# Patient Record
Sex: MALE | Race: WHITE | NOT HISPANIC OR LATINO | Employment: OTHER | URBAN - METROPOLITAN AREA
[De-identification: names, ages, dates, MRNs, and addresses within clinical notes are randomized per-mention and may not be internally consistent; named-entity substitution may affect disease eponyms.]

---

## 2017-05-27 DIAGNOSIS — F20.9 SCHIZOPHRENIA, UNSPECIFIED TYPE: ICD-10-CM

## 2017-05-29 RX ORDER — ARIPIPRAZOLE 20 MG/1
TABLET ORAL
Qty: 90 TABLET | OUTPATIENT
Start: 2017-05-29

## 2017-08-14 DIAGNOSIS — F20.9 SCHIZOPHRENIA, UNSPECIFIED TYPE: ICD-10-CM

## 2017-08-15 RX ORDER — ARIPIPRAZOLE 20 MG/1
20 TABLET ORAL DAILY
Qty: 90 TABLET | Refills: 0 | Status: SHIPPED | OUTPATIENT
Start: 2017-08-15 | End: 2017-10-20 | Stop reason: SDUPTHER

## 2017-10-20 ENCOUNTER — OFFICE VISIT (OUTPATIENT)
Dept: FAMILY MEDICINE | Facility: CLINIC | Age: 72
End: 2017-10-20
Payer: MEDICARE

## 2017-10-20 VITALS
HEIGHT: 70 IN | OXYGEN SATURATION: 98 % | WEIGHT: 159.81 LBS | SYSTOLIC BLOOD PRESSURE: 130 MMHG | TEMPERATURE: 98 F | HEART RATE: 78 BPM | BODY MASS INDEX: 22.88 KG/M2 | DIASTOLIC BLOOD PRESSURE: 66 MMHG

## 2017-10-20 DIAGNOSIS — F20.9 SCHIZOPHRENIA, UNSPECIFIED TYPE: ICD-10-CM

## 2017-10-20 DIAGNOSIS — D48.9 NEOPLASM OF UNCERTAIN BEHAVIOR: ICD-10-CM

## 2017-10-20 DIAGNOSIS — Z00.00 ANNUAL PHYSICAL EXAM: Primary | ICD-10-CM

## 2017-10-20 PROCEDURE — 99397 PER PM REEVAL EST PAT 65+ YR: CPT | Mod: S$GLB,,, | Performed by: FAMILY MEDICINE

## 2017-10-20 PROCEDURE — 99499 UNLISTED E&M SERVICE: CPT | Mod: S$GLB,,, | Performed by: FAMILY MEDICINE

## 2017-10-20 PROCEDURE — 99999 PR PBB SHADOW E&M-EST. PATIENT-LVL III: CPT | Mod: PBBFAC,,, | Performed by: FAMILY MEDICINE

## 2017-10-20 NOTE — PROGRESS NOTES
"Chief Complaint   Patient presents with    Annual Exam     SUBJECTIVE:   Antonio Torres Jr. is a 72 y.o. male presenting for his annual checkup.  Current Outpatient Prescriptions   Medication Sig Dispense Refill    aripiprazole (ABILIFY) 20 MG Tab Take 1 tablet (20 mg total) by mouth once daily. Due for follow up 90 tablet 0    fish oil-omega-3 fatty acids 300-1,000 mg capsule Take 2 g by mouth once daily.      loratadine (CLARITIN) 10 mg tablet Take 10 mg by mouth once daily.       No current facility-administered medications for this visit.      Allergies: Patient has no known allergies.     ROS:  Feeling well. No dyspnea or chest pain on exertion. No abdominal pain, change in bowel habits, black or bloody stools. No urinary tract or prostatic symptoms. No neurological complaints.    OBJECTIVE:   The patient appears well, alert, oriented x 3, in no distress.   /66   Pulse 78   Temp 97.8 °F (36.6 °C) (Oral)   Ht 5' 10" (1.778 m)   Wt 72.5 kg (159 lb 13.3 oz)   SpO2 98%   BMI 22.93 kg/m²   ENT normal.  Neck supple. No adenopathy or thyromegaly. DALE. Lungs are clear, good air entry, no wheezes, rhonchi or rales. S1 and S2 normal, no murmurs, regular rate and rhythm. Abdomen is soft without tenderness, guarding, mass or organomegaly.  exam: deferred.  Extremities show no edema, normal peripheral pulses. Neurological is normal without focal findings.  Lesion on the scalp with kristin now treating with Derm following his scalp lesions    ASSESSMENT:   1. Schizophrenia, unspecified type      Annual exam      PLAN:     Antonio was seen today for annual exam.    Diagnoses and all orders for this visit:    Annual physical exam    Schizophrenia, unspecified type  -     aripiprazole (ABILIFY) 20 MG Tab; Take 1 tablet (20 mg total) by mouth once daily. Due for follow up    Neoplasm of uncertain behavior        The current medical regimen is effective;  continue present plan and medications.  F/u in 6 months " for med check  Answers for HPI/ROS submitted by the patient on 10/19/2017   activity change: No  unexpected weight change: No  neck pain: No  hearing loss: No  rhinorrhea: No  trouble swallowing: No  eye discharge: No  visual disturbance: No  chest tightness: No  wheezing: No  chest pain: No  palpitations: No  blood in stool: No  constipation: No  vomiting: No  diarrhea: No  polydipsia: No  polyuria: No  difficulty urinating: No  urgency: No  hematuria: No  joint swelling: No  arthralgias: No  headaches: No  weakness: No  confusion: No  dysphoric mood: No

## 2017-10-22 RX ORDER — ARIPIPRAZOLE 20 MG/1
20 TABLET ORAL DAILY
Qty: 90 TABLET | Refills: 3 | Status: SHIPPED | OUTPATIENT
Start: 2017-10-22 | End: 2018-11-12 | Stop reason: SDUPTHER

## 2017-10-24 ENCOUNTER — LAB VISIT (OUTPATIENT)
Dept: LAB | Facility: HOSPITAL | Age: 72
End: 2017-10-24
Attending: FAMILY MEDICINE
Payer: MEDICARE

## 2017-10-24 ENCOUNTER — TELEPHONE (OUTPATIENT)
Dept: FAMILY MEDICINE | Facility: CLINIC | Age: 72
End: 2017-10-24

## 2017-10-24 DIAGNOSIS — Z00.00 ENCOUNTER FOR ANNUAL PHYSICAL EXAM: Primary | ICD-10-CM

## 2017-10-24 DIAGNOSIS — Z00.00 ENCOUNTER FOR ANNUAL PHYSICAL EXAM: ICD-10-CM

## 2017-10-24 LAB
ALBUMIN SERPL BCP-MCNC: 3.6 G/DL
ALP SERPL-CCNC: 72 U/L
ALT SERPL W/O P-5'-P-CCNC: 22 U/L
ANION GAP SERPL CALC-SCNC: 7 MMOL/L
AST SERPL-CCNC: 28 U/L
BASOPHILS # BLD AUTO: 0.03 K/UL
BASOPHILS NFR BLD: 0.4 %
BILIRUB SERPL-MCNC: 0.9 MG/DL
BUN SERPL-MCNC: 13 MG/DL
CALCIUM SERPL-MCNC: 9.6 MG/DL
CHLORIDE SERPL-SCNC: 106 MMOL/L
CO2 SERPL-SCNC: 28 MMOL/L
COMPLEXED PSA SERPL-MCNC: 2.2 NG/ML
CREAT SERPL-MCNC: 1.5 MG/DL
DIFFERENTIAL METHOD: ABNORMAL
EOSINOPHIL # BLD AUTO: 0.1 K/UL
EOSINOPHIL NFR BLD: 1.2 %
ERYTHROCYTE [DISTWIDTH] IN BLOOD BY AUTOMATED COUNT: 13.1 %
EST. GFR  (AFRICAN AMERICAN): 52 ML/MIN/1.73 M^2
EST. GFR  (NON AFRICAN AMERICAN): 45 ML/MIN/1.73 M^2
ESTIMATED AVG GLUCOSE: 100 MG/DL
GLUCOSE SERPL-MCNC: 86 MG/DL
HBA1C MFR BLD HPLC: 5.1 %
HCT VFR BLD AUTO: 46.4 %
HGB BLD-MCNC: 15.9 G/DL
LYMPHOCYTES # BLD AUTO: 1 K/UL
LYMPHOCYTES NFR BLD: 14.9 %
MCH RBC QN AUTO: 31.8 PG
MCHC RBC AUTO-ENTMCNC: 34.3 G/DL
MCV RBC AUTO: 93 FL
MONOCYTES # BLD AUTO: 0.6 K/UL
MONOCYTES NFR BLD: 8.8 %
NEUTROPHILS # BLD AUTO: 5.1 K/UL
NEUTROPHILS NFR BLD: 74.6 %
PLATELET # BLD AUTO: 218 K/UL
PMV BLD AUTO: 9.9 FL
POTASSIUM SERPL-SCNC: 4.8 MMOL/L
PROT SERPL-MCNC: 7.2 G/DL
RBC # BLD AUTO: 5 M/UL
SODIUM SERPL-SCNC: 141 MMOL/L
TSH SERPL DL<=0.005 MIU/L-ACNC: 3.41 UIU/ML
URATE SERPL-MCNC: 9.3 MG/DL
WBC # BLD AUTO: 6.78 K/UL

## 2017-10-24 PROCEDURE — 84443 ASSAY THYROID STIM HORMONE: CPT

## 2017-10-24 PROCEDURE — 84153 ASSAY OF PSA TOTAL: CPT

## 2017-10-24 PROCEDURE — 84550 ASSAY OF BLOOD/URIC ACID: CPT

## 2017-10-24 PROCEDURE — 36415 COLL VENOUS BLD VENIPUNCTURE: CPT

## 2017-10-24 PROCEDURE — 83036 HEMOGLOBIN GLYCOSYLATED A1C: CPT

## 2017-10-24 PROCEDURE — 80053 COMPREHEN METABOLIC PANEL: CPT

## 2017-10-24 PROCEDURE — 85025 COMPLETE CBC W/AUTO DIFF WBC: CPT

## 2017-10-30 ENCOUNTER — TELEPHONE (OUTPATIENT)
Dept: FAMILY MEDICINE | Facility: CLINIC | Age: 72
End: 2017-10-30

## 2017-10-30 NOTE — TELEPHONE ENCOUNTER
----- Message from Fabio Lennon MD sent at 10/22/2017  1:20 PM CDT -----  Schedule 6 month recall, thank you!

## 2018-10-22 ENCOUNTER — OFFICE VISIT (OUTPATIENT)
Dept: FAMILY MEDICINE | Facility: CLINIC | Age: 73
End: 2018-10-22
Payer: MEDICARE

## 2018-10-22 VITALS
HEIGHT: 70 IN | SYSTOLIC BLOOD PRESSURE: 130 MMHG | TEMPERATURE: 98 F | WEIGHT: 163.13 LBS | HEART RATE: 66 BPM | DIASTOLIC BLOOD PRESSURE: 70 MMHG | OXYGEN SATURATION: 97 % | BODY MASS INDEX: 23.35 KG/M2

## 2018-10-22 DIAGNOSIS — F20.9 SCHIZOPHRENIA, UNSPECIFIED TYPE: ICD-10-CM

## 2018-10-22 DIAGNOSIS — E79.0 HYPERURICEMIA: ICD-10-CM

## 2018-10-22 DIAGNOSIS — Z00.00 ANNUAL PHYSICAL EXAM: Primary | ICD-10-CM

## 2018-10-22 DIAGNOSIS — E78.00 HYPERCHOLESTEROLEMIA: ICD-10-CM

## 2018-10-22 DIAGNOSIS — D48.9 NEOPLASM OF UNCERTAIN BEHAVIOR: ICD-10-CM

## 2018-10-22 PROCEDURE — 99214 OFFICE O/P EST MOD 30 MIN: CPT | Mod: S$PBB,,, | Performed by: FAMILY MEDICINE

## 2018-10-22 PROCEDURE — 99499 UNLISTED E&M SERVICE: CPT | Mod: S$GLB,,, | Performed by: FAMILY MEDICINE

## 2018-10-22 PROCEDURE — 99213 OFFICE O/P EST LOW 20 MIN: CPT | Mod: PBBFAC,PO,25 | Performed by: FAMILY MEDICINE

## 2018-10-22 PROCEDURE — 99999 PR PBB SHADOW E&M-EST. PATIENT-LVL III: CPT | Mod: PBBFAC,,, | Performed by: FAMILY MEDICINE

## 2018-10-22 PROCEDURE — 90662 IIV NO PRSV INCREASED AG IM: CPT | Mod: PBBFAC,PO

## 2018-10-22 NOTE — PROGRESS NOTES
"Chief Complaint   Patient presents with    Annual Exam     SUBJECTIVE:   Antonio Torres Jr. is a 73 y.o. male presenting for his annual checkup.  Current Outpatient Medications   Medication Sig Dispense Refill    aripiprazole (ABILIFY) 20 MG Tab Take 1 tablet (20 mg total) by mouth once daily. Due for follow up 90 tablet 3    fish oil-omega-3 fatty acids 300-1,000 mg capsule Take 2 g by mouth once daily.      loratadine (CLARITIN) 10 mg tablet Take 10 mg by mouth once daily.       No current facility-administered medications for this visit.      Allergies: Patient has no known allergies.     ROS:  Feeling well. No dyspnea or chest pain on exertion. No abdominal pain, change in bowel habits, black or bloody stools. No urinary tract or prostatic symptoms. No neurological complaints.    OBJECTIVE:   The patient appears well, alert, oriented x 3, in no distress.   /70   Pulse 66   Temp 98.4 °F (36.9 °C) (Oral)   Ht 5' 10" (1.778 m)   Wt 74 kg (163 lb 2.3 oz)   SpO2 97%   BMI 23.41 kg/m²   ENT normal.  Neck supple. No adenopathy or thyromegaly. DALE. Lungs are clear, good air entry, no wheezes, rhonchi or rales. S1 and S2 normal, no murmurs, regular rate and rhythm. Abdomen is soft without tenderness, guarding, mass or organomegaly.  exam: deferred.  Extremities show no edema, normal peripheral pulses. Neurological is normal without focal findings.    ASSESSMENT:   1. Annual physical exam    2. Schizophrenia, unspecified type    3. Neoplasm of uncertain behavior    4. Hypercholesterolemia    5. Hyperuricemia        PLAN:     Counseled on age appropriate medical preventative services, including age appropriate cancer screenings, over all nutritional health, need for a consistent exercise regimen and an over all push towards maintaining a vigorous and active lifestyle.  Counseled on age appropriate vaccines and discussed upcoming health care needs based on age/gender.  Spent time with patient " counseling on need for a good patient/doctor relationship moving forward.  Discussed use of common OTC medications and supplements.  Discussed common dietary aids and use of caffeine and the need for good sleep hygiene and stress management.      We will watch him  No blood work.

## 2018-11-12 DIAGNOSIS — F20.9 SCHIZOPHRENIA, UNSPECIFIED TYPE: ICD-10-CM

## 2018-11-12 RX ORDER — ARIPIPRAZOLE 20 MG/1
TABLET ORAL
Qty: 90 TABLET | Refills: 3 | Status: SHIPPED | OUTPATIENT
Start: 2018-11-12 | End: 2019-08-19 | Stop reason: SDUPTHER

## 2019-01-14 ENCOUNTER — PATIENT MESSAGE (OUTPATIENT)
Dept: FAMILY MEDICINE | Facility: CLINIC | Age: 74
End: 2019-01-14

## 2019-08-19 DIAGNOSIS — F20.9 SCHIZOPHRENIA, UNSPECIFIED TYPE: ICD-10-CM

## 2019-08-19 RX ORDER — ARIPIPRAZOLE 20 MG/1
TABLET ORAL
Qty: 90 TABLET | Refills: 3 | Status: SHIPPED | OUTPATIENT
Start: 2019-08-19 | End: 2020-08-24 | Stop reason: SDUPTHER

## 2019-10-08 ENCOUNTER — PATIENT OUTREACH (OUTPATIENT)
Dept: ADMINISTRATIVE | Facility: HOSPITAL | Age: 74
End: 2019-10-08

## 2019-10-22 ENCOUNTER — OFFICE VISIT (OUTPATIENT)
Dept: FAMILY MEDICINE | Facility: CLINIC | Age: 74
End: 2019-10-22
Payer: MEDICARE

## 2019-10-22 VITALS
HEART RATE: 72 BPM | DIASTOLIC BLOOD PRESSURE: 70 MMHG | OXYGEN SATURATION: 98 % | SYSTOLIC BLOOD PRESSURE: 112 MMHG | BODY MASS INDEX: 23.83 KG/M2 | WEIGHT: 166.44 LBS | TEMPERATURE: 98 F | HEIGHT: 70 IN

## 2019-10-22 DIAGNOSIS — Z23 NEEDS FLU SHOT: ICD-10-CM

## 2019-10-22 DIAGNOSIS — Z00.00 ANNUAL PHYSICAL EXAM: Primary | ICD-10-CM

## 2019-10-22 DIAGNOSIS — N18.30 CKD (CHRONIC KIDNEY DISEASE) STAGE 3, GFR 30-59 ML/MIN: ICD-10-CM

## 2019-10-22 DIAGNOSIS — F20.9 SCHIZOPHRENIA, UNSPECIFIED TYPE: ICD-10-CM

## 2019-10-22 DIAGNOSIS — R79.9 ABNORMAL FINDING OF BLOOD CHEMISTRY, UNSPECIFIED: ICD-10-CM

## 2019-10-22 DIAGNOSIS — R73.03 PREDIABETES: ICD-10-CM

## 2019-10-22 PROCEDURE — G0008 ADMIN INFLUENZA VIRUS VAC: HCPCS | Mod: S$GLB,,, | Performed by: FAMILY MEDICINE

## 2019-10-22 PROCEDURE — 99999 PR PBB SHADOW E&M-EST. PATIENT-LVL III: ICD-10-PCS | Mod: PBBFAC,,, | Performed by: FAMILY MEDICINE

## 2019-10-22 PROCEDURE — 90662 IIV NO PRSV INCREASED AG IM: CPT | Mod: S$GLB,,, | Performed by: FAMILY MEDICINE

## 2019-10-22 PROCEDURE — G0008 FLU VACCINE - HIGH DOSE (65+) PRESERVATIVE FREE IM: ICD-10-PCS | Mod: S$GLB,,, | Performed by: FAMILY MEDICINE

## 2019-10-22 PROCEDURE — 99214 OFFICE O/P EST MOD 30 MIN: CPT | Mod: 25,S$GLB,, | Performed by: FAMILY MEDICINE

## 2019-10-22 PROCEDURE — 90662 FLU VACCINE - HIGH DOSE (65+) PRESERVATIVE FREE IM: ICD-10-PCS | Mod: S$GLB,,, | Performed by: FAMILY MEDICINE

## 2019-10-22 PROCEDURE — 99499 UNLISTED E&M SERVICE: CPT | Mod: S$GLB,,, | Performed by: FAMILY MEDICINE

## 2019-10-22 PROCEDURE — 99214 PR OFFICE/OUTPT VISIT, EST, LEVL IV, 30-39 MIN: ICD-10-PCS | Mod: 25,S$GLB,, | Performed by: FAMILY MEDICINE

## 2019-10-22 PROCEDURE — 99499 RISK ADDL DX/OHS AUDIT: ICD-10-PCS | Mod: S$GLB,,, | Performed by: FAMILY MEDICINE

## 2019-10-22 PROCEDURE — 99999 PR PBB SHADOW E&M-EST. PATIENT-LVL III: CPT | Mod: PBBFAC,,, | Performed by: FAMILY MEDICINE

## 2019-10-22 NOTE — PROGRESS NOTES
After obtaining consent, and per orders of Dr. Lennon, injection of high dose flu given into the right deltoid by Shelby Anand. Patient instructed to remain in clinic for 20 minutes afterwards, and to report any adverse reaction to me immediately.

## 2019-10-22 NOTE — PROGRESS NOTES
Chief Complaint   Patient presents with    Annual Exam       SUBJECTIVE:  Antonio Torres Jr. is a 74 y.o. male here for new problem of annual and doing well, he has no concerns or issues, wants to get some blood work.  He is independent with ADL's, no depression, no falls.  He has thought about Adv directives but doesn't seem to have any on file will send to his family to see about getting that done..  Currently has co-morbidities including per problem list.      Past Medical History:   Diagnosis Date    Chronic kidney disease     Depression     Hematuria     had neg work up with Dr. Stafford    Hypercholesterolemia 3/13    Schizophrenia      Past Surgical History:   Procedure Laterality Date    HERNIA REPAIR       Social History     Socioeconomic History    Marital status: Single     Spouse name: Not on file    Number of children: Not on file    Years of education: Not on file    Highest education level: Not on file   Occupational History    Not on file   Social Needs    Financial resource strain: Not on file    Food insecurity:     Worry: Not on file     Inability: Not on file    Transportation needs:     Medical: Not on file     Non-medical: Not on file   Tobacco Use    Smoking status: Never Smoker    Smokeless tobacco: Never Used   Substance and Sexual Activity    Alcohol use: No    Drug use: No    Sexual activity: Never   Lifestyle    Physical activity:     Days per week: Not on file     Minutes per session: Not on file    Stress: Not on file   Relationships    Social connections:     Talks on phone: Not on file     Gets together: Not on file     Attends Buddhism service: Not on file     Active member of club or organization: Not on file     Attends meetings of clubs or organizations: Not on file     Relationship status: Not on file   Other Topics Concern    Not on file   Social History Narrative    Not on file     Family History   Problem Relation Age of Onset    Alzheimer's  "disease Mother     Heart disease Father     Pulmonary embolism Father     Hypertension Sister      Current Outpatient Medications on File Prior to Visit   Medication Sig Dispense Refill    ARIPiprazole (ABILIFY) 20 MG Tab TAKE ONE TABLET BY MOUTH EVERY DAY 90 tablet 3    fish oil-omega-3 fatty acids 300-1,000 mg capsule Take 2 g by mouth once daily.      loratadine (CLARITIN) 10 mg tablet Take 10 mg by mouth once daily.       No current facility-administered medications on file prior to visit.      Review of patient's allergies indicates:  No Known Allergies      Review of Systems   Constitutional: Negative.    HENT: Negative.    Eyes: Negative.    Respiratory: Negative.    Cardiovascular: Negative.    Gastrointestinal: Negative.    Genitourinary: Negative.    Musculoskeletal: Negative.    Skin: Negative.    Neurological: Negative.    Endo/Heme/Allergies: Negative.    Psychiatric/Behavioral: Negative.        OBJECTIVE:  /70   Pulse 72   Temp 97.9 °F (36.6 °C) (Oral)   Ht 5' 10" (1.778 m)   Wt 75.5 kg (166 lb 7.2 oz)   SpO2 98%   BMI 23.88 kg/m²     Wt Readings from Last 3 Encounters:   10/22/19 75.5 kg (166 lb 7.2 oz)   10/22/18 74 kg (163 lb 2.3 oz)   10/20/17 72.5 kg (159 lb 13.3 oz)     BP Readings from Last 3 Encounters:   10/22/19 112/70   10/22/18 130/70   10/20/17 130/66       He appears well, in no apparent distress.  Alert and oriented times three, pleasant and cooperative. Vital signs are as documented in vital signs section.  S1 and S2 normal, no murmurs, clicks, gallops or rubs. Regular rate and rhythm. Chest is clear; no wheezes or rales. No edema or JVD.  Chronic skin changes.  Mood is farely flat, but he responds to everything and no issues with SI/HI  He has some sarcopenia overall but can transfer well.  No obvious neurological deficits.  He has bloated abdomen but otherwise no issues noted or masses.    Review of old Records:  Reviewed per epic and [facility]    Review of old " labs:  Reviewed from 2017    Review of old imaging:  n/a    ASSESSMENT:  Problem List Items Addressed This Visit     Schizophrenia    Relevant Orders    CBC auto differential    Comprehensive metabolic panel    Hemoglobin A1c    Lipid panel    Microalbumin/creatinine urine ratio    Urinalysis    Uric acid    TSH    CKD (chronic kidney disease) stage 3, GFR 30-59 ml/min    Relevant Orders    CBC auto differential    Comprehensive metabolic panel    Hemoglobin A1c    Lipid panel    Microalbumin/creatinine urine ratio    Urinalysis    Uric acid    TSH      Other Visit Diagnoses     Annual physical exam    -  Primary    Needs flu shot        Relevant Orders    Influenza - High Dose (65+) (PF) (IM) (Completed)    Abnormal finding of blood chemistry, unspecified         Relevant Orders    Hemoglobin A1c    Lipid panel    Prediabetes         Relevant Orders    TSH          ICD-10-CM ICD-9-CM   1. Annual physical exam Z00.00 V70.0   2. Schizophrenia, unspecified type F20.9 295.90   3. CKD (chronic kidney disease) stage 3, GFR 30-59 ml/min N18.3 585.3   4. Needs flu shot Z23 V04.81   5. Abnormal finding of blood chemistry, unspecified  R79.9 790.6   6. Prediabetes  R73.03 790.29         PLAN:  1. Schizophrenia, unspecified type  The current medical regimen is effective;  continue present plan and medications.    - CBC auto differential; Future  - Comprehensive metabolic panel; Future  - Hemoglobin A1c; Future  - Lipid panel; Future  - Microalbumin/creatinine urine ratio; Future  - Urinalysis; Future  - Uric acid; Future  - TSH; Future    2. CKD (chronic kidney disease) stage 3, GFR 30-59 ml/min  The current medical regimen is effective;  continue present plan and medications.  recheck  - CBC auto differential; Future  - Comprehensive metabolic panel; Future  - Hemoglobin A1c; Future  - Lipid panel; Future  - Microalbumin/creatinine urine ratio; Future  - Urinalysis; Future  - Uric acid; Future  - TSH; Future    3. Needs flu  shot    - Influenza - High Dose (65+) (PF) (IM)  - varicella-zoster [id number], PF, (SHINGRIX, PF,) 50 mcg/0.5 mL injection; Inject 0.5 mLs into the muscle once. for 1 dose  Dispense: 0.5 mL; Refill: 0    4. Abnormal finding of blood chemistry, unspecified     - Hemoglobin A1c; Future  - Lipid panel; Future    5. Prediabetes     - TSH; Future    6. Annual physical exam  Counseled on age appropriate medical preventative services, including age appropriate cancer screenings, over all nutritional health, need for a consistent exercise regimen and an over all push towards maintaining a vigorous and active lifestyle.  Counseled on age appropriate vaccines and discussed upcoming health care needs based on age/gender.  Spent time with patient counseling on need for a good patient/doctor relationship moving forward.  Discussed use of common OTC medications and supplements.  Discussed common dietary aids and use of caffeine and the need for good sleep hygiene and stress management.        Medication List with Changes/Refills   Current Medications    ARIPIPRAZOLE (ABILIFY) 20 MG TAB    TAKE ONE TABLET BY MOUTH EVERY DAY    FISH OIL-OMEGA-3 FATTY ACIDS 300-1,000 MG CAPSULE    Take 2 g by mouth once daily.    LORATADINE (CLARITIN) 10 MG TABLET    Take 10 mg by mouth once daily.       Follow up in about 1 year (around 10/22/2020) for wellness exam.

## 2019-10-24 ENCOUNTER — LAB VISIT (OUTPATIENT)
Dept: LAB | Facility: HOSPITAL | Age: 74
End: 2019-10-24
Attending: FAMILY MEDICINE
Payer: MEDICARE

## 2019-10-24 DIAGNOSIS — R73.03 PREDIABETES: ICD-10-CM

## 2019-10-24 DIAGNOSIS — N18.30 CKD (CHRONIC KIDNEY DISEASE) STAGE 3, GFR 30-59 ML/MIN: ICD-10-CM

## 2019-10-24 DIAGNOSIS — R79.9 ABNORMAL FINDING OF BLOOD CHEMISTRY, UNSPECIFIED: ICD-10-CM

## 2019-10-24 DIAGNOSIS — F20.9 SCHIZOPHRENIA, UNSPECIFIED TYPE: ICD-10-CM

## 2019-10-24 LAB
ALBUMIN SERPL BCP-MCNC: 4 G/DL (ref 3.5–5.2)
ALP SERPL-CCNC: 69 U/L (ref 55–135)
ALT SERPL W/O P-5'-P-CCNC: 23 U/L (ref 10–44)
ANION GAP SERPL CALC-SCNC: 8 MMOL/L (ref 8–16)
AST SERPL-CCNC: 22 U/L (ref 10–40)
BASOPHILS # BLD AUTO: 0.04 K/UL (ref 0–0.2)
BASOPHILS NFR BLD: 0.6 % (ref 0–1.9)
BILIRUB SERPL-MCNC: 1 MG/DL (ref 0.1–1)
BUN SERPL-MCNC: 13 MG/DL (ref 8–23)
CALCIUM SERPL-MCNC: 10.3 MG/DL (ref 8.7–10.5)
CHLORIDE SERPL-SCNC: 105 MMOL/L (ref 95–110)
CHOLEST SERPL-MCNC: 233 MG/DL (ref 120–199)
CHOLEST/HDLC SERPL: 4.2 {RATIO} (ref 2–5)
CO2 SERPL-SCNC: 29 MMOL/L (ref 23–29)
CREAT SERPL-MCNC: 1.7 MG/DL (ref 0.5–1.4)
DIFFERENTIAL METHOD: ABNORMAL
EOSINOPHIL # BLD AUTO: 0.2 K/UL (ref 0–0.5)
EOSINOPHIL NFR BLD: 2.3 % (ref 0–8)
ERYTHROCYTE [DISTWIDTH] IN BLOOD BY AUTOMATED COUNT: 12.9 % (ref 11.5–14.5)
EST. GFR  (AFRICAN AMERICAN): 44 ML/MIN/1.73 M^2
EST. GFR  (NON AFRICAN AMERICAN): 38 ML/MIN/1.73 M^2
ESTIMATED AVG GLUCOSE: 108 MG/DL (ref 68–131)
GLUCOSE SERPL-MCNC: 92 MG/DL (ref 70–110)
HBA1C MFR BLD HPLC: 5.4 % (ref 4–5.6)
HCT VFR BLD AUTO: 50.4 % (ref 40–54)
HDLC SERPL-MCNC: 55 MG/DL (ref 40–75)
HDLC SERPL: 23.6 % (ref 20–50)
HGB BLD-MCNC: 16.5 G/DL (ref 14–18)
IMM GRANULOCYTES # BLD AUTO: 0.02 K/UL (ref 0–0.04)
IMM GRANULOCYTES NFR BLD AUTO: 0.3 % (ref 0–0.5)
LDLC SERPL CALC-MCNC: 154.8 MG/DL (ref 63–159)
LYMPHOCYTES # BLD AUTO: 1 K/UL (ref 1–4.8)
LYMPHOCYTES NFR BLD: 15 % (ref 18–48)
MCH RBC QN AUTO: 31.3 PG (ref 27–31)
MCHC RBC AUTO-ENTMCNC: 32.7 G/DL (ref 32–36)
MCV RBC AUTO: 96 FL (ref 82–98)
MONOCYTES # BLD AUTO: 0.6 K/UL (ref 0.3–1)
MONOCYTES NFR BLD: 8.1 % (ref 4–15)
NEUTROPHILS # BLD AUTO: 5.1 K/UL (ref 1.8–7.7)
NEUTROPHILS NFR BLD: 73.7 % (ref 38–73)
NONHDLC SERPL-MCNC: 178 MG/DL
NRBC BLD-RTO: 0 /100 WBC
PLATELET # BLD AUTO: 212 K/UL (ref 150–350)
PMV BLD AUTO: 10.3 FL (ref 9.2–12.9)
POTASSIUM SERPL-SCNC: 4.7 MMOL/L (ref 3.5–5.1)
PROT SERPL-MCNC: 7 G/DL (ref 6–8.4)
RBC # BLD AUTO: 5.27 M/UL (ref 4.6–6.2)
SODIUM SERPL-SCNC: 142 MMOL/L (ref 136–145)
TRIGL SERPL-MCNC: 116 MG/DL (ref 30–150)
TSH SERPL DL<=0.005 MIU/L-ACNC: 2.63 UIU/ML (ref 0.4–4)
URATE SERPL-MCNC: 9.5 MG/DL (ref 3.4–7)
WBC # BLD AUTO: 6.93 K/UL (ref 3.9–12.7)

## 2019-10-24 PROCEDURE — 80053 COMPREHEN METABOLIC PANEL: CPT

## 2019-10-24 PROCEDURE — 83036 HEMOGLOBIN GLYCOSYLATED A1C: CPT

## 2019-10-24 PROCEDURE — 80061 LIPID PANEL: CPT

## 2019-10-24 PROCEDURE — 84550 ASSAY OF BLOOD/URIC ACID: CPT

## 2019-10-24 PROCEDURE — 84443 ASSAY THYROID STIM HORMONE: CPT

## 2019-10-24 PROCEDURE — 36415 COLL VENOUS BLD VENIPUNCTURE: CPT | Mod: PO

## 2019-10-24 PROCEDURE — 85025 COMPLETE CBC W/AUTO DIFF WBC: CPT

## 2020-08-14 DIAGNOSIS — Z11.59 NEED FOR HEPATITIS C SCREENING TEST: ICD-10-CM

## 2020-08-23 DIAGNOSIS — F20.9 SCHIZOPHRENIA, UNSPECIFIED TYPE: ICD-10-CM

## 2020-08-23 RX ORDER — ARIPIPRAZOLE 20 MG/1
20 TABLET ORAL DAILY
Qty: 90 TABLET | Refills: 3 | Status: CANCELLED | OUTPATIENT
Start: 2020-08-23

## 2020-08-24 ENCOUNTER — PATIENT MESSAGE (OUTPATIENT)
Dept: FAMILY MEDICINE | Facility: CLINIC | Age: 75
End: 2020-08-24

## 2020-08-24 DIAGNOSIS — N13.8 BPH WITH URINARY OBSTRUCTION: ICD-10-CM

## 2020-08-24 DIAGNOSIS — N40.1 BPH WITH URINARY OBSTRUCTION: ICD-10-CM

## 2020-08-24 DIAGNOSIS — F20.9 SCHIZOPHRENIA, UNSPECIFIED TYPE: Primary | ICD-10-CM

## 2020-08-24 DIAGNOSIS — F41.3 OTHER MIXED ANXIETY DISORDERS: ICD-10-CM

## 2020-08-24 DIAGNOSIS — N18.9 CHRONIC KIDNEY DISEASE, UNSPECIFIED: ICD-10-CM

## 2020-08-24 DIAGNOSIS — N18.30 CKD (CHRONIC KIDNEY DISEASE) STAGE 3, GFR 30-59 ML/MIN: ICD-10-CM

## 2020-08-25 ENCOUNTER — TELEPHONE (OUTPATIENT)
Dept: FAMILY MEDICINE | Facility: CLINIC | Age: 75
End: 2020-08-25

## 2020-10-05 ENCOUNTER — LAB VISIT (OUTPATIENT)
Dept: LAB | Facility: HOSPITAL | Age: 75
End: 2020-10-05
Attending: FAMILY MEDICINE
Payer: MEDICARE

## 2020-10-05 DIAGNOSIS — N40.1 BPH WITH URINARY OBSTRUCTION: ICD-10-CM

## 2020-10-05 DIAGNOSIS — F20.9 SCHIZOPHRENIA, UNSPECIFIED TYPE: ICD-10-CM

## 2020-10-05 DIAGNOSIS — N18.30 CKD (CHRONIC KIDNEY DISEASE) STAGE 3, GFR 30-59 ML/MIN: ICD-10-CM

## 2020-10-05 DIAGNOSIS — N13.8 BPH WITH URINARY OBSTRUCTION: ICD-10-CM

## 2020-10-05 LAB
BILIRUB UR QL STRIP: NEGATIVE
CLARITY UR: CLEAR
COLOR UR: YELLOW
GLUCOSE UR QL STRIP: NEGATIVE
HGB UR QL STRIP: NEGATIVE
KETONES UR QL STRIP: NEGATIVE
LEUKOCYTE ESTERASE UR QL STRIP: NEGATIVE
NITRITE UR QL STRIP: NEGATIVE
PH UR STRIP: 5 [PH] (ref 5–8)
PROT UR QL STRIP: NEGATIVE
SP GR UR STRIP: 1.01 (ref 1–1.03)
URN SPEC COLLECT METH UR: NORMAL
UROBILINOGEN UR STRIP-ACNC: NEGATIVE EU/DL

## 2020-10-05 PROCEDURE — 81003 URINALYSIS AUTO W/O SCOPE: CPT

## 2020-10-08 ENCOUNTER — OFFICE VISIT (OUTPATIENT)
Dept: FAMILY MEDICINE | Facility: CLINIC | Age: 75
End: 2020-10-08
Payer: MEDICARE

## 2020-10-08 VITALS
OXYGEN SATURATION: 95 % | TEMPERATURE: 99 F | WEIGHT: 165.38 LBS | HEIGHT: 67 IN | SYSTOLIC BLOOD PRESSURE: 120 MMHG | DIASTOLIC BLOOD PRESSURE: 70 MMHG | BODY MASS INDEX: 25.96 KG/M2 | HEART RATE: 78 BPM

## 2020-10-08 DIAGNOSIS — N40.1 BPH WITH URINARY OBSTRUCTION: ICD-10-CM

## 2020-10-08 DIAGNOSIS — E78.00 HYPERCHOLESTEROLEMIA: ICD-10-CM

## 2020-10-08 DIAGNOSIS — Z00.00 ANNUAL PHYSICAL EXAM: Primary | ICD-10-CM

## 2020-10-08 DIAGNOSIS — E79.0 HYPERURICEMIA: ICD-10-CM

## 2020-10-08 DIAGNOSIS — F20.9 SCHIZOPHRENIA, UNSPECIFIED TYPE: ICD-10-CM

## 2020-10-08 DIAGNOSIS — N13.8 BPH WITH URINARY OBSTRUCTION: ICD-10-CM

## 2020-10-08 DIAGNOSIS — N18.32 STAGE 3B CHRONIC KIDNEY DISEASE: ICD-10-CM

## 2020-10-08 PROCEDURE — 99397 PR PREVENTIVE VISIT,EST,65 & OVER: ICD-10-PCS | Mod: S$GLB,,, | Performed by: FAMILY MEDICINE

## 2020-10-08 PROCEDURE — 99397 PER PM REEVAL EST PAT 65+ YR: CPT | Mod: S$GLB,,, | Performed by: FAMILY MEDICINE

## 2020-10-08 PROCEDURE — 99499 UNLISTED E&M SERVICE: CPT | Mod: S$GLB,,, | Performed by: FAMILY MEDICINE

## 2020-10-08 PROCEDURE — 99499 RISK ADDL DX/OHS AUDIT: ICD-10-PCS | Mod: S$GLB,,, | Performed by: FAMILY MEDICINE

## 2020-10-08 PROCEDURE — 99999 PR PBB SHADOW E&M-EST. PATIENT-LVL III: CPT | Mod: PBBFAC,,, | Performed by: FAMILY MEDICINE

## 2020-10-08 PROCEDURE — 99999 PR PBB SHADOW E&M-EST. PATIENT-LVL III: ICD-10-PCS | Mod: PBBFAC,,, | Performed by: FAMILY MEDICINE

## 2020-10-08 NOTE — PROGRESS NOTES
"Chief Complaint   Patient presents with    Annual Exam     SUBJECTIVE:   Antonio Torres Jr. is a 75 y.o. male presenting for his annual checkup.  Current Outpatient Medications   Medication Sig Dispense Refill    ARIPiprazole (ABILIFY) 20 MG Tab Take 1 tablet (20 mg total) by mouth once daily. 90 tablet 3    fish oil-omega-3 fatty acids 300-1,000 mg capsule Take 2 g by mouth once daily.      loratadine (CLARITIN) 10 mg tablet Take 10 mg by mouth once daily.       No current facility-administered medications for this visit.      Allergies: Patient has no known allergies.     ROS:  Feeling well. No dyspnea or chest pain on exertion. No abdominal pain, change in bowel habits, black or bloody stools. No urinary tract or prostatic symptoms. No neurological complaints.    OBJECTIVE:   The patient appears well, alert, oriented x 3, in no distress.   /70   Pulse 78   Temp 99.1 °F (37.3 °C) (Oral)   Ht 5' 7" (1.702 m)   Wt 75 kg (165 lb 5.5 oz)   SpO2 95%   BMI 25.90 kg/m²   ENT normal.  Neck supple. No adenopathy or thyromegaly. DALE. Lungs are clear, good air entry, no wheezes, rhonchi or rales. S1 and S2 normal, no murmurs, regular rate and rhythm. Abdomen is soft without tenderness, guarding, mass or organomegaly.  exam: defered.  Extremities show no edema, normal peripheral pulses. Neurological is normal without focal findings. He is a little stff. Chronic skin changes, arthritic changes noted.  Has some tremor noted    ASSESSMENT:   1. Annual physical exam    2. Schizophrenia, unspecified type    3. Stage 3b chronic kidney disease    4. Hypercholesterolemia    5. Hyperuricemia    6. BPH with urinary obstruction          PLAN:   Counseled on age appropriate medical preventative services, including age appropriate cancer screenings, over all nutritional health, need for a consistent exercise regimen and an over all push towards maintaining a vigorous and active lifestyle.  Counseled on age " appropriate vaccines and discussed upcoming health care needs based on age/gender.  Spent time with patient counseling on need for a good patient/doctor relationship moving forward.  Discussed use of common OTC medications and supplements.  Discussed common dietary aids and use of caffeine and the need for good sleep hygiene and stress management.    The current medical regimen is effective;  continue present plan and medications.    CKD monitor    F/u in 1 year for wellness

## 2021-01-20 ENCOUNTER — OFFICE VISIT (OUTPATIENT)
Dept: FAMILY MEDICINE | Facility: CLINIC | Age: 76
End: 2021-01-20
Payer: MEDICARE

## 2021-01-20 ENCOUNTER — LAB VISIT (OUTPATIENT)
Dept: LAB | Facility: HOSPITAL | Age: 76
End: 2021-01-20
Attending: FAMILY MEDICINE
Payer: MEDICARE

## 2021-01-20 VITALS
OXYGEN SATURATION: 99 % | BODY MASS INDEX: 24.58 KG/M2 | WEIGHT: 156.94 LBS | HEART RATE: 80 BPM | DIASTOLIC BLOOD PRESSURE: 80 MMHG | TEMPERATURE: 98 F | SYSTOLIC BLOOD PRESSURE: 124 MMHG

## 2021-01-20 DIAGNOSIS — F20.9 SCHIZOPHRENIA, UNSPECIFIED TYPE: ICD-10-CM

## 2021-01-20 DIAGNOSIS — R55 SYNCOPE, UNSPECIFIED SYNCOPE TYPE: Primary | ICD-10-CM

## 2021-01-20 DIAGNOSIS — R55 SYNCOPE, UNSPECIFIED SYNCOPE TYPE: ICD-10-CM

## 2021-01-20 LAB
ALBUMIN SERPL BCP-MCNC: 4 G/DL (ref 3.5–5.2)
ALP SERPL-CCNC: 78 U/L (ref 55–135)
ALT SERPL W/O P-5'-P-CCNC: 32 U/L (ref 10–44)
ANION GAP SERPL CALC-SCNC: 11 MMOL/L (ref 8–16)
AST SERPL-CCNC: 22 U/L (ref 10–40)
BASOPHILS # BLD AUTO: 0.04 K/UL (ref 0–0.2)
BASOPHILS NFR BLD: 0.4 % (ref 0–1.9)
BILIRUB SERPL-MCNC: 0.8 MG/DL (ref 0.1–1)
BUN SERPL-MCNC: 18 MG/DL (ref 8–23)
CALCIUM SERPL-MCNC: 10 MG/DL (ref 8.7–10.5)
CHLORIDE SERPL-SCNC: 101 MMOL/L (ref 95–110)
CO2 SERPL-SCNC: 27 MMOL/L (ref 23–29)
CREAT SERPL-MCNC: 1.6 MG/DL (ref 0.5–1.4)
DIFFERENTIAL METHOD: ABNORMAL
EOSINOPHIL # BLD AUTO: 0.2 K/UL (ref 0–0.5)
EOSINOPHIL NFR BLD: 1.7 % (ref 0–8)
ERYTHROCYTE [DISTWIDTH] IN BLOOD BY AUTOMATED COUNT: 13.2 % (ref 11.5–14.5)
EST. GFR  (AFRICAN AMERICAN): 47 ML/MIN/1.73 M^2
EST. GFR  (NON AFRICAN AMERICAN): 41 ML/MIN/1.73 M^2
GLUCOSE SERPL-MCNC: 88 MG/DL (ref 70–110)
HCT VFR BLD AUTO: 50.7 % (ref 40–54)
HGB BLD-MCNC: 16.4 G/DL (ref 14–18)
IMM GRANULOCYTES # BLD AUTO: 0.04 K/UL (ref 0–0.04)
IMM GRANULOCYTES NFR BLD AUTO: 0.4 % (ref 0–0.5)
LYMPHOCYTES # BLD AUTO: 1.5 K/UL (ref 1–4.8)
LYMPHOCYTES NFR BLD: 16.2 % (ref 18–48)
MCH RBC QN AUTO: 30.9 PG (ref 27–31)
MCHC RBC AUTO-ENTMCNC: 32.3 G/DL (ref 32–36)
MCV RBC AUTO: 96 FL (ref 82–98)
MONOCYTES # BLD AUTO: 0.6 K/UL (ref 0.3–1)
MONOCYTES NFR BLD: 7.1 % (ref 4–15)
NEUTROPHILS # BLD AUTO: 6.7 K/UL (ref 1.8–7.7)
NEUTROPHILS NFR BLD: 74.2 % (ref 38–73)
NRBC BLD-RTO: 0 /100 WBC
PLATELET # BLD AUTO: 280 K/UL (ref 150–350)
PMV BLD AUTO: 10.2 FL (ref 9.2–12.9)
POTASSIUM SERPL-SCNC: 4.1 MMOL/L (ref 3.5–5.1)
PROT SERPL-MCNC: 7.3 G/DL (ref 6–8.4)
RBC # BLD AUTO: 5.3 M/UL (ref 4.6–6.2)
SODIUM SERPL-SCNC: 139 MMOL/L (ref 136–145)
WBC # BLD AUTO: 9.05 K/UL (ref 3.9–12.7)

## 2021-01-20 PROCEDURE — 93005 ELECTROCARDIOGRAM TRACING: CPT | Mod: S$GLB,,, | Performed by: FAMILY MEDICINE

## 2021-01-20 PROCEDURE — 1159F MED LIST DOCD IN RCRD: CPT | Mod: S$GLB,,, | Performed by: FAMILY MEDICINE

## 2021-01-20 PROCEDURE — 93010 ELECTROCARDIOGRAM REPORT: CPT | Mod: S$GLB,,, | Performed by: INTERNAL MEDICINE

## 2021-01-20 PROCEDURE — 1159F PR MEDICATION LIST DOCUMENTED IN MEDICAL RECORD: ICD-10-PCS | Mod: S$GLB,,, | Performed by: FAMILY MEDICINE

## 2021-01-20 PROCEDURE — 93005 EKG 12-LEAD: ICD-10-PCS | Mod: S$GLB,,, | Performed by: FAMILY MEDICINE

## 2021-01-20 PROCEDURE — 99214 OFFICE O/P EST MOD 30 MIN: CPT | Mod: S$GLB,,, | Performed by: FAMILY MEDICINE

## 2021-01-20 PROCEDURE — 99214 PR OFFICE/OUTPT VISIT, EST, LEVL IV, 30-39 MIN: ICD-10-PCS | Mod: S$GLB,,, | Performed by: FAMILY MEDICINE

## 2021-01-20 PROCEDURE — 93010 EKG 12-LEAD: ICD-10-PCS | Mod: S$GLB,,, | Performed by: INTERNAL MEDICINE

## 2021-01-20 PROCEDURE — 1125F PR PAIN SEVERITY QUANTIFIED, PAIN PRESENT: ICD-10-PCS | Mod: S$GLB,,, | Performed by: FAMILY MEDICINE

## 2021-01-20 PROCEDURE — 36415 COLL VENOUS BLD VENIPUNCTURE: CPT | Mod: PO

## 2021-01-20 PROCEDURE — 1100F PR PT FALLS ASSESS DOC 2+ FALLS/FALL W/INJURY/YR: ICD-10-PCS | Mod: CPTII,S$GLB,, | Performed by: FAMILY MEDICINE

## 2021-01-20 PROCEDURE — 80053 COMPREHEN METABOLIC PANEL: CPT

## 2021-01-20 PROCEDURE — 99999 PR PBB SHADOW E&M-EST. PATIENT-LVL III: CPT | Mod: PBBFAC,,, | Performed by: FAMILY MEDICINE

## 2021-01-20 PROCEDURE — 3288F FALL RISK ASSESSMENT DOCD: CPT | Mod: CPTII,S$GLB,, | Performed by: FAMILY MEDICINE

## 2021-01-20 PROCEDURE — 99999 PR PBB SHADOW E&M-EST. PATIENT-LVL III: ICD-10-PCS | Mod: PBBFAC,,, | Performed by: FAMILY MEDICINE

## 2021-01-20 PROCEDURE — 85025 COMPLETE CBC W/AUTO DIFF WBC: CPT

## 2021-01-20 PROCEDURE — 1125F AMNT PAIN NOTED PAIN PRSNT: CPT | Mod: S$GLB,,, | Performed by: FAMILY MEDICINE

## 2021-01-20 PROCEDURE — 1100F PTFALLS ASSESS-DOCD GE2>/YR: CPT | Mod: CPTII,S$GLB,, | Performed by: FAMILY MEDICINE

## 2021-01-20 PROCEDURE — 3288F PR FALLS RISK ASSESSMENT DOCUMENTED: ICD-10-PCS | Mod: CPTII,S$GLB,, | Performed by: FAMILY MEDICINE

## 2021-01-22 ENCOUNTER — HOSPITAL ENCOUNTER (OUTPATIENT)
Dept: RADIOLOGY | Facility: HOSPITAL | Age: 76
Discharge: HOME | End: 2021-01-22
Attending: FAMILY MEDICINE
Payer: MEDICARE

## 2021-01-22 ENCOUNTER — PATIENT MESSAGE (OUTPATIENT)
Dept: FAMILY MEDICINE | Facility: CLINIC | Age: 76
End: 2021-01-22

## 2021-01-22 DIAGNOSIS — R55 SYNCOPE, UNSPECIFIED SYNCOPE TYPE: ICD-10-CM

## 2021-01-22 PROCEDURE — 93880 US CAROTID BILATERAL: ICD-10-PCS | Mod: 26,,, | Performed by: RADIOLOGY

## 2021-01-22 PROCEDURE — 93880 EXTRACRANIAL BILAT STUDY: CPT | Mod: 26,,, | Performed by: RADIOLOGY

## 2021-01-22 PROCEDURE — 93880 EXTRACRANIAL BILAT STUDY: CPT | Mod: TC

## 2021-01-29 ENCOUNTER — HOSPITAL ENCOUNTER (OUTPATIENT)
Dept: CARDIOLOGY | Facility: HOSPITAL | Age: 76
Discharge: HOME | End: 2021-01-29
Attending: FAMILY MEDICINE
Payer: MEDICARE

## 2021-01-29 LAB
ASCENDING AORTA: 4.11 CM
AV INDEX (PROSTH): 0.87
AV MEAN GRADIENT: 2 MMHG
AV PEAK GRADIENT: 3 MMHG
AV VALVE AREA: 4.73 CM2
AV VELOCITY RATIO: 0.77
CV ECHO LV RWT: 0.35 CM
DOP CALC AO PEAK VEL: 0.87 M/S
DOP CALC AO VTI: 17.87 CM
DOP CALC LVOT AREA: 5.4 CM2
DOP CALC LVOT DIAMETER: 2.63 CM
DOP CALC LVOT PEAK VEL: 0.67 M/S
DOP CALC LVOT STROKE VOLUME: 84.6 CM3
DOP CALCLVOT PEAK VEL VTI: 15.58 CM
E WAVE DECELERATION TIME: 267.84 MSEC
E/A RATIO: 1.05
ECHO LV POSTERIOR WALL: 0.77 CM (ref 0.6–1.1)
FRACTIONAL SHORTENING: 28 % (ref 28–44)
INTERVENTRICULAR SEPTUM: 0.77 CM (ref 0.6–1.1)
IVRT: 138.41 MSEC
LA MAJOR: 4.96 CM
LA MINOR: 4.84 CM
LA WIDTH: 3.77 CM
LEFT ATRIUM SIZE: 3.55 CM
LEFT ATRIUM VOLUME: 55.73 CM3
LEFT INTERNAL DIMENSION IN SYSTOLE: 3.12 CM (ref 2.1–4)
LEFT VENTRICLE DIASTOLIC VOLUME: 85.56 ML
LEFT VENTRICLE SYSTOLIC VOLUME: 38.46 ML
LEFT VENTRICULAR INTERNAL DIMENSION IN DIASTOLE: 4.35 CM (ref 3.5–6)
LEFT VENTRICULAR MASS: 102.12 G
MV PEAK A VEL: 0.42 M/S
MV PEAK E VEL: 0.44 M/S
PISA TR MAX VEL: 2.31 M/S
PULM VEIN S/D RATIO: 1.92
PV PEAK D VEL: 0.24 M/S
PV PEAK S VEL: 0.46 M/S
PV PEAK VELOCITY: 0.97 CM/S
RA MAJOR: 4.64 CM
RA WIDTH: 4.78 CM
RIGHT VENTRICULAR END-DIASTOLIC DIMENSION: 4.09 CM
SINUS: 3.84 CM
STJ: 3.79 CM
TR MAX PG: 21 MMHG
TRICUSPID ANNULAR PLANE SYSTOLIC EXCURSION: 2.97 CM

## 2021-01-29 PROCEDURE — 93306 ECHO (CUPID ONLY): ICD-10-PCS | Mod: 26,,, | Performed by: INTERNAL MEDICINE

## 2021-01-29 PROCEDURE — 93306 TTE W/DOPPLER COMPLETE: CPT | Mod: 26,,, | Performed by: INTERNAL MEDICINE

## 2021-01-29 PROCEDURE — 93306 TTE W/DOPPLER COMPLETE: CPT

## 2021-02-05 ENCOUNTER — PATIENT MESSAGE (OUTPATIENT)
Dept: FAMILY MEDICINE | Facility: CLINIC | Age: 76
End: 2021-02-05

## 2021-02-25 ENCOUNTER — PATIENT MESSAGE (OUTPATIENT)
Dept: FAMILY MEDICINE | Facility: CLINIC | Age: 76
End: 2021-02-25

## 2021-10-27 ENCOUNTER — PATIENT MESSAGE (OUTPATIENT)
Dept: FAMILY MEDICINE | Facility: CLINIC | Age: 76
End: 2021-10-27
Payer: MEDICARE

## 2021-10-29 ENCOUNTER — OFFICE VISIT (OUTPATIENT)
Dept: FAMILY MEDICINE | Facility: CLINIC | Age: 76
End: 2021-10-29
Payer: MEDICARE

## 2021-10-29 DIAGNOSIS — N18.32 STAGE 3B CHRONIC KIDNEY DISEASE: ICD-10-CM

## 2021-10-29 DIAGNOSIS — R79.9 ABNORMAL FINDING OF BLOOD CHEMISTRY: ICD-10-CM

## 2021-10-29 DIAGNOSIS — N13.8 BPH WITH URINARY OBSTRUCTION: ICD-10-CM

## 2021-10-29 DIAGNOSIS — F20.9 SCHIZOPHRENIA, UNSPECIFIED TYPE: Primary | ICD-10-CM

## 2021-10-29 DIAGNOSIS — E78.00 HYPERCHOLESTEROLEMIA: ICD-10-CM

## 2021-10-29 DIAGNOSIS — N40.1 BPH WITH URINARY OBSTRUCTION: ICD-10-CM

## 2021-10-29 PROCEDURE — 99442 PR PHYSICIAN TELEPHONE EVALUATION 11-20 MIN: ICD-10-PCS | Mod: 95,,, | Performed by: FAMILY MEDICINE

## 2021-10-29 PROCEDURE — 99442 PR PHYSICIAN TELEPHONE EVALUATION 11-20 MIN: CPT | Mod: 95,,, | Performed by: FAMILY MEDICINE

## 2021-11-02 ENCOUNTER — LAB VISIT (OUTPATIENT)
Dept: LAB | Facility: HOSPITAL | Age: 76
End: 2021-11-02
Attending: FAMILY MEDICINE
Payer: MEDICARE

## 2021-11-02 ENCOUNTER — CLINICAL SUPPORT (OUTPATIENT)
Dept: FAMILY MEDICINE | Facility: CLINIC | Age: 76
End: 2021-11-02
Payer: MEDICARE

## 2021-11-02 DIAGNOSIS — Z23 FLU VACCINE NEED: Primary | ICD-10-CM

## 2021-11-02 DIAGNOSIS — N40.1 BPH WITH URINARY OBSTRUCTION: ICD-10-CM

## 2021-11-02 DIAGNOSIS — R79.9 ABNORMAL FINDING OF BLOOD CHEMISTRY: ICD-10-CM

## 2021-11-02 DIAGNOSIS — E78.00 HYPERCHOLESTEROLEMIA: ICD-10-CM

## 2021-11-02 DIAGNOSIS — N18.32 STAGE 3B CHRONIC KIDNEY DISEASE: ICD-10-CM

## 2021-11-02 DIAGNOSIS — N13.8 BPH WITH URINARY OBSTRUCTION: ICD-10-CM

## 2021-11-02 DIAGNOSIS — F20.9 SCHIZOPHRENIA, UNSPECIFIED TYPE: ICD-10-CM

## 2021-11-02 LAB
ALBUMIN SERPL BCP-MCNC: 4 G/DL (ref 3.5–5.2)
ALP SERPL-CCNC: 75 U/L (ref 55–135)
ALT SERPL W/O P-5'-P-CCNC: 26 U/L (ref 10–44)
ANION GAP SERPL CALC-SCNC: 9 MMOL/L (ref 8–16)
AST SERPL-CCNC: 24 U/L (ref 10–40)
BASOPHILS # BLD AUTO: 0.05 K/UL (ref 0–0.2)
BASOPHILS NFR BLD: 0.6 % (ref 0–1.9)
BILIRUB SERPL-MCNC: 1.1 MG/DL (ref 0.1–1)
BUN SERPL-MCNC: 17 MG/DL (ref 8–23)
CALCIUM SERPL-MCNC: 10.3 MG/DL (ref 8.7–10.5)
CHLORIDE SERPL-SCNC: 104 MMOL/L (ref 95–110)
CHOLEST SERPL-MCNC: 222 MG/DL (ref 120–199)
CHOLEST/HDLC SERPL: 4.2 {RATIO} (ref 2–5)
CO2 SERPL-SCNC: 26 MMOL/L (ref 23–29)
CREAT SERPL-MCNC: 1.6 MG/DL (ref 0.5–1.4)
DIFFERENTIAL METHOD: ABNORMAL
EOSINOPHIL # BLD AUTO: 0.1 K/UL (ref 0–0.5)
EOSINOPHIL NFR BLD: 1.6 % (ref 0–8)
ERYTHROCYTE [DISTWIDTH] IN BLOOD BY AUTOMATED COUNT: 12.7 % (ref 11.5–14.5)
EST. GFR  (AFRICAN AMERICAN): 47 ML/MIN/1.73 M^2
EST. GFR  (NON AFRICAN AMERICAN): 41 ML/MIN/1.73 M^2
GLUCOSE SERPL-MCNC: 95 MG/DL (ref 70–110)
HCT VFR BLD AUTO: 48.3 % (ref 40–54)
HDLC SERPL-MCNC: 53 MG/DL (ref 40–75)
HDLC SERPL: 23.9 % (ref 20–50)
HGB BLD-MCNC: 16.1 G/DL (ref 14–18)
IMM GRANULOCYTES # BLD AUTO: 0.03 K/UL (ref 0–0.04)
IMM GRANULOCYTES NFR BLD AUTO: 0.3 % (ref 0–0.5)
LDLC SERPL CALC-MCNC: 142.2 MG/DL (ref 63–159)
LYMPHOCYTES # BLD AUTO: 1.3 K/UL (ref 1–4.8)
LYMPHOCYTES NFR BLD: 14.6 % (ref 18–48)
MCH RBC QN AUTO: 31.9 PG (ref 27–31)
MCHC RBC AUTO-ENTMCNC: 33.3 G/DL (ref 32–36)
MCV RBC AUTO: 96 FL (ref 82–98)
MONOCYTES # BLD AUTO: 0.7 K/UL (ref 0.3–1)
MONOCYTES NFR BLD: 7.8 % (ref 4–15)
NEUTROPHILS # BLD AUTO: 6.5 K/UL (ref 1.8–7.7)
NEUTROPHILS NFR BLD: 75.1 % (ref 38–73)
NONHDLC SERPL-MCNC: 169 MG/DL
NRBC BLD-RTO: 0 /100 WBC
PLATELET # BLD AUTO: 218 K/UL (ref 150–450)
PMV BLD AUTO: 10.3 FL (ref 9.2–12.9)
POTASSIUM SERPL-SCNC: 4.3 MMOL/L (ref 3.5–5.1)
PROT SERPL-MCNC: 7.2 G/DL (ref 6–8.4)
RBC # BLD AUTO: 5.05 M/UL (ref 4.6–6.2)
SODIUM SERPL-SCNC: 139 MMOL/L (ref 136–145)
TRIGL SERPL-MCNC: 134 MG/DL (ref 30–150)
WBC # BLD AUTO: 8.7 K/UL (ref 3.9–12.7)

## 2021-11-02 PROCEDURE — G0008 FLU VACCINE - QUADRIVALENT - ADJUVANTED: ICD-10-PCS | Mod: S$GLB,,, | Performed by: FAMILY MEDICINE

## 2021-11-02 PROCEDURE — 90694 FLU VACCINE - QUADRIVALENT - ADJUVANTED: ICD-10-PCS | Mod: S$GLB,,, | Performed by: FAMILY MEDICINE

## 2021-11-02 PROCEDURE — 80061 LIPID PANEL: CPT | Performed by: FAMILY MEDICINE

## 2021-11-02 PROCEDURE — 36415 COLL VENOUS BLD VENIPUNCTURE: CPT | Mod: PO | Performed by: FAMILY MEDICINE

## 2021-11-02 PROCEDURE — 90694 VACC AIIV4 NO PRSRV 0.5ML IM: CPT | Mod: S$GLB,,, | Performed by: FAMILY MEDICINE

## 2021-11-02 PROCEDURE — 80053 COMPREHEN METABOLIC PANEL: CPT | Performed by: FAMILY MEDICINE

## 2021-11-02 PROCEDURE — G0008 ADMIN INFLUENZA VIRUS VAC: HCPCS | Mod: S$GLB,,, | Performed by: FAMILY MEDICINE

## 2021-11-02 PROCEDURE — 85025 COMPLETE CBC W/AUTO DIFF WBC: CPT | Performed by: FAMILY MEDICINE

## 2021-12-10 ENCOUNTER — HOSPITAL ENCOUNTER (OUTPATIENT)
Dept: RADIOLOGY | Facility: HOSPITAL | Age: 76
Discharge: HOME | End: 2021-12-10
Attending: NURSE PRACTITIONER
Payer: MEDICARE

## 2021-12-10 ENCOUNTER — OFFICE VISIT (OUTPATIENT)
Dept: FAMILY MEDICINE | Facility: CLINIC | Age: 76
End: 2021-12-10
Payer: MEDICARE

## 2021-12-10 VITALS
HEART RATE: 86 BPM | TEMPERATURE: 98 F | OXYGEN SATURATION: 96 % | BODY MASS INDEX: 21.73 KG/M2 | DIASTOLIC BLOOD PRESSURE: 70 MMHG | RESPIRATION RATE: 16 BRPM | HEIGHT: 71 IN | SYSTOLIC BLOOD PRESSURE: 130 MMHG | WEIGHT: 155.19 LBS

## 2021-12-10 DIAGNOSIS — M79.672 PAIN OF LEFT FOOT: Primary | ICD-10-CM

## 2021-12-10 DIAGNOSIS — M79.672 PAIN OF LEFT FOOT: ICD-10-CM

## 2021-12-10 PROCEDURE — 73630 XR FOOT COMPLETE 3 VIEW LEFT: ICD-10-PCS | Mod: 26,LT,, | Performed by: RADIOLOGY

## 2021-12-10 PROCEDURE — 99213 PR OFFICE/OUTPT VISIT, EST, LEVL III, 20-29 MIN: ICD-10-PCS | Mod: S$GLB,,, | Performed by: NURSE PRACTITIONER

## 2021-12-10 PROCEDURE — 99999 PR PBB SHADOW E&M-EST. PATIENT-LVL IV: CPT | Mod: PBBFAC,,, | Performed by: NURSE PRACTITIONER

## 2021-12-10 PROCEDURE — 99213 OFFICE O/P EST LOW 20 MIN: CPT | Mod: S$GLB,,, | Performed by: NURSE PRACTITIONER

## 2021-12-10 PROCEDURE — 73630 X-RAY EXAM OF FOOT: CPT | Mod: TC,FY,LT

## 2021-12-10 PROCEDURE — 99999 PR PBB SHADOW E&M-EST. PATIENT-LVL IV: ICD-10-PCS | Mod: PBBFAC,,, | Performed by: NURSE PRACTITIONER

## 2021-12-10 PROCEDURE — 73630 X-RAY EXAM OF FOOT: CPT | Mod: 26,LT,, | Performed by: RADIOLOGY

## 2021-12-10 RX ORDER — DICLOFENAC SODIUM 50 MG/1
50 TABLET, DELAYED RELEASE ORAL 4 TIMES DAILY PRN
Qty: 60 TABLET | Refills: 1 | Status: SHIPPED | OUTPATIENT
Start: 2021-12-10 | End: 2022-04-29

## 2021-12-10 RX ORDER — METHYLPREDNISOLONE 4 MG/1
TABLET ORAL
Qty: 21 EACH | Refills: 0 | Status: SHIPPED | OUTPATIENT
Start: 2021-12-10 | End: 2021-12-31

## 2022-03-23 ENCOUNTER — PATIENT MESSAGE (OUTPATIENT)
Dept: FAMILY MEDICINE | Facility: CLINIC | Age: 77
End: 2022-03-23
Payer: MEDICARE

## 2022-03-23 DIAGNOSIS — F20.9 SCHIZOPHRENIA, UNSPECIFIED TYPE: ICD-10-CM

## 2022-03-23 RX ORDER — ARIPIPRAZOLE 20 MG/1
20 TABLET ORAL DAILY
Qty: 90 TABLET | Refills: 2 | Status: SHIPPED | OUTPATIENT
Start: 2022-03-23 | End: 2022-12-19

## 2022-03-23 NOTE — TELEPHONE ENCOUNTER
No new care gaps identified.  Powered by [employer]. Reference number: [id number].   3/23/2022 2:38:35 PM CDT

## 2022-04-29 ENCOUNTER — OFFICE VISIT (OUTPATIENT)
Dept: FAMILY MEDICINE | Facility: CLINIC | Age: 77
End: 2022-04-29
Payer: MEDICARE

## 2022-04-29 VITALS
SYSTOLIC BLOOD PRESSURE: 110 MMHG | HEART RATE: 74 BPM | TEMPERATURE: 98 F | OXYGEN SATURATION: 97 % | HEIGHT: 70 IN | BODY MASS INDEX: 22 KG/M2 | WEIGHT: 153.69 LBS | DIASTOLIC BLOOD PRESSURE: 70 MMHG

## 2022-04-29 DIAGNOSIS — N18.32 STAGE 3B CHRONIC KIDNEY DISEASE: ICD-10-CM

## 2022-04-29 DIAGNOSIS — F20.9 SCHIZOPHRENIA, UNSPECIFIED TYPE: ICD-10-CM

## 2022-04-29 DIAGNOSIS — E86.0 DEHYDRATION: Primary | ICD-10-CM

## 2022-04-29 PROCEDURE — 1160F RVW MEDS BY RX/DR IN RCRD: CPT | Mod: CPTII,S$GLB,, | Performed by: FAMILY MEDICINE

## 2022-04-29 PROCEDURE — 1160F PR REVIEW ALL MEDS BY PRESCRIBER/CLIN PHARMACIST DOCUMENTED: ICD-10-PCS | Mod: CPTII,S$GLB,, | Performed by: FAMILY MEDICINE

## 2022-04-29 PROCEDURE — 1101F PT FALLS ASSESS-DOCD LE1/YR: CPT | Mod: CPTII,S$GLB,, | Performed by: FAMILY MEDICINE

## 2022-04-29 PROCEDURE — 3288F FALL RISK ASSESSMENT DOCD: CPT | Mod: CPTII,S$GLB,, | Performed by: FAMILY MEDICINE

## 2022-04-29 PROCEDURE — 99999 PR PBB SHADOW E&M-EST. PATIENT-LVL III: CPT | Mod: PBBFAC,,, | Performed by: FAMILY MEDICINE

## 2022-04-29 PROCEDURE — 3288F PR FALLS RISK ASSESSMENT DOCUMENTED: ICD-10-PCS | Mod: CPTII,S$GLB,, | Performed by: FAMILY MEDICINE

## 2022-04-29 PROCEDURE — 3078F PR MOST RECENT DIASTOLIC BLOOD PRESSURE < 80 MM HG: ICD-10-PCS | Mod: CPTII,S$GLB,, | Performed by: FAMILY MEDICINE

## 2022-04-29 PROCEDURE — 99213 PR OFFICE/OUTPT VISIT, EST, LEVL III, 20-29 MIN: ICD-10-PCS | Mod: S$GLB,,, | Performed by: FAMILY MEDICINE

## 2022-04-29 PROCEDURE — 3074F SYST BP LT 130 MM HG: CPT | Mod: CPTII,S$GLB,, | Performed by: FAMILY MEDICINE

## 2022-04-29 PROCEDURE — 1159F MED LIST DOCD IN RCRD: CPT | Mod: CPTII,S$GLB,, | Performed by: FAMILY MEDICINE

## 2022-04-29 PROCEDURE — 1159F PR MEDICATION LIST DOCUMENTED IN MEDICAL RECORD: ICD-10-PCS | Mod: CPTII,S$GLB,, | Performed by: FAMILY MEDICINE

## 2022-04-29 PROCEDURE — 3074F PR MOST RECENT SYSTOLIC BLOOD PRESSURE < 130 MM HG: ICD-10-PCS | Mod: CPTII,S$GLB,, | Performed by: FAMILY MEDICINE

## 2022-04-29 PROCEDURE — 1101F PR PT FALLS ASSESS DOC 0-1 FALLS W/OUT INJ PAST YR: ICD-10-PCS | Mod: CPTII,S$GLB,, | Performed by: FAMILY MEDICINE

## 2022-04-29 PROCEDURE — 99999 PR PBB SHADOW E&M-EST. PATIENT-LVL III: ICD-10-PCS | Mod: PBBFAC,,, | Performed by: FAMILY MEDICINE

## 2022-04-29 PROCEDURE — 99213 OFFICE O/P EST LOW 20 MIN: CPT | Mod: S$GLB,,, | Performed by: FAMILY MEDICINE

## 2022-04-29 PROCEDURE — 3078F DIAST BP <80 MM HG: CPT | Mod: CPTII,S$GLB,, | Performed by: FAMILY MEDICINE

## 2022-04-29 NOTE — PROGRESS NOTES
Answers for HPI/ROS submitted by the patient on 4/26/2022  activity change: No  unexpected weight change: No  neck pain: No  hearing loss: No  rhinorrhea: No  trouble swallowing: No  eye discharge: No  visual disturbance: No  chest tightness: No  wheezing: No  chest pain: No  palpitations: No  blood in stool: No  constipation: No  vomiting: No  diarrhea: No  polydipsia: No  polyuria: No  difficulty urinating: No  urgency: No  hematuria: No  joint swelling: No  arthralgias: No  headaches: No  weakness: Yes  confusion: No  dysphoric mood: No

## 2022-04-29 NOTE — PROGRESS NOTES
Subjective:       Patient ID: Antonio Torres Jr. is a 77 y.o. male.    Chief Complaint: Discuss recurrent light-headedness when walking    77 yelindsey antonyd here fiwht his sister for issues with falling. He states he is walking and had an episode where he got weak an d fell out. His sister is concerned about his fluid intake as he sttes he doesn't want to take in too much water. He is also eating at 7 a.m. and at 9 a.m. and then again at 5 p.m. they are trying to get him to adjust.     Past Medical History:   Diagnosis Date    Chronic kidney disease     Depression     Hematuria     had neg work up with Dr. Stafford    Hypercholesterolemia 3/13    Schizophrenia       Past Surgical History:   Procedure Laterality Date    HERNIA REPAIR       Family History   Problem Relation Age of Onset    Alzheimer's disease Mother     Heart disease Father     Pulmonary embolism Father     Hypertension Sister      Social History     Socioeconomic History    Marital status: Single   Tobacco Use    Smoking status: Never Smoker    Smokeless tobacco: Never Used   Substance and Sexual Activity    Alcohol use: No    Drug use: No    Sexual activity: Never       Review of Systems   Constitutional: Negative for activity change and unexpected weight change.   HENT: Negative for hearing loss, rhinorrhea and trouble swallowing.    Eyes: Negative for discharge and visual disturbance.   Respiratory: Negative for chest tightness and wheezing.    Cardiovascular: Negative for chest pain and palpitations.   Gastrointestinal: Negative for blood in stool, constipation, diarrhea and vomiting.   Endocrine: Negative for polydipsia and polyuria.   Genitourinary: Negative for difficulty urinating, hematuria and urgency.   Musculoskeletal: Negative for arthralgias, joint swelling and neck pain.   Neurological: Positive for weakness. Negative for headaches.   Psychiatric/Behavioral: Negative for confusion and dysphoric mood.         Objective:        Past Medical History:   Diagnosis Date    Chronic kidney disease     Depression     Hematuria     had neg work up with Dr. Stafford    Hypercholesterolemia 3/13    Schizophrenia       Past Surgical History:   Procedure Laterality Date    HERNIA REPAIR       Family History   Problem Relation Age of Onset    Alzheimer's disease Mother     Heart disease Father     Pulmonary embolism Father     Hypertension Sister      Social History     Socioeconomic History    Marital status: Single   Tobacco Use    Smoking status: Never Smoker    Smokeless tobacco: Never Used   Substance and Sexual Activity    Alcohol use: No    Drug use: No    Sexual activity: Never       Physical Exam  Constitutional:       General: He is not in acute distress.     Appearance: Normal appearance. He is well-developed. He is not ill-appearing, toxic-appearing or diaphoretic.   HENT:      Head: Normocephalic and atraumatic.   Eyes:      Conjunctiva/sclera: Conjunctivae normal.   Cardiovascular:      Rate and Rhythm: Normal rate and regular rhythm.      Heart sounds: Normal heart sounds. No murmur heard.    No friction rub. No gallop.   Pulmonary:      Effort: Pulmonary effort is normal. No respiratory distress.      Breath sounds: Normal breath sounds. No stridor. No wheezing, rhonchi or rales.   Musculoskeletal:      Cervical back: Normal range of motion and neck supple.   Neurological:      General: No focal deficit present.      Mental Status: He is alert and oriented to person, place, and time.   Psychiatric:         Mood and Affect: Mood normal.         Behavior: Behavior normal.         Assessment:       Problem List Items Addressed This Visit    None     Visit Diagnoses     Dehydration    -  Primary          Plan:       Antonio was seen today for discuss recurrent light-headedness when walking.    Diagnoses and all orders for this visit:    Dehydration    encouraged hydration and increased food intake.

## 2022-07-02 ENCOUNTER — PES CALL (OUTPATIENT)
Dept: ADMINISTRATIVE | Facility: CLINIC | Age: 77
End: 2022-07-02
Payer: MEDICARE

## 2022-08-11 ENCOUNTER — PES CALL (OUTPATIENT)
Dept: ADMINISTRATIVE | Facility: CLINIC | Age: 77
End: 2022-08-11
Payer: MEDICARE

## 2022-08-24 ENCOUNTER — PES CALL (OUTPATIENT)
Dept: ADMINISTRATIVE | Facility: CLINIC | Age: 77
End: 2022-08-24
Payer: MEDICARE

## 2022-09-07 ENCOUNTER — PATIENT MESSAGE (OUTPATIENT)
Dept: FAMILY MEDICINE | Facility: CLINIC | Age: 77
End: 2022-09-07
Payer: MEDICARE

## 2022-09-07 DIAGNOSIS — R79.9 ABNORMAL FINDING OF BLOOD CHEMISTRY: ICD-10-CM

## 2022-09-07 DIAGNOSIS — E78.00 HYPERCHOLESTEROLEMIA: Primary | ICD-10-CM

## 2022-09-21 ENCOUNTER — LAB VISIT (OUTPATIENT)
Dept: LAB | Facility: HOSPITAL | Age: 77
End: 2022-09-21
Attending: FAMILY MEDICINE
Payer: MEDICARE

## 2022-09-21 DIAGNOSIS — R79.9 ABNORMAL FINDING OF BLOOD CHEMISTRY: ICD-10-CM

## 2022-09-21 DIAGNOSIS — E78.00 HYPERCHOLESTEROLEMIA: ICD-10-CM

## 2022-09-21 LAB
ALBUMIN SERPL BCP-MCNC: 3.8 G/DL (ref 3.5–5.2)
ALP SERPL-CCNC: 69 U/L (ref 55–135)
ALT SERPL W/O P-5'-P-CCNC: 31 U/L (ref 10–44)
ANION GAP SERPL CALC-SCNC: 9 MMOL/L (ref 8–16)
AST SERPL-CCNC: 24 U/L (ref 10–40)
BASOPHILS # BLD AUTO: 0.03 K/UL (ref 0–0.2)
BASOPHILS NFR BLD: 0.4 % (ref 0–1.9)
BILIRUB SERPL-MCNC: 1 MG/DL (ref 0.1–1)
BUN SERPL-MCNC: 21 MG/DL (ref 8–23)
CALCIUM SERPL-MCNC: 9.6 MG/DL (ref 8.7–10.5)
CHLORIDE SERPL-SCNC: 106 MMOL/L (ref 95–110)
CHOLEST SERPL-MCNC: 222 MG/DL (ref 120–199)
CHOLEST/HDLC SERPL: 4.1 {RATIO} (ref 2–5)
CO2 SERPL-SCNC: 25 MMOL/L (ref 23–29)
CREAT SERPL-MCNC: 1.6 MG/DL (ref 0.5–1.4)
DIFFERENTIAL METHOD: ABNORMAL
EOSINOPHIL # BLD AUTO: 0.2 K/UL (ref 0–0.5)
EOSINOPHIL NFR BLD: 2.5 % (ref 0–8)
ERYTHROCYTE [DISTWIDTH] IN BLOOD BY AUTOMATED COUNT: 12.8 % (ref 11.5–14.5)
EST. GFR  (NO RACE VARIABLE): 44 ML/MIN/1.73 M^2
ESTIMATED AVG GLUCOSE: 103 MG/DL (ref 68–131)
GLUCOSE SERPL-MCNC: 95 MG/DL (ref 70–110)
HBA1C MFR BLD: 5.2 % (ref 4–5.6)
HCT VFR BLD AUTO: 46.2 % (ref 40–54)
HDLC SERPL-MCNC: 54 MG/DL (ref 40–75)
HDLC SERPL: 24.3 % (ref 20–50)
HGB BLD-MCNC: 15.4 G/DL (ref 14–18)
IMM GRANULOCYTES # BLD AUTO: 0.02 K/UL (ref 0–0.04)
IMM GRANULOCYTES NFR BLD AUTO: 0.2 % (ref 0–0.5)
LDLC SERPL CALC-MCNC: 143.2 MG/DL (ref 63–159)
LYMPHOCYTES # BLD AUTO: 1.2 K/UL (ref 1–4.8)
LYMPHOCYTES NFR BLD: 14 % (ref 18–48)
MCH RBC QN AUTO: 31.8 PG (ref 27–31)
MCHC RBC AUTO-ENTMCNC: 33.3 G/DL (ref 32–36)
MCV RBC AUTO: 96 FL (ref 82–98)
MONOCYTES # BLD AUTO: 0.7 K/UL (ref 0.3–1)
MONOCYTES NFR BLD: 7.8 % (ref 4–15)
NEUTROPHILS # BLD AUTO: 6.4 K/UL (ref 1.8–7.7)
NEUTROPHILS NFR BLD: 75.1 % (ref 38–73)
NONHDLC SERPL-MCNC: 168 MG/DL
NRBC BLD-RTO: 0 /100 WBC
PLATELET # BLD AUTO: 229 K/UL (ref 150–450)
PMV BLD AUTO: 10.5 FL (ref 9.2–12.9)
POTASSIUM SERPL-SCNC: 4.2 MMOL/L (ref 3.5–5.1)
PROT SERPL-MCNC: 6.8 G/DL (ref 6–8.4)
RBC # BLD AUTO: 4.84 M/UL (ref 4.6–6.2)
SODIUM SERPL-SCNC: 140 MMOL/L (ref 136–145)
TRIGL SERPL-MCNC: 124 MG/DL (ref 30–150)
WBC # BLD AUTO: 8.48 K/UL (ref 3.9–12.7)

## 2022-09-21 PROCEDURE — 85025 COMPLETE CBC W/AUTO DIFF WBC: CPT | Performed by: FAMILY MEDICINE

## 2022-09-21 PROCEDURE — 36415 COLL VENOUS BLD VENIPUNCTURE: CPT | Mod: PO | Performed by: FAMILY MEDICINE

## 2022-09-21 PROCEDURE — 83036 HEMOGLOBIN GLYCOSYLATED A1C: CPT | Performed by: FAMILY MEDICINE

## 2022-09-21 PROCEDURE — 80061 LIPID PANEL: CPT | Performed by: FAMILY MEDICINE

## 2022-09-21 PROCEDURE — 80053 COMPREHEN METABOLIC PANEL: CPT | Performed by: FAMILY MEDICINE

## 2022-09-23 ENCOUNTER — OFFICE VISIT (OUTPATIENT)
Dept: FAMILY MEDICINE | Facility: CLINIC | Age: 77
End: 2022-09-23
Payer: MEDICARE

## 2022-09-23 VITALS
HEIGHT: 70 IN | TEMPERATURE: 98 F | DIASTOLIC BLOOD PRESSURE: 70 MMHG | WEIGHT: 149.94 LBS | SYSTOLIC BLOOD PRESSURE: 122 MMHG | OXYGEN SATURATION: 98 % | HEART RATE: 72 BPM | BODY MASS INDEX: 21.47 KG/M2

## 2022-09-23 DIAGNOSIS — F20.9 SUBCHRONIC SCHIZOPHRENIA: ICD-10-CM

## 2022-09-23 DIAGNOSIS — N18.32 STAGE 3B CHRONIC KIDNEY DISEASE: ICD-10-CM

## 2022-09-23 DIAGNOSIS — R53.1 WEAKNESS: Primary | ICD-10-CM

## 2022-09-23 PROCEDURE — 3288F PR FALLS RISK ASSESSMENT DOCUMENTED: ICD-10-PCS | Mod: CPTII,S$GLB,, | Performed by: FAMILY MEDICINE

## 2022-09-23 PROCEDURE — 1101F PT FALLS ASSESS-DOCD LE1/YR: CPT | Mod: CPTII,S$GLB,, | Performed by: FAMILY MEDICINE

## 2022-09-23 PROCEDURE — 3074F PR MOST RECENT SYSTOLIC BLOOD PRESSURE < 130 MM HG: ICD-10-PCS | Mod: CPTII,S$GLB,, | Performed by: FAMILY MEDICINE

## 2022-09-23 PROCEDURE — 99214 OFFICE O/P EST MOD 30 MIN: CPT | Mod: S$GLB,,, | Performed by: FAMILY MEDICINE

## 2022-09-23 PROCEDURE — 1159F PR MEDICATION LIST DOCUMENTED IN MEDICAL RECORD: ICD-10-PCS | Mod: CPTII,S$GLB,, | Performed by: FAMILY MEDICINE

## 2022-09-23 PROCEDURE — G0008 FLU VACCINE - QUADRIVALENT - ADJUVANTED: ICD-10-PCS | Mod: S$GLB,,, | Performed by: FAMILY MEDICINE

## 2022-09-23 PROCEDURE — 1101F PR PT FALLS ASSESS DOC 0-1 FALLS W/OUT INJ PAST YR: ICD-10-PCS | Mod: CPTII,S$GLB,, | Performed by: FAMILY MEDICINE

## 2022-09-23 PROCEDURE — 90694 FLU VACCINE - QUADRIVALENT - ADJUVANTED: ICD-10-PCS | Mod: S$GLB,,, | Performed by: FAMILY MEDICINE

## 2022-09-23 PROCEDURE — 90694 VACC AIIV4 NO PRSRV 0.5ML IM: CPT | Mod: S$GLB,,, | Performed by: FAMILY MEDICINE

## 2022-09-23 PROCEDURE — 99214 PR OFFICE/OUTPT VISIT, EST, LEVL IV, 30-39 MIN: ICD-10-PCS | Mod: S$GLB,,, | Performed by: FAMILY MEDICINE

## 2022-09-23 PROCEDURE — 99999 PR PBB SHADOW E&M-EST. PATIENT-LVL III: ICD-10-PCS | Mod: PBBFAC,,, | Performed by: FAMILY MEDICINE

## 2022-09-23 PROCEDURE — 99499 UNLISTED E&M SERVICE: CPT | Mod: S$GLB,,, | Performed by: FAMILY MEDICINE

## 2022-09-23 PROCEDURE — 3074F SYST BP LT 130 MM HG: CPT | Mod: CPTII,S$GLB,, | Performed by: FAMILY MEDICINE

## 2022-09-23 PROCEDURE — 1160F RVW MEDS BY RX/DR IN RCRD: CPT | Mod: CPTII,S$GLB,, | Performed by: FAMILY MEDICINE

## 2022-09-23 PROCEDURE — 1159F MED LIST DOCD IN RCRD: CPT | Mod: CPTII,S$GLB,, | Performed by: FAMILY MEDICINE

## 2022-09-23 PROCEDURE — 99499 RISK ADDL DX/OHS AUDIT: ICD-10-PCS | Mod: S$GLB,,, | Performed by: FAMILY MEDICINE

## 2022-09-23 PROCEDURE — 3078F PR MOST RECENT DIASTOLIC BLOOD PRESSURE < 80 MM HG: ICD-10-PCS | Mod: CPTII,S$GLB,, | Performed by: FAMILY MEDICINE

## 2022-09-23 PROCEDURE — 1160F PR REVIEW ALL MEDS BY PRESCRIBER/CLIN PHARMACIST DOCUMENTED: ICD-10-PCS | Mod: CPTII,S$GLB,, | Performed by: FAMILY MEDICINE

## 2022-09-23 PROCEDURE — 3288F FALL RISK ASSESSMENT DOCD: CPT | Mod: CPTII,S$GLB,, | Performed by: FAMILY MEDICINE

## 2022-09-23 PROCEDURE — G0008 ADMIN INFLUENZA VIRUS VAC: HCPCS | Mod: S$GLB,,, | Performed by: FAMILY MEDICINE

## 2022-09-23 PROCEDURE — 3078F DIAST BP <80 MM HG: CPT | Mod: CPTII,S$GLB,, | Performed by: FAMILY MEDICINE

## 2022-09-23 PROCEDURE — 99999 PR PBB SHADOW E&M-EST. PATIENT-LVL III: CPT | Mod: PBBFAC,,, | Performed by: FAMILY MEDICINE

## 2022-09-23 NOTE — PROGRESS NOTES
Subjective:       Patient ID: Antonio Torres Jr. is a 77 y.o. male.    Chief Complaint: Annual Exam    77 yea old male presents with concerns about his foot. He  has stopped walking as a result of his foot pain. They are interested at doing physical therapy. He states he feels his foot is getting better. He uses tylenol for the foot pain, which helped. They would like home health for him.      Past Medical History:   Diagnosis Date    Chronic kidney disease     Depression     Hematuria     had neg work up with Dr. Stafford    Hypercholesterolemia 3/13    Schizophrenia       Past Surgical History:   Procedure Laterality Date    HERNIA REPAIR       Family History   Problem Relation Age of Onset    Alzheimer's disease Mother     Heart disease Father     Pulmonary embolism Father     Hypertension Sister      Social History     Socioeconomic History    Marital status: Single   Tobacco Use    Smoking status: Never    Smokeless tobacco: Never   Substance and Sexual Activity    Alcohol use: No    Drug use: No    Sexual activity: Never       Review of Systems   Constitutional:  Negative for activity change and unexpected weight change.   HENT:  Negative for hearing loss, rhinorrhea and trouble swallowing.    Eyes:  Negative for discharge and visual disturbance.   Respiratory:  Negative for chest tightness, shortness of breath and wheezing.    Cardiovascular:  Negative for chest pain, palpitations and leg swelling.   Gastrointestinal:  Negative for abdominal pain, blood in stool, constipation, diarrhea and vomiting.   Endocrine: Negative for polydipsia and polyuria.   Genitourinary:  Negative for difficulty urinating, hematuria and urgency.   Musculoskeletal:  Positive for arthralgias. Negative for joint swelling and neck pain.   Neurological:  Positive for weakness. Negative for dizziness, light-headedness and headaches.   Psychiatric/Behavioral:  Negative for confusion and dysphoric mood.        Objective:      Vitals:     "09/23/22 0921   BP: 122/70   Pulse: 72   Temp: 98.1 °F (36.7 °C)   TempSrc: Oral   SpO2: 98%   Weight: 68 kg (149 lb 14.6 oz)   Height: 5' 10" (1.778 m)       Physical Exam  Constitutional:       General: He is not in acute distress.     Appearance: Normal appearance. He is well-developed. He is obese. He is not ill-appearing, toxic-appearing or diaphoretic.   HENT:      Head: Normocephalic and atraumatic.   Eyes:      Conjunctiva/sclera: Conjunctivae normal.   Cardiovascular:      Rate and Rhythm: Normal rate and regular rhythm.      Heart sounds: Normal heart sounds. No murmur heard.    No friction rub. No gallop.   Pulmonary:      Effort: Pulmonary effort is normal. No respiratory distress.      Breath sounds: Normal breath sounds. No stridor. No wheezing, rhonchi or rales.   Abdominal:      General: Abdomen is flat. Bowel sounds are normal. There is no distension.      Palpations: Abdomen is soft. There is no mass.      Tenderness: There is no abdominal tenderness. There is no guarding or rebound.      Hernia: No hernia is present.   Musculoskeletal:      Cervical back: Normal range of motion and neck supple.      Right lower leg: No edema.      Left lower leg: No edema.   Neurological:      General: No focal deficit present.      Mental Status: He is alert and oriented to person, place, and time.       Assessment:       Problem List Items Addressed This Visit       CKD (chronic kidney disease) stage 3, GFR 30-59 ml/min     Other Visit Diagnoses       Weakness    -  Primary    Relevant Orders    Ambulatory referral/consult to Home Health    Subchronic schizophrenia                Plan:       Antonio was seen today for annual exam.    Diagnoses and all orders for this visit:    Weakness  -     Ambulatory referral/consult to Home Health; Future  Referral to home health  Subchronic schizophrenia  Controlled    Stage 3b chronic kidney disease    Other orders  -     Influenza (FLUAD) - Quadrivalent (Adjuvanted) " *Preferred* (65+) (PF)

## 2022-09-23 NOTE — PROGRESS NOTES
Answers submitted by the patient for this visit:  Review of Systems Questionnaire (Submitted on 9/22/2022)  activity change: No  unexpected weight change: No  neck pain: No  hearing loss: No  rhinorrhea: No  trouble swallowing: No  eye discharge: No  visual disturbance: No  chest tightness: No  wheezing: No  chest pain: No  palpitations: No  blood in stool: No  constipation: No  vomiting: No  diarrhea: No  polydipsia: No  polyuria: No  difficulty urinating: No  urgency: No  hematuria: No  joint swelling: No  arthralgias: Yes  headaches: No  weakness: Yes  confusion: No  dysphoric mood: No

## 2022-09-26 ENCOUNTER — PES CALL (OUTPATIENT)
Dept: ADMINISTRATIVE | Facility: CLINIC | Age: 77
End: 2022-09-26
Payer: MEDICARE

## 2022-09-27 ENCOUNTER — PES CALL (OUTPATIENT)
Dept: ADMINISTRATIVE | Facility: CLINIC | Age: 77
End: 2022-09-27
Payer: MEDICARE

## 2022-10-26 ENCOUNTER — PATIENT MESSAGE (OUTPATIENT)
Dept: FAMILY MEDICINE | Facility: CLINIC | Age: 77
End: 2022-10-26
Payer: MEDICARE

## 2022-11-18 ENCOUNTER — OFFICE VISIT (OUTPATIENT)
Dept: HOME HEALTH SERVICES | Facility: CLINIC | Age: 77
End: 2022-11-18
Payer: MEDICARE

## 2022-11-18 VITALS
HEIGHT: 70 IN | DIASTOLIC BLOOD PRESSURE: 61 MMHG | BODY MASS INDEX: 22.9 KG/M2 | SYSTOLIC BLOOD PRESSURE: 106 MMHG | WEIGHT: 160 LBS | HEART RATE: 62 BPM

## 2022-11-18 DIAGNOSIS — R53.1 WEAKNESS: ICD-10-CM

## 2022-11-18 DIAGNOSIS — N18.32 STAGE 3B CHRONIC KIDNEY DISEASE: ICD-10-CM

## 2022-11-18 DIAGNOSIS — F20.9 SCHIZOPHRENIA, UNSPECIFIED TYPE: ICD-10-CM

## 2022-11-18 DIAGNOSIS — Z00.00 ENCOUNTER FOR PREVENTIVE HEALTH EXAMINATION: Primary | ICD-10-CM

## 2022-11-18 DIAGNOSIS — E78.00 HYPERCHOLESTEROLEMIA: ICD-10-CM

## 2022-11-18 DIAGNOSIS — R26.9 ABNORMALITY OF GAIT: ICD-10-CM

## 2022-11-18 PROCEDURE — 1160F PR REVIEW ALL MEDS BY PRESCRIBER/CLIN PHARMACIST DOCUMENTED: ICD-10-PCS | Mod: CPTII,S$GLB,, | Performed by: NURSE PRACTITIONER

## 2022-11-18 PROCEDURE — G0439 PR MEDICARE ANNUAL WELLNESS SUBSEQUENT VISIT: ICD-10-PCS | Mod: S$GLB,,, | Performed by: NURSE PRACTITIONER

## 2022-11-18 PROCEDURE — 1159F MED LIST DOCD IN RCRD: CPT | Mod: CPTII,S$GLB,, | Performed by: NURSE PRACTITIONER

## 2022-11-18 PROCEDURE — 3078F DIAST BP <80 MM HG: CPT | Mod: CPTII,S$GLB,, | Performed by: NURSE PRACTITIONER

## 2022-11-18 PROCEDURE — 1160F RVW MEDS BY RX/DR IN RCRD: CPT | Mod: CPTII,S$GLB,, | Performed by: NURSE PRACTITIONER

## 2022-11-18 PROCEDURE — 1101F PT FALLS ASSESS-DOCD LE1/YR: CPT | Mod: CPTII,S$GLB,, | Performed by: NURSE PRACTITIONER

## 2022-11-18 PROCEDURE — 1126F PR PAIN SEVERITY QUANTIFIED, NO PAIN PRESENT: ICD-10-PCS | Mod: CPTII,S$GLB,, | Performed by: NURSE PRACTITIONER

## 2022-11-18 PROCEDURE — 3074F PR MOST RECENT SYSTOLIC BLOOD PRESSURE < 130 MM HG: ICD-10-PCS | Mod: CPTII,S$GLB,, | Performed by: NURSE PRACTITIONER

## 2022-11-18 PROCEDURE — 3288F PR FALLS RISK ASSESSMENT DOCUMENTED: ICD-10-PCS | Mod: CPTII,S$GLB,, | Performed by: NURSE PRACTITIONER

## 2022-11-18 PROCEDURE — 1126F AMNT PAIN NOTED NONE PRSNT: CPT | Mod: CPTII,S$GLB,, | Performed by: NURSE PRACTITIONER

## 2022-11-18 PROCEDURE — 3078F PR MOST RECENT DIASTOLIC BLOOD PRESSURE < 80 MM HG: ICD-10-PCS | Mod: CPTII,S$GLB,, | Performed by: NURSE PRACTITIONER

## 2022-11-18 PROCEDURE — 3288F FALL RISK ASSESSMENT DOCD: CPT | Mod: CPTII,S$GLB,, | Performed by: NURSE PRACTITIONER

## 2022-11-18 PROCEDURE — 99499 UNLISTED E&M SERVICE: CPT | Mod: S$GLB,,, | Performed by: NURSE PRACTITIONER

## 2022-11-18 PROCEDURE — G0439 PPPS, SUBSEQ VISIT: HCPCS | Mod: S$GLB,,, | Performed by: NURSE PRACTITIONER

## 2022-11-18 PROCEDURE — 99499 RISK ADDL DX/OHS AUDIT: ICD-10-PCS | Mod: S$GLB,,, | Performed by: NURSE PRACTITIONER

## 2022-11-18 PROCEDURE — 3074F SYST BP LT 130 MM HG: CPT | Mod: CPTII,S$GLB,, | Performed by: NURSE PRACTITIONER

## 2022-11-18 PROCEDURE — 1101F PR PT FALLS ASSESS DOC 0-1 FALLS W/OUT INJ PAST YR: ICD-10-PCS | Mod: CPTII,S$GLB,, | Performed by: NURSE PRACTITIONER

## 2022-11-18 PROCEDURE — 1159F PR MEDICATION LIST DOCUMENTED IN MEDICAL RECORD: ICD-10-PCS | Mod: CPTII,S$GLB,, | Performed by: NURSE PRACTITIONER

## 2022-11-18 NOTE — PROGRESS NOTES
"  Antonio Torres presented for a  Medicare AWV and comprehensive Health Risk Assessment today. The following components were reviewed and updated:    Medical history  Family History  Social history  Allergies and Current Medications  Health Risk Assessment  Health Maintenance  Care Team         ** See Completed Assessments for Annual Wellness Visit within the encounter summary.**         The following assessments were completed:  Living Situation  CAGE  Depression Screening  Timed Get Up and Go  Whisper Test  Cognitive Function Screening      Nutrition Screening  ADL Screening  PAQ Screening        Vitals:    11/18/22 0907   BP: 106/61   Pulse: 62   Weight: 72.6 kg (160 lb)   Height: 5' 10" (1.778 m)     Body mass index is 22.96 kg/m².  Physical Exam  Constitutional:       Appearance: Normal appearance.   HENT:      Head: Normocephalic.      Nose: Nose normal.      Mouth/Throat:      Mouth: Mucous membranes are moist.   Eyes:      Extraocular Movements: Extraocular movements intact.   Cardiovascular:      Rate and Rhythm: Normal rate and regular rhythm.      Heart sounds: Normal heart sounds.   Pulmonary:      Effort: Pulmonary effort is normal. No respiratory distress.      Breath sounds: Normal breath sounds.   Abdominal:      General: Bowel sounds are normal. There is no distension.      Palpations: Abdomen is soft.   Musculoskeletal:         General: No swelling. Normal range of motion.      Cervical back: Normal range of motion.   Skin:     General: Skin is warm and dry.      Findings: No bruising.   Neurological:      General: No focal deficit present.      Mental Status: He is alert and oriented to person, place, and time.      Motor: Weakness present.      Gait: Gait abnormal.   Psychiatric:         Mood and Affect: Mood normal.         Behavior: Behavior normal.             Diagnoses and health risks identified today and associated recommendations/orders:    1. Encounter for preventive health " examination  Assessments completed. Preventive measures and health maintenance reviewed with patient and patients sister.  Review for Opioid screening: Patient does not have any prescriptions for Opioids.  Review for substance use disorder: Patient does not use any substances.    2. Schizophrenia, unspecified type  Stable, patient on Abilify. Followed by     3. Stage 3b chronic kidney disease  Stable, followed by PCP. Patient educated on avoiding NSAID's.    4. Hypercholesterolemia  Stable, patient taking fish oil. Followed by PCP. Low fat healthy diet encouraged.    5. Weakness  Stable, followed by PCP. Fluids and hydration encouraged.    6. Abnormality of gait  Stable, followed by PCP. Patient does not currently use any ambulatory assistance devices. Denies any recent falls or injuries.      Provided Antonio with a 5-10 year written screening schedule and personal prevention plan. Recommendations were developed using the USPSTF age appropriate recommendations. Education, counseling, and referrals were provided as needed. After Visit Summary printed and given to patient which includes a list of additional screenings\tests needed.    Follow up in about 1 year (around 11/18/2023) for your next annual wellness visit.    Megha Valdez, CHRISTINE  I offered to discuss advanced care planning, including how to pick a person who would make decisions for you if you were unable to make them for yourself, called a health care power of , and what kind of decisions you might make such as use of life sustaining treatments such as ventilators and tube feeding when faced with a life limiting illness recorded on a living will that they will need to know. (How you want to be cared for as you near the end of your natural life)     X Patient is interested in learning more about how to make advanced directives.  I provided them paperwork and offered to discuss this with them.

## 2022-11-18 NOTE — PATIENT INSTRUCTIONS
Counseling and Referral of Other Preventative  (Italic type indicates deductible and co-insurance are waived)    Patient Name: Antonio Torres  Today's Date: 11/18/2022    Health Maintenance       Date Due Completion Date    TETANUS VACCINE Never done ---    COVID-19 Vaccine (3 - Booster for Moderna series) 05/24/2021 3/29/2021    Lipid Panel 09/21/2027 9/21/2022        No orders of the defined types were placed in this encounter.      The following information is provided to all patients.  This information is to help you find resources for any of the problems found today that may be affecting your health:                Living healthy guide: www.[facility].[place].[facility]      Understanding Diabetes: www.diabetes.org      Eating healthy: www.cdc.gov/healthyweight      [facility] home safety checklist: www.cdc.gov/steadi/patient.html      Agency on Aging: www.goea.[place].[facility]      Alcoholics anonymous (AA): www.aa.org      Physical Activity: www.belinda.nih.gov/ef9gxet      Tobacco use: www.quitwithusla.org

## 2022-11-20 PROBLEM — R53.1 WEAKNESS: Status: ACTIVE | Noted: 2022-11-20

## 2022-11-22 ENCOUNTER — PATIENT MESSAGE (OUTPATIENT)
Dept: INTERNAL MEDICINE | Facility: CLINIC | Age: 77
End: 2022-11-22
Payer: MEDICARE

## 2022-11-23 ENCOUNTER — TELEPHONE (OUTPATIENT)
Dept: FAMILY MEDICINE | Facility: CLINIC | Age: 77
End: 2022-11-23
Payer: MEDICARE

## 2022-11-23 NOTE — TELEPHONE ENCOUNTER
----- Message from Fabio Lennon MD sent at 11/23/2022  3:16 PM CST -----  Regarding: RE: Home health order  Fawn can we print out the prior home health order and send to [facility]    ----- Message -----  From: Fawn Mccain LPN  Sent: 11/23/2022   2:32 PM CST  To: Fabio Lennon MD  Subject: FW: Home health order                              ----- Message -----  From: Megha Valdez NP  Sent: 11/23/2022  12:44 PM CST  To: S[facility] Mccarthy Staff  Subject: Home health order                                Hi, I had the pleasure of visiting Mr. Torres for an in home eAWV. There's an order in his chart from 9/23 for home health PT. Patient sister/caregiver reports that no one has reached out so far. Can someone follow-up with home health and the patient regarding this matter? Thanks!

## 2023-04-06 ENCOUNTER — HOSPITAL ENCOUNTER (OUTPATIENT)
Facility: HOSPITAL | Age: 78
Discharge: HOME | End: 2023-04-10
Attending: INTERNAL MEDICINE | Admitting: STUDENT IN AN ORGANIZED HEALTH CARE EDUCATION/TRAINING PROGRAM
Payer: MEDICARE

## 2023-04-06 DIAGNOSIS — M25.562 BILATERAL KNEE PAIN: ICD-10-CM

## 2023-04-06 DIAGNOSIS — R79.89 ELEVATED TROPONIN I LEVEL: ICD-10-CM

## 2023-04-06 DIAGNOSIS — R55 SYNCOPAL EPISODES: ICD-10-CM

## 2023-04-06 DIAGNOSIS — M25.571 RIGHT ANKLE PAIN: ICD-10-CM

## 2023-04-06 DIAGNOSIS — R07.9 CHEST PAIN: ICD-10-CM

## 2023-04-06 DIAGNOSIS — S82.831A CLOSED FRACTURE OF DISTAL END OF RIGHT FIBULA, UNSPECIFIED FRACTURE MORPHOLOGY, INITIAL ENCOUNTER: Primary | ICD-10-CM

## 2023-04-06 DIAGNOSIS — M25.561 BILATERAL KNEE PAIN: ICD-10-CM

## 2023-04-06 DIAGNOSIS — R55 SYNCOPE: ICD-10-CM

## 2023-04-06 PROBLEM — S82.401A CLOSED RIGHT FIBULAR FRACTURE: Status: ACTIVE | Noted: 2023-04-06

## 2023-04-06 LAB
ALBUMIN SERPL BCP-MCNC: 4 G/DL (ref 3.5–5.2)
ALP SERPL-CCNC: 73 U/L (ref 55–135)
ALT SERPL W/O P-5'-P-CCNC: 34 U/L (ref 10–44)
ANION GAP SERPL CALC-SCNC: 17 MMOL/L (ref 8–16)
AST SERPL-CCNC: 46 U/L (ref 10–40)
BASOPHILS # BLD AUTO: 0.04 K/UL (ref 0–0.2)
BASOPHILS NFR BLD: 0.2 % (ref 0–1.9)
BILIRUB SERPL-MCNC: 1.2 MG/DL (ref 0.1–1)
BNP SERPL-MCNC: 32 PG/ML (ref 0–99)
BUN SERPL-MCNC: 25 MG/DL (ref 8–23)
CALCIUM SERPL-MCNC: 10.2 MG/DL (ref 8.7–10.5)
CHLORIDE SERPL-SCNC: 107 MMOL/L (ref 95–110)
CO2 SERPL-SCNC: 21 MMOL/L (ref 23–29)
CREAT SERPL-MCNC: 2.1 MG/DL (ref 0.5–1.4)
DIFFERENTIAL METHOD: ABNORMAL
EOSINOPHIL # BLD AUTO: 0 K/UL (ref 0–0.5)
EOSINOPHIL NFR BLD: 0 % (ref 0–8)
ERYTHROCYTE [DISTWIDTH] IN BLOOD BY AUTOMATED COUNT: 12.9 % (ref 11.5–14.5)
EST. GFR  (NO RACE VARIABLE): 31 ML/MIN/1.73 M^2
GLUCOSE SERPL-MCNC: 113 MG/DL (ref 70–110)
HCT VFR BLD AUTO: 45.9 % (ref 40–54)
HGB BLD-MCNC: 15.8 G/DL (ref 14–18)
IMM GRANULOCYTES # BLD AUTO: 0.08 K/UL (ref 0–0.04)
IMM GRANULOCYTES NFR BLD AUTO: 0.4 % (ref 0–0.5)
LACTATE SERPL-SCNC: 3.7 MMOL/L (ref 0.5–2.2)
LYMPHOCYTES # BLD AUTO: 0.3 K/UL (ref 1–4.8)
LYMPHOCYTES NFR BLD: 1.6 % (ref 18–48)
MCH RBC QN AUTO: 31.7 PG (ref 27–31)
MCHC RBC AUTO-ENTMCNC: 34.4 G/DL (ref 32–36)
MCV RBC AUTO: 92 FL (ref 82–98)
MONOCYTES # BLD AUTO: 1.9 K/UL (ref 0.3–1)
MONOCYTES NFR BLD: 9.3 % (ref 4–15)
NEUTROPHILS # BLD AUTO: 17.8 K/UL (ref 1.8–7.7)
NEUTROPHILS NFR BLD: 88.5 % (ref 38–73)
NRBC BLD-RTO: 0 /100 WBC
PLATELET # BLD AUTO: 228 K/UL (ref 150–450)
PMV BLD AUTO: 9.6 FL (ref 9.2–12.9)
POTASSIUM SERPL-SCNC: 4.5 MMOL/L (ref 3.5–5.1)
PROCALCITONIN SERPL IA-MCNC: 0.42 NG/ML
PROT SERPL-MCNC: 6.7 G/DL (ref 6–8.4)
RBC # BLD AUTO: 4.98 M/UL (ref 4.6–6.2)
SODIUM SERPL-SCNC: 145 MMOL/L (ref 136–145)
TROPONIN I SERPL DL<=0.01 NG/ML-MCNC: 0.49 NG/ML (ref 0–0.03)
WBC # BLD AUTO: 20.07 K/UL (ref 3.9–12.7)

## 2023-04-06 PROCEDURE — 25000003 PHARM REV CODE 250: Performed by: INTERNAL MEDICINE

## 2023-04-06 PROCEDURE — 83605 ASSAY OF LACTIC ACID: CPT

## 2023-04-06 PROCEDURE — 83880 ASSAY OF NATRIURETIC PEPTIDE: CPT | Performed by: EMERGENCY MEDICINE

## 2023-04-06 PROCEDURE — 96361 HYDRATE IV INFUSION ADD-ON: CPT

## 2023-04-06 PROCEDURE — G0378 HOSPITAL OBSERVATION PER HR: HCPCS

## 2023-04-06 PROCEDURE — 80053 COMPREHEN METABOLIC PANEL: CPT | Performed by: EMERGENCY MEDICINE

## 2023-04-06 PROCEDURE — 99285 EMERGENCY DEPT VISIT HI MDM: CPT | Mod: 25

## 2023-04-06 PROCEDURE — 93010 ELECTROCARDIOGRAM REPORT: CPT | Mod: ,,, | Performed by: INTERNAL MEDICINE

## 2023-04-06 PROCEDURE — 83605 ASSAY OF LACTIC ACID: CPT | Mod: 91 | Performed by: INTERNAL MEDICINE

## 2023-04-06 PROCEDURE — 84484 ASSAY OF TROPONIN QUANT: CPT | Performed by: EMERGENCY MEDICINE

## 2023-04-06 PROCEDURE — 85025 COMPLETE CBC W/AUTO DIFF WBC: CPT | Performed by: EMERGENCY MEDICINE

## 2023-04-06 PROCEDURE — 93005 ELECTROCARDIOGRAM TRACING: CPT

## 2023-04-06 PROCEDURE — 84145 PROCALCITONIN (PCT): CPT | Performed by: INTERNAL MEDICINE

## 2023-04-06 PROCEDURE — 96360 HYDRATION IV INFUSION INIT: CPT

## 2023-04-06 PROCEDURE — 84443 ASSAY THYROID STIM HORMONE: CPT

## 2023-04-06 PROCEDURE — 93010 EKG 12-LEAD: ICD-10-PCS | Mod: ,,, | Performed by: INTERNAL MEDICINE

## 2023-04-06 RX ORDER — ACETAMINOPHEN 325 MG/1
650 TABLET ORAL EVERY 4 HOURS PRN
Status: DISCONTINUED | OUTPATIENT
Start: 2023-04-07 | End: 2023-04-10 | Stop reason: HOSPADM

## 2023-04-06 RX ORDER — PROCHLORPERAZINE EDISYLATE 5 MG/ML
5 INJECTION INTRAMUSCULAR; INTRAVENOUS EVERY 6 HOURS PRN
Status: DISCONTINUED | OUTPATIENT
Start: 2023-04-07 | End: 2023-04-10 | Stop reason: HOSPADM

## 2023-04-06 RX ORDER — TALC
6 POWDER (GRAM) TOPICAL NIGHTLY PRN
Status: DISCONTINUED | OUTPATIENT
Start: 2023-04-07 | End: 2023-04-10 | Stop reason: HOSPADM

## 2023-04-06 RX ORDER — ONDANSETRON 8 MG/1
8 TABLET, ORALLY DISINTEGRATING ORAL EVERY 8 HOURS PRN
Status: DISCONTINUED | OUTPATIENT
Start: 2023-04-07 | End: 2023-04-10 | Stop reason: HOSPADM

## 2023-04-06 RX ORDER — ENOXAPARIN SODIUM 100 MG/ML
30 INJECTION SUBCUTANEOUS EVERY 24 HOURS
Status: DISCONTINUED | OUTPATIENT
Start: 2023-04-07 | End: 2023-04-10 | Stop reason: HOSPADM

## 2023-04-06 RX ORDER — OXYCODONE HYDROCHLORIDE 5 MG/1
5 TABLET ORAL EVERY 6 HOURS PRN
Status: DISCONTINUED | OUTPATIENT
Start: 2023-04-07 | End: 2023-04-10 | Stop reason: HOSPADM

## 2023-04-06 RX ORDER — POLYETHYLENE GLYCOL 3350 17 G/17G
17 POWDER, FOR SOLUTION ORAL DAILY
Status: DISCONTINUED | OUTPATIENT
Start: 2023-04-07 | End: 2023-04-10 | Stop reason: HOSPADM

## 2023-04-06 RX ORDER — MAG HYDROX/ALUMINUM HYD/SIMETH 200-200-20
30 SUSPENSION, ORAL (FINAL DOSE FORM) ORAL 4 TIMES DAILY PRN
Status: DISCONTINUED | OUTPATIENT
Start: 2023-04-07 | End: 2023-04-10 | Stop reason: HOSPADM

## 2023-04-06 RX ORDER — NALOXONE HCL 0.4 MG/ML
0.02 VIAL (ML) INJECTION
Status: DISCONTINUED | OUTPATIENT
Start: 2023-04-07 | End: 2023-04-10 | Stop reason: HOSPADM

## 2023-04-06 RX ADMIN — SODIUM CHLORIDE 1000 ML: 9 INJECTION, SOLUTION INTRAVENOUS at 09:04

## 2023-04-06 NOTE — Clinical Note
Diagnosis: Syncope [868024]   Future Attending Provider: ZACARIAS ROJAS [[id number]]   Admitting Provider:: ZACARIAS ROJAS [[id number]]   Special Needs:: No Special Needs [1]

## 2023-04-07 ENCOUNTER — PATIENT MESSAGE (OUTPATIENT)
Dept: FAMILY MEDICINE | Facility: CLINIC | Age: 78
End: 2023-04-07
Payer: MEDICARE

## 2023-04-07 PROBLEM — R79.89 ELEVATED TROPONIN LEVEL NOT DUE TO ACUTE CORONARY SYNDROME: Status: ACTIVE | Noted: 2023-04-07

## 2023-04-07 LAB
ALLENS TEST: ABNORMAL
AMPHET+METHAMPHET UR QL: NEGATIVE
ANION GAP SERPL CALC-SCNC: 12 MMOL/L (ref 8–16)
AORTIC ROOT ANNULUS: 3.27 CM
AORTIC VALVE CUSP SEPERATION: 2.11 CM
ASCENDING AORTA: 3.65 CM
AV INDEX (PROSTH): 0.84
AV MEAN GRADIENT: 2 MMHG
AV PEAK GRADIENT: 3 MMHG
AV VALVE AREA: 4.11 CM2
AV VELOCITY RATIO: 0.85
BACTERIA #/AREA URNS HPF: ABNORMAL /HPF
BARBITURATES UR QL SCN>200 NG/ML: NEGATIVE
BASOPHILS # BLD AUTO: 0.01 K/UL (ref 0–0.2)
BASOPHILS NFR BLD: 0.1 % (ref 0–1.9)
BENZODIAZ UR QL SCN>200 NG/ML: NEGATIVE
BILIRUB UR QL STRIP: NEGATIVE
BSA FOR ECHO PROCEDURE: 1.89 M2
BUN SERPL-MCNC: 24 MG/DL (ref 8–23)
BZE UR QL SCN: NEGATIVE
CALCIUM SERPL-MCNC: 9.2 MG/DL (ref 8.7–10.5)
CANNABINOIDS UR QL SCN: NEGATIVE
CHLORIDE SERPL-SCNC: 112 MMOL/L (ref 95–110)
CHOLEST SERPL-MCNC: 178 MG/DL (ref 120–199)
CHOLEST/HDLC SERPL: 3.7 {RATIO} (ref 2–5)
CLARITY UR: CLEAR
CO2 SERPL-SCNC: 19 MMOL/L (ref 23–29)
COLOR UR: YELLOW
CREAT SERPL-MCNC: 1.7 MG/DL (ref 0.5–1.4)
CREAT UR-MCNC: 174.4 MG/DL (ref 23–375)
CV ECHO LV RWT: 0.53 CM
DELSYS: ABNORMAL
DIFFERENTIAL METHOD: ABNORMAL
DOP CALC AO PEAK VEL: 0.86 M/S
DOP CALC AO VTI: 18.6 CM
DOP CALC LVOT AREA: 4.9 CM2
DOP CALC LVOT DIAMETER: 2.49 CM
DOP CALC LVOT PEAK VEL: 0.73 M/S
DOP CALC LVOT STROKE VOLUME: 76.41 CM3
DOP CALCLVOT PEAK VEL VTI: 15.7 CM
E WAVE DECELERATION TIME: 149.55 MSEC
E/A RATIO: 1.02
ECHO LV POSTERIOR WALL: 1.23 CM (ref 0.6–1.1)
EJECTION FRACTION: 55 %
EOSINOPHIL # BLD AUTO: 0 K/UL (ref 0–0.5)
EOSINOPHIL NFR BLD: 0 % (ref 0–8)
ERYTHROCYTE [DISTWIDTH] IN BLOOD BY AUTOMATED COUNT: 13.1 % (ref 11.5–14.5)
EST. GFR  (NO RACE VARIABLE): 40 ML/MIN/1.73 M^2
FIO2: 21
FRACTIONAL SHORTENING: 29 % (ref 28–44)
GLUCOSE SERPL-MCNC: 107 MG/DL (ref 70–110)
GLUCOSE UR QL STRIP: NEGATIVE
HCO3 UR-SCNC: 22.6 MMOL/L (ref 24–28)
HCT VFR BLD AUTO: 44.1 % (ref 40–54)
HDLC SERPL-MCNC: 48 MG/DL (ref 40–75)
HDLC SERPL: 27 % (ref 20–50)
HGB BLD-MCNC: 14.2 G/DL (ref 14–18)
HGB UR QL STRIP: ABNORMAL
HYALINE CASTS #/AREA URNS LPF: 106 /LPF
IMM GRANULOCYTES # BLD AUTO: 0.05 K/UL (ref 0–0.04)
IMM GRANULOCYTES NFR BLD AUTO: 0.4 % (ref 0–0.5)
INTERVENTRICULAR SEPTUM: 1.24 CM (ref 0.6–1.1)
IVC DIAMETER: 0.79 CM
KETONES UR QL STRIP: ABNORMAL
LA MAJOR: 3.9 CM
LA MINOR: 3.93 CM
LA WIDTH: 3.9 CM
LACTATE SERPL-SCNC: 2.8 MMOL/L (ref 0.5–2.2)
LDLC SERPL CALC-MCNC: 120.6 MG/DL (ref 63–159)
LEFT ATRIUM SIZE: 2.92 CM
LEFT ATRIUM VOLUME INDEX: 20.1 ML/M2
LEFT ATRIUM VOLUME: 37.9 CM3
LEFT INTERNAL DIMENSION IN SYSTOLE: 3.29 CM (ref 2.1–4)
LEFT VENTRICLE DIASTOLIC VOLUME INDEX: 52.7 ML/M2
LEFT VENTRICLE DIASTOLIC VOLUME: 99.61 ML
LEFT VENTRICLE MASS INDEX: 115 G/M2
LEFT VENTRICLE SYSTOLIC VOLUME INDEX: 23.1 ML/M2
LEFT VENTRICLE SYSTOLIC VOLUME: 43.69 ML
LEFT VENTRICULAR INTERNAL DIMENSION IN DIASTOLE: 4.65 CM (ref 3.5–6)
LEFT VENTRICULAR MASS: 217.25 G
LEUKOCYTE ESTERASE UR QL STRIP: NEGATIVE
LVOT MG: 1.31 MMHG
LVOT MV: 0.55 CM/S
LYMPHOCYTES # BLD AUTO: 0.7 K/UL (ref 1–4.8)
LYMPHOCYTES NFR BLD: 5.3 % (ref 18–48)
MCH RBC QN AUTO: 30.7 PG (ref 27–31)
MCHC RBC AUTO-ENTMCNC: 32.2 G/DL (ref 32–36)
MCV RBC AUTO: 96 FL (ref 82–98)
METHADONE UR QL SCN>300 NG/ML: NEGATIVE
MICROSCOPIC COMMENT: ABNORMAL
MODE: ABNORMAL
MONOCYTES # BLD AUTO: 1.5 K/UL (ref 0.3–1)
MONOCYTES NFR BLD: 11.4 % (ref 4–15)
MV PEAK A VEL: 0.62 M/S
MV PEAK E VEL: 0.63 M/S
MV STENOSIS PRESSURE HALF TIME: 48.38 MS
MV VALVE AREA P 1/2 METHOD: 4.55 CM2
NEUTROPHILS # BLD AUTO: 11.1 K/UL (ref 1.8–7.7)
NEUTROPHILS NFR BLD: 82.8 % (ref 38–73)
NITRITE UR QL STRIP: NEGATIVE
NONHDLC SERPL-MCNC: 130 MG/DL
NRBC BLD-RTO: 0 /100 WBC
OPIATES UR QL SCN: NEGATIVE
PCO2 BLDA: 39.8 MMHG (ref 35–45)
PCP UR QL SCN>25 NG/ML: NEGATIVE
PH SMN: 7.36 [PH] (ref 7.35–7.45)
PH UR STRIP: 5 [PH] (ref 5–8)
PISA TR MAX VEL: 2.79 M/S
PLATELET # BLD AUTO: 197 K/UL (ref 150–450)
PMV BLD AUTO: 10 FL (ref 9.2–12.9)
PO2 BLDA: 25 MMHG (ref 40–60)
POC BE: -3 MMOL/L
POC SATURATED O2: 43 % (ref 95–100)
POC TCO2: 24 MMOL/L (ref 24–29)
POTASSIUM SERPL-SCNC: 4.1 MMOL/L (ref 3.5–5.1)
PROT UR QL STRIP: ABNORMAL
PV PEAK VELOCITY: 0.59 CM/S
RA MAJOR: 4.3 CM
RA PRESSURE: 3 MMHG
RA WIDTH: 3.31 CM
RBC # BLD AUTO: 4.62 M/UL (ref 4.6–6.2)
RBC #/AREA URNS HPF: 1 /HPF (ref 0–4)
SAMPLE: ABNORMAL
SINUS: 3.98 CM
SITE: ABNORMAL
SODIUM SERPL-SCNC: 143 MMOL/L (ref 136–145)
SP GR UR STRIP: 1.02 (ref 1–1.03)
STJ: 3.45 CM
TOXICOLOGY INFORMATION: NORMAL
TR MAX PG: 31 MMHG
TRIGL SERPL-MCNC: 47 MG/DL (ref 30–150)
TROPONIN I SERPL DL<=0.01 NG/ML-MCNC: 0.8 NG/ML (ref 0–0.03)
TROPONIN I SERPL DL<=0.01 NG/ML-MCNC: 0.81 NG/ML (ref 0–0.03)
TROPONIN I SERPL DL<=0.01 NG/ML-MCNC: 0.9 NG/ML (ref 0–0.03)
TSH SERPL DL<=0.005 MIU/L-ACNC: 1.4 UIU/ML (ref 0.4–4)
TV REST PULMONARY ARTERY PRESSURE: 34 MMHG
URN SPEC COLLECT METH UR: ABNORMAL
UROBILINOGEN UR STRIP-ACNC: NEGATIVE EU/DL
WBC # BLD AUTO: 13.4 K/UL (ref 3.9–12.7)
WBC #/AREA URNS HPF: 0 /HPF (ref 0–5)

## 2023-04-07 PROCEDURE — 99203 OFFICE O/P NEW LOW 30 MIN: CPT | Mod: 25,,, | Performed by: INTERNAL MEDICINE

## 2023-04-07 PROCEDURE — 80307 DRUG TEST PRSMV CHEM ANLYZR: CPT

## 2023-04-07 PROCEDURE — 96372 THER/PROPH/DIAG INJ SC/IM: CPT

## 2023-04-07 PROCEDURE — 96361 HYDRATE IV INFUSION ADD-ON: CPT

## 2023-04-07 PROCEDURE — 80048 BASIC METABOLIC PNL TOTAL CA: CPT

## 2023-04-07 PROCEDURE — 25000003 PHARM REV CODE 250

## 2023-04-07 PROCEDURE — 82803 BLOOD GASES ANY COMBINATION: CPT

## 2023-04-07 PROCEDURE — G0378 HOSPITAL OBSERVATION PER HR: HCPCS

## 2023-04-07 PROCEDURE — 85025 COMPLETE CBC W/AUTO DIFF WBC: CPT

## 2023-04-07 PROCEDURE — 80061 LIPID PANEL: CPT

## 2023-04-07 PROCEDURE — 84484 ASSAY OF TROPONIN QUANT: CPT

## 2023-04-07 PROCEDURE — 63600175 PHARM REV CODE 636 W HCPCS

## 2023-04-07 PROCEDURE — 81000 URINALYSIS NONAUTO W/SCOPE: CPT | Mod: 59

## 2023-04-07 PROCEDURE — 99900035 HC TECH TIME PER 15 MIN (STAT)

## 2023-04-07 PROCEDURE — 99203 PR OFFICE/OUTPT VISIT, NEW, LEVL III, 30-44 MIN: ICD-10-PCS | Mod: 25,,, | Performed by: INTERNAL MEDICINE

## 2023-04-07 RX ORDER — ARIPIPRAZOLE 5 MG/1
20 TABLET ORAL DAILY
Status: DISCONTINUED | OUTPATIENT
Start: 2023-04-07 | End: 2023-04-10 | Stop reason: HOSPADM

## 2023-04-07 RX ORDER — SODIUM CHLORIDE 9 MG/ML
INJECTION, SOLUTION INTRAVENOUS CONTINUOUS
Status: DISCONTINUED | OUTPATIENT
Start: 2023-04-07 | End: 2023-04-09

## 2023-04-07 RX ADMIN — ENOXAPARIN SODIUM 30 MG: 30 INJECTION SUBCUTANEOUS at 04:04

## 2023-04-07 RX ADMIN — SODIUM CHLORIDE: 9 INJECTION, SOLUTION INTRAVENOUS at 07:04

## 2023-04-07 RX ADMIN — SODIUM CHLORIDE: 9 INJECTION, SOLUTION INTRAVENOUS at 05:04

## 2023-04-07 RX ADMIN — ARIPIPRAZOLE 20 MG: 10 TABLET ORAL at 09:04

## 2023-04-07 RX ADMIN — OXYCODONE HYDROCHLORIDE 5 MG: 5 TABLET ORAL at 04:04

## 2023-04-07 NOTE — ASSESSMENT & PLAN NOTE
Patient denies being on any current medications for schizophrenia, not currently exhibiting any signs of psychosis.  Continue to monitor

## 2023-04-07 NOTE — H&P
"[facility] Medicine  History & Physical    Patient Name: Antonio Torres Jr.  MRN: [id number]  Patient Class: OP- Observation  Admission Date: 4/6/2023  Attending Physician: Yaakov Echevarria MD   Primary Care Provider: Fabio Lennon MD         Patient information was obtained from patient, past medical records and ER records.     Subjective:     Principal Problem:Syncope    Chief Complaint:   Chief Complaint   Patient presents with    Weakness     Pt reports he was having a BM and then felt very "faint" and attempted to lower himself to the floor; pt was on the floor for 2hrs when sister found him; pt c/o dizziness, weakness in his legs, and was unable to stand without assist; pt has abrasions noted to back of head; denies LOC        HPI: Antonio Torres Jr. 78 y.o. male with With CKD 3, HDL, BPH, neoplasm, schizophrenia presents to the hospital due to a chief complaint of syncopal episode.  Acute onset 04/06/2023 patient reports that he had a fainting episode after large bowel movement today and standing up.  Patient endorses that he was on the floor for awhile with his sister found him, per family report it was 2 hours.  He stated that he felt lightheaded after standing up his legs grew weak and he gently lowered himself to the ground.  Patient endorses decreased thirst stating that he only had 2 glasses of water today, patient states that he had similar episodes 2 years ago.  Patient denied attempted any self-treatment.   denies head trauma, syncope, headache, chest pain, SOB, or other associated symptoms.  Patient also endorses pain to the right ankle and bilateral knees.  On exam right ankle is swollen +2 greater than left bilateral knees are erythematous and TTP. No other alleviating or exacerbating factors noted    In the ED patient was afebrile with leukocytosis WBCs 20.07, CO2 21, anion gap 17, BUN 25, creatinine 2.1, EGFR 31, glucose 113, bilirubin total 1.2, AST 46, BNP negative, " 1st troponin 0.493, 2nd troponin 0.803, UA showed +1 protein, +2 ketones +3 occult blood, CT head and C-spine pending, CXR negative, x-ray bilateral knees negative, x-ray right ankle showed Acute distal fibular shaft fracture.  Patient was placed in observation for further workup and management of fibular shaft fracture and ACS rule out      Past Medical History:   Diagnosis Date    Chronic kidney disease     Depression     Hematuria     had neg work up with Dr. Stafford    Hypercholesterolemia 3/13    Schizophrenia        Past Surgical History:   Procedure Laterality Date    HERNIA REPAIR         Review of patient's allergies indicates:  No Known Allergies    No current facility-administered medications on file prior to encounter.     Current Outpatient Medications on File Prior to Encounter   Medication Sig    ARIPiprazole (ABILIFY) 20 MG Tab TAKE ONE TABLET BY MOUTH EVERY DAY    fish oil-omega-3 fatty acids 300-1,000 mg capsule Take 2 g by mouth once daily.    loratadine (CLARITIN) 10 mg tablet Take 10 mg by mouth once daily.    multivitamin capsule Take 1 capsule by mouth once daily.     Family History       Problem Relation (Age of Onset)    Alzheimer's disease Mother    Heart disease Father    Hypertension Sister    Pulmonary embolism Father          Tobacco Use    Smoking status: Never    Smokeless tobacco: Never   Substance and Sexual Activity    Alcohol use: No    Drug use: No    Sexual activity: Never     Review of Systems   Constitutional:  Negative for chills and fever.   HENT:  Negative for congestion and rhinorrhea.    Eyes:  Negative for photophobia and visual disturbance.   Respiratory:  Negative for cough and shortness of breath.    Cardiovascular:  Negative for chest pain, palpitations and leg swelling.   Gastrointestinal:  Negative for abdominal pain, diarrhea, nausea and vomiting.   Genitourinary:  Negative for frequency, hematuria and urgency.   Skin:  Negative for pallor, rash and wound.    Neurological:  Positive for syncope, weakness and light-headedness (resolved). Negative for headaches.   Psychiatric/Behavioral:  Negative for confusion and decreased concentration.    Objective:     Vital Signs (Most Recent):  Temp: 97.9 °F (36.6 °C) (04/06/23 2105)  Pulse: 85 (04/07/23 0001)  Resp: 11 (04/07/23 0001)  BP: 119/67 (04/07/23 0001)  SpO2: 97 % (04/07/23 0001) Vital Signs (24h Range):  Temp:  [97.9 °F (36.6 °C)] 97.9 °F (36.6 °C)  Pulse:  [] 85  Resp:  [11-20] 11  SpO2:  [97 %-100 %] 97 %  BP: ()/(59-76) 119/67     Weight: 72.1 kg (159 lb)  Body mass index is 22.81 kg/m².    Physical Exam  Vitals and nursing note reviewed.   Constitutional:       General: He is not in acute distress.     Appearance: He is well-developed.   HENT:      Head: Normocephalic and atraumatic.      Right Ear: External ear normal.      Left Ear: External ear normal.      Nose: Nose normal.      Mouth/Throat:      Mouth: Mucous membranes are dry.   Eyes:      Conjunctiva/sclera: Conjunctivae normal.      Pupils: Pupils are equal, round, and reactive to light.   Cardiovascular:      Rate and Rhythm: Normal rate and regular rhythm.      Pulses: Normal pulses.      Heart sounds: Normal heart sounds.   Pulmonary:      Effort: Pulmonary effort is normal. No respiratory distress.      Breath sounds: Normal breath sounds. No wheezing or rales.   Abdominal:      General: Bowel sounds are normal. There is no distension.      Palpations: Abdomen is soft.      Tenderness: There is no abdominal tenderness.      Comments: No palpable hepatomegaly or splenomegaly   Musculoskeletal:         General: Swelling (RLE ankle) and tenderness (RLE ankle) present. Normal range of motion.      Right elbow: Tenderness present.      Left elbow: Tenderness present.      Cervical back: Normal range of motion and neck supple.      Right knee: Swelling and erythema present. Tenderness present.      Left knee: Swelling and erythema present.  Tenderness present.      Right ankle: Swelling present. Tenderness present.      Left ankle: No swelling. No tenderness.      Comments: Erythema and slight swelling to bilateral forearms/elbows slight TTP  Mild swelling and erythema to bilateral knees, slight TTP   Skin:     General: Skin is warm and dry.   Neurological:      Mental Status: He is alert and oriented to person, place, and time.   Psychiatric:         Thought Content: Thought content normal.         CRANIAL NERVES     CN III, IV, VI   Pupils are equal, round, and reactive to light.     Significant Labs: All pertinent labs within the past 24 hours have been reviewed.  Recent Lab Results  (Last 5 results in the past 24 hours)        04/07/23  0002   04/07/23  0001   04/06/23  2359   04/06/23  2244   04/06/23  2134        Procalcitonin       0.42  Comment: A concentration < 0.25 ng/mL represents a low risk of bacterial   infection.  Procalcitonin may not be accurate among patients with localized   infection, recent trauma or major surgery, immunosuppressed state,   invasive fungal infection, renal dysfunction. Decisions regarding   initiation or continuation of antibiotic therapy should not be based   solely on procalcitonin levels.           Albumin         4.0       Alkaline Phosphatase         73       Allens Test   N/A             ALT         34       Anion Gap         17       Appearance, UA Clear               AST         46       Bacteria, UA None               Baso #         0.04       Basophil %         0.2       Bilirubin (UA) Negative               BILIRUBIN TOTAL         1.2  Comment: For infants and newborns, interpretation of results should be based  on gestational age, weight and in agreement with clinical  observations.    Premature Infant recommended reference ranges:  Up to 24 hours.............<8.0 mg/dL  Up to 48 hours............<12.0 mg/dL  3-5 days..................<15.0 mg/dL  6-29 days.................<15.0 mg/dL         BNP          32  Comment: Values of less than 100 pg/ml are consistent with non-CHF populations.       Site   Other             BUN         25       Calcium         10.2       Chloride         107       CO2         21       Color, UA Yellow               Creatinine         2.1       DelSys   Room Air             Differential Method         Automated       eGFR         31       Eos #         0.0       Eosinophil %         0.0       FiO2   21             Glucose         113       Glucose, UA Negative               Gran # (ANC)         17.8       Gran %         88.5       Hematocrit         45.9       Hemoglobin         15.8       Hyaline Casts,                Immature Grans (Abs)         0.08  Comment: Mild elevation in immature granulocytes is non specific and   can be seen in a variety of conditions including stress response,   acute inflammation, trauma and pregnancy. Correlation with other   laboratory and clinical findings is essential.         Immature Granulocytes         0.4       Ketones, UA 2+               Lactate, Samm     2.8  Comment: Falsely low lactic acid results can be found in samples   containing >=13.0 mg/dL total bilirubin and/or >=3.5 mg/dL   direct bilirubin.     3.7  Comment: Falsely low lactic acid results can be found in samples   containing >=13.0 mg/dL total bilirubin and/or >=3.5 mg/dL   direct bilirubin.  Lactic Acid critical result(s) called and verbal readback obtained   from Maribel GÓMEZ   by LN2 04/06/2023 23:10           Leukocytes, UA Negative               Lymph #         0.3       Lymph %         1.6       MCH         31.7       MCHC         34.4       MCV         92       Microscopic Comment SEE COMMENT  Comment: Other formed elements not mentioned in the report are not   present in the microscopic examination.                  Mode   SPONT             Mono #         1.9       Mono %         9.3       MPV         9.6       NITRITE UA Negative               nRBC         0        Occult Blood UA 3+               pH, UA 5.0               Platelets         228       POC BE   -3             POC HCO3   22.6             POC PCO2   39.8             POC PH   7.361             POC PO2   25             POC SATURATED O2   43             POC TCO2   24             Potassium         4.5       PROTEIN TOTAL         6.7       Protein, UA 1+  Comment: Recommend a 24 hour urine protein or a urine   protein/creatinine ratio if globulin induced proteinuria is  clinically suspected.                 RBC         4.98       RBC, UA 1               RDW         12.9       Sample   VENOUS             Sodium         145       Specific [place], UA 1.020               Specimen UA Urine, Clean Catch               Troponin I     0.803  Comment: The reference interval for Troponin I represents the 99th percentile   cutoff   for our facility and is consistent with 3rd generation assay   performance.       0.493  Comment: The reference interval for Troponin I represents the 99th percentile   cutoff   for our facility and is consistent with 3rd generation assay   performance.         TSH     1.398           UROBILINOGEN UA Negative               WBC, UA 0               WBC         20.07                              Significant Imaging: I have reviewed all pertinent imaging results/findings within the past 24 hours.  Imaging Results              CT Head Without Contrast (Final result)  Result time 04/07/23 01:11:49      Final result by Олег Vo MD (04/07/23 01:11:49)                   Impression:      No acute intracranial abnormalities identified.      Electronically signed by: Олег Vo MD  Date:    04/07/2023  Time:    01:11               Narrative:    EXAMINATION:  CT HEAD WITHOUT CONTRAST    CLINICAL HISTORY:  Head trauma, minor (Age >= 65y);    TECHNIQUE:  Low dose axial images were obtained through the head.  Coronal and sagittal reformations were also performed. Contrast was not  administered.    COMPARISON:  None.    FINDINGS:  There is mild generalized cerebral volume loss and chronic microvascular ischemic change.  No evidence of acute/recent major vascular distribution cerebral infarction, intraparenchymal hemorrhage, or intra-axial space occupying lesion. The ventricular system is normal in size and configuration with no evidence of hydrocephalus. No effacement of the skull-base cisterns. No abnormal extra-axial fluid collections or blood products. Visualized paranasal sinuses and mastoid air cells are clear. The calvarium shows no significant abnormality.                                       CT Cervical Spine Without Contrast (Final result)  Result time 04/07/23 01:14:02      Final result by Олег Vo MD (04/07/23 01:14:02)                   Impression:      No evidence of acute cervical spine fracture or dislocation.      Electronically signed by: Олег Vo MD  Date:    04/07/2023  Time:    01:14               Narrative:    EXAMINATION:  CT CERVICAL SPINE WITHOUT CONTRAST    CLINICAL HISTORY:  Neck trauma (Age >= 65y);    TECHNIQUE:  Low dose axial images, sagittal and coronal reformations were performed though the cervical spine.  Contrast was not administered.    COMPARISON:  None    FINDINGS:  No evidence of acute cervical spine fracture or dislocation.  Odontoid process is intact.  Craniocervical junction is unremarkable.  Cervical spine alignment is within normal limits.  Mild multilevel degenerative changes and intervertebral disc space narrowing are seen.  Prominent facet arthropathy is visualized at the right C4-5 and C5-6 levels.    Surrounding soft tissues show no significant abnormalities.  Airway is patent.  Partially visualized lung apices are clear.                                       X-Ray Knee 1 or 2 View Bilateral (Final result)  Result time 04/07/23 00:04:58   Procedure changed from X-Ray Knee 3 View Bilateral     Final result by Олег Vo MD  (04/07/23 00:04:58)                   Impression:      No acute osseous abnormality identified.      Electronically signed by: Олег Vo MD  Date:    04/07/2023  Time:    00:04               Narrative:    EXAMINATION:  XR KNEE 1 OR 2 VIEW BILATERAL    CLINICAL HISTORY:  Bilateral knee pain;  Pain in right knee    TECHNIQUE:  Bilateral knees AP and lateral views were obtained.    COMPARISON:  None    FINDINGS:  No evidence of acute displaced fracture, dislocation, or osseous destructive process.  Medial and lateral compartment joint spaces of the knees appear fairly well preserved.  No significant suprapatellar joint effusion on the right or left.                                       X-Ray Ankle Complete Right (Final result)  Result time 04/07/23 00:01:03      Final result by Олег Vo MD (04/07/23 00:01:03)                   Impression:      Acute distal fibular shaft fracture.      Electronically signed by: Олег Vo MD  Date:    04/07/2023  Time:    00:01               Narrative:    EXAMINATION:  XR ANKLE COMPLETE 3 VIEW RIGHT    CLINICAL HISTORY:  Pain in right ankle and joints of right foot    TECHNIQUE:  AP, lateral, and oblique images of the right ankle were performed.    COMPARISON:  None    FINDINGS:  Acute displaced, obliquely oriented fracture is seen through the distal aspect of the fibular shaft.  Prominent overlying soft tissue swelling is seen.  No additional acute displaced fracture or dislocation seen.  Ankle mortise is maintained.                                       X-Ray Chest AP Portable (Final result)  Result time 04/06/23 22:08:35      Final result by Олег Vo MD (04/06/23 22:08:35)                   Impression:      No acute cardiopulmonary process identified.      Electronically signed by: Олег Vo MD  Date:    04/06/2023  Time:    22:08               Narrative:    EXAMINATION:  XR CHEST AP PORTABLE    CLINICAL HISTORY:  fatigue;    TECHNIQUE:  Single  frontal view of the chest was performed.    COMPARISON:  None    FINDINGS:  Cardiac silhouette is normal in size.  Lungs are expanded with mild elevation of the right hemidiaphragm seen.  No evidence of focal consolidative process, pneumothorax, or significant pleural effusion.  No acute osseous abnormality identified.                                        Assessment/Plan:     * Syncope  Continuous Cardiac monitoring  EKG showed Normal sinus rhythm Possible Lateral infarct  Trend troponins 1st troponin 0.493, 2nd troponin 0.803 consult to cardiology for current significant upward trend  Orthostatic blood pressure negative  Neuro checks Q4hrs  2D Echo with bubble  U/A negative   CT of head pending   Check Tox screen       Closed right fibular fracture  Patient was still adamant that he did not fall however physical exam showed obvious bracing injuries to forearms and knees, x-ray right ankle showed Acute distal fibular shaft fracture.  Fall precautions  Prn analgesics  Nonweightbearing RLE  Ice packs  Short leg splint RLE  Consult ortho for displaced distal fibular shaft fracture  NPO    Neoplasm of uncertain behavior  History noted, patient with abrasion to the back of scalp on exam states that is part of his treatment that he receives patient referred to it as a skin peel    CKD (chronic kidney disease) stage 3, GFR 30-59 ml/min  Creatinine 2.1 on admit Baseline about 1.6 Voiding well  Avoid nephrotoxic agents  Strict I/O's  Renally dose medications BMP, Magnesium in AM  No ace/arb     Hypercholesterolemia  Chronic, stable, continue statin      Schizophrenia  Patient denies being on any current medications for schizophrenia, not currently exhibiting any signs of psychosis.  Continue to monitor      VTE Risk Mitigation (From admission, onward)           Ordered     enoxaparin injection 30 mg  Daily         04/06/23 2312     IP VTE HIGH RISK PATIENT  Once         04/06/23 2315     Place sequential compression  device  Until discontinued         04/06/23 2315                         On 04/07/2023, patient should be placed in hospital observation services under my care in collaboration with Yaakov Echevarria MD.      James Mcfarland NP  Department of Hospital Medicine  [facility] - Emergency Dept

## 2023-04-07 NOTE — HPI
Antonio Torres Jr. Is an 78 y.o. male who presents after a syncopal episode.  Patient states episode occurred after having a bowel movement.  He did note some straining.  When he stood up he felt dizzy, weak.  He went to the floor and was there for roughly a few hours.  He notes 1 other prior syncopal episode roughly 2 years ago.  He denies any history of coronary artery disease.  Initial troponin was 0.4.  Subsequent troponins have remained flat at 0.8.  His echocardiogram shows normal left ventricular systolic function with estimated ejection fraction of 55 percent.  EKG shows sinus rhythm.  No acute ischemic changes.  History of chronic kidney disease.  Creatinine was 2.1 on presentation.  Latest 1.7 which is his baseline.  Leukocytosis.  X-ray of his knees showed no acute abnormalities.  Right ankle showed acute distal fibular shaft fracture.    Echocardiogram 04/07/2023:    Technically difficult study.  The left ventricle is normal in size with concentric remodeling and normal systolic function.  The estimated ejection fraction is 55%.  Normal left ventricular diastolic function.  Normal right ventricular size with normal right ventricular systolic function.  Mild tricuspid regurgitation.  Normal central venous pressure (3 mmHg).  The estimated PA systolic pressure is 34 mmHg.  There is mild pulmonary hypertension.  The sinuses of Valsalva is mildly dilated. 4.0cm.

## 2023-04-07 NOTE — ASSESSMENT & PLAN NOTE
Patient was still adamant that he did not fall however physical exam showed obvious bracing injuries to forearms and knees, x-ray right ankle showed Acute distal fibular shaft fracture.  Fall precautions  Prn analgesics  Nonweightbearing RLE  Ice packs  Consult ortho for displaced distal fibular shaft fracture  NPO

## 2023-04-07 NOTE — SUBJECTIVE & OBJECTIVE
Past Medical History:   Diagnosis Date    Chronic kidney disease     Depression     Hematuria     had neg work up with Dr. Stafford    Hypercholesterolemia 3/13    Schizophrenia        Past Surgical History:   Procedure Laterality Date    HERNIA REPAIR         Review of patient's allergies indicates:  No Known Allergies    No current facility-administered medications on file prior to encounter.     Current Outpatient Medications on File Prior to Encounter   Medication Sig    ARIPiprazole (ABILIFY) 20 MG Tab TAKE ONE TABLET BY MOUTH EVERY DAY    fish oil-omega-3 fatty acids 300-1,000 mg capsule Take 2 g by mouth once daily.    loratadine (CLARITIN) 10 mg tablet Take 10 mg by mouth once daily.    multivitamin capsule Take 1 capsule by mouth once daily.     Family History       Problem Relation (Age of Onset)    Alzheimer's disease Mother    Heart disease Father    Hypertension Sister    Pulmonary embolism Father          Tobacco Use    Smoking status: Never    Smokeless tobacco: Never   Substance and Sexual Activity    Alcohol use: No    Drug use: No    Sexual activity: Never     Review of Systems   Constitutional:  Negative for chills and fever.   HENT:  Negative for congestion and rhinorrhea.    Eyes:  Negative for photophobia and visual disturbance.   Respiratory:  Negative for cough and shortness of breath.    Cardiovascular:  Negative for chest pain, palpitations and leg swelling.   Gastrointestinal:  Negative for abdominal pain, diarrhea, nausea and vomiting.   Genitourinary:  Negative for frequency, hematuria and urgency.   Skin:  Negative for pallor, rash and wound.   Neurological:  Positive for syncope, weakness and light-headedness (resolved). Negative for headaches.   Psychiatric/Behavioral:  Negative for confusion and decreased concentration.    Objective:     Vital Signs (Most Recent):  Temp: 97.9 °F (36.6 °C) (04/06/23 2105)  Pulse: 85 (04/07/23 0001)  Resp: 11 (04/07/23 0001)  BP: 119/67 (04/07/23  0001)  SpO2: 97 % (04/07/23 0001) Vital Signs (24h Range):  Temp:  [97.9 °F (36.6 °C)] 97.9 °F (36.6 °C)  Pulse:  [] 85  Resp:  [11-20] 11  SpO2:  [97 %-100 %] 97 %  BP: ()/(59-76) 119/67     Weight: 72.1 kg (159 lb)  Body mass index is 22.81 kg/m².    Physical Exam  Vitals and nursing note reviewed.   Constitutional:       General: He is not in acute distress.     Appearance: He is well-developed.   HENT:      Head: Normocephalic and atraumatic.      Right Ear: External ear normal.      Left Ear: External ear normal.      Nose: Nose normal.      Mouth/Throat:      Mouth: Mucous membranes are dry.   Eyes:      Conjunctiva/sclera: Conjunctivae normal.      Pupils: Pupils are equal, round, and reactive to light.   Cardiovascular:      Rate and Rhythm: Normal rate and regular rhythm.      Pulses: Normal pulses.      Heart sounds: Normal heart sounds.   Pulmonary:      Effort: Pulmonary effort is normal. No respiratory distress.      Breath sounds: Normal breath sounds. No wheezing or rales.   Abdominal:      General: Bowel sounds are normal. There is no distension.      Palpations: Abdomen is soft.      Tenderness: There is no abdominal tenderness.      Comments: No palpable hepatomegaly or splenomegaly   Musculoskeletal:         General: Swelling (RLE ankle) and tenderness (RLE ankle) present. Normal range of motion.      Right elbow: Tenderness present.      Left elbow: Tenderness present.      Cervical back: Normal range of motion and neck supple.      Right knee: Swelling and erythema present. Tenderness present.      Left knee: Swelling and erythema present. Tenderness present.      Right ankle: Swelling present. Tenderness present.      Left ankle: No swelling. No tenderness.      Comments: Erythema and slight swelling to bilateral forearms/elbows slight TTP  Mild swelling and erythema to bilateral knees, slight TTP   Skin:     General: Skin is warm and dry.   Neurological:      Mental Status: He is  alert and oriented to person, place, and time.   Psychiatric:         Thought Content: Thought content normal.         CRANIAL NERVES     CN III, IV, VI   Pupils are equal, round, and reactive to light.     Significant Labs: All pertinent labs within the past 24 hours have been reviewed.  Recent Lab Results  (Last 5 results in the past 24 hours)        04/07/23  0002   04/07/23  0001   04/06/23  2359   04/06/23  2244   04/06/23  2134        Procalcitonin       0.42  Comment: A concentration < 0.25 ng/mL represents a low risk of bacterial   infection.  Procalcitonin may not be accurate among patients with localized   infection, recent trauma or major surgery, immunosuppressed state,   invasive fungal infection, renal dysfunction. Decisions regarding   initiation or continuation of antibiotic therapy should not be based   solely on procalcitonin levels.           Albumin         4.0       Alkaline Phosphatase         73       Allens Test   N/A             ALT         34       Anion Gap         17       Appearance, UA Clear               AST         46       Bacteria, UA None               Baso #         0.04       Basophil %         0.2       Bilirubin (UA) Negative               BILIRUBIN TOTAL         1.2  Comment: For infants and newborns, interpretation of results should be based  on gestational age, weight and in agreement with clinical  observations.    Premature Infant recommended reference ranges:  Up to 24 hours.............<8.0 mg/dL  Up to 48 hours............<12.0 mg/dL  3-5 days..................<15.0 mg/dL  6-29 days.................<15.0 mg/dL         BNP         32  Comment: Values of less than 100 pg/ml are consistent with non-CHF populations.       Site   Other             BUN         25       Calcium         10.2       Chloride         107       CO2         21       Color, UA Yellow               Creatinine         2.1       DelLyatisss   Room Air             Differential Method         Automated        eGFR         31       Eos #         0.0       Eosinophil %         0.0       FiO2   21             Glucose         113       Glucose, UA Negative               Gran # (ANC)         17.8       Gran %         88.5       Hematocrit         45.9       Hemoglobin         15.8       Hyaline Casts,                Immature Grans (Abs)         0.08  Comment: Mild elevation in immature granulocytes is non specific and   can be seen in a variety of conditions including stress response,   acute inflammation, trauma and pregnancy. Correlation with other   laboratory and clinical findings is essential.         Immature Granulocytes         0.4       Ketones, UA 2+               Lactate, Samm     2.8  Comment: Falsely low lactic acid results can be found in samples   containing >=13.0 mg/dL total bilirubin and/or >=3.5 mg/dL   direct bilirubin.     3.7  Comment: Falsely low lactic acid results can be found in samples   containing >=13.0 mg/dL total bilirubin and/or >=3.5 mg/dL   direct bilirubin.  Lactic Acid critical result(s) called and verbal readback obtained   from Maribel GÓMEZ   by LN2 04/06/2023 23:10           Leukocytes, UA Negative               Lymph #         0.3       Lymph %         1.6       MCH         31.7       MCHC         34.4       MCV         92       Microscopic Comment SEE COMMENT  Comment: Other formed elements not mentioned in the report are not   present in the microscopic examination.                  Mode   SPONT             Mono #         1.9       Mono %         9.3       MPV         9.6       NITRITE UA Negative               nRBC         0       Occult Blood UA 3+               pH, UA 5.0               Platelets         228       POC BE   -3             POC HCO3   22.6             POC PCO2   39.8             POC PH   7.361             POC PO2   25             POC SATURATED O2   43             POC TCO2   24             Potassium         4.5       PROTEIN TOTAL         6.7       Protein, UA  1+  Comment: Recommend a 24 hour urine protein or a urine   protein/creatinine ratio if globulin induced proteinuria is  clinically suspected.                 RBC         4.98       RBC, UA 1               RDW         12.9       Sample   VENOUS             Sodium         145       Specific [place], UA 1.020               Specimen UA Urine, Clean Catch               Troponin I     0.803  Comment: The reference interval for Troponin I represents the 99th percentile   cutoff   for our facility and is consistent with 3rd generation assay   performance.       0.493  Comment: The reference interval for Troponin I represents the 99th percentile   cutoff   for our facility and is consistent with 3rd generation assay   performance.         TSH     1.398           UROBILINOGEN UA Negative               WBC, UA 0               WBC         20.07                              Significant Imaging: I have reviewed all pertinent imaging results/findings within the past 24 hours.  Imaging Results              CT Head Without Contrast (Final result)  Result time 04/07/23 01:11:49      Final result by Олег Vo MD (04/07/23 01:11:49)                   Impression:      No acute intracranial abnormalities identified.      Electronically signed by: Олег Vo MD  Date:    04/07/2023  Time:    01:11               Narrative:    EXAMINATION:  CT HEAD WITHOUT CONTRAST    CLINICAL HISTORY:  Head trauma, minor (Age >= 65y);    TECHNIQUE:  Low dose axial images were obtained through the head.  Coronal and sagittal reformations were also performed. Contrast was not administered.    COMPARISON:  None.    FINDINGS:  There is mild generalized cerebral volume loss and chronic microvascular ischemic change.  No evidence of acute/recent major vascular distribution cerebral infarction, intraparenchymal hemorrhage, or intra-axial space occupying lesion. The ventricular system is normal in size and configuration with no evidence of  hydrocephalus. No effacement of the skull-base cisterns. No abnormal extra-axial fluid collections or blood products. Visualized paranasal sinuses and mastoid air cells are clear. The calvarium shows no significant abnormality.                                       CT Cervical Spine Without Contrast (Final result)  Result time 04/07/23 01:14:02      Final result by Олег Vo MD (04/07/23 01:14:02)                   Impression:      No evidence of acute cervical spine fracture or dislocation.      Electronically signed by: Олег Vo MD  Date:    04/07/2023  Time:    01:14               Narrative:    EXAMINATION:  CT CERVICAL SPINE WITHOUT CONTRAST    CLINICAL HISTORY:  Neck trauma (Age >= 65y);    TECHNIQUE:  Low dose axial images, sagittal and coronal reformations were performed though the cervical spine.  Contrast was not administered.    COMPARISON:  None    FINDINGS:  No evidence of acute cervical spine fracture or dislocation.  Odontoid process is intact.  Craniocervical junction is unremarkable.  Cervical spine alignment is within normal limits.  Mild multilevel degenerative changes and intervertebral disc space narrowing are seen.  Prominent facet arthropathy is visualized at the right C4-5 and C5-6 levels.    Surrounding soft tissues show no significant abnormalities.  Airway is patent.  Partially visualized lung apices are clear.                                       X-Ray Knee 1 or 2 View Bilateral (Final result)  Result time 04/07/23 00:04:58   Procedure changed from X-Ray Knee 3 View Bilateral     Final result by Олег Vo MD (04/07/23 00:04:58)                   Impression:      No acute osseous abnormality identified.      Electronically signed by: Олег Vo MD  Date:    04/07/2023  Time:    00:04               Narrative:    EXAMINATION:  XR KNEE 1 OR 2 VIEW BILATERAL    CLINICAL HISTORY:  Bilateral knee pain;  Pain in right knee    TECHNIQUE:  Bilateral knees AP and lateral  views were obtained.    COMPARISON:  None    FINDINGS:  No evidence of acute displaced fracture, dislocation, or osseous destructive process.  Medial and lateral compartment joint spaces of the knees appear fairly well preserved.  No significant suprapatellar joint effusion on the right or left.                                       X-Ray Ankle Complete Right (Final result)  Result time 04/07/23 00:01:03      Final result by Олег Vo MD (04/07/23 00:01:03)                   Impression:      Acute distal fibular shaft fracture.      Electronically signed by: Олег Vo MD  Date:    04/07/2023  Time:    00:01               Narrative:    EXAMINATION:  XR ANKLE COMPLETE 3 VIEW RIGHT    CLINICAL HISTORY:  Pain in right ankle and joints of right foot    TECHNIQUE:  AP, lateral, and oblique images of the right ankle were performed.    COMPARISON:  None    FINDINGS:  Acute displaced, obliquely oriented fracture is seen through the distal aspect of the fibular shaft.  Prominent overlying soft tissue swelling is seen.  No additional acute displaced fracture or dislocation seen.  Ankle mortise is maintained.                                       X-Ray Chest AP Portable (Final result)  Result time 04/06/23 22:08:35      Final result by Олег Vo MD (04/06/23 22:08:35)                   Impression:      No acute cardiopulmonary process identified.      Electronically signed by: Олег Vo MD  Date:    04/06/2023  Time:    22:08               Narrative:    EXAMINATION:  XR CHEST AP PORTABLE    CLINICAL HISTORY:  fatigue;    TECHNIQUE:  Single frontal view of the chest was performed.    COMPARISON:  None    FINDINGS:  Cardiac silhouette is normal in size.  Lungs are expanded with mild elevation of the right hemidiaphragm seen.  No evidence of focal consolidative process, pneumothorax, or significant pleural effusion.  No acute osseous abnormality identified.

## 2023-04-07 NOTE — NURSING TRANSFER
Nursing Transfer Note      4/7/2023       Reason patient is being transferred: Change in level of care      Transfer From: ED      Transfer via stretcher      Transfer with cardiac monitoring      Transported by Transporter      Medicines sent: No      Any special needs or follow-up needed: None      Chart send with patient: Yes      Notified: Sister      Patient reassessed at: 04/07/2023, 1455      Upon arrival to floor: cardiac monitor applied, patient oriented to room, call bell in reach, and bed in lowest position

## 2023-04-07 NOTE — ASSESSMENT & PLAN NOTE
Creatinine 2.1 on admit Baseline about 1.6 Voiding well  Avoid nephrotoxic agents  Strict I/O's  Renally dose medications BMP, Magnesium in AM  No ace/arb

## 2023-04-07 NOTE — CARE UPDATE
H&P, labs and vitals were reviewed. Patient seen and examined during provider rounds.    Antonio Torres Jr. Was placed under observation for management of right fibula fracture and elevated troponin. Orthopedic surgery was consulted, who recommended keeping the splint in place, and elevation and ice application help with reduction of swelling, with no surgical intervention planned during admission. Patient is to follow up as outpatient upon discharge. Cardiology was consulted for  ACS rule out of elevated troponin and although troponins were elevated, latest have remained flat and echocardiogram noted to have shown normal function. Further ischemic evaluation planned with nuclear stress test, as permitted. PT/OT have been consulted since patient will now be non-weightbearing on his RLE, and on exam is noted to have swollen and bruised right knee and right ankle > left ankle.     I will continue to monitor and treat Antonio Torres Jr. Throughout his observation stay.      Simeon Lomeli PA-C  Department of Hospital Medicine  [facility]  04/07/2023   left lower quadrant

## 2023-04-07 NOTE — ED PROVIDER NOTES
"Encounter Date: 4/6/2023    SCRIBE #1 NOTE: I, Arnoldo Rios, am scribing for, and in the presence of,  Curtis Louis MD. I have scribed the following portions of the note - the EKG reading. Other sections scribed: HPI, ROS, PE.     History     Chief Complaint   Patient presents with    Weakness     Pt reports he was having a BM and then felt very "faint" and attempted to lower himself to the floor; pt was on the floor for 2hrs when sister found him; pt c/o dizziness, weakness in his legs, and was unable to stand without assist; pt has abrasions noted to back of head; denies LOC     Antonio Torres Jr. Is an 78 y.o male with a PMHx of CKD, and hypercholesteremia, who presents to the ED for evaluation of lightheadedness beginning today. Patient reports he had a "fainting episode" after having a large bowel movement today, endorsing he felt lightheaded upon standing up and his legs were weak afterwards. Patient reports he only had 2 glasses of water today. Patient reports similar previous episodes 2 years ago. No medications taken PTA. No alleviating or exacerbating factors noted. Denies head trauma. Denies syncope, headache, CP, SOB, or other associated symptoms. NKDA.    The history is provided by the patient. No  was used.   Review of patient's allergies indicates:  No Known Allergies  Past Medical History:   Diagnosis Date    Chronic kidney disease     Depression     Hematuria     had neg work up with Dr. Stafford    Hypercholesterolemia 03/01/2013    Schizophrenia     Skin cancer of scalp     Syncope 04/06/2023     Past Surgical History:   Procedure Laterality Date    HERNIA REPAIR      SKIN CANCER EXCISION       Family History   Problem Relation Age of Onset    Alzheimer's disease Mother     Heart disease Father     Pulmonary embolism Father     Hypertension Sister      Social History     Tobacco Use    Smoking status: Never    Smokeless tobacco: Never   Substance Use Topics    Alcohol use: " No    Drug use: No     Review of Systems   Constitutional:  Negative for fever.   HENT:  Negative for sore throat.    Respiratory:  Negative for shortness of breath.    Cardiovascular:  Negative for chest pain.   Gastrointestinal:  Negative for diarrhea, nausea and vomiting.   Genitourinary:  Negative for dysuria.   Musculoskeletal:  Negative for back pain.   Skin:  Negative for rash.   Neurological:  Positive for weakness (BLE) and light-headedness. Negative for syncope and headaches.   Psychiatric/Behavioral:  Negative for behavioral problems.    All other systems reviewed and are negative.    Physical Exam     Initial Vitals [04/06/23 2105]   BP Pulse Resp Temp SpO2   117/70 94 18 97.9 °F (36.6 °C) 97 %      MAP       --         Physical Exam    Nursing note and vitals reviewed.  Constitutional: He appears well-developed and well-nourished.   HENT:   Head: Normocephalic and atraumatic.   Eyes: Conjunctivae are normal.   Neck: Neck supple.   Normal range of motion.  Cardiovascular:  Normal rate, regular rhythm and normal heart sounds.     Exam reveals no gallop and no friction rub.       No murmur heard.  Pulmonary/Chest: Breath sounds normal. No respiratory distress. He has no wheezes. He has no rhonchi. He has no rales.   Abdominal: Abdomen is soft. There is no abdominal tenderness.   Musculoskeletal:         General: No edema. Normal range of motion.      Cervical back: Normal range of motion and neck supple.     Neurological: He is alert and oriented to person, place, and time. GCS score is 15. GCS eye subscore is 4. GCS verbal subscore is 5. GCS motor subscore is 6.   Skin: Skin is warm and dry.   Multiple healed scabbed lesions.    Psychiatric: He has a normal mood and affect.       ED Course   Critical Care    Date/Time: 4/6/2023 11:08 PM  Performed by: Curtis Louis MD  Authorized by: Curtis Louis MD   Direct patient critical care time: 10 minutes  Additional history critical care time: 10  minutes  Ordering / reviewing critical care time: 10 minutes  Documentation critical care time: 10 minutes  Consulting other physicians critical care time: 5 minutes  Consult with family critical care time: 10 minutes  Total critical care time (exclusive of procedural time) : 55 minutes  Critical care was necessary to treat or prevent imminent or life-threatening deterioration of the following conditions: circulatory failure and cardiac failure.  Critical care was time spent personally by me on the following activities: development of treatment plan with patient or surrogate, discussions with consultants, evaluation of patient's response to treatment, examination of patient, obtaining history from patient or surrogate, ordering and performing treatments and interventions, ordering and review of laboratory studies, ordering and review of radiographic studies, re-evaluation of patient's condition and review of old charts.      Labs Reviewed   CBC W/ AUTO DIFFERENTIAL - Abnormal; Notable for the following components:       Result Value    WBC 20.07 (*)     MCH 31.7 (*)     Gran # (ANC) 17.8 (*)     Immature Grans (Abs) 0.08 (*)     Lymph # 0.3 (*)     Mono # 1.9 (*)     Gran % 88.5 (*)     Lymph % 1.6 (*)     All other components within normal limits   COMPREHENSIVE METABOLIC PANEL - Abnormal; Notable for the following components:    CO2 21 (*)     Glucose 113 (*)     BUN 25 (*)     Creatinine 2.1 (*)     Total Bilirubin 1.2 (*)     AST 46 (*)     Anion Gap 17 (*)     eGFR 31 (*)     All other components within normal limits   TROPONIN I - Abnormal; Notable for the following components:    Troponin I 0.493 (*)     All other components within normal limits   TROPONIN I - Abnormal; Notable for the following components:    Troponin I 0.803 (*)     All other components within normal limits   PROCALCITONIN - Abnormal; Notable for the following components:    Procalcitonin 0.42 (*)     All other components within normal  limits   LACTIC ACID, PLASMA - Abnormal; Notable for the following components:    Lactate (Lactic Acid) 3.7 (*)     All other components within normal limits    Narrative:     Lactic Acid critical result(s) called and verbal readback obtained   from Maribel GÓMEZ   by LN2 04/06/2023 23:10   URINALYSIS, REFLEX TO URINE CULTURE - Abnormal; Notable for the following components:    Protein, UA 1+ (*)     Ketones, UA 2+ (*)     Occult Blood UA 3+ (*)     All other components within normal limits    Narrative:     Specimen Source->Urine   LACTIC ACID, PLASMA - Abnormal; Notable for the following components:    Lactate (Lactic Acid) 2.8 (*)     All other components within normal limits   URINALYSIS MICROSCOPIC - Abnormal; Notable for the following components:    Hyaline Casts,  (*)     All other components within normal limits    Narrative:     Specimen Source->Urine   BASIC METABOLIC PANEL - Abnormal; Notable for the following components:    Chloride 112 (*)     CO2 19 (*)     BUN 24 (*)     Creatinine 1.7 (*)     eGFR 40 (*)     All other components within normal limits    Narrative:     Fasting   TROPONIN I - Abnormal; Notable for the following components:    Troponin I 0.897 (*)     All other components within normal limits    Narrative:     STAT, if not done in ED, then at 2nd and 6th hour from  initial draw.   TROPONIN I - Abnormal; Notable for the following components:    Troponin I 0.809 (*)     All other components within normal limits    Narrative:     STAT, if not done in ED, then at 2nd and 6th hour from  initial draw.  Collection has been rescheduled by CMO at 04/07/2023 07:43 Reason: Pt   not in 23   CBC W/ AUTO DIFFERENTIAL - Abnormal; Notable for the following components:    WBC 13.40 (*)     Gran # (ANC) 11.1 (*)     Immature Grans (Abs) 0.05 (*)     Lymph # 0.7 (*)     Mono # 1.5 (*)     Gran % 82.8 (*)     Lymph % 5.3 (*)     All other components within normal limits    Narrative:     Fasting    ISTAT PROCEDURE - Abnormal; Notable for the following components:    POC PO2 25 (*)     POC HCO3 22.6 (*)     POC SATURATED O2 43 (*)     All other components within normal limits   B-TYPE NATRIURETIC PEPTIDE   TSH   DRUG SCREEN PANEL, URINE EMERGENCY   LIPID PANEL    Narrative:     Fasting   DRUG SCREEN PANEL, URINE EMERGENCY    Narrative:     Specimen Source->Urine     EKG Readings: (Independently Interpreted)   EKGs independently interpreted by Curtis Louis MD reads: Normal Sinus Rhythm. Rate of 92. Normal ST segments. Normal T-waves. No STEMI. Q-waves present in V4, V5, and V6. External documents reviewed for previous EKG comparison: Grossly unchanged from EKG on January 20, 2021.       Imaging Results              CT Head Without Contrast (Final result)  Result time 04/07/23 01:11:49      Final result by Олег Vo MD (04/07/23 01:11:49)                   Impression:      No acute intracranial abnormalities identified.      Electronically signed by: Олег Vo MD  Date:    04/07/2023  Time:    01:11               Narrative:    EXAMINATION:  CT HEAD WITHOUT CONTRAST    CLINICAL HISTORY:  Head trauma, minor (Age >= 65y);    TECHNIQUE:  Low dose axial images were obtained through the head.  Coronal and sagittal reformations were also performed. Contrast was not administered.    COMPARISON:  None.    FINDINGS:  There is mild generalized cerebral volume loss and chronic microvascular ischemic change.  No evidence of acute/recent major vascular distribution cerebral infarction, intraparenchymal hemorrhage, or intra-axial space occupying lesion. The ventricular system is normal in size and configuration with no evidence of hydrocephalus. No effacement of the skull-base cisterns. No abnormal extra-axial fluid collections or blood products. Visualized paranasal sinuses and mastoid air cells are clear. The calvarium shows no significant abnormality.                                       CT Cervical Spine  Without Contrast (Final result)  Result time 04/07/23 01:14:02      Final result by Олег Vo MD (04/07/23 01:14:02)                   Impression:      No evidence of acute cervical spine fracture or dislocation.      Electronically signed by: Олег Vo MD  Date:    04/07/2023  Time:    01:14               Narrative:    EXAMINATION:  CT CERVICAL SPINE WITHOUT CONTRAST    CLINICAL HISTORY:  Neck trauma (Age >= 65y);    TECHNIQUE:  Low dose axial images, sagittal and coronal reformations were performed though the cervical spine.  Contrast was not administered.    COMPARISON:  None    FINDINGS:  No evidence of acute cervical spine fracture or dislocation.  Odontoid process is intact.  Craniocervical junction is unremarkable.  Cervical spine alignment is within normal limits.  Mild multilevel degenerative changes and intervertebral disc space narrowing are seen.  Prominent facet arthropathy is visualized at the right C4-5 and C5-6 levels.    Surrounding soft tissues show no significant abnormalities.  Airway is patent.  Partially visualized lung apices are clear.                                       X-Ray Knee 1 or 2 View Bilateral (Final result)  Result time 04/07/23 00:04:58   Procedure changed from X-Ray Knee 3 View Bilateral     Final result by Олег Vo MD (04/07/23 00:04:58)                   Impression:      No acute osseous abnormality identified.      Electronically signed by: Олег Vo MD  Date:    04/07/2023  Time:    00:04               Narrative:    EXAMINATION:  XR KNEE 1 OR 2 VIEW BILATERAL    CLINICAL HISTORY:  Bilateral knee pain;  Pain in right knee    TECHNIQUE:  Bilateral knees AP and lateral views were obtained.    COMPARISON:  None    FINDINGS:  No evidence of acute displaced fracture, dislocation, or osseous destructive process.  Medial and lateral compartment joint spaces of the knees appear fairly well preserved.  No significant suprapatellar joint effusion on the  right or left.                                       X-Ray Ankle Complete Right (Final result)  Result time 04/07/23 00:01:03      Final result by Олег Vo MD (04/07/23 00:01:03)                   Impression:      Acute distal fibular shaft fracture.      Electronically signed by: Олег Vo MD  Date:    04/07/2023  Time:    00:01               Narrative:    EXAMINATION:  XR ANKLE COMPLETE 3 VIEW RIGHT    CLINICAL HISTORY:  Pain in right ankle and joints of right foot    TECHNIQUE:  AP, lateral, and oblique images of the right ankle were performed.    COMPARISON:  None    FINDINGS:  Acute displaced, obliquely oriented fracture is seen through the distal aspect of the fibular shaft.  Prominent overlying soft tissue swelling is seen.  No additional acute displaced fracture or dislocation seen.  Ankle mortise is maintained.                                       X-Ray Chest AP Portable (Final result)  Result time 04/06/23 22:08:35      Final result by Олег Vo MD (04/06/23 22:08:35)                   Impression:      No acute cardiopulmonary process identified.      Electronically signed by: Олег Vo MD  Date:    04/06/2023  Time:    22:08               Narrative:    EXAMINATION:  XR CHEST AP PORTABLE    CLINICAL HISTORY:  fatigue;    TECHNIQUE:  Single frontal view of the chest was performed.    COMPARISON:  None    FINDINGS:  Cardiac silhouette is normal in size.  Lungs are expanded with mild elevation of the right hemidiaphragm seen.  No evidence of focal consolidative process, pneumothorax, or significant pleural effusion.  No acute osseous abnormality identified.                                       Medications   melatonin tablet 6 mg (has no administration in time range)   ondansetron disintegrating tablet 8 mg (has no administration in time range)   prochlorperazine injection Soln 5 mg (has no administration in time range)   polyethylene glycol packet 17 g (17 g Oral Not Given 4/7/23  "0900)   aluminum-magnesium hydroxide-simethicone 200-200-20 mg/5 mL suspension 30 mL (has no administration in time range)   acetaminophen tablet 650 mg (650 mg Oral Given 4/8/23 0001)   naloxone 0.4 mg/mL injection 0.02 mg (has no administration in time range)   enoxaparin injection 30 mg (30 mg Subcutaneous Given 4/7/23 1647)   oxyCODONE immediate release tablet 5 mg (5 mg Oral Given 4/7/23 1646)   0.9%  NaCl infusion ( Intravenous New Bag 4/8/23 0301)   ARIPiprazole tablet 20 mg (20 mg Oral Given 4/7/23 0930)   sodium chloride 0.9% bolus 1,000 mL 1,000 mL (0 mLs Intravenous Stopped 4/7/23 0005)     Medical Decision Making:   History:   Old Medical Records: I decided to obtain old medical records.  Initial Assessment:   Antonio Torres Jr. Is an 78 y.o male with a PMHx of CKD, and hypercholesteremia, who presents to the ED for evaluation of lightheadedness beginning today. Patient reports he had a "fainting episode" after having a large bowel movement today, endorsing he felt lightheaded upon standing up and his legs were weak afterwards. Patient reports he only had 2 glasses of water today. Patient reports similar previous episodes 2 years ago. No medications taken PTA. No alleviating or exacerbating factors noted. Denies head trauma. Denies syncope, headache, CP, SOB, or other associated symptoms. NKDA.  Independently Interpreted Test(s):   I have ordered and independently interpreted EKG Reading(s) - see prior notes  Clinical Tests:   Lab Tests: Ordered and Reviewed  Radiological Study: Ordered and Reviewed  Medical Tests: Ordered and Reviewed  ED Management:  Antonio Torres Jr. Is an 78 y.o male with a PMHx of CKD, and hypercholesteremia, who presents to the ED for evaluation of lightheadedness beginning today. Patient reports he had a "fainting episode" after having a large bowel movement today, endorsing he felt lightheaded upon standing up and his legs were weak afterwards. Patient reports he only had " 2 glasses of water today. Patient reports similar previous episodes 2 years ago. No medications taken PTA. No alleviating or exacerbating factors noted. Denies head trauma. Denies syncope, headache, CP, SOB, or other associated symptoms. NKDA.  Course of ED stay:  1. Cardiovascular-patient has denied chest pain throughout ED stay and prior to presentation to the ED. EKG showed no changes except for inferior lateral Q-waves since his most recent EKG in 2021.  Troponin was elevated (0.493).  BNP was normal.  Heart score is 6  2. Pulmonary-patient has had no signs or symptoms of respiratory distress throughout ED stay.  Chest x-ray shows no acute abnormalities.  O2 sats have been greater than 95% on room air throughout ED stay.  3. Hematology/infectious disease-patient has been afebrile and CBC was reassuring except for elevated white blood cell count (20,000).  4. GI/nutrition/renal/endocrine-patient has history of chronic kidney disease.  CMP shows increase in creatinine (2.1) from previous in September 2022 (1.6).  5. Musculoskeletal-x-ray ankle shows distal fibular shaft fracture and x-ray of bilateral knees shows no acute disease.  Spoke with Cardiology on-call (Dr. Bragg) who recommends admission for syncope, rule out ACS.    Additional MDM:   Heart Score:    History:          Slightly suspicious.  ECG:             Normal  Age:               >65 years  Risk factors: >= 3 risk factors or history of atherosclerotic disease  Troponin:       >2x normal limit  Final Score: 6       Scribe Attestation:   Scribe #1: I performed the above scribed service and the documentation accurately describes the services I performed. I attest to the accuracy of the note.                   Clinical Impression:   Final diagnoses:  [R55] Syncope  [M25.561, M25.562] Bilateral knee pain  [M25.571] Right ankle pain        ED Disposition Condition    Observation Stable               This document was produced by a scribe under my  direction and in my presence. I agree with the content of the note and have made any necessary edits.     Dr. Louis    04/08/2023 10:12 PM       Curtis Louis MD  04/08/23 0445

## 2023-04-07 NOTE — ASSESSMENT & PLAN NOTE
04/07/2023-Abnormal troponin in light of chronic kidney disease.  Latest have remained flat.  Echocardiogram shows normal function.  Can pursue ischemic evaluation with nuclear stress.

## 2023-04-07 NOTE — CONSULTS
"[facility] - Emergency Dept  Orthopedics  Consult Note    Patient Name: Antonio Torres Jr.  MRN: [id number]  Admission Date: 4/6/2023  Hospital Length of Stay: 0 days  Attending Provider: Yaakov Echevarria MD  Primary Care Provider: Fabio Lennon MD    Patient information was obtained from patient and ER records.     Inpatient consult to Orthopedic Surgery  Consult performed by: Abby Lemus MD  Consult ordered by: James Mcfarland NP      Subjective:     Principal Problem:Syncope    Chief Complaint:   Chief Complaint   Patient presents with    Weakness     Pt reports he was having a BM and then felt very "faint" and attempted to lower himself to the floor; pt was on the floor for 2hrs when sister found him; pt c/o dizziness, weakness in his legs, and was unable to stand without assist; pt has abrasions noted to back of head; denies LOC        HPI: Patient is s/p syncopal episode and fall while in the bathroom. Found down by family approximately 2 hours later. Presents with abrasions to bilateral knees and right ankle pain and swelling. Knee xrays negative but R ankle xray shows a distal fibula fracture. Ortho consulted for evaluation. Patient being admitted for syncopal workup.     Past Medical History:   Diagnosis Date    Chronic kidney disease     Depression     Hematuria     had neg work up with Dr. Stafford    Hypercholesterolemia 3/13    Schizophrenia        Past Surgical History:   Procedure Laterality Date    HERNIA REPAIR         Review of patient's allergies indicates:  No Known Allergies    Current Facility-Administered Medications   Medication    0.9%  NaCl infusion    acetaminophen tablet 650 mg    aluminum-magnesium hydroxide-simethicone 200-200-20 mg/5 mL suspension 30 mL    ARIPiprazole tablet 20 mg    enoxaparin injection 30 mg    melatonin tablet 6 mg    naloxone 0.4 mg/mL injection 0.02 mg    ondansetron disintegrating tablet 8 mg    oxyCODONE immediate release tablet 5 mg    polyethylene glycol " "packet 17 g    prochlorperazine injection Soln 5 mg     Current Outpatient Medications   Medication Sig    ARIPiprazole (ABILIFY) 20 MG Tab TAKE ONE TABLET BY MOUTH EVERY DAY    fish oil-omega-3 fatty acids 300-1,000 mg capsule Take 2 g by mouth once daily.    loratadine (CLARITIN) 10 mg tablet Take 10 mg by mouth once daily.    multivitamin capsule Take 1 capsule by mouth once daily.     Family History       Problem Relation (Age of Onset)    Alzheimer's disease Mother    Heart disease Father    Hypertension Sister    Pulmonary embolism Father          Tobacco Use    Smoking status: Never    Smokeless tobacco: Never   Substance and Sexual Activity    Alcohol use: No    Drug use: No    Sexual activity: Never     Review of System:   Pertinent as above.       Objective:     Vital Signs (Most Recent):  Temp: 97.9 °F (36.6 °C) (04/06/23 2105)  Pulse: 76 (04/07/23 0633)  Resp: 16 (04/07/23 0633)  BP: 117/60 (04/07/23 0633)  SpO2: 96 % (04/07/23 0633) Vital Signs (24h Range):  Temp:  [97.9 °F (36.6 °C)] 97.9 °F (36.6 °C)  Pulse:  [] 76  Resp:  [10-20] 16  SpO2:  [95 %-100 %] 96 %  BP: ()/(57-76) 117/60     Weight: 72.1 kg (159 lb)  Height: 5' 10" (177.8 cm)  Body mass index is 22.81 kg/m².      Intake/Output Summary (Last 24 hours) at 4/7/2023 0841  Last data filed at 4/7/2023 0005  Gross per 24 hour   Intake 1000 ml   Output --   Net 1000 ml       Physical Exam:   NAD, AAOx3, nonlabored respirations.   R ankle splinted. Sensation and motor function intact to exposed toes. Calf soft and compressible.     Significant Labs: All pertinent labs within the past 24 hours have been reviewed.    Significant Imaging:  XR R ankle show  a mildly displaced Tai C distal fibula fracture. Ankle mortise is well aligned. Knee XR negative, no proximal fracture.     Assessment/Plan:   S/p fall with R distal fibula fracture.   - Keep splint in place.   - NWB RLE. Elevate to decrease swelling. May apply ice behind the knee.   - " No surgical intervention planned during this admission.   - Follow up as outpatient after discharge.     Abby Lemus MD  Orthopedics  [facility]

## 2023-04-07 NOTE — HPI
Antonio Torres Jr. 78 y.o. male with With CKD 3, HDL, BPH, neoplasm, schizophrenia presents to the hospital due to a chief complaint of syncopal episode.  Acute onset 04/06/2023 patient reports that he had a fainting episode after large bowel movement today and standing up.  Patient endorses that he was on the floor for awhile with his sister found him, per family report it was 2 hours.  He stated that he felt lightheaded after standing up his legs grew weak and he gently lowered himself to the ground.  Patient endorses decreased thirst stating that he only had 2 glasses of water today, patient states that he had similar episodes 2 years ago.  Patient denied attempted any self-treatment.   denies head trauma, syncope, headache, chest pain, SOB, or other associated symptoms.  Patient also endorses pain to the right ankle and bilateral knees.  On exam right ankle is swollen +2 greater than left bilateral knees are erythematous and TTP. No other alleviating or exacerbating factors noted    In the ED patient was afebrile with leukocytosis WBCs 20.07, CO2 21, anion gap 17, BUN 25, creatinine 2.1, EGFR 31, glucose 113, bilirubin total 1.2, AST 46, BNP negative, 1st troponin 0.493, 2nd troponin 0.803, UA showed +1 protein, +2 ketones +3 occult blood, CT head and C-spine pending, CXR negative, x-ray bilateral knees negative, x-ray right ankle showed Acute distal fibular shaft fracture.  Patient was placed in observation for further workup and management of fibular shaft fracture and ACS rule out

## 2023-04-07 NOTE — NURSING
[facility] [place]  Nurses Note -- 4 Eyes      4/7/2023       Skin assessed on: Admit      [] No Pressure Injuries Present    []Prevention Measures Documented    [] Yes LDA  for Pressure Injury Previously documented     [] Yes New Pressure Injury Discovered   [x] LDA for New Pressure Injury Added      Attending RN:  Carley Krueger, RN     Second RN:  Karen Carl

## 2023-04-07 NOTE — PHARMACY MED REC
"Admission Medication History     The home medication history was taken by Brittny Corrales CPhT.      You may go to "Admission" then "Reconcile Home Medications" tabs to review and/or act upon these items.     The home medication list has been updated by the Pharmacy department.   Please read ALL comments highlighted in yellow.   Please address this information as you see fit.    Feel free to contact us if you have any questions or require assistance.          Medications listed below were obtained from: Patient/family and Analytic software- Rep  (Not in a hospital admission)          Brittny Corrales CPhT.  [phone]                .        "

## 2023-04-07 NOTE — ASSESSMENT & PLAN NOTE
Continuous Cardiac monitoring  EKG showed Normal sinus rhythm Possible Lateral infarct  Trend troponins 1st troponin 0.493, 2nd troponin 0.803 consult to cardiology for current significant upward trend  Orthostatic blood pressure negative  Neuro checks Q4hrs  2D Echo with bubble  U/A negative   CT of head pending   Check Tox screen

## 2023-04-07 NOTE — SUBJECTIVE & OBJECTIVE
Past Medical History:   Diagnosis Date    Chronic kidney disease     Depression     Hematuria     had neg work up with Dr. Stafford    Hypercholesterolemia 03/01/2013    Schizophrenia     Skin cancer of scalp     Syncope 04/06/2023       Past Surgical History:   Procedure Laterality Date    HERNIA REPAIR      SKIN CANCER EXCISION         Review of patient's allergies indicates:  No Known Allergies    No current facility-administered medications on file prior to encounter.     Current Outpatient Medications on File Prior to Encounter   Medication Sig    ARIPiprazole (ABILIFY) 20 MG Tab TAKE ONE TABLET BY MOUTH EVERY DAY    fish oil-omega-3 fatty acids 300-1,000 mg capsule Take 2 g by mouth once daily.    loratadine (CLARITIN) 10 mg tablet Take 10 mg by mouth once daily.    multivitamin capsule Take 1 capsule by mouth once daily.     Family History       Problem Relation (Age of Onset)    Alzheimer's disease Mother    Heart disease Father    Hypertension Sister    Pulmonary embolism Father          Tobacco Use    Smoking status: Never    Smokeless tobacco: Never   Substance and Sexual Activity    Alcohol use: No    Drug use: No    Sexual activity: Not on file     Review of Systems   Constitutional: Negative for chills, decreased appetite, diaphoresis, malaise/fatigue, weight gain and weight loss.   HENT:  Negative for congestion, hearing loss, hoarse voice, nosebleeds, odynophagia, stridor and tinnitus.    Eyes:  Negative for blurred vision, double vision, photophobia, visual disturbance and visual halos.   Cardiovascular:  Positive for syncope. Negative for chest pain, dyspnea on exertion, irregular heartbeat, leg swelling, near-syncope, orthopnea, palpitations and paroxysmal nocturnal dyspnea.   Respiratory:  Negative for shortness of breath, sputum production and wheezing.    Musculoskeletal:  Negative for falls, joint pain, muscle cramps, muscle weakness, myalgias, neck pain and stiffness.   Gastrointestinal:   Negative for abdominal pain, anorexia, change in bowel habit, bowel incontinence, constipation, diarrhea, heartburn, hematemesis and melena.   Neurological:  Negative for disturbances in coordination, dizziness, focal weakness, headaches, light-headedness, loss of balance, numbness, paresthesias, seizures, sensory change, tremors, vertigo and weakness.   Psychiatric/Behavioral:  Negative for altered mental status, depression, hallucinations and memory loss. The patient does not have insomnia and is not nervous/anxious.    Objective:     Vital Signs (Most Recent):  Temp: 97.9 °F (36.6 °C) (04/06/23 2105)  Pulse: 85 (04/07/23 0932)  Resp: 14 (04/07/23 0932)  BP: 120/71 (04/07/23 0932)  SpO2: 98 % (04/07/23 0932) Vital Signs (24h Range):  Temp:  [97.9 °F (36.6 °C)] 97.9 °F (36.6 °C)  Pulse:  [] 85  Resp:  [10-20] 14  SpO2:  [95 %-100 %] 98 %  BP: ()/(57-76) 120/71     Weight: 72.1 kg (159 lb)  Body mass index is 22.81 kg/m².    SpO2: 98 %         Intake/Output Summary (Last 24 hours) at 4/7/2023 1330  Last data filed at 4/7/2023 0005  Gross per 24 hour   Intake 1000 ml   Output --   Net 1000 ml       Lines/Drains/Airways       Peripheral Intravenous Line  Duration                  Peripheral IV - Single Lumen 04/06/23 2135 20 G Left Forearm <1 day                    Physical Exam  Vitals reviewed.   Constitutional:       General: He is not in acute distress.     Appearance: He is not diaphoretic.   Neck:      Vascular: No carotid bruit or JVD.   Cardiovascular:      Rate and Rhythm: Normal rate and regular rhythm.      Pulses: Normal pulses.            Right dorsalis pedis pulse not accessible.         Right posterior tibial pulse not accessible.   Pulmonary:      Effort: Pulmonary effort is normal.      Breath sounds: Normal breath sounds.   Abdominal:      General: Bowel sounds are normal.      Palpations: Abdomen is soft.      Tenderness: There is no abdominal tenderness.   Musculoskeletal:      Right  knee: Erythema present.      Right lower leg: No edema.      Left lower leg: No edema.   Neurological:      Mental Status: He is alert.   Psychiatric:         Mood and Affect: Affect is flat.         Speech: Speech normal.         Behavior: Behavior normal.       Significant Labs: CMP   Recent Labs   Lab 04/06/23 2134 04/07/23  0404    143   K 4.5 4.1    112*   CO2 21* 19*   * 107   BUN 25* 24*   CREATININE 2.1* 1.7*   CALCIUM 10.2 9.2   PROT 6.7  --    ALBUMIN 4.0  --    BILITOT 1.2*  --    ALKPHOS 73  --    AST 46*  --    ALT 34  --    ANIONGAP 17* 12   , CBC   Recent Labs   Lab 04/06/23 2134 04/07/23  0404   WBC 20.07* 13.40*   HGB 15.8 14.2   HCT 45.9 44.1    197   , and Troponin   Recent Labs   Lab 04/06/23 2359 04/07/23  0404 04/07/23  0748   TROPONINI 0.803* 0.897* 0.809*       Significant Imaging: Echocardiogram: Transthoracic echo (TTE) complete (Cupid Only):   Results for orders placed or performed during the hospital encounter of 04/06/23   Echo Saline Bubble? Yes   Result Value Ref Range    BSA 1.89 m2    LA WIDTH 3.90 cm    IVC diameter 0.79 cm    Left Ventricular Outflow Tract Mean Velocity 0.55 cm/s    Left Ventricular Outflow Tract Mean Gradient 1.31 mmHg    AORTIC VALVE CUSP SEPERATION 2.11 cm    PV PEAK VELOCITY 0.59 cm/s    LVIDd 4.65 3.5 - 6.0 cm    IVS 1.24 (A) 0.6 - 1.1 cm    Posterior Wall 1.23 (A) 0.6 - 1.1 cm    Ao root annulus 3.27 cm    LVIDs 3.29 2.1 - 4.0 cm    FS 29 28 - 44 %    LA volume 37.90 cm3    Sinus 3.98 cm    STJ 3.45 cm    Ascending aorta 3.65 cm    LV mass 217.25 g    LA size 2.92 cm    Left Ventricle Relative Wall Thickness 0.53 cm    AV mean gradient 2 mmHg    AV valve area 4.11 cm2    AV Velocity Ratio 0.85     AV index (prosthetic) 0.84     MV valve area p 1/2 method 4.55 cm2    E/A ratio 1.02     E wave deceleration time 149.55 msec    LVOT diameter 2.49 cm    LVOT area 4.9 cm2    LVOT peak sukhjinder 0.73 m/s    LVOT peak VTI 15.70 cm    Ao peak  rm 0.86 m/s    Ao VTI 18.6 cm    LVOT stroke volume 76.41 cm3    AV peak gradient 3 mmHg    MV Peak E Rm 0.63 m/s    TR Max Rm 2.79 m/s    MV stenosis pressure 1/2 time 48.38 ms    MV Peak A Rm 0.62 m/s    LV Systolic Volume 43.69 mL    LV Systolic Volume Index 23.1 mL/m2    LV Diastolic Volume 99.61 mL    LV Diastolic Volume Index 52.70 mL/m2    LA Volume Index 20.1 mL/m2    LV Mass Index 115 g/m2    RA Major Axis 4.30 cm    Left Atrium Minor Axis 3.93 cm    Left Atrium Major Axis 3.90 cm    Triscuspid Valve Regurgitation Peak Gradient 31 mmHg    RA Width 3.31 cm    Right Atrial Pressure (from IVC) 3 mmHg    EF 55 %    TV rest pulmonary artery pressure 34 mmHg    Narrative    · Technically difficult study.  · The left ventricle is normal in size with concentric remodeling and   normal systolic function.  · The estimated ejection fraction is 55%.  · Normal left ventricular diastolic function.  · Normal right ventricular size with normal right ventricular systolic   function.  · Mild tricuspid regurgitation.  · Normal central venous pressure (3 mmHg).  · The estimated PA systolic pressure is 34 mmHg.  · There is mild pulmonary hypertension.

## 2023-04-08 ENCOUNTER — PATIENT MESSAGE (OUTPATIENT)
Dept: FAMILY MEDICINE | Facility: CLINIC | Age: 78
End: 2023-04-08
Payer: MEDICARE

## 2023-04-08 LAB
ANION GAP SERPL CALC-SCNC: 9 MMOL/L (ref 8–16)
BASOPHILS # BLD AUTO: 0.04 K/UL (ref 0–0.2)
BASOPHILS NFR BLD: 0.4 % (ref 0–1.9)
BUN SERPL-MCNC: 20 MG/DL (ref 8–23)
CALCIUM SERPL-MCNC: 8.7 MG/DL (ref 8.7–10.5)
CHLORIDE SERPL-SCNC: 114 MMOL/L (ref 95–110)
CO2 SERPL-SCNC: 21 MMOL/L (ref 23–29)
CREAT SERPL-MCNC: 1.3 MG/DL (ref 0.5–1.4)
DIFFERENTIAL METHOD: ABNORMAL
EOSINOPHIL # BLD AUTO: 0.1 K/UL (ref 0–0.5)
EOSINOPHIL NFR BLD: 0.9 % (ref 0–8)
ERYTHROCYTE [DISTWIDTH] IN BLOOD BY AUTOMATED COUNT: 13.2 % (ref 11.5–14.5)
EST. GFR  (NO RACE VARIABLE): 56 ML/MIN/1.73 M^2
GLUCOSE SERPL-MCNC: 97 MG/DL (ref 70–110)
HCT VFR BLD AUTO: 39.9 % (ref 40–54)
HGB BLD-MCNC: 12.7 G/DL (ref 14–18)
IMM GRANULOCYTES # BLD AUTO: 0.02 K/UL (ref 0–0.04)
IMM GRANULOCYTES NFR BLD AUTO: 0.2 % (ref 0–0.5)
LYMPHOCYTES # BLD AUTO: 1 K/UL (ref 1–4.8)
LYMPHOCYTES NFR BLD: 9.7 % (ref 18–48)
MCH RBC QN AUTO: 30.5 PG (ref 27–31)
MCHC RBC AUTO-ENTMCNC: 31.8 G/DL (ref 32–36)
MCV RBC AUTO: 96 FL (ref 82–98)
MONOCYTES # BLD AUTO: 0.9 K/UL (ref 0.3–1)
MONOCYTES NFR BLD: 8.4 % (ref 4–15)
NEUTROPHILS # BLD AUTO: 8.2 K/UL (ref 1.8–7.7)
NEUTROPHILS NFR BLD: 80.4 % (ref 38–73)
NRBC BLD-RTO: 0 /100 WBC
PLATELET # BLD AUTO: 173 K/UL (ref 150–450)
PMV BLD AUTO: 10.2 FL (ref 9.2–12.9)
POTASSIUM SERPL-SCNC: 4 MMOL/L (ref 3.5–5.1)
RBC # BLD AUTO: 4.16 M/UL (ref 4.6–6.2)
SODIUM SERPL-SCNC: 144 MMOL/L (ref 136–145)
WBC # BLD AUTO: 10.17 K/UL (ref 3.9–12.7)

## 2023-04-08 PROCEDURE — 97530 THERAPEUTIC ACTIVITIES: CPT

## 2023-04-08 PROCEDURE — 99232 PR SUBSEQUENT HOSPITAL CARE,LEVL II: ICD-10-PCS | Mod: ,,, | Performed by: INTERNAL MEDICINE

## 2023-04-08 PROCEDURE — 96372 THER/PROPH/DIAG INJ SC/IM: CPT

## 2023-04-08 PROCEDURE — 97165 OT EVAL LOW COMPLEX 30 MIN: CPT

## 2023-04-08 PROCEDURE — 96361 HYDRATE IV INFUSION ADD-ON: CPT

## 2023-04-08 PROCEDURE — 36415 COLL VENOUS BLD VENIPUNCTURE: CPT

## 2023-04-08 PROCEDURE — 25000003 PHARM REV CODE 250

## 2023-04-08 PROCEDURE — G0378 HOSPITAL OBSERVATION PER HR: HCPCS

## 2023-04-08 PROCEDURE — 97161 PT EVAL LOW COMPLEX 20 MIN: CPT

## 2023-04-08 PROCEDURE — 99232 SBSQ HOSP IP/OBS MODERATE 35: CPT | Mod: ,,, | Performed by: INTERNAL MEDICINE

## 2023-04-08 PROCEDURE — 80048 BASIC METABOLIC PNL TOTAL CA: CPT

## 2023-04-08 PROCEDURE — 85025 COMPLETE CBC W/AUTO DIFF WBC: CPT

## 2023-04-08 PROCEDURE — 63600175 PHARM REV CODE 636 W HCPCS

## 2023-04-08 RX ADMIN — SODIUM CHLORIDE: 9 INJECTION, SOLUTION INTRAVENOUS at 03:04

## 2023-04-08 RX ADMIN — ARIPIPRAZOLE 20 MG: 10 TABLET ORAL at 09:04

## 2023-04-08 RX ADMIN — SODIUM CHLORIDE: 9 INJECTION, SOLUTION INTRAVENOUS at 06:04

## 2023-04-08 RX ADMIN — ENOXAPARIN SODIUM 30 MG: 30 INJECTION SUBCUTANEOUS at 04:04

## 2023-04-08 RX ADMIN — ACETAMINOPHEN 650 MG: 325 TABLET ORAL at 12:04

## 2023-04-08 NOTE — PT/OT/SLP EVAL
"Physical Therapy Evaluation    Patient Name:  Antonio Torres Jr.   MRN:  [id number]    Recommendations:     Discharge Recommendations: home health PT, home with home health   Discharge Equipment Recommendations: walker, rolling, bedside commode     Assessment:     Antonio Torres Jr. is a 78 y.o. male admitted with a medical diagnosis of Syncope.  He presents with the following impairments/functional limitations: impaired endurance, impaired functional mobility, gait instability, impaired balance, decreased lower extremity function, orthopedic precautions.    Rehab Prognosis: Good; patient would benefit from acute skilled PT services to address these deficits and reach maximum level of function.    Recent Surgery: * No surgery found *      Plan:     During this hospitalization, patient to be seen 5 x/week to address the identified rehab impairments via gait training, therapeutic activities, therapeutic exercises and progress toward the following goals:    Plan of Care Expires:  04/11/23    Subjective     Chief Complaint: None  Patient/Family Comments/goals: Pt agreeable to PT evaluation and treatment.  Pain/Comfort:  Pain Rating 1: 0/10 ("uncomfortable")  Location - Side 1: Right  Location 1: leg  Pain Addressed 1: Reposition    Patients cultural, spiritual, Muslim conflicts given the current situation: no    Living Environment:  PTA pt lived alone in a garage apartment at sister and brother-in-law's home.  Prior to admission, patients level of function was independent.  Equipment used at home: none.  DME owned (not currently used): none.  Upon discharge, patient will have assistance from sister.    Objective:     Communicated with nurse, April prior to session.  Patient found supine with peripheral IV, telemetry  upon PT entry to room.    General Precautions: Standard, fall  Orthopedic Precautions:RLE non weight bearing   Braces:  (splint (R) ankle)  Respiratory Status: Room air    Exams:  Sensation:    -    "    Intact  light/touch BLEs /x (R) foot NT  Skin Integrity/Edema:      -       Skin integrity: redness (B) knees; (R)>(L)  RLE ROM: WFL except ankle NT  RLE Strength: NT  LLE ROM: WFL  LLE Strength: WFL    Functional Mobility:  Bed Mobility:     Supine to Sit: stand by assistance  Transfers:     Sit to Stand:  contact guard assistance with rolling walker  Gait: 5' w/RW CGA and VCs to maintain NWB RLE  Balance: Fair static/dynamic standing      AM-PAC 6 CLICK MOBILITY  Total Score:18       Treatment & Education:  Educated on role of PT and POC, able to take some steps to B/S chair, NWB RLE.    Patient left  reclined  with all lines intact, call button in reach, nurse notified, and sister and pharmacist present.    GOALS:   Multidisciplinary Problems       Physical Therapy Goals          Problem: Physical Therapy    Goal Priority Disciplines Outcome Goal Variances Interventions   Physical Therapy Goal     PT, PT/OT Ongoing, Progressing     Description: Goals to be met by: 23     Patient will increase functional independence with mobility by performin. Pt to be mod I with bed mobility.  2. Pt to transfer with supervision.  3. Pt to ambulate 15' w/RW SBA, NWB RLE.  4. Pt to be (I) with written HEP.                         History:     Past Medical History:   Diagnosis Date    Chronic kidney disease     Depression     Hematuria     had neg work up with Dr. Stafford    Hypercholesterolemia 2013    Schizophrenia     Skin cancer of scalp     Syncope 2023       Past Surgical History:   Procedure Laterality Date    HERNIA REPAIR      SKIN CANCER EXCISION         Time Tracking:     PT Received On: 23  PT Start Time: 852     PT Stop Time: 915  PT Total Time (min): 23 min     Billable Minutes: Evaluation 15 and Therapeutic Activity 8      2023

## 2023-04-08 NOTE — PT/OT/SLP EVAL
Occupational Therapy   Evaluation    Name: Antonio Torres Jr.  MRN: [id number]  Admitting Diagnosis: Syncope  Recent Surgery: * No surgery found *      Recommendations:     Discharge Recommendations: home health OT  Discharge Equipment Recommendations:  bedside commode, walker, rolling  Barriers to discharge:  None    Assessment:     Antonio Torres Jr. is a 78 y.o. male with a medical diagnosis of Syncope.  He presents with decreased ability to perform ADL's due to being NWB on R LE. Performance deficits affecting function: weakness, impaired endurance, impaired functional mobility, impaired self care skills, gait instability, impaired balance, decreased lower extremity function, impaired cardiopulmonary response to activity. Patient tolerated OT session fairly well. He performed bed mobility with stand by assistance and standing trial with CGA and RW. He was able to maintain R LE NWB while standing. He would continue to benefit in OT in order to get back to independently performing ADL's.     Rehab Prognosis: Good; patient would benefit from acute skilled OT services to address these deficits and reach maximum level of function.       Plan:     Patient to be seen 4 x/week to address the above listed problems via self-care/home management, therapeutic activities, therapeutic exercises  Plan of Care Expires: 05/08/23  Plan of Care Reviewed with: patient (sister present in AM)    Subjective     Subjective: Agreeable to OT session.   Patient/Family Comments/goals: To get right ankle to heal.     Occupational Profile:  Living Environment: Patient lives in a garage apartment attached to his sister and brother in laws house.   Previous level of function: independent   Roles and Routines: brother  Equipment Used at Home: none  Assistance upon Discharge: sister    Pain/Comfort:  Pain Rating 1: 0/10    Patients cultural, spiritual, Restorationism conflicts given the current situation:  n/a    Objective:     Communicated with:  RN prior to session.  Patient found up in chair during 1st session and supine for 2nd session with peripheral IV, telemetry upon OT entry to room. Sister present in room during 1st session.     General Precautions: Standard, fall  Orthopedic Precautions: RLE non weight bearing  Braces:  (splint to right ankle)  Respiratory Status: Room air    Occupational Performance:    Bed Mobility:    Patient completed Scooting/Bridging with stand by assistance  Patient completed Supine to Sit with stand by assistance  Patient completed Sit to Supine with stand by assistance    Functional Mobility/Transfers:  Patient completed Sit <> Stand Transfer with contact guard assistance and  rolling walker x1 from bed while maintaining R LE NWB  Functional Mobility: Patient hipped 3 steps to the left with contact guard assistance and rolling walker while maintaining R LE NWB.    Activities of Daily Living:  Lower Body Dressing: minimum assistance for left sock while seated on edge of bed  Grooming: already performed this AM with setup from his sister  Toileting: denied need during both attempts     Cognitive/Visual Perceptual:  Cognitive/Psychosocial Skills:     -       Oriented to: Person, Place, Time, and Situation   -       Follows Commands/attention:Follows two-step commands  -       Communication: clear/fluent  -       Memory: No Deficits noted  -       Safety awareness/insight to disability: intact   -       Mood/Affect/Coping skills/emotional control: Appropriate to situation    Physical Exam:  Upper Extremity Range of Motion:     -       Right Upper Extremity: WFL  -       Left Upper Extremity: WFL  Upper Extremity Strength:    -       Right Upper Extremity: WFL  -       Left Upper Extremity: WFL   Strength:    -       Right Upper Extremity: WFL  -       Left Upper Extremity: WFL    AMPAC 6 Click ADL:  AMPAC Total Score: 21    Treatment & Education:  Patient educated in OT role, POC, and NWB to R LE.     Patient left supine  with all lines intact, call button in reach, and RN notified    GOALS:   Multidisciplinary Problems       Occupational Therapy Goals          Problem: Occupational Therapy    Goal Priority Disciplines Outcome Interventions   Occupational Therapy Goal     OT, PT/OT Ongoing, Progressing    Description: Goals to be met by: 4/22/2023     Patient will increase functional independence with ADLs by performing:    LE Dressing with Supervision.  Grooming while standing at sink with Supervision.  Toileting from bedside commode with Supervision for hygiene and clothing management.   Toilet transfer to bedside commode with Supervision.                         History:     Past Medical History:   Diagnosis Date    Chronic kidney disease     Depression     Hematuria     had neg work up with Dr. Stafford    Hypercholesterolemia 03/01/2013    Schizophrenia     Skin cancer of scalp     Syncope 04/06/2023         Past Surgical History:   Procedure Laterality Date    HERNIA REPAIR      SKIN CANCER EXCISION         Time Tracking:     OT Date of Treatment: 04/08/23  OT Start Time: 0943 (1313)  OT Stop Time: 0952 (1328)  OT Total Time (min): 9 min + 15 min = 24 total     Billable Minutes:Evaluation 24    4/8/2023

## 2023-04-08 NOTE — PLAN OF CARE
Problem: Physical Therapy  Goal: Physical Therapy Goal  Description: Goals to be met by: 23     Patient will increase functional independence with mobility by performin. Pt to be mod I with bed mobility.  2. Pt to transfer with supervision.  3. Pt to ambulate 15' w/RW SBA, NWB RLE.  4. Pt to be (I) with written HEP.    2023 1046 by Terinne G Coleman, PT  Outcome: Ongoing, Progressing   Initial eval completed, see in chart for details.

## 2023-04-08 NOTE — NURSING
[facility]  Nurses Note -- 4 Eyes      4/7/2023       Skin assessed on: Q Shift      [] No Pressure Injuries Present    []Prevention Measures Documented    [x] Yes LDA  for Pressure Injury Previously documented     [] Yes New Pressure Injury Discovered   [] LDA for New Pressure Injury Added      Attending RN:  RAYMOND SUAREZ RN     Second RN:  Carley DEWEY RN

## 2023-04-08 NOTE — PLAN OF CARE
OT evaluation completed. Patient would benefit from RW, BSC, and HH OT at discharge.     Problem: Occupational Therapy  Goal: Occupational Therapy Goal  Description: Goals to be met by: 4/22/2023     Patient will increase functional independence with ADLs by performing:    LE Dressing with Supervision.  Grooming while standing at sink with Supervision.  Toileting from bedside commode with Supervision for hygiene and clothing management.   Toilet transfer to bedside commode with Supervision.    Outcome: Ongoing, Progressing

## 2023-04-08 NOTE — ASSESSMENT & PLAN NOTE
4/8:  Initially BISI superimposed on CKD, renal function improving, continue to monitor.    Creatinine 2.1 on admit Baseline about 1.6 Voiding well  Avoid nephrotoxic agents  Strict I/O's  Renally dose medications BMP, Magnesium in AM  No ace/arb

## 2023-04-08 NOTE — NURSING
Reported off to oncoming nurse, patient resting in bed, [id number]. Patient can make needs known to staff, minimal assist required for adls and transfers, no acute distress noted, safety precautions maintained.      Chart check completed.

## 2023-04-08 NOTE — SUBJECTIVE & OBJECTIVE
Interval History:     Review of Systems   Constitutional: Negative for chills, decreased appetite, diaphoresis, malaise/fatigue, weight gain and weight loss.   HENT:  Negative for congestion, hearing loss, hoarse voice, nosebleeds, odynophagia, stridor and tinnitus.    Eyes:  Negative for blurred vision, double vision, photophobia, visual disturbance and visual halos.   Cardiovascular:  Negative for chest pain, dyspnea on exertion, irregular heartbeat, leg swelling, near-syncope, orthopnea, palpitations, paroxysmal nocturnal dyspnea and syncope.   Respiratory:  Negative for shortness of breath, sputum production and wheezing.    Musculoskeletal:  Negative for falls, joint pain, muscle cramps, muscle weakness, myalgias, neck pain and stiffness.   Gastrointestinal:  Negative for abdominal pain, anorexia, change in bowel habit, bowel incontinence, constipation, diarrhea, heartburn, hematemesis and melena.   Neurological:  Negative for disturbances in coordination, dizziness, focal weakness, headaches, light-headedness, loss of balance, numbness, paresthesias, seizures, sensory change, tremors, vertigo and weakness.   Psychiatric/Behavioral:  Negative for altered mental status, depression, hallucinations and memory loss. The patient does not have insomnia and is not nervous/anxious.    Objective:     Vital Signs (Most Recent):  Temp: 97.8 °F (36.6 °C) (04/08/23 1118)  Pulse: 69 (04/08/23 1118)  Resp: 18 (04/08/23 1118)  BP: 110/62 (04/08/23 1118)  SpO2: 96 % (04/08/23 1118) Vital Signs (24h Range):  Temp:  [97.6 °F (36.4 °C)-98.3 °F (36.8 °C)] 97.8 °F (36.6 °C)  Pulse:  [] 69  Resp:  [18-20] 18  SpO2:  [96 %-99 %] 96 %  BP: (102-138)/(57-62) 110/62     Weight: 73 kg (160 lb 15 oz)  Body mass index is 23.09 kg/m².     SpO2: 96 %         Intake/Output Summary (Last 24 hours) at 4/8/2023 1429  Last data filed at 4/8/2023 1257  Gross per 24 hour   Intake 560 ml   Output 875 ml   Net -315 ml       Lines/Drains/Airways        Peripheral Intravenous Line  Duration                  Peripheral IV - Single Lumen 04/06/23 2135 20 G Left Antecubital 1 day                    Physical Exam  Vitals reviewed.   Constitutional:       General: He is not in acute distress.     Appearance: He is not diaphoretic.   Neck:      Vascular: No carotid bruit or JVD.   Cardiovascular:      Rate and Rhythm: Normal rate and regular rhythm.      Pulses: Normal pulses.            Right dorsalis pedis pulse not accessible.         Right posterior tibial pulse not accessible.   Pulmonary:      Effort: Pulmonary effort is normal.      Breath sounds: Normal breath sounds.   Abdominal:      General: Bowel sounds are normal.      Palpations: Abdomen is soft.      Tenderness: There is no abdominal tenderness.   Musculoskeletal:      Right knee: Erythema present.      Right lower leg: No edema.      Left lower leg: No edema.   Neurological:      Mental Status: He is alert.   Psychiatric:         Mood and Affect: Affect is flat.         Speech: Speech normal.         Behavior: Behavior normal.       Significant Labs: CMP   Recent Labs   Lab 04/06/23 2134 04/07/23 0404 04/08/23  0523    143 144   K 4.5 4.1 4.0    112* 114*   CO2 21* 19* 21*   * 107 97   BUN 25* 24* 20   CREATININE 2.1* 1.7* 1.3   CALCIUM 10.2 9.2 8.7   PROT 6.7  --   --    ALBUMIN 4.0  --   --    BILITOT 1.2*  --   --    ALKPHOS 73  --   --    AST 46*  --   --    ALT 34  --   --    ANIONGAP 17* 12 9   , CBC   Recent Labs   Lab 04/06/23 2134 04/07/23  0404 04/08/23  0523   WBC 20.07* 13.40* 10.17   HGB 15.8 14.2 12.7*   HCT 45.9 44.1 39.9*    197 173   , and Troponin   Recent Labs   Lab 04/06/23  2359 04/07/23  0404 04/07/23  0748   TROPONINI 0.803* 0.897* 0.809*       Significant Imaging: Echocardiogram: Transthoracic echo (TTE) complete (Cupid Only):   Results for orders placed or performed during the hospital encounter of 04/06/23   Echo Saline Bubble? Yes   Result Value  Ref Range    BSA 1.89 m2    LA WIDTH 3.90 cm    IVC diameter 0.79 cm    Left Ventricular Outflow Tract Mean Velocity 0.55 cm/s    Left Ventricular Outflow Tract Mean Gradient 1.31 mmHg    AORTIC VALVE CUSP SEPERATION 2.11 cm    PV PEAK VELOCITY 0.59 cm/s    LVIDd 4.65 3.5 - 6.0 cm    IVS 1.24 (A) 0.6 - 1.1 cm    Posterior Wall 1.23 (A) 0.6 - 1.1 cm    Ao root annulus 3.27 cm    LVIDs 3.29 2.1 - 4.0 cm    FS 29 28 - 44 %    LA volume 37.90 cm3    Sinus 3.98 cm    STJ 3.45 cm    Ascending aorta 3.65 cm    LV mass 217.25 g    LA size 2.92 cm    Left Ventricle Relative Wall Thickness 0.53 cm    AV mean gradient 2 mmHg    AV valve area 4.11 cm2    AV Velocity Ratio 0.85     AV index (prosthetic) 0.84     MV valve area p 1/2 method 4.55 cm2    E/A ratio 1.02     E wave deceleration time 149.55 msec    LVOT diameter 2.49 cm    LVOT area 4.9 cm2    LVOT peak rm 0.73 m/s    LVOT peak VTI 15.70 cm    Ao peak rm 0.86 m/s    Ao VTI 18.6 cm    LVOT stroke volume 76.41 cm3    AV peak gradient 3 mmHg    MV Peak E Rm 0.63 m/s    TR Max Rm 2.79 m/s    MV stenosis pressure 1/2 time 48.38 ms    MV Peak A Rm 0.62 m/s    LV Systolic Volume 43.69 mL    LV Systolic Volume Index 23.1 mL/m2    LV Diastolic Volume 99.61 mL    LV Diastolic Volume Index 52.70 mL/m2    LA Volume Index 20.1 mL/m2    LV Mass Index 115 g/m2    RA Major Axis 4.30 cm    Left Atrium Minor Axis 3.93 cm    Left Atrium Major Axis 3.90 cm    Triscuspid Valve Regurgitation Peak Gradient 31 mmHg    RA Width 3.31 cm    Right Atrial Pressure (from IVC) 3 mmHg    EF 55 %    TV rest pulmonary artery pressure 34 mmHg    Narrative    · Technically difficult study.  · The left ventricle is normal in size with concentric remodeling and   normal systolic function.  · The estimated ejection fraction is 55%.  · Normal left ventricular diastolic function.  · Normal right ventricular size with normal right ventricular systolic   function.  · Mild tricuspid regurgitation.  · Normal  central venous pressure (3 mmHg).  · The estimated PA systolic pressure is 34 mmHg.  · There is mild pulmonary hypertension.

## 2023-04-08 NOTE — ASSESSMENT & PLAN NOTE
04/07/2023-Abnormal troponin in light of chronic kidney disease.  Latest have remained flat.  Echocardiogram shows normal function.  Can pursue ischemic evaluation with nuclear stress.    04/08/2023-plan for nuclear stress on 04/10/2023.

## 2023-04-08 NOTE — PROGRESS NOTES
[facility] Medicine  Progress Note    Patient Name: Antonio Torres Jr.  MRN: [id number]  Patient Class: OP- Observation   Admission Date: 4/6/2023  Length of Stay: 0 days  Attending Physician: Yaakov Echevarria MD  Primary Care Provider: Fabio Lennon MD        Subjective:     Principal Problem:Syncope        HPI:  Antonio Torres Jr. 78 y.o. male with With CKD 3, HDL, BPH, neoplasm, schizophrenia presents to the hospital due to a chief complaint of syncopal episode.  Acute onset 04/06/2023 patient reports that he had a fainting episode after large bowel movement today and standing up.  Patient endorses that he was on the floor for awhile with his sister found him, per family report it was 2 hours.  He stated that he felt lightheaded after standing up his legs grew weak and he gently lowered himself to the ground.  Patient endorses decreased thirst stating that he only had 2 glasses of water today, patient states that he had similar episodes 2 years ago.  Patient denied attempted any self-treatment.   denies head trauma, syncope, headache, chest pain, SOB, or other associated symptoms.  Patient also endorses pain to the right ankle and bilateral knees.  On exam right ankle is swollen +2 greater than left bilateral knees are erythematous and TTP. No other alleviating or exacerbating factors noted    In the ED patient was afebrile with leukocytosis WBCs 20.07, CO2 21, anion gap 17, BUN 25, creatinine 2.1, EGFR 31, glucose 113, bilirubin total 1.2, AST 46, BNP negative, 1st troponin 0.493, 2nd troponin 0.803, UA showed +1 protein, +2 ketones +3 occult blood, CT head and C-spine pending, CXR negative, x-ray bilateral knees negative, x-ray right ankle showed Acute distal fibular shaft fracture.  Patient was placed in observation for further workup and management of fibular shaft fracture and ACS rule out      Overview/Hospital Course:  Mr. Torres was placed in observation for further evaluation and  treatment following a syncopal episode at home.  Acute onset after large bowel movement and standing up.  Was on the floor for a while.  Was noted to have significant laboratory abnormalities and x-rays revealed ankle fracture.  Labs with leukocytosis, BISI, and elevated troponin.  EKG without evidence of acute ischemia, troponin with flat trend in setting of BISI.  Leukocytosis and renal function have improved.  Cardiology was consulted and plan for stress test on Monday 04/10/2023.      Interval History:  Feeling well without complaint, no new issues    Review of Systems   Constitutional:  Negative for chills and fever.   HENT:  Negative for congestion and rhinorrhea.    Eyes:  Negative for photophobia and visual disturbance.   Respiratory:  Negative for cough and shortness of breath.    Cardiovascular:  Negative for chest pain, palpitations and leg swelling.   Gastrointestinal:  Negative for abdominal pain, diarrhea, nausea and vomiting.   Genitourinary:  Negative for frequency, hematuria and urgency.   Skin:  Negative for pallor, rash and wound.   Neurological:  Negative for light-headedness and headaches.   Psychiatric/Behavioral:  Negative for confusion and decreased concentration.    Objective:     Vital Signs (Most Recent):  Temp: 97.8 °F (36.6 °C) (04/08/23 1118)  Pulse: 69 (04/08/23 1118)  Resp: 18 (04/08/23 1118)  BP: 110/62 (04/08/23 1118)  SpO2: 96 % (04/08/23 1118) Vital Signs (24h Range):  Temp:  [97.6 °F (36.4 °C)-98.3 °F (36.8 °C)] 97.8 °F (36.6 °C)  Pulse:  [] 69  Resp:  [18-20] 18  SpO2:  [96 %-99 %] 96 %  BP: (102-138)/(57-62) 110/62     Weight: 73 kg (160 lb 15 oz)  Body mass index is 23.09 kg/m².    Intake/Output Summary (Last 24 hours) at 4/8/2023 1323  Last data filed at 4/8/2023 1257  Gross per 24 hour   Intake 560 ml   Output 875 ml   Net -315 ml      Physical Exam  Vitals and nursing note reviewed.   Constitutional:       General: He is not in acute distress.     Appearance: He is  well-developed.   HENT:      Head: Normocephalic and atraumatic.      Right Ear: External ear normal.      Left Ear: External ear normal.      Nose: Nose normal.   Eyes:      Conjunctiva/sclera: Conjunctivae normal.   Cardiovascular:      Rate and Rhythm: Normal rate and regular rhythm.   Pulmonary:      Effort: Pulmonary effort is normal. No respiratory distress.   Abdominal:      General: There is no distension.      Palpations: Abdomen is soft.      Comments: No palpable hepatomegaly or splenomegaly   Musculoskeletal:         General: Normal range of motion.      Cervical back: Normal range of motion and neck supple.      Comments: Cast to right lower extremity   Skin:     General: Skin is warm and dry.   Neurological:      Mental Status: He is alert and oriented to person, place, and time.   Psychiatric:         Thought Content: Thought content normal.       Significant Labs: All pertinent labs within the past 24 hours have been reviewed.    Significant Imaging: I have reviewed all pertinent imaging results/findings within the past 24 hours.      Assessment/Plan:      * Syncope  4/8:  Suspect vagal, but elevated troponins are concerning.  Cardiology plans for stress test on Monday.  Continue to monitor on tele and follow lab work.    Continuous Cardiac monitoring  EKG showed Normal sinus rhythm Possible Lateral infarct  Trend troponins 1st troponin 0.493, 2nd troponin 0.803 consult to cardiology for current significant upward trend  Orthostatic blood pressure negative  Neuro checks Q4hrs  2D Echo with bubble  U/A negative   CT of head pending   Check Tox screen       Elevated troponin level not due to acute coronary syndrome  As above    Closed right fibular fracture  Patient was still adamant that he did not fall however physical exam showed obvious bracing injuries to forearms and knees, x-ray right ankle showed Acute distal fibular shaft fracture.  Fall precautions  Prn analgesics  Nonweightbearing RLE  Ice  packs  Consult ortho for displaced distal fibular shaft fracture  NPO    Neoplasm of uncertain behavior  History noted, patient with abrasion to the back of scalp on exam states that is part of his treatment that he receives patient referred to it as a skin peel    CKD (chronic kidney disease) stage 3, GFR 30-59 ml/min  4/8:  Initially BISI superimposed on CKD, renal function improving, continue to monitor.    Creatinine 2.1 on admit Baseline about 1.6 Voiding well  Avoid nephrotoxic agents  Strict I/O's  Renally dose medications BMP, Magnesium in AM  No ace/arb     Hypercholesterolemia  Chronic, stable, continue statin      Schizophrenia  Patient denies being on any current medications for schizophrenia, not currently exhibiting any signs of psychosis.  Continue to monitor      VTE Risk Mitigation (From admission, onward)         Ordered     enoxaparin injection 30 mg  Daily         04/06/23 2315     IP VTE HIGH RISK PATIENT  Once         04/06/23 2315     Place sequential compression device  Until discontinued         04/06/23 2315                Discharge Planning   JULIANA:      Code Status: Full Code   Is the patient medically ready for discharge?:     Reason for patient still in hospital (select all that apply): Patient trending condition and Consult recommendations         Ignacio Doll Jr., APRN, [facility]-BC  Hospitalist - Department of Hospital Medicine  [facility] [address] [place] [address]  Office #: [phone]; Pager #: [phone]

## 2023-04-08 NOTE — PROGRESS NOTES
[facility]  Cardiology  Progress Note    Patient Name: Antonio Torres Jr.  MRN: [id number]  Admission Date: 4/6/2023  Hospital Length of Stay: 0 days  Code Status: Full Code   Attending Physician: Yaakov Echevarria MD   Primary Care Physician: Fabio Lennon MD  Expected Discharge Date:   Principal Problem:Syncope    Subjective:     Hospital Course:   No notes on file    Interval History:     Review of Systems   Constitutional: Negative for chills, decreased appetite, diaphoresis, malaise/fatigue, weight gain and weight loss.   HENT:  Negative for congestion, hearing loss, hoarse voice, nosebleeds, odynophagia, stridor and tinnitus.    Eyes:  Negative for blurred vision, double vision, photophobia, visual disturbance and visual halos.   Cardiovascular:  Negative for chest pain, dyspnea on exertion, irregular heartbeat, leg swelling, near-syncope, orthopnea, palpitations, paroxysmal nocturnal dyspnea and syncope.   Respiratory:  Negative for shortness of breath, sputum production and wheezing.    Musculoskeletal:  Negative for falls, joint pain, muscle cramps, muscle weakness, myalgias, neck pain and stiffness.   Gastrointestinal:  Negative for abdominal pain, anorexia, change in bowel habit, bowel incontinence, constipation, diarrhea, heartburn, hematemesis and melena.   Neurological:  Negative for disturbances in coordination, dizziness, focal weakness, headaches, light-headedness, loss of balance, numbness, paresthesias, seizures, sensory change, tremors, vertigo and weakness.   Psychiatric/Behavioral:  Negative for altered mental status, depression, hallucinations and memory loss. The patient does not have insomnia and is not nervous/anxious.    Objective:     Vital Signs (Most Recent):  Temp: 97.8 °F (36.6 °C) (04/08/23 1118)  Pulse: 69 (04/08/23 1118)  Resp: 18 (04/08/23 1118)  BP: 110/62 (04/08/23 1118)  SpO2: 96 % (04/08/23 1118) Vital Signs (24h Range):  Temp:  [97.6 °F (36.4 °C)-98.3 °F (36.8 °C)] 97.8  °F (36.6 °C)  Pulse:  [] 69  Resp:  [18-20] 18  SpO2:  [96 %-99 %] 96 %  BP: (102-138)/(57-62) 110/62     Weight: 73 kg (160 lb 15 oz)  Body mass index is 23.09 kg/m².     SpO2: 96 %         Intake/Output Summary (Last 24 hours) at 4/8/2023 1429  Last data filed at 4/8/2023 1257  Gross per 24 hour   Intake 560 ml   Output 875 ml   Net -315 ml       Lines/Drains/Airways       Peripheral Intravenous Line  Duration                  Peripheral IV - Single Lumen 04/06/23 2135 20 G Left Antecubital 1 day                    Physical Exam  Vitals reviewed.   Constitutional:       General: He is not in acute distress.     Appearance: He is not diaphoretic.   Neck:      Vascular: No carotid bruit or JVD.   Cardiovascular:      Rate and Rhythm: Normal rate and regular rhythm.      Pulses: Normal pulses.            Right dorsalis pedis pulse not accessible.         Right posterior tibial pulse not accessible.   Pulmonary:      Effort: Pulmonary effort is normal.      Breath sounds: Normal breath sounds.   Abdominal:      General: Bowel sounds are normal.      Palpations: Abdomen is soft.      Tenderness: There is no abdominal tenderness.   Musculoskeletal:      Right knee: Erythema present.      Right lower leg: No edema.      Left lower leg: No edema.   Neurological:      Mental Status: He is alert.   Psychiatric:         Mood and Affect: Affect is flat.         Speech: Speech normal.         Behavior: Behavior normal.       Significant Labs: CMP   Recent Labs   Lab 04/06/23 2134 04/07/23  0404 04/08/23  0523    143 144   K 4.5 4.1 4.0    112* 114*   CO2 21* 19* 21*   * 107 97   BUN 25* 24* 20   CREATININE 2.1* 1.7* 1.3   CALCIUM 10.2 9.2 8.7   PROT 6.7  --   --    ALBUMIN 4.0  --   --    BILITOT 1.2*  --   --    ALKPHOS 73  --   --    AST 46*  --   --    ALT 34  --   --    ANIONGAP 17* 12 9   , CBC   Recent Labs   Lab 04/06/23 2134 04/07/23  0404 04/08/23  0523   WBC 20.07* 13.40* 10.17   HGB 15.8  14.2 12.7*   HCT 45.9 44.1 39.9*    197 173   , and Troponin   Recent Labs   Lab 04/06/23  2359 04/07/23  0404 04/07/23  0748   TROPONINI 0.803* 0.897* 0.809*       Significant Imaging: Echocardiogram: Transthoracic echo (TTE) complete (Cupid Only):   Results for orders placed or performed during the hospital encounter of 04/06/23   Echo Saline Bubble? Yes   Result Value Ref Range    BSA 1.89 m2    LA WIDTH 3.90 cm    IVC diameter 0.79 cm    Left Ventricular Outflow Tract Mean Velocity 0.55 cm/s    Left Ventricular Outflow Tract Mean Gradient 1.31 mmHg    AORTIC VALVE CUSP SEPERATION 2.11 cm    PV PEAK VELOCITY 0.59 cm/s    LVIDd 4.65 3.5 - 6.0 cm    IVS 1.24 (A) 0.6 - 1.1 cm    Posterior Wall 1.23 (A) 0.6 - 1.1 cm    Ao root annulus 3.27 cm    LVIDs 3.29 2.1 - 4.0 cm    FS 29 28 - 44 %    LA volume 37.90 cm3    Sinus 3.98 cm    STJ 3.45 cm    Ascending aorta 3.65 cm    LV mass 217.25 g    LA size 2.92 cm    Left Ventricle Relative Wall Thickness 0.53 cm    AV mean gradient 2 mmHg    AV valve area 4.11 cm2    AV Velocity Ratio 0.85     AV index (prosthetic) 0.84     MV valve area p 1/2 method 4.55 cm2    E/A ratio 1.02     E wave deceleration time 149.55 msec    LVOT diameter 2.49 cm    LVOT area 4.9 cm2    LVOT peak rm 0.73 m/s    LVOT peak VTI 15.70 cm    Ao peak rm 0.86 m/s    Ao VTI 18.6 cm    LVOT stroke volume 76.41 cm3    AV peak gradient 3 mmHg    MV Peak E Rm 0.63 m/s    TR Max Rm 2.79 m/s    MV stenosis pressure 1/2 time 48.38 ms    MV Peak A Rm 0.62 m/s    LV Systolic Volume 43.69 mL    LV Systolic Volume Index 23.1 mL/m2    LV Diastolic Volume 99.61 mL    LV Diastolic Volume Index 52.70 mL/m2    LA Volume Index 20.1 mL/m2    LV Mass Index 115 g/m2    RA Major Axis 4.30 cm    Left Atrium Minor Axis 3.93 cm    Left Atrium Major Axis 3.90 cm    Triscuspid Valve Regurgitation Peak Gradient 31 mmHg    RA Width 3.31 cm    Right Atrial Pressure (from IVC) 3 mmHg    EF 55 %    TV rest pulmonary  artery pressure 34 mmHg    Narrative    · Technically difficult study.  · The left ventricle is normal in size with concentric remodeling and   normal systolic function.  · The estimated ejection fraction is 55%.  · Normal left ventricular diastolic function.  · Normal right ventricular size with normal right ventricular systolic   function.  · Mild tricuspid regurgitation.  · Normal central venous pressure (3 mmHg).  · The estimated PA systolic pressure is 34 mmHg.  · There is mild pulmonary hypertension.        Assessment and Plan:     Brief HPI:     * Syncope  04/07/2023-episode sounds vagal.      Elevated troponin level not due to acute coronary syndrome  04/07/2023-Abnormal troponin in light of chronic kidney disease.  Latest have remained flat.  Echocardiogram shows normal function.  Can pursue ischemic evaluation with nuclear stress.    04/08/2023-plan for nuclear stress on 04/10/2023.          VTE Risk Mitigation (From admission, onward)         Ordered     enoxaparin injection 30 mg  Daily         04/06/23 2315     IP VTE HIGH RISK PATIENT  Once         04/06/23 2315     Place sequential compression device  Until discontinued         04/06/23 2315                Eduar Vasquez MD  Cardiology  [facility] - Telemetry

## 2023-04-08 NOTE — SUBJECTIVE & OBJECTIVE
Interval History:  Feeling well without complaint, no new issues    Review of Systems   Constitutional:  Negative for chills and fever.   HENT:  Negative for congestion and rhinorrhea.    Eyes:  Negative for photophobia and visual disturbance.   Respiratory:  Negative for cough and shortness of breath.    Cardiovascular:  Negative for chest pain, palpitations and leg swelling.   Gastrointestinal:  Negative for abdominal pain, diarrhea, nausea and vomiting.   Genitourinary:  Negative for frequency, hematuria and urgency.   Skin:  Negative for pallor, rash and wound.   Neurological:  Negative for light-headedness and headaches.   Psychiatric/Behavioral:  Negative for confusion and decreased concentration.    Objective:     Vital Signs (Most Recent):  Temp: 97.8 °F (36.6 °C) (04/08/23 1118)  Pulse: 69 (04/08/23 1118)  Resp: 18 (04/08/23 1118)  BP: 110/62 (04/08/23 1118)  SpO2: 96 % (04/08/23 1118) Vital Signs (24h Range):  Temp:  [97.6 °F (36.4 °C)-98.3 °F (36.8 °C)] 97.8 °F (36.6 °C)  Pulse:  [] 69  Resp:  [18-20] 18  SpO2:  [96 %-99 %] 96 %  BP: (102-138)/(57-62) 110/62     Weight: 73 kg (160 lb 15 oz)  Body mass index is 23.09 kg/m².    Intake/Output Summary (Last 24 hours) at 4/8/2023 1323  Last data filed at 4/8/2023 1257  Gross per 24 hour   Intake 560 ml   Output 875 ml   Net -315 ml      Physical Exam  Vitals and nursing note reviewed.   Constitutional:       General: He is not in acute distress.     Appearance: He is well-developed.   HENT:      Head: Normocephalic and atraumatic.      Right Ear: External ear normal.      Left Ear: External ear normal.      Nose: Nose normal.   Eyes:      Conjunctiva/sclera: Conjunctivae normal.   Cardiovascular:      Rate and Rhythm: Normal rate and regular rhythm.   Pulmonary:      Effort: Pulmonary effort is normal. No respiratory distress.   Abdominal:      General: There is no distension.      Palpations: Abdomen is soft.      Comments: No palpable hepatomegaly or  splenomegaly   Musculoskeletal:         General: Normal range of motion.      Cervical back: Normal range of motion and neck supple.      Comments: Cast to right lower extremity   Skin:     General: Skin is warm and dry.   Neurological:      Mental Status: He is alert and oriented to person, place, and time.   Psychiatric:         Thought Content: Thought content normal.       Significant Labs: All pertinent labs within the past 24 hours have been reviewed.    Significant Imaging: I have reviewed all pertinent imaging results/findings within the past 24 hours.

## 2023-04-08 NOTE — HOSPITAL COURSE
Mr. Torres was placed in observation for further evaluation and treatment following a syncopal episode at home.  Acute onset after large bowel movement and standing up.  Was on the floor for a while.  Was noted to have significant laboratory abnormalities and x-rays revealed ankle fracture.  Orthopedic surgery evaluated the patient and recommended keeping splint in place, ice behind knee as needed, no surgical intervention and patient is to follow-up outpatient upon discharge.  Labs with leukocytosis, BISI, and elevated troponin.  EKG without evidence of acute ischemia, troponin with flat trend in setting of BISI.  Leukocytosis and renal function have improved.  Cardiology was consulted and conducted stress test on 04/10 which showed no evidence of myocardial ischemia or infarction, normal wall motion post stress, and no arrhythmias.  PT/OT evaluated the patient and recommended home health PT/OT as well as a rolling walker and bedside commode upon discharge.  Per case management, patient will have to call orthopedic surgery clinic to set up an appointment.    All findings and plan were explained to the patient and sister at bedside. All questions and concerns were answered. Patient and sister verbalized understanding. Patient is in stable condition to d/c home and has been informed to follow up with his PCP within the next 7-10 days to discuss his observation stay and to follow-up with Orthopedic Surgery and Cardiology. Patient has been educated to return to the ED if he experiences any further syncopal episodes, chest pain, shortness of breath, falls, lightheadness, weakness, or discomfort.

## 2023-04-08 NOTE — NURSING
[facility]  Nurses Note -- 4 Eyes      4/8/2023       Skin assessed on: Q Shift      [] No Pressure Injuries Present    []Prevention Measures Documented    [x] Yes LDA  for Pressure Injury Previously documented     [] Yes New Pressure Injury Discovered   [] LDA for New Pressure Injury Added      Attending RN:  Ciera Greco RN     Second RN:  RICO Rocha

## 2023-04-08 NOTE — ASSESSMENT & PLAN NOTE
4/8:  Suspect vagal, but elevated troponins are concerning.  Cardiology plans for stress test on Monday.  Continue to monitor on tele and follow lab work.    Continuous Cardiac monitoring  EKG showed Normal sinus rhythm Possible Lateral infarct  Trend troponins 1st troponin 0.493, 2nd troponin 0.803 consult to cardiology for current significant upward trend  Orthostatic blood pressure negative  Neuro checks Q4hrs  2D Echo with bubble  U/A negative   CT of head pending   Check Tox screen

## 2023-04-08 NOTE — PLAN OF CARE
[facility] [place] - Telemetry  Initial Discharge Assessment    Mostly independent at home next to sister, who assists; no current medical services or equip; sister desires HH and BSC at discharge.    Primary Care Provider: Fabio Lennon MD Prefers mid-morning appointments    Admission Diagnosis: Syncopal episodes [R55]  Syncope [R55]  Right ankle pain [M25.571]  Bilateral knee pain [M25.561, M25.562]  Chest pain [R07.9]    Admission Date: 4/6/2023  Expected Discharge Date:     Discharge Barriers Identified: None    Payor: [employer] MEDICARE / Plan: [employer] COMPLETE / Product Type: Medicare Advantage /     Extended Emergency Contact Information  Primary Emergency Contact: Caitlyn Manzo  Address: [address] [place] [address] [place] [address] [facility]  Home Phone: [phone]  Mobile Phone: [phone]  Relation: Sister    Discharge Plan A: Home with family (Follow-up; possible HH; BSC)  Discharge Plan B: Other (TBD)      [facility] - [place] - [address]  [address]  [place] [address]  Phone: [phone] Fax: [phone]      Initial Assessment (most recent)       Adult Discharge Assessment - 04/08/23 1440          Discharge Assessment    Assessment Type Discharge Planning Assessment     Confirmed/corrected address, phone number and insurance Yes     Confirmed Demographics Correct on Facesheet     Source of Information patient;health record;family     Does patient/caregiver understand observation status Yes     Communicated JULIANA with patient/caregiver Date not available/Unable to determine     Reason For Admission Syncopal episodes     People in Home sibling(s)     Facility Arrived From: Home: in garage next to sister's home     Do you expect to return to your current living situation? Yes     Do you have help at home or someone to help you manage your care at home? Yes     Who are your caregiver(s) and their phone number(s)? Caitlyn-sister:  [phone]     Prior to hospitilization cognitive status: Alert/Oriented     Current cognitive status: Alert/Oriented     Do you have any problems with: Errands/Grocery;Needs other help     Specify other help Sister and brother-in-law assist as needed; transport     Home Accessibility wheelchair accessible     Home Layout Able to live on 1st floor     Equipment Currently Used at Home none     Readmission within 30 days? No     Patient currently being followed by outpatient case management? No     Do you currently have service(s) that help you manage your care at home? No     Do you take prescription medications? Yes     Do you have prescription coverage? Yes     Coverage PHN; Medicaid     Do you have any problems affording any of your prescribed medications? No     Is the patient taking medications as prescribed? yes     Who is going to help you get home at discharge? Caitlyn sister     How do you get to doctors appointments? family or friend will provide     Are you on dialysis? No     Do you take coumadin? No     Discharge Plan A Home with family   Follow-up; possible HH; BSC    Discharge Plan B Other   TBD    DME Needed Upon Discharge  --   TBD    Discharge Plan discussed with: Patient;Sibling     Name(s) and Number(s) Caitlyn-daughter: [phone]     Discharge Barriers Identified None                 SW Role explained to patient; two patient identifiers recognized; SW contact information placed on Communication board. Discussed patient managing health care at home and discussed discharge plans A and B; determined who would be helping patient at home with recovery: Caitlyn, sister, will help with recovery at home.

## 2023-04-09 LAB
ANION GAP SERPL CALC-SCNC: 7 MMOL/L (ref 8–16)
BASOPHILS # BLD AUTO: 0.03 K/UL (ref 0–0.2)
BASOPHILS NFR BLD: 0.3 % (ref 0–1.9)
BUN SERPL-MCNC: 14 MG/DL (ref 8–23)
CALCIUM SERPL-MCNC: 8.2 MG/DL (ref 8.7–10.5)
CHLORIDE SERPL-SCNC: 115 MMOL/L (ref 95–110)
CO2 SERPL-SCNC: 21 MMOL/L (ref 23–29)
CREAT SERPL-MCNC: 1.2 MG/DL (ref 0.5–1.4)
DIFFERENTIAL METHOD: ABNORMAL
EOSINOPHIL # BLD AUTO: 0.2 K/UL (ref 0–0.5)
EOSINOPHIL NFR BLD: 2.1 % (ref 0–8)
ERYTHROCYTE [DISTWIDTH] IN BLOOD BY AUTOMATED COUNT: 13 % (ref 11.5–14.5)
EST. GFR  (NO RACE VARIABLE): >60 ML/MIN/1.73 M^2
GLUCOSE SERPL-MCNC: 97 MG/DL (ref 70–110)
HCT VFR BLD AUTO: 36.9 % (ref 40–54)
HGB BLD-MCNC: 12.3 G/DL (ref 14–18)
IMM GRANULOCYTES # BLD AUTO: 0.03 K/UL (ref 0–0.04)
IMM GRANULOCYTES NFR BLD AUTO: 0.3 % (ref 0–0.5)
LYMPHOCYTES # BLD AUTO: 1.1 K/UL (ref 1–4.8)
LYMPHOCYTES NFR BLD: 12 % (ref 18–48)
MCH RBC QN AUTO: 31.1 PG (ref 27–31)
MCHC RBC AUTO-ENTMCNC: 33.3 G/DL (ref 32–36)
MCV RBC AUTO: 93 FL (ref 82–98)
MONOCYTES # BLD AUTO: 0.8 K/UL (ref 0.3–1)
MONOCYTES NFR BLD: 8.3 % (ref 4–15)
NEUTROPHILS # BLD AUTO: 6.9 K/UL (ref 1.8–7.7)
NEUTROPHILS NFR BLD: 77 % (ref 38–73)
NRBC BLD-RTO: 0 /100 WBC
PLATELET # BLD AUTO: 170 K/UL (ref 150–450)
PMV BLD AUTO: 10.5 FL (ref 9.2–12.9)
POTASSIUM SERPL-SCNC: 3.4 MMOL/L (ref 3.5–5.1)
RBC # BLD AUTO: 3.96 M/UL (ref 4.6–6.2)
SODIUM SERPL-SCNC: 143 MMOL/L (ref 136–145)
WBC # BLD AUTO: 9.02 K/UL (ref 3.9–12.7)

## 2023-04-09 PROCEDURE — 99232 SBSQ HOSP IP/OBS MODERATE 35: CPT | Mod: ,,, | Performed by: INTERNAL MEDICINE

## 2023-04-09 PROCEDURE — 96361 HYDRATE IV INFUSION ADD-ON: CPT

## 2023-04-09 PROCEDURE — 99232 PR SUBSEQUENT HOSPITAL CARE,LEVL II: ICD-10-PCS | Mod: ,,, | Performed by: INTERNAL MEDICINE

## 2023-04-09 PROCEDURE — 85025 COMPLETE CBC W/AUTO DIFF WBC: CPT

## 2023-04-09 PROCEDURE — 96372 THER/PROPH/DIAG INJ SC/IM: CPT

## 2023-04-09 PROCEDURE — 80048 BASIC METABOLIC PNL TOTAL CA: CPT

## 2023-04-09 PROCEDURE — 36415 COLL VENOUS BLD VENIPUNCTURE: CPT

## 2023-04-09 PROCEDURE — 25000003 PHARM REV CODE 250

## 2023-04-09 PROCEDURE — G0378 HOSPITAL OBSERVATION PER HR: HCPCS

## 2023-04-09 PROCEDURE — 63600175 PHARM REV CODE 636 W HCPCS

## 2023-04-09 RX ORDER — POTASSIUM CHLORIDE 20 MEQ/1
40 TABLET, EXTENDED RELEASE ORAL ONCE
Status: COMPLETED | OUTPATIENT
Start: 2023-04-09 | End: 2023-04-09

## 2023-04-09 RX ADMIN — ENOXAPARIN SODIUM 30 MG: 30 INJECTION SUBCUTANEOUS at 05:04

## 2023-04-09 RX ADMIN — POTASSIUM CHLORIDE 40 MEQ: 1500 TABLET, EXTENDED RELEASE ORAL at 08:04

## 2023-04-09 RX ADMIN — ARIPIPRAZOLE 20 MG: 10 TABLET ORAL at 08:04

## 2023-04-09 RX ADMIN — SODIUM CHLORIDE: 9 INJECTION, SOLUTION INTRAVENOUS at 04:04

## 2023-04-09 NOTE — NURSING
[facility]  Nurses Note -- 4 Eyes      4/9/2023       Skin assessed on: Q Shift      [] No Pressure Injuries Present    []Prevention Measures Documented    [x] Yes LDA  for Pressure Injury Previously documented     [] Yes New Pressure Injury Discovered   [] LDA for New Pressure Injury Added      Attending RN:  Ciera Greco RN     Second RN:  RICO Ayoub

## 2023-04-09 NOTE — NURSING
[facility]  Nurses Note -- 4 Eyes      4/8/2023       Skin assessed on: Q Shift      [] No Pressure Injuries Present    []Prevention Measures Documented    [x] Yes LDA  for Pressure Injury Previously documented     [] Yes New Pressure Injury Discovered   [] LDA for New Pressure Injury Added      Attending RN:  Mega Nolasco RN     Second RN:  RICO Vang

## 2023-04-09 NOTE — ASSESSMENT & PLAN NOTE
Patient was still adamant that he did not fall however physical exam showed obvious bracing injuries to forearms and knees, x-ray right ankle showed Acute distal fibular shaft fracture.  Fall precautions  Prn analgesics  Nonweightbearing RLE  Ice packs    04/09: Ortho consulted and evaluated the patient. No plan for surgical intervention currently.   PT/OT consulted and recommended HH PT/OT with rolling walker and bedside commode upon discharge.

## 2023-04-09 NOTE — PROGRESS NOTES
[facility]  Cardiology  Progress Note    Patient Name: Antonio Torres Jr.  MRN: [id number]  Admission Date: 4/6/2023  Hospital Length of Stay: 0 days  Code Status: Full Code   Attending Physician: Yaakov Echevarria MD   Primary Care Physician: Fabio Lennon MD  Expected Discharge Date:   Principal Problem:Syncope    Subjective:     Hospital Course:   No notes on file    Interval History:     Review of Systems   Constitutional: Negative for chills, decreased appetite, diaphoresis, malaise/fatigue, weight gain and weight loss.   HENT:  Negative for congestion, hearing loss, hoarse voice, nosebleeds, odynophagia, stridor and tinnitus.    Eyes:  Negative for blurred vision, double vision, photophobia, visual disturbance and visual halos.   Cardiovascular:  Negative for chest pain, dyspnea on exertion, irregular heartbeat, leg swelling, near-syncope, orthopnea, palpitations, paroxysmal nocturnal dyspnea and syncope.   Respiratory:  Negative for shortness of breath, sputum production and wheezing.    Musculoskeletal:  Negative for falls, joint pain, muscle cramps, muscle weakness, myalgias, neck pain and stiffness.   Gastrointestinal:  Negative for abdominal pain, anorexia, change in bowel habit, bowel incontinence, constipation, diarrhea, heartburn, hematemesis and melena.   Neurological:  Negative for disturbances in coordination, dizziness, focal weakness, headaches, light-headedness, loss of balance, numbness, paresthesias, seizures, sensory change, tremors, vertigo and weakness.   Psychiatric/Behavioral:  Negative for altered mental status, depression, hallucinations and memory loss. The patient does not have insomnia and is not nervous/anxious.    Objective:     Vital Signs (Most Recent):  Temp: 99.8 °F (37.7 °C) (04/09/23 0804)  Pulse: 90 (04/09/23 0804)  Resp: 18 (04/09/23 0804)  BP: 139/65 (04/09/23 0804)  SpO2: (!) 93 % (04/09/23 0804)   Vital Signs (24h Range):  Temp:  [97.8 °F (36.6 °C)-99.8 °F (37.7 °C)]  99.8 °F (37.7 °C)  Pulse:  [69-90] 90  Resp:  [17-19] 18  SpO2:  [93 %-99 %] 93 %  BP: (110-139)/(60-66) 139/65     Weight: 73 kg (160 lb 15 oz)  Body mass index is 23.09 kg/m².     SpO2: (!) 93 %         Intake/Output Summary (Last 24 hours) at 4/9/2023 0836  Last data filed at 4/9/2023 0414  Gross per 24 hour   Intake --   Output 1100 ml   Net -1100 ml       Lines/Drains/Airways       Peripheral Intravenous Line  Duration                  Peripheral IV - Single Lumen 04/06/23 2135 20 G Left Antecubital 2 days                    Physical Exam  Vitals reviewed.   Constitutional:       General: He is not in acute distress.     Appearance: He is not diaphoretic.   Neck:      Vascular: No carotid bruit or JVD.   Cardiovascular:      Rate and Rhythm: Normal rate and regular rhythm.      Pulses: Normal pulses.            Right dorsalis pedis pulse not accessible.         Right posterior tibial pulse not accessible.   Pulmonary:      Effort: Pulmonary effort is normal.      Breath sounds: Normal breath sounds.   Abdominal:      General: Bowel sounds are normal.      Palpations: Abdomen is soft.      Tenderness: There is no abdominal tenderness.   Musculoskeletal:      Right knee: Erythema present.      Right lower leg: No edema.      Left lower leg: No edema.   Neurological:      Mental Status: He is alert.   Psychiatric:         Mood and Affect: Affect is flat.         Speech: Speech normal.         Behavior: Behavior normal.       Significant Labs: CMP   Recent Labs   Lab 04/08/23  0523 04/09/23  0505    143   K 4.0 3.4*   * 115*   CO2 21* 21*   GLU 97 97   BUN 20 14   CREATININE 1.3 1.2   CALCIUM 8.7 8.2*   ANIONGAP 9 7*   , CBC   Recent Labs   Lab 04/08/23  0523 04/09/23  0505   WBC 10.17 9.02   HGB 12.7* 12.3*   HCT 39.9* 36.9*    170   , and Troponin No results for input(s): TROPONINI in the last 48 hours.    Significant Imaging: Echocardiogram: Transthoracic echo (TTE) complete (Cupid Only):    Results for orders placed or performed during the hospital encounter of 04/06/23   Echo Saline Bubble? Yes   Result Value Ref Range    BSA 1.89 m2    LA WIDTH 3.90 cm    IVC diameter 0.79 cm    Left Ventricular Outflow Tract Mean Velocity 0.55 cm/s    Left Ventricular Outflow Tract Mean Gradient 1.31 mmHg    AORTIC VALVE CUSP SEPERATION 2.11 cm    PV PEAK VELOCITY 0.59 cm/s    LVIDd 4.65 3.5 - 6.0 cm    IVS 1.24 (A) 0.6 - 1.1 cm    Posterior Wall 1.23 (A) 0.6 - 1.1 cm    Ao root annulus 3.27 cm    LVIDs 3.29 2.1 - 4.0 cm    FS 29 28 - 44 %    LA volume 37.90 cm3    Sinus 3.98 cm    STJ 3.45 cm    Ascending aorta 3.65 cm    LV mass 217.25 g    LA size 2.92 cm    Left Ventricle Relative Wall Thickness 0.53 cm    AV mean gradient 2 mmHg    AV valve area 4.11 cm2    AV Velocity Ratio 0.85     AV index (prosthetic) 0.84     MV valve area p 1/2 method 4.55 cm2    E/A ratio 1.02     E wave deceleration time 149.55 msec    LVOT diameter 2.49 cm    LVOT area 4.9 cm2    LVOT peak rm 0.73 m/s    LVOT peak VTI 15.70 cm    Ao peak rm 0.86 m/s    Ao VTI 18.6 cm    LVOT stroke volume 76.41 cm3    AV peak gradient 3 mmHg    MV Peak E Rm 0.63 m/s    TR Max Rm 2.79 m/s    MV stenosis pressure 1/2 time 48.38 ms    MV Peak A Rm 0.62 m/s    LV Systolic Volume 43.69 mL    LV Systolic Volume Index 23.1 mL/m2    LV Diastolic Volume 99.61 mL    LV Diastolic Volume Index 52.70 mL/m2    LA Volume Index 20.1 mL/m2    LV Mass Index 115 g/m2    RA Major Axis 4.30 cm    Left Atrium Minor Axis 3.93 cm    Left Atrium Major Axis 3.90 cm    Triscuspid Valve Regurgitation Peak Gradient 31 mmHg    RA Width 3.31 cm    Right Atrial Pressure (from IVC) 3 mmHg    EF 55 %    TV rest pulmonary artery pressure 34 mmHg    Narrative    · Technically difficult study.  · The left ventricle is normal in size with concentric remodeling and   normal systolic function.  · The estimated ejection fraction is 55%.  · Normal left ventricular diastolic function.  ·  Normal right ventricular size with normal right ventricular systolic   function.  · Mild tricuspid regurgitation.  · Normal central venous pressure (3 mmHg).  · The estimated PA systolic pressure is 34 mmHg.  · There is mild pulmonary hypertension.        Assessment and Plan:     Brief HPI:     * Syncope  04/07/2023-episode sounds vagal.      Elevated troponin level not due to acute coronary syndrome  04/07/2023-Abnormal troponin in light of chronic kidney disease.  Latest have remained flat.  Echocardiogram shows normal function.  Can pursue ischemic evaluation with nuclear stress.    04/08/2023-plan for nuclear stress on 04/10/2023.    04/09/2023-nuclear stress 04/10/2023.          VTE Risk Mitigation (From admission, onward)         Ordered     enoxaparin injection 30 mg  Daily         04/06/23 2315     IP VTE HIGH RISK PATIENT  Once         04/06/23 2315     Place sequential compression device  Until discontinued         04/06/23 2315                Eduar Vasquez MD  Cardiology  [facility] - Telemetry

## 2023-04-09 NOTE — ASSESSMENT & PLAN NOTE
04/07/2023-Abnormal troponin in light of chronic kidney disease.  Latest have remained flat.  Echocardiogram shows normal function.  Can pursue ischemic evaluation with nuclear stress.    04/08/2023-plan for nuclear stress on 04/10/2023.    04/09/2023-nuclear stress 04/10/2023.

## 2023-04-09 NOTE — PROGRESS NOTES
[facility]  Progress Note    Patient Name: Antonio Torres Jr.  MRN: [id number]  Patient Class: OP- Observation   Admission Date: 4/6/2023  Length of Stay: 0 days  Attending Physician: Yaakov Echevarria MD  Primary Care Provider: Fabio Lennon MD        Subjective:     Principal Problem:Syncope        HPI:  Antonio Torers Jr. 78 y.o. male with With CKD 3, HDL, BPH, neoplasm, schizophrenia presents to the hospital due to a chief complaint of syncopal episode.  Acute onset 04/06/2023 patient reports that he had a fainting episode after large bowel movement today and standing up.  Patient endorses that he was on the floor for awhile with his sister found him, per family report it was 2 hours.  He stated that he felt lightheaded after standing up his legs grew weak and he gently lowered himself to the ground.  Patient endorses decreased thirst stating that he only had 2 glasses of water today, patient states that he had similar episodes 2 years ago.  Patient denied attempted any self-treatment.   denies head trauma, syncope, headache, chest pain, SOB, or other associated symptoms.  Patient also endorses pain to the right ankle and bilateral knees.  On exam right ankle is swollen +2 greater than left bilateral knees are erythematous and TTP. No other alleviating or exacerbating factors noted    In the ED patient was afebrile with leukocytosis WBCs 20.07, CO2 21, anion gap 17, BUN 25, creatinine 2.1, EGFR 31, glucose 113, bilirubin total 1.2, AST 46, BNP negative, 1st troponin 0.493, 2nd troponin 0.803, UA showed +1 protein, +2 ketones +3 occult blood, CT head and C-spine pending, CXR negative, x-ray bilateral knees negative, x-ray right ankle showed Acute distal fibular shaft fracture.  Patient was placed in observation for further workup and management of fibular shaft fracture and ACS rule out      Overview/Hospital Course:  Mr. Torres was placed in observation for further evaluation and  treatment following a syncopal episode at home.  Acute onset after large bowel movement and standing up.  Was on the floor for a while.  Was noted to have significant laboratory abnormalities and x-rays revealed ankle fracture.  Labs with leukocytosis, BISI, and elevated troponin.  EKG without evidence of acute ischemia, troponin with flat trend in setting of BISI.  Leukocytosis and renal function have improved.  Cardiology was consulted and plan for stress test on Monday 04/10/2023. Patient continues to endorse that he is chest-pain free, without any other complaints.      Interval History: NAEON; resting comfortably in bed, chest pain free awaiting stress test on 04/10.    Review of Systems   Constitutional:  Negative for activity change, appetite change, chills, fatigue and fever.   HENT:  Negative for congestion, ear pain, rhinorrhea, sinus pressure, sneezing, sore throat and trouble swallowing.    Eyes:  Negative for pain, redness and visual disturbance.   Respiratory:  Negative for cough, chest tightness, shortness of breath and wheezing.    Cardiovascular:  Negative for chest pain (Resolved), palpitations and leg swelling.   Gastrointestinal:  Negative for abdominal distention, abdominal pain, constipation, diarrhea, nausea and vomiting.   Genitourinary:  Negative for difficulty urinating, dysuria, frequency, hematuria and urgency.   Musculoskeletal:  Negative for arthralgias, gait problem and joint swelling.   Neurological:  Negative for dizziness, tremors, light-headedness, numbness and headaches.   Psychiatric/Behavioral:  Negative for agitation and behavioral problems.    Objective:     Vital Signs (Most Recent):  Temp: 99.8 °F (37.7 °C) (04/09/23 0804)  Pulse: 90 (04/09/23 0804)  Resp: 18 (04/09/23 0804)  BP: 139/65 (04/09/23 0804)  SpO2: (!) 93 % (04/09/23 0804)   Vital Signs (24h Range):  Temp:  [97.8 °F (36.6 °C)-99.8 °F (37.7 °C)] 99.8 °F (37.7 °C)  Pulse:  [69-90] 90  Resp:  [17-19] 18  SpO2:  [93  %-99 %] 93 %  BP: (110-139)/(60-66) 139/65     Weight: 73 kg (160 lb 15 oz)  Body mass index is 23.09 kg/m².    Intake/Output Summary (Last 24 hours) at 4/9/2023 1102  Last data filed at 4/9/2023 0804  Gross per 24 hour   Intake 240 ml   Output 1100 ml   Net -860 ml      Physical Exam  Vitals reviewed.   Constitutional:       General: He is not in acute distress.     Appearance: Normal appearance. He is normal weight. He is not ill-appearing.   HENT:      Head: Normocephalic and atraumatic.      Right Ear: External ear normal.      Left Ear: External ear normal.      Nose: Nose normal.      Mouth/Throat:      Mouth: Mucous membranes are moist.      Pharynx: Oropharynx is clear.   Eyes:      General:         Right eye: No discharge.         Left eye: No discharge.      Extraocular Movements: Extraocular movements intact.      Conjunctiva/sclera: Conjunctivae normal.      Pupils: Pupils are equal, round, and reactive to light.   Cardiovascular:      Rate and Rhythm: Normal rate and regular rhythm.      Pulses: Normal pulses.      Heart sounds: Normal heart sounds. No murmur heard.    No friction rub. No gallop.      Comments: Admission EKG reviewed and noted to be NSR at 85 bpm.  Pulmonary:      Effort: Pulmonary effort is normal. No respiratory distress.      Breath sounds: Normal breath sounds. No wheezing.   Abdominal:      General: Bowel sounds are normal. There is no distension.      Palpations: Abdomen is soft.      Tenderness: There is no abdominal tenderness.   Musculoskeletal:         General: Signs of injury present. Normal range of motion.      Cervical back: Normal range of motion.      Right knee: Ecchymosis present.      Left lower leg: Normal.      Comments: Cast noted on RLE.   Skin:     General: Skin is warm.   Neurological:      General: No focal deficit present.      Mental Status: He is alert and oriented to person, place, and time.      Motor: No weakness.   Psychiatric:         Mood and Affect:  Mood normal.         Behavior: Behavior normal.       Significant Labs: All pertinent labs within the past 24 hours have been reviewed.  Bilirubin:   Recent Labs   Lab 04/06/23  2134   BILITOT 1.2*     BMP:   Recent Labs   Lab 04/09/23  0505   GLU 97      K 3.4*   *   CO2 21*   BUN 14   CREATININE 1.2   CALCIUM 8.2*     CBC:   Recent Labs   Lab 04/08/23  0523 04/09/23  0505   WBC 10.17 9.02   HGB 12.7* 12.3*   HCT 39.9* 36.9*    170     CMP:   Recent Labs   Lab 04/08/23  0523 04/09/23  0505    143   K 4.0 3.4*   * 115*   CO2 21* 21*   GLU 97 97   BUN 20 14   CREATININE 1.3 1.2   CALCIUM 8.7 8.2*   ANIONGAP 9 7*     TSH:   Recent Labs   Lab 04/06/23  2359   TSH 1.398       Significant Imaging: I have reviewed all pertinent imaging results/findings within the past 24 hours.      Assessment/Plan:      * Syncope  4/8:  Suspect vagal, but elevated troponins are concerning.  Cardiology plans for stress test on Monday.  Continue to monitor on tele and follow lab work.  04/09: Cardiology consulted and planning for stress test on 04/10/2023.    Continuous Cardiac monitoring  EKG showed Normal sinus rhythm Possible Lateral infarct  Trend troponins 1st troponin 0.493, 2nd troponin 0.803 consult to cardiology for current significant upward trend  Orthostatic blood pressure negative  Neuro checks Q4hrs  2D Echo with bubble  U/A negative   CT of head pending   Check Tox screen     Elevated troponin level not due to acute coronary syndrome  Troponins trended flat.  Cardiology consulted and planning for stress test on 04/10/2023.    Closed right fibular fracture  Patient was still adamant that he did not fall however physical exam showed obvious bracing injuries to forearms and knees, x-ray right ankle showed Acute distal fibular shaft fracture.  Fall precautions  Prn analgesics  Nonweightbearing RLE  Ice packs    04/09: Ortho consulted and evaluated the patient. No plan for surgical intervention  currently.   PT/OT consulted and recommended HH PT/OT with rolling walker and bedside commode upon discharge.    Neoplasm of uncertain behavior  History noted, patient with abrasion to the back of scalp on exam states that is part of his treatment that he receives patient referred to it as a skin peel    CKD (chronic kidney disease) stage 3, GFR 30-59 ml/min  4/8:  Initially BISI superimposed on CKD, renal function improving, continue to monitor.  04/09: Resolved; Fluids stopped.    Creatinine 2.1 on admit Baseline about 1.6 Voiding well  Avoid nephrotoxic agents  Strict I/O's  Renally dose medications BMP, Magnesium in AM  No ace/arb     Hypercholesterolemia  Chronic, stable, continue statin    Schizophrenia  Patient denies being on any current medications for schizophrenia, not currently exhibiting any signs of psychosis.  Continue to monitor      VTE Risk Mitigation (From admission, onward)         Ordered     enoxaparin injection 30 mg  Daily         04/06/23 2315     IP VTE HIGH RISK PATIENT  Once         04/06/23 2315     Place sequential compression device  Until discontinued         04/06/23 2315                Discharge Planning   JULIANA:      Code Status: Full Code   Is the patient medically ready for discharge?:     Reason for patient still in hospital (select all that apply): Laboratory test and Consult recommendations  Discharge Plan A: Home with family (Follow-up; possible HH; BSC)         Simeon Lomeli PA-C  Department of Hospital Medicine  [facility]  04/09/2023

## 2023-04-09 NOTE — SUBJECTIVE & OBJECTIVE
Interval History:     Review of Systems   Constitutional: Negative for chills, decreased appetite, diaphoresis, malaise/fatigue, weight gain and weight loss.   HENT:  Negative for congestion, hearing loss, hoarse voice, nosebleeds, odynophagia, stridor and tinnitus.    Eyes:  Negative for blurred vision, double vision, photophobia, visual disturbance and visual halos.   Cardiovascular:  Negative for chest pain, dyspnea on exertion, irregular heartbeat, leg swelling, near-syncope, orthopnea, palpitations, paroxysmal nocturnal dyspnea and syncope.   Respiratory:  Negative for shortness of breath, sputum production and wheezing.    Musculoskeletal:  Negative for falls, joint pain, muscle cramps, muscle weakness, myalgias, neck pain and stiffness.   Gastrointestinal:  Negative for abdominal pain, anorexia, change in bowel habit, bowel incontinence, constipation, diarrhea, heartburn, hematemesis and melena.   Neurological:  Negative for disturbances in coordination, dizziness, focal weakness, headaches, light-headedness, loss of balance, numbness, paresthesias, seizures, sensory change, tremors, vertigo and weakness.   Psychiatric/Behavioral:  Negative for altered mental status, depression, hallucinations and memory loss. The patient does not have insomnia and is not nervous/anxious.    Objective:     Vital Signs (Most Recent):  Temp: 99.8 °F (37.7 °C) (04/09/23 0804)  Pulse: 90 (04/09/23 0804)  Resp: 18 (04/09/23 0804)  BP: 139/65 (04/09/23 0804)  SpO2: (!) 93 % (04/09/23 0804)   Vital Signs (24h Range):  Temp:  [97.8 °F (36.6 °C)-99.8 °F (37.7 °C)] 99.8 °F (37.7 °C)  Pulse:  [69-90] 90  Resp:  [17-19] 18  SpO2:  [93 %-99 %] 93 %  BP: (110-139)/(60-66) 139/65     Weight: 73 kg (160 lb 15 oz)  Body mass index is 23.09 kg/m².     SpO2: (!) 93 %         Intake/Output Summary (Last 24 hours) at 4/9/2023 0883  Last data filed at 4/9/2023 0414  Gross per 24 hour   Intake --   Output 1100 ml   Net -1100 ml        Lines/Drains/Airways       Peripheral Intravenous Line  Duration                  Peripheral IV - Single Lumen 04/06/23 2135 20 G Left Antecubital 2 days                    Physical Exam  Vitals reviewed.   Constitutional:       General: He is not in acute distress.     Appearance: He is not diaphoretic.   Neck:      Vascular: No carotid bruit or JVD.   Cardiovascular:      Rate and Rhythm: Normal rate and regular rhythm.      Pulses: Normal pulses.            Right dorsalis pedis pulse not accessible.         Right posterior tibial pulse not accessible.   Pulmonary:      Effort: Pulmonary effort is normal.      Breath sounds: Normal breath sounds.   Abdominal:      General: Bowel sounds are normal.      Palpations: Abdomen is soft.      Tenderness: There is no abdominal tenderness.   Musculoskeletal:      Right knee: Erythema present.      Right lower leg: No edema.      Left lower leg: No edema.   Neurological:      Mental Status: He is alert.   Psychiatric:         Mood and Affect: Affect is flat.         Speech: Speech normal.         Behavior: Behavior normal.       Significant Labs: CMP   Recent Labs   Lab 04/08/23  0523 04/09/23  0505    143   K 4.0 3.4*   * 115*   CO2 21* 21*   GLU 97 97   BUN 20 14   CREATININE 1.3 1.2   CALCIUM 8.7 8.2*   ANIONGAP 9 7*   , CBC   Recent Labs   Lab 04/08/23  0523 04/09/23  0505   WBC 10.17 9.02   HGB 12.7* 12.3*   HCT 39.9* 36.9*    170   , and Troponin No results for input(s): TROPONINI in the last 48 hours.    Significant Imaging: Echocardiogram: Transthoracic echo (TTE) complete (Cupid Only):   Results for orders placed or performed during the hospital encounter of 04/06/23   Echo Saline Bubble? Yes   Result Value Ref Range    BSA 1.89 m2    LA WIDTH 3.90 cm    IVC diameter 0.79 cm    Left Ventricular Outflow Tract Mean Velocity 0.55 cm/s    Left Ventricular Outflow Tract Mean Gradient 1.31 mmHg    AORTIC VALVE CUSP SEPERATION 2.11 cm    PV PEAK  VELOCITY 0.59 cm/s    LVIDd 4.65 3.5 - 6.0 cm    IVS 1.24 (A) 0.6 - 1.1 cm    Posterior Wall 1.23 (A) 0.6 - 1.1 cm    Ao root annulus 3.27 cm    LVIDs 3.29 2.1 - 4.0 cm    FS 29 28 - 44 %    LA volume 37.90 cm3    Sinus 3.98 cm    STJ 3.45 cm    Ascending aorta 3.65 cm    LV mass 217.25 g    LA size 2.92 cm    Left Ventricle Relative Wall Thickness 0.53 cm    AV mean gradient 2 mmHg    AV valve area 4.11 cm2    AV Velocity Ratio 0.85     AV index (prosthetic) 0.84     MV valve area p 1/2 method 4.55 cm2    E/A ratio 1.02     E wave deceleration time 149.55 msec    LVOT diameter 2.49 cm    LVOT area 4.9 cm2    LVOT peak rm 0.73 m/s    LVOT peak VTI 15.70 cm    Ao peak rm 0.86 m/s    Ao VTI 18.6 cm    LVOT stroke volume 76.41 cm3    AV peak gradient 3 mmHg    MV Peak E Rm 0.63 m/s    TR Max Rm 2.79 m/s    MV stenosis pressure 1/2 time 48.38 ms    MV Peak A Rm 0.62 m/s    LV Systolic Volume 43.69 mL    LV Systolic Volume Index 23.1 mL/m2    LV Diastolic Volume 99.61 mL    LV Diastolic Volume Index 52.70 mL/m2    LA Volume Index 20.1 mL/m2    LV Mass Index 115 g/m2    RA Major Axis 4.30 cm    Left Atrium Minor Axis 3.93 cm    Left Atrium Major Axis 3.90 cm    Triscuspid Valve Regurgitation Peak Gradient 31 mmHg    RA Width 3.31 cm    Right Atrial Pressure (from IVC) 3 mmHg    EF 55 %    TV rest pulmonary artery pressure 34 mmHg    Narrative    · Technically difficult study.  · The left ventricle is normal in size with concentric remodeling and   normal systolic function.  · The estimated ejection fraction is 55%.  · Normal left ventricular diastolic function.  · Normal right ventricular size with normal right ventricular systolic   function.  · Mild tricuspid regurgitation.  · Normal central venous pressure (3 mmHg).  · The estimated PA systolic pressure is 34 mmHg.  · There is mild pulmonary hypertension.

## 2023-04-09 NOTE — ASSESSMENT & PLAN NOTE
4/8:  Initially BISI superimposed on CKD, renal function improving, continue to monitor.  04/09: Resolved; Fluids stopped.    Creatinine 2.1 on admit Baseline about 1.6 Voiding well  Avoid nephrotoxic agents  Strict I/O's  Renally dose medications BMP, Magnesium in AM  No ace/arb

## 2023-04-09 NOTE — ASSESSMENT & PLAN NOTE
4/8:  Suspect vagal, but elevated troponins are concerning.  Cardiology plans for stress test on Monday.  Continue to monitor on tele and follow lab work.  04/09: Cardiology consulted and planning for stress test on 04/10/2023.    Continuous Cardiac monitoring  EKG showed Normal sinus rhythm Possible Lateral infarct  Trend troponins 1st troponin 0.493, 2nd troponin 0.803 consult to cardiology for current significant upward trend  Orthostatic blood pressure negative  Neuro checks Q4hrs  2D Echo with bubble  U/A negative   CT of head pending   Check Tox screen

## 2023-04-09 NOTE — SUBJECTIVE & OBJECTIVE
Interval History: NAEON; resting comfortably in bed, chest pain free awaiting stress test on 04/10.    Review of Systems   Constitutional:  Negative for activity change, appetite change, chills, fatigue and fever.   HENT:  Negative for congestion, ear pain, rhinorrhea, sinus pressure, sneezing, sore throat and trouble swallowing.    Eyes:  Negative for pain, redness and visual disturbance.   Respiratory:  Negative for cough, chest tightness, shortness of breath and wheezing.    Cardiovascular:  Negative for chest pain (Resolved), palpitations and leg swelling.   Gastrointestinal:  Negative for abdominal distention, abdominal pain, constipation, diarrhea, nausea and vomiting.   Genitourinary:  Negative for difficulty urinating, dysuria, frequency, hematuria and urgency.   Musculoskeletal:  Negative for arthralgias, gait problem and joint swelling.   Neurological:  Negative for dizziness, tremors, light-headedness, numbness and headaches.   Psychiatric/Behavioral:  Negative for agitation and behavioral problems.    Objective:     Vital Signs (Most Recent):  Temp: 99.8 °F (37.7 °C) (04/09/23 0804)  Pulse: 90 (04/09/23 0804)  Resp: 18 (04/09/23 0804)  BP: 139/65 (04/09/23 0804)  SpO2: (!) 93 % (04/09/23 0804)   Vital Signs (24h Range):  Temp:  [97.8 °F (36.6 °C)-99.8 °F (37.7 °C)] 99.8 °F (37.7 °C)  Pulse:  [69-90] 90  Resp:  [17-19] 18  SpO2:  [93 %-99 %] 93 %  BP: (110-139)/(60-66) 139/65     Weight: 73 kg (160 lb 15 oz)  Body mass index is 23.09 kg/m².    Intake/Output Summary (Last 24 hours) at 4/9/2023 1102  Last data filed at 4/9/2023 0804  Gross per 24 hour   Intake 240 ml   Output 1100 ml   Net -860 ml      Physical Exam  Vitals reviewed.   Constitutional:       General: He is not in acute distress.     Appearance: Normal appearance. He is normal weight. He is not ill-appearing.   HENT:      Head: Normocephalic and atraumatic.      Right Ear: External ear normal.      Left Ear: External ear normal.      Nose:  Nose normal.      Mouth/Throat:      Mouth: Mucous membranes are moist.      Pharynx: Oropharynx is clear.   Eyes:      General:         Right eye: No discharge.         Left eye: No discharge.      Extraocular Movements: Extraocular movements intact.      Conjunctiva/sclera: Conjunctivae normal.      Pupils: Pupils are equal, round, and reactive to light.   Cardiovascular:      Rate and Rhythm: Normal rate and regular rhythm.      Pulses: Normal pulses.      Heart sounds: Normal heart sounds. No murmur heard.    No friction rub. No gallop.      Comments: Admission EKG reviewed and noted to be NSR at 85 bpm.  Pulmonary:      Effort: Pulmonary effort is normal. No respiratory distress.      Breath sounds: Normal breath sounds. No wheezing.   Abdominal:      General: Bowel sounds are normal. There is no distension.      Palpations: Abdomen is soft.      Tenderness: There is no abdominal tenderness.   Musculoskeletal:         General: Signs of injury present. Normal range of motion.      Cervical back: Normal range of motion.      Right knee: Ecchymosis present.      Left lower leg: Normal.      Comments: Cast noted on RLE.   Skin:     General: Skin is warm.   Neurological:      General: No focal deficit present.      Mental Status: He is alert and oriented to person, place, and time.      Motor: No weakness.   Psychiatric:         Mood and Affect: Mood normal.         Behavior: Behavior normal.       Significant Labs: All pertinent labs within the past 24 hours have been reviewed.  Bilirubin:   Recent Labs   Lab 04/06/23  2134   BILITOT 1.2*     BMP:   Recent Labs   Lab 04/09/23  0505   GLU 97      K 3.4*   *   CO2 21*   BUN 14   CREATININE 1.2   CALCIUM 8.2*     CBC:   Recent Labs   Lab 04/08/23  0523 04/09/23  0505   WBC 10.17 9.02   HGB 12.7* 12.3*   HCT 39.9* 36.9*    170     CMP:   Recent Labs   Lab 04/08/23  0523 04/09/23  0505    143   K 4.0 3.4*   * 115*   CO2 21* 21*   GLU 97 97    BUN 20 14   CREATININE 1.3 1.2   CALCIUM 8.7 8.2*   ANIONGAP 9 7*     TSH:   Recent Labs   Lab 04/06/23  2359   TSH 1.398       Significant Imaging: I have reviewed all pertinent imaging results/findings within the past 24 hours.

## 2023-04-10 VITALS
TEMPERATURE: 98 F | HEIGHT: 70 IN | WEIGHT: 160.94 LBS | SYSTOLIC BLOOD PRESSURE: 128 MMHG | RESPIRATION RATE: 18 BRPM | OXYGEN SATURATION: 99 % | DIASTOLIC BLOOD PRESSURE: 60 MMHG | BODY MASS INDEX: 23.04 KG/M2 | HEART RATE: 72 BPM

## 2023-04-10 LAB
ALBUMIN SERPL BCP-MCNC: 3 G/DL (ref 3.5–5.2)
ALP SERPL-CCNC: 56 U/L (ref 55–135)
ALT SERPL W/O P-5'-P-CCNC: 61 U/L (ref 10–44)
ANION GAP SERPL CALC-SCNC: 9 MMOL/L (ref 8–16)
AST SERPL-CCNC: 67 U/L (ref 10–40)
BASOPHILS # BLD AUTO: 0.04 K/UL (ref 0–0.2)
BASOPHILS NFR BLD: 0.4 % (ref 0–1.9)
BILIRUB SERPL-MCNC: 1.4 MG/DL (ref 0.1–1)
BUN SERPL-MCNC: 14 MG/DL (ref 8–23)
CALCIUM SERPL-MCNC: 9.2 MG/DL (ref 8.7–10.5)
CHLORIDE SERPL-SCNC: 109 MMOL/L (ref 95–110)
CO2 SERPL-SCNC: 21 MMOL/L (ref 23–29)
CREAT SERPL-MCNC: 1.1 MG/DL (ref 0.5–1.4)
CV STRESS BASE HR: 90 BPM
DIASTOLIC BLOOD PRESSURE: 75 MMHG
DIFFERENTIAL METHOD: ABNORMAL
EOSINOPHIL # BLD AUTO: 0.2 K/UL (ref 0–0.5)
EOSINOPHIL NFR BLD: 1.7 % (ref 0–8)
ERYTHROCYTE [DISTWIDTH] IN BLOOD BY AUTOMATED COUNT: 12.8 % (ref 11.5–14.5)
EST. GFR  (NO RACE VARIABLE): >60 ML/MIN/1.73 M^2
GLUCOSE SERPL-MCNC: 103 MG/DL (ref 70–110)
HCT VFR BLD AUTO: 40.2 % (ref 40–54)
HGB BLD-MCNC: 13.7 G/DL (ref 14–18)
IMM GRANULOCYTES # BLD AUTO: 0.03 K/UL (ref 0–0.04)
IMM GRANULOCYTES NFR BLD AUTO: 0.3 % (ref 0–0.5)
LYMPHOCYTES # BLD AUTO: 1.6 K/UL (ref 1–4.8)
LYMPHOCYTES NFR BLD: 15.1 % (ref 18–48)
MCH RBC QN AUTO: 31.4 PG (ref 27–31)
MCHC RBC AUTO-ENTMCNC: 34.1 G/DL (ref 32–36)
MCV RBC AUTO: 92 FL (ref 82–98)
MONOCYTES # BLD AUTO: 1 K/UL (ref 0.3–1)
MONOCYTES NFR BLD: 9.1 % (ref 4–15)
NEUTROPHILS # BLD AUTO: 7.8 K/UL (ref 1.8–7.7)
NEUTROPHILS NFR BLD: 73.4 % (ref 38–73)
NRBC BLD-RTO: 0 /100 WBC
NUC STRESS DIASTOLIC VOLUME INDEX: 50
NUC STRESS EJECTION FRACTION: 69 %
NUC STRESS SYSTOLIC VOLUME INDEX: 15
OHS CV CPX 85 PERCENT MAX PREDICTED HEART RATE MALE: 119
OHS CV CPX MAX PREDICTED HEART RATE: 140
OHS CV CPX PATIENT IS FEMALE: 0
OHS CV CPX PATIENT IS MALE: 1
OHS CV CPX PEAK DIASTOLIC BLOOD PRESSURE: 71 MMHG
OHS CV CPX PEAK HEAR RATE: 115 BPM
OHS CV CPX PEAK RATE PRESSURE PRODUCT: NORMAL
OHS CV CPX PEAK SYSTOLIC BLOOD PRESSURE: 128 MMHG
OHS CV CPX PERCENT MAX PREDICTED HEART RATE ACHIEVED: 82
OHS CV CPX RATE PRESSURE PRODUCT PRESENTING: NORMAL
PLATELET # BLD AUTO: 193 K/UL (ref 150–450)
PMV BLD AUTO: 10.2 FL (ref 9.2–12.9)
POTASSIUM SERPL-SCNC: 4 MMOL/L (ref 3.5–5.1)
PROT SERPL-MCNC: 6.1 G/DL (ref 6–8.4)
RBC # BLD AUTO: 4.36 M/UL (ref 4.6–6.2)
SODIUM SERPL-SCNC: 139 MMOL/L (ref 136–145)
SYSTOLIC BLOOD PRESSURE: 125 MMHG
WBC # BLD AUTO: 10.59 K/UL (ref 3.9–12.7)

## 2023-04-10 PROCEDURE — 97530 THERAPEUTIC ACTIVITIES: CPT | Mod: CQ

## 2023-04-10 PROCEDURE — 25000003 PHARM REV CODE 250

## 2023-04-10 PROCEDURE — 97116 GAIT TRAINING THERAPY: CPT | Mod: CQ

## 2023-04-10 PROCEDURE — 97535 SELF CARE MNGMENT TRAINING: CPT

## 2023-04-10 PROCEDURE — 80053 COMPREHEN METABOLIC PANEL: CPT

## 2023-04-10 PROCEDURE — 36415 COLL VENOUS BLD VENIPUNCTURE: CPT

## 2023-04-10 PROCEDURE — 97110 THERAPEUTIC EXERCISES: CPT | Mod: CQ

## 2023-04-10 PROCEDURE — 63600175 PHARM REV CODE 636 W HCPCS: Performed by: INTERNAL MEDICINE

## 2023-04-10 PROCEDURE — 85025 COMPLETE CBC W/AUTO DIFF WBC: CPT

## 2023-04-10 PROCEDURE — G0378 HOSPITAL OBSERVATION PER HR: HCPCS

## 2023-04-10 RX ORDER — REGADENOSON 0.08 MG/ML
0.4 INJECTION, SOLUTION INTRAVENOUS ONCE
Status: COMPLETED | OUTPATIENT
Start: 2023-04-10 | End: 2023-04-10

## 2023-04-10 RX ADMIN — REGADENOSON 0.4 MG: 0.08 INJECTION, SOLUTION INTRAVENOUS at 09:04

## 2023-04-10 RX ADMIN — ARIPIPRAZOLE 20 MG: 10 TABLET ORAL at 11:04

## 2023-04-10 NOTE — PLAN OF CARE
Problem: Fall Injury Risk  Goal: Absence of Fall and Fall-Related Injury  Outcome: Ongoing, Progressing   No fall during shift. No new impaired skin. Bed alarm active. Bedside commode utilized.

## 2023-04-10 NOTE — PLAN OF CARE
Charisse. [facility] delivered RW and BSC to patient bedside.   Declined WC. Patient signed for equipment.

## 2023-04-10 NOTE — DISCHARGE SUMMARY
[facility] Medicine  Discharge Summary      Patient Name: Antonio Torres Jr.  MRN: [id number]  JUDIE: [id number]  Patient Class: OP- Observation  Admission Date: 4/6/2023  Hospital Length of Stay: 0 days  Discharge Date and Time:  04/10/2023 3:25 PM  Attending Physician: Yaakov Echevarria MD   Discharging Provider: Simeon Lomeli PA-C  Primary Care Provider: Fabio Lennon MD    Primary Care Team: SIMEON LOMELI    HPI:   Antonio Torres Jr. 78 y.o. male with With CKD 3, HDL, BPH, neoplasm, schizophrenia presents to the hospital due to a chief complaint of syncopal episode.  Acute onset 04/06/2023 patient reports that he had a fainting episode after large bowel movement today and standing up.  Patient endorses that he was on the floor for awhile with his sister found him, per family report it was 2 hours.  He stated that he felt lightheaded after standing up his legs grew weak and he gently lowered himself to the ground.  Patient endorses decreased thirst stating that he only had 2 glasses of water today, patient states that he had similar episodes 2 years ago.  Patient denied attempted any self-treatment.   denies head trauma, syncope, headache, chest pain, SOB, or other associated symptoms.  Patient also endorses pain to the right ankle and bilateral knees.  On exam right ankle is swollen +2 greater than left bilateral knees are erythematous and TTP. No other alleviating or exacerbating factors noted    In the ED patient was afebrile with leukocytosis WBCs 20.07, CO2 21, anion gap 17, BUN 25, creatinine 2.1, EGFR 31, glucose 113, bilirubin total 1.2, AST 46, BNP negative, 1st troponin 0.493, 2nd troponin 0.803, UA showed +1 protein, +2 ketones +3 occult blood, CT head and C-spine pending, CXR negative, x-ray bilateral knees negative, x-ray right ankle showed Acute distal fibular shaft fracture.  Patient was placed in observation for further workup and management of fibular shaft fracture  and ACS rule out      * No surgery found *      Hospital Course:   Mr. Torres was placed in observation for further evaluation and treatment following a syncopal episode at home.  Acute onset after large bowel movement and standing up.  Was on the floor for a while.  Was noted to have significant laboratory abnormalities and x-rays revealed ankle fracture.  Orthopedic surgery evaluated the patient and recommended keeping splint in place, ice behind knee as needed, no surgical intervention and patient is to follow-up outpatient upon discharge.  Labs with leukocytosis, BISI, and elevated troponin.  EKG without evidence of acute ischemia, troponin with flat trend in setting of BISI.  Leukocytosis and renal function have improved.  Cardiology was consulted and conducted stress test on 04/10 which showed no evidence of myocardial ischemia or infarction, normal wall motion post stress, and no arrhythmias.  PT/OT evaluated the patient and recommended home health PT/OT as well as a rolling walker and bedside commode upon discharge.  Per case management, patient will have to call orthopedic surgery clinic to set up an appointment.    All findings and plan were explained to the patient and sister at bedside. All questions and concerns were answered. Patient and sister verbalized understanding. Patient is in stable condition to d/c home and has been informed to follow up with his PCP within the next 7-10 days to discuss his observation stay and to follow-up with Orthopedic Surgery and Cardiology. Patient has been educated to return to the ED if he experiences any further syncopal episodes, chest pain, shortness of breath, falls, lightheadness, weakness, or discomfort.       Goals of Care Treatment Preferences:  Code Status: Full Code      Consults:   Consults (From admission, onward)        Status Ordering Provider     Inpatient consult to Orthopedic Surgery  Once        Provider:  (Not yet assigned)    Completed ALEXIS WHITLOCK  "    Inpatient consult to Cardiology  Once        Provider:  Eduar Vasquez MD    Completed ALEXIS WHITLOCK          No new Assessment & Plan notes have been filed under this hospital service since the last note was generated.  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    Specialties: Cardiovascular Disease, Interventional Cardiology, Cardiology  Contact information:  [address]  [address]  [facility] [address]  [phone]             [facility] Follow up.    Why: Home Health- please call to follow up on tomorrow to get assigned agency and when they will be coming to the home  Contact information:  [address]  [facility] [address]  [phone]           Abby Lemus MD. Schedule an appointment as soon as possible for a visit in 2 week(s).    Specialty: Orthopedic Surgery  Why: Please call to schedule follow up appointment in 2-3 weeks. Please have insurance cards available. You will need to provide the policy number.  Contact information:  [address]  [address]  [facility]  [facility] [address]  [phone]                       Patient Instructions:      WALKER FOR HOME USE     Order Specific Question Answer Comments   Type of Walker: Adult (5'4"-6'6")    With wheels? Yes    Height: 5' 10" (1.778 m)  " "  Weight: 73 kg (160 lb 15 oz)    Length of need (1-99 months): 99    Does patient have medical equipment at home? none    Please check all that apply: Patient's condition impairs ambulation.    Please check all that apply: Patient is unable to safely ambulate without equipment.      3 IN 1 COMMODE FOR HOME USE     Order Specific Question Answer Comments   Type: Standard    Height: 5' 10" (1.778 m)    Weight: 73 kg (160 lb 15 oz)    Does patient have medical equipment at home? none    Length of need (1-99 months): 99      Ambulatory referral/consult to Orthopedics   Standing Status: Future   Referral Priority: Urgent Referral Type: Consultation   Requested Specialty: Orthopedic Surgery   Number of Visits Requested: 1     Diet Cardiac     Notify your health care provider if you experience any of the following:  severe uncontrolled pain     Activity as tolerated       Significant Diagnostic Studies: Labs:   BMP:   Recent Labs   Lab 04/09/23  0505 04/10/23  0717   GLU 97 103    139   K 3.4* 4.0   * 109   CO2 21* 21*   BUN 14 14   CREATININE 1.2 1.1   CALCIUM 8.2* 9.2     CMP   Recent Labs   Lab 04/09/23  0505 04/10/23  0717    139   K 3.4* 4.0   * 109   CO2 21* 21*   GLU 97 103   BUN 14 14   CREATININE 1.2 1.1   CALCIUM 8.2* 9.2   PROT  --  6.1   ALBUMIN  --  3.0*   BILITOT  --  1.4*   ALKPHOS  --  56   AST  --  67*   ALT  --  61*   ANIONGAP 7* 9     CBC   Recent Labs   Lab 04/09/23  0505 04/10/23  0717   WBC 9.02 10.59   HGB 12.3* 13.7*   HCT 36.9* 40.2    193     Lipid Panel   Lab Results   Component Value Date    CHOL 178 04/07/2023    HDL 48 04/07/2023    LDLCALC 120.6 04/07/2023    TRIG 47 04/07/2023    CHOLHDL 27.0 04/07/2023     Troponin   Recent Labs   Lab 04/06/23  2359 04/07/23  0404 04/07/23  0748   TROPONINI 0.803* 0.897* 0.809*     X-Ray Ankle Complete Right (04/06/2023):  Acute distal fibular shaft fracture    Results for orders placed or performed during the hospital " encounter of 04/06/23 (from the past 2160 hour(s))   CT Cervical Spine Without Contrast    Narrative    EXAMINATION:  CT CERVICAL SPINE WITHOUT CONTRAST    CLINICAL HISTORY:  Neck trauma (Age >= 65y);    TECHNIQUE:  Low dose axial images, sagittal and coronal reformations were performed though the cervical spine.  Contrast was not administered.    COMPARISON:  None    FINDINGS:  No evidence of acute cervical spine fracture or dislocation.  Odontoid process is intact.  Craniocervical junction is unremarkable.  Cervical spine alignment is within normal limits.  Mild multilevel degenerative changes and intervertebral disc space narrowing are seen.  Prominent facet arthropathy is visualized at the right C4-5 and C5-6 levels.    Surrounding soft tissues show no significant abnormalities.  Airway is patent.  Partially visualized lung apices are clear.      Impression    No evidence of acute cervical spine fracture or dislocation.      Electronically signed by: Олег Vo MD  Date:    04/07/2023  Time:    01:14   CT Head Without Contrast    Narrative    EXAMINATION:  CT HEAD WITHOUT CONTRAST    CLINICAL HISTORY:  Head trauma, minor (Age >= 65y);    TECHNIQUE:  Low dose axial images were obtained through the head.  Coronal and sagittal reformations were also performed. Contrast was not administered.    COMPARISON:  None.    FINDINGS:  There is mild generalized cerebral volume loss and chronic microvascular ischemic change.  No evidence of acute/recent major vascular distribution cerebral infarction, intraparenchymal hemorrhage, or intra-axial space occupying lesion. The ventricular system is normal in size and configuration with no evidence of hydrocephalus. No effacement of the skull-base cisterns. No abnormal extra-axial fluid collections or blood products. Visualized paranasal sinuses and mastoid air cells are clear. The calvarium shows no significant abnormality.      Impression    No acute intracranial  abnormalities identified.      Electronically signed by: Олег Vo MD  Date:    04/07/2023  Time:    01:11         Nuclear Stress - Cardiology Interpreted    Normal myocardial perfusion scan. There is no evidence of myocardial   ischemia or infarction.    There is a  moderate to severe intensity fixed perfusion abnormality in   the inferior wall of the left ventricle secondary to diaphragm   attenuation.    The gated perfusion images showed an ejection fraction of 69% post   stress.    There is normal wall motion post stress.    LV cavity size is  and normal at stress.    The ECG portion of the study is negative for ischemia.    The patient reported no chest pain during the stress test.    There were no arrhythmias during stress.    Transthoracic echo (TTE) complete (Cupid Only):   Results for orders placed or performed during the hospital encounter of 04/06/23   Echo Saline Bubble? Yes   Result Value Ref Range    BSA 1.89 m2    LA WIDTH 3.90 cm    IVC diameter 0.79 cm    Left Ventricular Outflow Tract Mean Velocity 0.55 cm/s    Left Ventricular Outflow Tract Mean Gradient 1.31 mmHg    AORTIC VALVE CUSP SEPERATION 2.11 cm    PV PEAK VELOCITY 0.59 cm/s    LVIDd 4.65 3.5 - 6.0 cm    IVS 1.24 (A) 0.6 - 1.1 cm    Posterior Wall 1.23 (A) 0.6 - 1.1 cm    Ao root annulus 3.27 cm    LVIDs 3.29 2.1 - 4.0 cm    FS 29 28 - 44 %    LA volume 37.90 cm3    Sinus 3.98 cm    STJ 3.45 cm    Ascending aorta 3.65 cm    LV mass 217.25 g    LA size 2.92 cm    Left Ventricle Relative Wall Thickness 0.53 cm    AV mean gradient 2 mmHg    AV valve area 4.11 cm2    AV Velocity Ratio 0.85     AV index (prosthetic) 0.84     MV valve area p 1/2 method 4.55 cm2    E/A ratio 1.02     E wave deceleration time 149.55 msec    LVOT diameter 2.49 cm    LVOT area 4.9 cm2    LVOT peak rm 0.73 m/s    LVOT peak VTI 15.70 cm    Ao peak rm 0.86 m/s    Ao VTI 18.6 cm    LVOT stroke volume 76.41 cm3    AV peak gradient 3 mmHg    MV Peak E Rm  0.63 m/s    TR Max Rm 2.79 m/s    MV stenosis pressure 1/2 time 48.38 ms    MV Peak A Rm 0.62 m/s    LV Systolic Volume 43.69 mL    LV Systolic Volume Index 23.1 mL/m2    LV Diastolic Volume 99.61 mL    LV Diastolic Volume Index 52.70 mL/m2    LA Volume Index 20.1 mL/m2    LV Mass Index 115 g/m2    RA Major Axis 4.30 cm    Left Atrium Minor Axis 3.93 cm    Left Atrium Major Axis 3.90 cm    Triscuspid Valve Regurgitation Peak Gradient 31 mmHg    RA Width 3.31 cm    Right Atrial Pressure (from IVC) 3 mmHg    EF 55 %    TV rest pulmonary artery pressure 34 mmHg    Narrative    · Technically difficult study.  · The left ventricle is normal in size with concentric remodeling and   normal systolic function.  · The estimated ejection fraction is 55%.  · Normal left ventricular diastolic function.  · Normal right ventricular size with normal right ventricular systolic   function.  · Mild tricuspid regurgitation.  · Normal central venous pressure (3 mmHg).  · The estimated PA systolic pressure is 34 mmHg.  · There is mild pulmonary hypertension.          Pending Diagnostic Studies:     None         Medications:  Reconciled Home Medications:      Medication List      CONTINUE taking these medications    ARIPiprazole 20 MG Tab  Commonly known as: ABILIFY  TAKE ONE TABLET BY MOUTH EVERY DAY     fish oil-omega-3 fatty acids 300-1,000 mg capsule  Take 2 g by mouth once daily.     loratadine 10 mg tablet  Commonly known as: CLARITIN  Take 10 mg by mouth once daily.     multivitamin capsule  Take 1 capsule by mouth once daily.            Indwelling Lines/Drains at time of discharge:   Lines/Drains/Airways     None                 Time spent on the discharge of patient: 55 minutes      Simeon Lomeli PA-C  Department of [place] [facility] Medicine  [facility]  04/10/2023

## 2023-04-10 NOTE — PLAN OF CARE
David sent to Cardiology / PCP to schedule appt.  Patient Medicaid is picking up first, so unable to schedule.

## 2023-04-10 NOTE — NURSING
[facility]  Nurses Note -- 4 Eyes      4/10/2023       Skin assessed on: Q Shift      [x] No Pressure Injuries Present    []Prevention Measures Documented    [] Yes LDA  for Pressure Injury Previously documented     [] Yes New Pressure Injury Discovered   [] LDA for New Pressure Injury Added      Attending RN:  Renetta Florez RN     Second RN: RICO Alvarado

## 2023-04-10 NOTE — PLAN OF CARE
[facility]      HOME HEALTH ORDERS  FACE TO FACE ENCOUNTER    Patient Name: Antonio Torres Jr.  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       Follow Up Appointments:  No future appointments.    Allergies:Review of patient's allergies indicates:  No Known Allergies    Medications: Review discharge medications with patient and family and provide education.    Current Facility-Administered Medications   Medication Dose Route Frequency Provider Last Rate Last Admin    acetaminophen tablet 650 mg  650 mg Oral Q4H PRN James Mcfarland NP   650 mg at 04/08/23 0001    aluminum-magnesium hydroxide-simethicone 200-200-20 mg/5 mL suspension 30 mL  30 mL Oral QID PRN James Mcfarland NP        ARIPiprazole tablet 20 mg  20 mg Oral Daily Simeon Lomeli PA-C   20 mg at 04/10/23 1149    enoxaparin injection 30 mg  30 mg Subcutaneous Daily James Mcfarland NP   30 mg at 04/09/23 1709    melatonin tablet 6 mg  6 mg Oral Nightly PRN James Mcfarland NP        naloxone 0.4 mg/mL injection 0.02 mg  0.02 mg Intravenous PRN James Mcfarland NP        ondansetron disintegrating tablet 8 mg  8 mg Oral Q8H PRN James Mcfarland NP        oxyCODONE immediate release tablet 5 mg  5 mg Oral Q6H PRN James Mcfarland NP   5 mg at 04/07/23  1646    polyethylene glycol packet 17 g  17 g Oral Daily James Mcfarland NP        prochlorperazine injection Soln 5 mg  5 mg Intravenous Q6H PRN James Mcfarland NP         Current Discharge Medication List        CONTINUE these medications which have NOT CHANGED    Details   ARIPiprazole (ABILIFY) 20 MG Tab TAKE ONE TABLET BY MOUTH EVERY DAY  Qty: 90 tablet, Refills: 2    Comments: This prescription was filled on 9/23/2022. Any refills authorized will be placed on file.  Associated Diagnoses: Schizophrenia, unspecified type      fish oil-omega-3 fatty acids 300-1,000 mg capsule Take 2 g by mouth once daily.      loratadine (CLARITIN) 10 mg tablet Take 10 mg by mouth once daily.      multivitamin capsule Take 1 capsule by mouth once daily.               I have seen and examined this patient within the last 30 days. My clinical findings that support the need for the home health skilled services and home bound status are the following:no   Weakness/numbness causing balance and gait disturbance due to Fracture making it taxing to leave home.  Requiring assistive device to leave home due to unsteady gait caused by  Fracture.     Diet:   cardiac diet    Referrals/ Consults  Physical Therapy to evaluate and treat. Evaluate for home safety and equipment needs; Establish/upgrade home exercise program. Perform / instruct on therapeutic exercises, gait training, transfer training, and Range of Motion.  Occupational Therapy to evaluate and treat. Evaluate home environment for safety and equipment needs. Perform/Instruct on transfers, ADL training, ROM, and therapeutic exercises.  Aide to provide assistance with personal care, ADLs, and vital signs.    Activities:   activity as tolerated    Nursing:   Agency to admit patient within 24 hours of hospital discharge unless specified on physician order or at patient request    SN to complete comprehensive assessment including routine vital signs. Instruct on disease process and  s/s of complications to report to MD. Review/verify medication list sent home with the patient at time of discharge  and instruct patient/caregiver as needed. Frequency may be adjusted depending on start of care date.     Skilled nurse to perform up to 3 visits PRN for symptoms related to diagnosis    Notify MD if SBP > 160 or < 90; DBP > 90 or < 50; HR > 120 or < 50; Temp > 101; O2 < 88%;     Ok to schedule additional visits based on staff availability and patient request on consecutive days within the home health episode.    When multiple disciplines ordered:    Start of Care occurs on Sunday - Wednesday schedule remaining discipline evaluations as ordered on separate consecutive days following the start of care.    Thursday SOC -schedule subsequent evaluations Friday and Monday the following week.     Friday - Saturday SOC - schedule subsequent discipline evaluations on consecutive days starting Monday of the following week.    For all post-discharge communication and subsequent orders please contact patient's primary care physician.       Home Health Aide:  Physical Therapy: Evaluate and Treat  Occupational Therapy: Evaluate and Treat  Medical Social Work: Evaluate and Treat      I certify that this patient is confined to his home and needs physical therapy and occupational therapy.      Simeon Lomeli PA-C  Department of Hospital Medicine  [facility]  04/10/2023

## 2023-04-10 NOTE — PLAN OF CARE
Problem: Occupational Therapy  Goal: Occupational Therapy Goal  Description: Goals to be met by: 4/22/2023     Patient will increase functional independence with ADLs by performing:    LE Dressing with Supervision.  Grooming while standing at sink with Supervision.  Toileting from bedside commode with Supervision for hygiene and clothing management.   Toilet transfer to bedside commode with Supervision.    Outcome: Ongoing, Progressing     Supervision to don underwear and pants seated; CGA to fully don while standing with RLE NWB. CGA for few hops using RW and RLE NWB chair<>BSC with verbal cueing for continued compliance. OT rec HHOT with BSC, RW, and w/c at d/c in order to increase safety and independence with ADLs and all aspects of functional mobility. Secure message sent to VANI.

## 2023-04-10 NOTE — PT/OT/SLP PROGRESS
Occupational Therapy   Treatment    Name: Antoino Torres Jr.  MRN: [id number]  Admitting Diagnosis:  Syncope       Recommendations:     Discharge Recommendations: home health OT (with caregiver assistance/supervision)  Discharge Equipment Recommendations:  bedside commode, walker, rolling, wheelchair, bath bench  Barriers to discharge:  None    Assessment:     Antonio Torres Jr. is a 78 y.o. male with a medical diagnosis of Syncope. Performance deficits affecting function are weakness, impaired endurance, orthopedic precautions, impaired self care skills, impaired skin, decreased lower extremity function, impaired functional mobility, impaired balance, gait instability, decreased safety awareness, edema, decreased coordination.     Supervision to don underwear and pants seated; CGA to fully don while standing with RLE NWB. CGA for few hops using RW and RLE NWB chair<>BSC with verbal cueing for continued compliance. OT rec HHOT with BSC, RW, and w/c at d/c in order to increase safety and independence with ADLs and all aspects of functional mobility. Secure message sent to VANI    Rehab Prognosis:  Good; patient would benefit from acute skilled OT services to address these deficits and reach maximum level of function.       Plan:     Patient to be seen 4 x/week to address the above listed problems via self-care/home management, therapeutic activities, therapeutic exercises  Plan of Care Expires: 05/08/23  Plan of Care Reviewed with: patient, sibling    Subjective     Chief Complaint: sister anxious, asking for help to get pt dressed   Patient/Family Comments/goals: pt agreeable to work on dressing with OT while sister stepped out     Pain/Comfort:  Pain Rating 1: 0/10  Location - Side 1: Right  Location 1: leg    Objective:     Communicated with: nurseJeny, and VANI Hendrix, prior to session.  Patient found  reclined in the chair with RLE elevated  with peripheral IV, telemetry upon OT entry to  room.    General Precautions: Standard, fall    Orthopedic Precautions:RLE non weight bearing  Braces:  (splint to RLE)  Respiratory Status: Room air     Occupational Performance:     Functional Mobility/Transfers:  Patient completed Sit <> Stand Transfer with stand by assistance  with  rolling walker   Patient completed Toilet Transfer Step Transfer technique with contact guard assistance with  rolling walker and bedside commode  Functional Mobility: Gait belt donned prior to transfer for safety during mobility/transfers. CGA for few hops using RW and RLE NWB chair<>BSC with verbal cueing for continued compliance.     Activities of Daily Living:  Upper Body Dressing: supervision seated to doff front and back gown and to down personal shirt and button the buttons   Lower Body Dressing: Supervision to don underwear and pants seated; CGA for dynamic standing balance for pt to fully don while standing with RLE NWB. Min A to don L tennis shoe (d/t wearing thick hospital socks, tight fit)      [facility] 6 Click ADL: 22    Treatment & Education:  Pt re-educated on OT role/POC.   Verbal cueing during session for RLE NWB compliance  Edu on sequencing to pt and sister on lower body dressing: RLE threaded in first to don, RLE threaded out last to doff. Edu to complete dressing seated   Importance of OOB activity with staff assistance  Edu pt to keep RLE elevated   Safety during functional t/f and mobility   Multiple self-care tasks/functional mobility completed- assistance level noted above   Provided pt and sister BLE HEP per PTA request   All questions/concerns answered within OT scope of practice       Patient left  reclined in the chair with RLE elevated on pillow  with all lines intact, call button in reach, nurseJeny, and Smieon LUDWIG, notified, sister present, and all needs met/within reach.     GOALS:   Multidisciplinary Problems       Occupational Therapy Goals          Problem: Occupational Therapy    Goal Priority  Disciplines Outcome Interventions   Occupational Therapy Goal     OT, PT/OT Ongoing, Progressing    Description: Goals to be met by: 4/22/2023     Patient will increase functional independence with ADLs by performing:    LE Dressing with Supervision.  Grooming while standing at sink with Supervision.  Toileting from bedside commode with Supervision for hygiene and clothing management.   Toilet transfer to bedside commode with Supervision.                         Time Tracking:     OT Date of Treatment: 04/10/23  OT Start Time: 1518  OT Stop Time: 1532  OT Total Time (min): 14 min    Billable Minutes:Self Care/Home Management 14 min    OT/JOANN: OT     Number of JOANN visits since last OT visit: 0    4/10/2023

## 2023-04-10 NOTE — PT/OT/SLP PROGRESS
Physical Therapy      Patient Name:  Antonio Torres Jr.   MRN:  [id number]    Patient not seen today secondary to Testing/imaging (xray/CT/MRI), pt off floor for Stress Test. Will follow-up.

## 2023-04-10 NOTE — NURSING
[facility]  Nurses Note -- 4 Eyes      4/10/2023       Skin assessed on: Q Shift      [x] No Pressure Injuries Present    [x]Prevention Measures Documented    [] Yes LDA  for Pressure Injury Previously documented     [] Yes New Pressure Injury Discovered   [] LDA for New Pressure Injury Added      Attending RN:  Jenny Yin RN     Second RN:  Ciera GÓMEZ

## 2023-04-10 NOTE — PT/OT/SLP PROGRESS
Physical Therapy Treatment    Patient Name:  Antonio Torres Jr.   MRN:  [id number]    Recommendations:     Discharge Recommendations: home health PT, home with home health  Discharge Equipment Recommendations: walker, rolling, bedside commode  Barriers to discharge:  high fall risk    Assessment:     Antonio Torres Jr. is a 78 y.o. male admitted with a medical diagnosis of Syncope.  He presents with the following impairments/functional limitations: impaired endurance, impaired functional mobility, gait instability, impaired balance, decreased safety awareness, decreased lower extremity function, impaired skin, decreased ROM, impaired cardiopulmonary response to activity, orthopedic precautions.    Rehab Prognosis: Good; patient would benefit from acute skilled PT services to address these deficits and reach maximum level of function.    Recent Surgery: * No surgery found *      Plan:     During this hospitalization, patient to be seen 5 x/week to address the identified rehab impairments via gait training, therapeutic activities, therapeutic exercises and progress toward the following goals:    Plan of Care Expires:  04/11/23    Subjective     Chief Complaint: concert about how to transfer from bed to chair  Patient/Family Comments/goals: Pt agreed to participate.  Pain/Comfort:  Pain Rating 1:  (discomfort)  Location - Side 1: Right  Location 1: foot  Pain Addressed 1: Reposition, Cessation of Activity      Objective:     Communicated with nurse Mcintyre prior to session.  Patient found HOB elevated with telemetry, peripheral IV upon PT entry to room.     General Precautions: Standard, fall  Orthopedic Precautions: RLE non weight bearing  Braces:  (splint to right ankle)  Respiratory Status: Room air     Functional Mobility:  Bed Mobility:     Scooting: modified independence  Supine to Sit: modified independence  Transfers: gait belt don prior OOB activity    Sit to Stand: from EOB and BS Chair with contact guard  assistance with rolling walker  Bed to Chair: contact guard assistance with  rolling walker  using  Step Transfer  Chair to Bed: contact guard assistance with  rolling walker  using  Step Transfer  Gait: Pt hopped 4 trials of ~5 feet Bed<>Chair with CGA using RW. Pt with min unsteadiness; Max v/c for sequencing, AD management, , proper hand placement, RLE NWB, and to allow self corrections  Balance: Fair Standing      AM-PAC 6 CLICK MOBILITY  Turning over in bed (including adjusting bedclothes, sheets and blankets)?: 4  Sitting down on and standing up from a chair with arms (e.g., wheelchair, bedside commode, etc.): 3  Moving from lying on back to sitting on the side of the bed?: 4  Moving to and from a bed to a chair (including a wheelchair)?: 3  Need to walk in hospital room?: 3  Climbing 3-5 steps with a railing?: 2  Basic Mobility Total Score: 19       Treatment & Education:  Provided pt handout of BLE ex, educated pt on RLE NWB, benefits of OOB activity and performing BLE ex throughout the day, pt verbalized understanding.  BLE ex in seated 10 reps LAQ, Corry, PS, AP (LLE)  PTA educated pt step by step on safety transfer tech with RLE NWB, the first trial with Max cuing, the 2nd 2 trials still with some assistance, the 4th trial was with SBA and min cuing, pt demo with improved tech.     Patient left up in chair on green cushion in reclined position with tray table near by, BLE offloaded by pillow, all lines intact, call button in reach, nurse notified, and Sister present.    GOALS:   Multidisciplinary Problems       Physical Therapy Goals          Problem: Physical Therapy    Goal Priority Disciplines Outcome Goal Variances Interventions   Physical Therapy Goal     PT, PT/OT Ongoing, Progressing     Description: Goals to be met by: 23     Patient will increase functional independence with mobility by performin. Pt to be mod I with bed mobility.  2. Pt to transfer with supervision.  3.  Pt to ambulate 15' w/RW SBA, NWB RLE.  4. Pt to be (I) with written HEP.                         Time Tracking:     PT Received On: 04/10/23  PT Start Time: 1337     PT Stop Time: 1430  PT Total Time (min): 53 min     Billable Minutes: Gait Training 24 min, Therapeutic Activity 14 min, and Therapeutic Exercise 15 min    Treatment Type: Treatment  PT/PTA: PTA     Number of PTA visits since last PT visit: 1     04/10/2023

## 2023-04-11 NOTE — NURSING
Discharge instructions, follow up appts., and prescriptions given and explained to patient and patient's family. Pt and pt's family verbalizes understanding of all. IV removed, pt tolerated well. Opportunity for questions given. Patient escorted to car via wheelchair by staff member.

## 2023-04-11 NOTE — PLAN OF CARE
Problem: Physical Therapy  Goal: Physical Therapy Goal  Description: Goals to be met by: 23     Patient will increase functional independence with mobility by performin. Pt to be mod I with bed mobility.  2. Pt to transfer with supervision.  3. Pt to ambulate 15' w/RW SBA, NWB RLE.  4. Pt to be (I) with written HEP.    Outcome: Ongoing, Progressing      Xenograft Text: The defect edges were debeveled with a #15 scalpel blade.  Given the location of the defect, shape of the defect and the proximity to free margins a xenograft was deemed most appropriate.  The graft was then trimmed to fit the size of the defect.  The graft was then placed in the primary defect and oriented appropriately.

## 2023-04-12 ENCOUNTER — PATIENT MESSAGE (OUTPATIENT)
Dept: FAMILY MEDICINE | Facility: CLINIC | Age: 78
End: 2023-04-12
Payer: MEDICARE

## 2023-04-12 DIAGNOSIS — R53.81 DEBILITY: Primary | ICD-10-CM

## 2023-04-13 ENCOUNTER — OFFICE VISIT (OUTPATIENT)
Dept: SURGERY | Facility: CLINIC | Age: 78
End: 2023-04-13
Payer: MEDICARE

## 2023-04-13 VITALS
SYSTOLIC BLOOD PRESSURE: 113 MMHG | BODY MASS INDEX: 23.04 KG/M2 | DIASTOLIC BLOOD PRESSURE: 71 MMHG | WEIGHT: 160.94 LBS | HEART RATE: 89 BPM | HEIGHT: 70 IN

## 2023-04-13 DIAGNOSIS — C43.4 MALIGNANT MELANOMA OF SCALP: Primary | ICD-10-CM

## 2023-04-13 PROCEDURE — 1159F PR MEDICATION LIST DOCUMENTED IN MEDICAL RECORD: ICD-10-PCS | Mod: CPTII,S$GLB,, | Performed by: SURGERY

## 2023-04-13 PROCEDURE — 99204 OFFICE O/P NEW MOD 45 MIN: CPT | Mod: S$GLB,,, | Performed by: SURGERY

## 2023-04-13 PROCEDURE — 3078F PR MOST RECENT DIASTOLIC BLOOD PRESSURE < 80 MM HG: ICD-10-PCS | Mod: CPTII,S$GLB,, | Performed by: SURGERY

## 2023-04-13 PROCEDURE — 1100F PTFALLS ASSESS-DOCD GE2>/YR: CPT | Mod: CPTII,S$GLB,, | Performed by: SURGERY

## 2023-04-13 PROCEDURE — 3288F PR FALLS RISK ASSESSMENT DOCUMENTED: ICD-10-PCS | Mod: CPTII,S$GLB,, | Performed by: SURGERY

## 2023-04-13 PROCEDURE — 99204 PR OFFICE/OUTPT VISIT, NEW, LEVL IV, 45-59 MIN: ICD-10-PCS | Mod: S$GLB,,, | Performed by: SURGERY

## 2023-04-13 PROCEDURE — 3074F SYST BP LT 130 MM HG: CPT | Mod: CPTII,S$GLB,, | Performed by: SURGERY

## 2023-04-13 PROCEDURE — 1100F PR PT FALLS ASSESS DOC 2+ FALLS/FALL W/INJURY/YR: ICD-10-PCS | Mod: CPTII,S$GLB,, | Performed by: SURGERY

## 2023-04-13 PROCEDURE — 3078F DIAST BP <80 MM HG: CPT | Mod: CPTII,S$GLB,, | Performed by: SURGERY

## 2023-04-13 PROCEDURE — 1159F MED LIST DOCD IN RCRD: CPT | Mod: CPTII,S$GLB,, | Performed by: SURGERY

## 2023-04-13 PROCEDURE — 3288F FALL RISK ASSESSMENT DOCD: CPT | Mod: CPTII,S$GLB,, | Performed by: SURGERY

## 2023-04-13 PROCEDURE — 3074F PR MOST RECENT SYSTOLIC BLOOD PRESSURE < 130 MM HG: ICD-10-PCS | Mod: CPTII,S$GLB,, | Performed by: SURGERY

## 2023-04-13 NOTE — H&P (VIEW-ONLY)
History & Physical    SUBJECTIVE:     History of Present Illness:  Patient is a 78 y.o. male presents with malignant melanoma of the scalp.     Chief Complaint   Patient presents with    Consult       Review of patient's allergies indicates:  No Known Allergies    Current Outpatient Medications   Medication Sig Dispense Refill    ARIPiprazole (ABILIFY) 20 MG Tab TAKE ONE TABLET BY MOUTH EVERY DAY 90 tablet 2    fish oil-omega-3 fatty acids 300-1,000 mg capsule Take 2 g by mouth once daily.      loratadine (CLARITIN) 10 mg tablet Take 10 mg by mouth once daily.      multivitamin capsule Take 1 capsule by mouth once daily.       No current facility-administered medications for this visit.       Past Medical History:   Diagnosis Date    Chronic kidney disease     Depression     Hematuria     had neg work up with Dr. Stafford    Hypercholesterolemia 03/01/2013    Schizophrenia     Skin cancer of scalp     Syncope 04/06/2023     Past Surgical History:   Procedure Laterality Date    HERNIA REPAIR      SKIN CANCER EXCISION       Family History   Problem Relation Age of Onset    Alzheimer's disease Mother     Heart disease Father     Pulmonary embolism Father     Hypertension Sister      Social History     Tobacco Use    Smoking status: Never    Smokeless tobacco: Never   Substance Use Topics    Alcohol use: No    Drug use: No        Review of Systems:  Review of Systems   Constitutional:  Positive for activity change. Negative for unexpected weight change.   HENT: Negative.  Negative for hearing loss, rhinorrhea and trouble swallowing.    Eyes: Negative.  Negative for discharge and visual disturbance.   Respiratory: Negative.  Negative for chest tightness and wheezing.    Cardiovascular: Negative.  Negative for chest pain and palpitations.   Gastrointestinal: Negative.  Negative for blood in stool, constipation, diarrhea and vomiting.   Endocrine: Negative.  Negative for polydipsia and polyuria.   Genitourinary:  Negative  "for difficulty urinating, hematuria and urgency.   Musculoskeletal: Negative.  Negative for arthralgias, joint swelling and neck pain.   Skin: Negative.    Allergic/Immunologic: Negative.    Neurological: Negative.  Negative for weakness and headaches.   Hematological: Negative.    Psychiatric/Behavioral: Negative.  Negative for confusion and dysphoric mood.    All other systems reviewed and are negative.    OBJECTIVE:     Vital Signs (Most Recent)  Pulse: 89 (04/13/23 0811)  BP: 113/71 (04/13/23 0811)  5' 10" (1.778 m)  73 kg (160 lb 15 oz)     Physical Exam:  Physical Exam  Vitals reviewed.   Constitutional:       Appearance: He is well-developed.   HENT:      Head: Normocephalic and atraumatic.        Right Ear: External ear normal.      Left Ear: External ear normal.      Nose: Nose normal.   Eyes:      Conjunctiva/sclera: Conjunctivae normal.      Pupils: Pupils are equal, round, and reactive to light.   Cardiovascular:      Rate and Rhythm: Normal rate and regular rhythm.      Heart sounds: Normal heart sounds.   Pulmonary:      Effort: Pulmonary effort is normal.      Breath sounds: Normal breath sounds.   Abdominal:      General: Bowel sounds are normal.      Palpations: Abdomen is soft.   Musculoskeletal:         General: Normal range of motion.      Cervical back: Normal range of motion and neck supple.   Skin:     General: Skin is warm and dry.   Neurological:      Mental Status: He is alert and oriented to person, place, and time.      Deep Tendon Reflexes: Reflexes are normal and symmetric.   Psychiatric:         Behavior: Behavior normal.         Thought Content: Thought content normal.       Laboratory  none    Diagnostic Results:  none    ASSESSMENT/PLAN:     Malignant melanoma of scalp     PLAN:Plan     I discussed his melanoma of the scalp and the risks and options of the operation.  I will send him to see plastic surgery for coverage          "

## 2023-04-13 NOTE — H&P (VIEW-ONLY)
Answers submitted by the patient for this visit:  Review of Systems Questionnaire (Submitted on 4/12/2023)  activity change: Yes  unexpected weight change: No  neck pain: No  hearing loss: No  rhinorrhea: No  trouble swallowing: No  eye discharge: No  visual disturbance: No  chest tightness: No  wheezing: No  chest pain: No  palpitations: No  blood in stool: No  constipation: No  vomiting: No  diarrhea: No  polydipsia: No  polyuria: No  difficulty urinating: No  urgency: No  hematuria: No  joint swelling: No  arthralgias: No  headaches: No  weakness: No  confusion: No  dysphoric mood: No

## 2023-04-14 ENCOUNTER — PATIENT MESSAGE (OUTPATIENT)
Dept: SURGERY | Facility: CLINIC | Age: 78
End: 2023-04-14
Payer: MEDICARE

## 2023-04-19 ENCOUNTER — TELEPHONE (OUTPATIENT)
Dept: SURGERY | Facility: CLINIC | Age: 78
End: 2023-04-19
Payer: MEDICARE

## 2023-04-19 NOTE — TELEPHONE ENCOUNTER
Spoke with the sister and she wanted to inform us that the orthopedic doctor said he was cleared to have surgery despite foot sore.         ----- Message from Jayesh Freedman sent at 4/19/2023 11:12 AM CDT -----  Regarding: Sister [phone]  Type: Patient Call Back    Who called:      What is the request in detail: called to talk to a nurse Emre Poole LPN  in regards to her brother, the pt. Would like a call back     Can the clinic reply by [facility]?    Would the patient rather a call back or a response via [facility]? Call back     Best call back number: [phone]     Additional Information:    Thank you.

## 2023-04-19 NOTE — TELEPHONE ENCOUNTER
Spoke with Ms.Caitlyn regarding her brother she stated she took off his jackson and noticed a blister will have the orthopedic doctor check it out soon before  is scheduled for the combined surgery and wanted to give us notice.     ----- Message from Gerardo Church sent at 4/19/2023  8:05 AM CDT -----  Type: Patient Call Back    Who called:Caitlyn/ Sister     What is the request in detail: ASKING FOR A CALL REGARDING PT     Can the clinic reply by [facility]? NO     Would the patient rather a call back or a response via [facility]? CALL     Best call back number: [phone]

## 2023-04-20 ENCOUNTER — TELEPHONE (OUTPATIENT)
Dept: SURGERY | Facility: CLINIC | Age: 78
End: 2023-04-20
Payer: MEDICARE

## 2023-04-20 NOTE — TELEPHONE ENCOUNTER
Spoke with  nurse regarding combined surgery date, decided May 8th. Msg relayed to provider.       ----- Message from Nadya Oleary sent at 4/20/2023 12:31 PM CDT -----  Type: Patient Call Back    Who called: Dr Montano office     What is the request in detail: please call to schedule patient surgery     Can the clinic reply by [facility]? no    Would the patient rather a call back or a response via [facility]?  call    Best call back number: [phone]    Additional Information:

## 2023-04-24 ENCOUNTER — TELEPHONE (OUTPATIENT)
Dept: SURGERY | Facility: CLINIC | Age: 78
End: 2023-04-24
Payer: MEDICARE

## 2023-04-24 NOTE — TELEPHONE ENCOUNTER
Spoke with  nurse regarding combined surgery date, decided May 8th. I informed her that Dr. Lehman is currently out of the office and that the message will be forwarded.

## 2023-04-25 ENCOUNTER — TELEPHONE (OUTPATIENT)
Dept: SURGERY | Facility: CLINIC | Age: 78
End: 2023-04-25
Payer: MEDICARE

## 2023-04-25 ENCOUNTER — ANESTHESIA EVENT (OUTPATIENT)
Dept: SURGERY | Facility: HOSPITAL | Age: 78
End: 2023-04-25
Payer: MEDICARE

## 2023-04-25 DIAGNOSIS — C43.4 MALIGNANT MELANOMA OF SCALP: Primary | ICD-10-CM

## 2023-04-25 RX ORDER — MUPIROCIN 20 MG/G
OINTMENT TOPICAL
Status: DISCONTINUED | OUTPATIENT
Start: 2023-04-25 | End: 2023-04-26 | Stop reason: HOSPADM

## 2023-04-25 RX ORDER — SODIUM CHLORIDE 0.9 % (FLUSH) 0.9 %
10 SYRINGE (ML) INJECTION
Status: DISCONTINUED | OUTPATIENT
Start: 2023-04-25 | End: 2023-04-26 | Stop reason: HOSPADM

## 2023-04-25 RX ORDER — LIDOCAINE HYDROCHLORIDE 10 MG/ML
1 INJECTION, SOLUTION EPIDURAL; INFILTRATION; INTRACAUDAL; PERINEURAL ONCE
Status: DISCONTINUED | OUTPATIENT
Start: 2023-04-25 | End: 2023-04-26 | Stop reason: HOSPADM

## 2023-04-25 NOTE — TELEPHONE ENCOUNTER
Spoke with Kaelyn regarding combine surgery date. Agreed to May 8th, msg relayed to provider for case request.     ----- Message from Pat Viveros sent at 4/25/2023 11:45 AM CDT -----  Regarding: Kaelyn/ Nurse/  [phone]  Type: Patient Call Back    Who called:  Kaelyn    What is the request in detail:   's nurse is needing a call back from staff with an answer regarding patients surgery.  Thank you    Would the patient rather a call back or a response via [facility]?  Call back    Best call back number:  [phone]          Thank you

## 2023-05-01 ENCOUNTER — HOSPITAL ENCOUNTER (OUTPATIENT)
Dept: PREADMISSION TESTING | Facility: HOSPITAL | Age: 78
Discharge: HOME | End: 2023-05-01
Attending: SURGERY
Payer: MEDICARE

## 2023-05-01 VITALS
RESPIRATION RATE: 18 BRPM | WEIGHT: 160.94 LBS | BODY MASS INDEX: 23.04 KG/M2 | OXYGEN SATURATION: 100 % | SYSTOLIC BLOOD PRESSURE: 126 MMHG | DIASTOLIC BLOOD PRESSURE: 83 MMHG | TEMPERATURE: 99 F | HEART RATE: 86 BPM | HEIGHT: 70 IN

## 2023-05-01 NOTE — DISCHARGE INSTRUCTIONS
Before 7 AM, enter through the Emergency Entrance..   After 7 AM enter through the Main Entrance.      Your procedure  is scheduled for _____5/8/23_____.    Call [phone] between 2pm and 5pm on __5/5/23_____to find out your arrival time for the day of surgery.    You may have one visitor.  No children allowed.     You will be going to the Same Day Surgery Unit on the 2nd floor of the hospital.    Important instructions:  Do not eat anything after midnight.  You may have plain water, non carbonated.  You may also have Gatorade or Powerade after midnight.    Stop all fluids 2 hours before your surgery.    It is okay to brush your teeth.  Do not have gum, candy or mints.    SEE MEDICATION SHEET.   TAKE MEDICATIONS AS DIRECTED WITH SIPS OF WATER.      STOP taking Aspirin, Ibuprofen,  Advil, Motrin, Mobic(meloxicam), Aleve (naproxen), Fish oil, and Vitamin E for at least 7 days before your surgery.     You may take Tylenol if needed which is not a blood thinner.    Please shower the night before and the morning of your surgery.      Follow any Prep Instructions given by your surgeon.    Use Chlorhexidine soap as instructed by your pre op nurse.   Please place clean linens on your bed the night before surgery. Please wear fresh clean clothing after each shower.    No shaving of procedural area at least 4-5 days before surgery due to increased risk of skin irritation and/or possible infection.    Contact lenses and removable denture work may not be worn during your procedure.    You may wear deodorant only. If you are having breast surgery, do not wear deodorant on the operative side.    Do not wear powder, body lotion, perfume/cologne or make-up.    Do not wear any jewelry or have any metal on your body.    You will be asked to remove any dentures or partials for the procedure.    If you are going home on the same day of surgery, you must arrange for a family member or a friend to drive you home.  Public  transportation is prohibited.  You will not be able to drive home if you were given anesthesia or sedation.    Patients who want to have their Post-op prescriptions filled from our in-house [facility], bring a Credit/Debit Card or cash with you. A co-pay may be required.  The pharmacy closes at 5:30 pm.    Wear loose fitting clothes allowing for bandages.    Please leave money and valuables home.      You may bring your cell phone.    Call the doctor if fever or illness should occur before your surgery.    Call [phone] to contact us here if needed.

## 2023-05-01 NOTE — ANESTHESIA PREPROCEDURE EVALUATION
05/01/2023  Antonio Torres Jr. is a 78 y.o., male scheduled for EXCISION-WIDE LOCAL       BIOPSY, LYMPH NODE, SENTINEL (Breast)       RECONSTRUCTION USING FLAP WITH APPLICATION OF DERMAL GRAFT (Chest) - 10 X 10 DERMAL GRAFT on 5/8/2023.    Past Medical History:   Diagnosis Date    Chronic kidney disease     Depression     Hematuria     had neg work up with Dr. Stafford    Hypercholesterolemia 03/01/2013    Schizophrenia     Skin cancer of scalp     Syncope 04/06/2023       Past Surgical History:   Procedure Laterality Date    HERNIA REPAIR      SKIN CANCER EXCISION             Pre-op Assessment    I have reviewed the Patient Summary Reports.     I have reviewed the Nursing Notes.       Review of Systems  Anesthesia Hx:  No problems with previous Anesthesia  Denies Family Hx of Anesthesia complications.   Denies Personal Hx of Anesthesia complications.   Social:  Non-Smoker, No Alcohol Use    Hematology/Oncology:  Hematology Normal      Oncology Comments: Melanoma scalp    EENT/Dental:EENT/Dental Normal   Cardiovascular:   Exercise tolerance: good Denies Hypertension.  Denies MI.   Denies Dysrhythmias.   Denies Angina. 4/10/2023 stress: negative for ischemia; EF 69%   Pulmonary:  Pulmonary Normal    Renal/:   Chronic Renal Disease, CKD    Hepatic/GI:  Hepatic/GI Normal    Musculoskeletal:   Right ankle fracture 2/2 syncopal episode ~1 month ago   Neurological:  Neurology Normal    Endocrine:  Endocrine Normal    Dermatological:  Skin Normal    Psych:   Psychiatric History          Physical Exam  General: Well nourished, Cooperative, Alert and Oriented    Airway:  Mallampati: II   Mouth Opening: Normal  TM Distance: 4 - 6 cm  Tongue: Normal  Neck ROM: Normal ROM    Dental:  Intact    Chest/Lungs:  Clear to auscultation, Normal Respiratory Rate    Heart:  Rate: Normal  Rhythm: Regular Rhythm      Wt  Readings from Last 3 Encounters:   05/03/23 73 kg (160 lb 15 oz)   05/01/23 73 kg (160 lb 15 oz)   04/13/23 73 kg (160 lb 15 oz)     Temp Readings from Last 3 Encounters:   05/08/23 36.7 °C (98.1 °F) (Oral)   05/01/23 36.9 °C (98.5 °F) (Oral)   04/10/23 36.7 °C (98 °F)     BP Readings from Last 3 Encounters:   05/08/23 114/76   05/01/23 126/83   04/13/23 113/71     Pulse Readings from Last 3 Encounters:   05/08/23 81   05/01/23 86   04/13/23 89     Lab Results   Component Value Date    WBC 10.55 05/08/2023    HGB 15.6 05/08/2023    HCT 48.3 05/08/2023    MCV 93 05/08/2023     05/08/2023       CMP  Sodium   Date Value Ref Range Status   05/08/2023 145 136 - 145 mmol/L Final     Potassium   Date Value Ref Range Status   05/08/2023 4.5 3.5 - 5.1 mmol/L Final     Comment:     Specimen moderately hemolyzed     Chloride   Date Value Ref Range Status   05/08/2023 108 95 - 110 mmol/L Final     CO2   Date Value Ref Range Status   05/08/2023 23 23 - 29 mmol/L Final     Glucose   Date Value Ref Range Status   05/08/2023 90 70 - 110 mg/dL Final     BUN   Date Value Ref Range Status   05/08/2023 22 8 - 23 mg/dL Final     Creatinine   Date Value Ref Range Status   05/08/2023 1.5 (H) 0.5 - 1.4 mg/dL Final     Calcium   Date Value Ref Range Status   05/08/2023 10.2 8.7 - 10.5 mg/dL Final     Total Protein   Date Value Ref Range Status   05/08/2023 7.4 6.0 - 8.4 g/dL Final     Albumin   Date Value Ref Range Status   05/08/2023 3.7 3.5 - 5.2 g/dL Final     Total Bilirubin   Date Value Ref Range Status   05/08/2023 0.8 0.1 - 1.0 mg/dL Final     Comment:     For infants and newborns, interpretation of results should be based  on gestational age, weight and in agreement with clinical  observations.    Premature Infant recommended reference ranges:  Up to 24 hours.............<8.0 mg/dL  Up to 48 hours............<12.0 mg/dL  3-5 days..................<15.0 mg/dL  6-29 days.................<15.0 mg/dL       Alkaline Phosphatase    Date Value Ref Range Status   05/08/2023 84 55 - 135 U/L Final     AST   Date Value Ref Range Status   05/08/2023 30 10 - 40 U/L Final     ALT   Date Value Ref Range Status   05/08/2023 33 10 - 44 U/L Final     Anion Gap   Date Value Ref Range Status   05/08/2023 14 8 - 16 mmol/L Final     eGFR   Date Value Ref Range Status   05/08/2023 47 (A) >60 mL/min/1.73 m^2 Final         Anesthesia Plan  Type of Anesthesia, risks & benefits discussed:    Anesthesia Type: Gen ETT  Intra-op Monitoring Plan: Standard ASA Monitors  Post Op Pain Control Plan: multimodal analgesia and IV/PO Opioids PRN  Induction:  IV  Informed Consent: Informed consent signed with the Patient and all parties understand the risks and agree with anesthesia plan.  All questions answered. Patient consented to blood products? Yes  ASA Score: 3  Day of Surgery Review of History & Physical: H&P Update referred to the surgeon/provider.    Ready For Surgery From Anesthesia Perspective.     .

## 2023-05-08 ENCOUNTER — HOSPITAL ENCOUNTER (OUTPATIENT)
Facility: HOSPITAL | Age: 78
Discharge: HOME | End: 2023-05-08
Attending: SURGERY
Payer: MEDICARE

## 2023-05-08 ENCOUNTER — ANESTHESIA (OUTPATIENT)
Dept: SURGERY | Facility: HOSPITAL | Age: 78
End: 2023-05-08
Payer: MEDICARE

## 2023-05-08 VITALS
RESPIRATION RATE: 16 BRPM | HEART RATE: 84 BPM | OXYGEN SATURATION: 98 % | BODY MASS INDEX: 23.09 KG/M2 | WEIGHT: 160.94 LBS | DIASTOLIC BLOOD PRESSURE: 63 MMHG | SYSTOLIC BLOOD PRESSURE: 112 MMHG | TEMPERATURE: 99 F

## 2023-05-08 DIAGNOSIS — C43.4 MALIGNANT MELANOMA OF SCALP: Primary | ICD-10-CM

## 2023-05-08 DIAGNOSIS — C43.30 MALIGNANT MELANOMA OF FACE: ICD-10-CM

## 2023-05-08 LAB
ALBUMIN SERPL BCP-MCNC: 3.7 G/DL (ref 3.5–5.2)
ALP SERPL-CCNC: 84 U/L (ref 55–135)
ALT SERPL W/O P-5'-P-CCNC: 33 U/L (ref 10–44)
ANION GAP SERPL CALC-SCNC: 14 MMOL/L (ref 8–16)
AST SERPL-CCNC: 30 U/L (ref 10–40)
BASOPHILS # BLD AUTO: 0.04 K/UL (ref 0–0.2)
BASOPHILS NFR BLD: 0.4 % (ref 0–1.9)
BILIRUB SERPL-MCNC: 0.8 MG/DL (ref 0.1–1)
BUN SERPL-MCNC: 22 MG/DL (ref 8–23)
CALCIUM SERPL-MCNC: 10.2 MG/DL (ref 8.7–10.5)
CHLORIDE SERPL-SCNC: 108 MMOL/L (ref 95–110)
CO2 SERPL-SCNC: 23 MMOL/L (ref 23–29)
CREAT SERPL-MCNC: 1.5 MG/DL (ref 0.5–1.4)
DIFFERENTIAL METHOD: ABNORMAL
EOSINOPHIL # BLD AUTO: 0.2 K/UL (ref 0–0.5)
EOSINOPHIL NFR BLD: 1.5 % (ref 0–8)
ERYTHROCYTE [DISTWIDTH] IN BLOOD BY AUTOMATED COUNT: 13.2 % (ref 11.5–14.5)
EST. GFR  (NO RACE VARIABLE): 47 ML/MIN/1.73 M^2
GLUCOSE SERPL-MCNC: 90 MG/DL (ref 70–110)
HCT VFR BLD AUTO: 48.3 % (ref 40–54)
HGB BLD-MCNC: 15.6 G/DL (ref 14–18)
IMM GRANULOCYTES # BLD AUTO: 0.04 K/UL (ref 0–0.04)
IMM GRANULOCYTES NFR BLD AUTO: 0.4 % (ref 0–0.5)
LYMPHOCYTES # BLD AUTO: 1.8 K/UL (ref 1–4.8)
LYMPHOCYTES NFR BLD: 16.7 % (ref 18–48)
MCH RBC QN AUTO: 30.1 PG (ref 27–31)
MCHC RBC AUTO-ENTMCNC: 32.3 G/DL (ref 32–36)
MCV RBC AUTO: 93 FL (ref 82–98)
MONOCYTES # BLD AUTO: 1 K/UL (ref 0.3–1)
MONOCYTES NFR BLD: 9 % (ref 4–15)
NEUTROPHILS # BLD AUTO: 7.6 K/UL (ref 1.8–7.7)
NEUTROPHILS NFR BLD: 72 % (ref 38–73)
NRBC BLD-RTO: 0 /100 WBC
PLATELET # BLD AUTO: 241 K/UL (ref 150–450)
PMV BLD AUTO: 10.2 FL (ref 9.2–12.9)
POTASSIUM SERPL-SCNC: 4.5 MMOL/L (ref 3.5–5.1)
PROT SERPL-MCNC: 7.4 G/DL (ref 6–8.4)
RBC # BLD AUTO: 5.18 M/UL (ref 4.6–6.2)
SODIUM SERPL-SCNC: 145 MMOL/L (ref 136–145)
WBC # BLD AUTO: 10.55 K/UL (ref 3.9–12.7)

## 2023-05-08 PROCEDURE — 37000008 HC ANESTHESIA 1ST 15 MINUTES: Performed by: SURGERY

## 2023-05-08 PROCEDURE — 80053 COMPREHEN METABOLIC PANEL: CPT | Performed by: SURGERY

## 2023-05-08 PROCEDURE — 36000707: Performed by: SURGERY

## 2023-05-08 PROCEDURE — 11622 EXC S/N/H/F/G MAL+MRG 1.1-2: CPT | Mod: ,,, | Performed by: SURGERY

## 2023-05-08 PROCEDURE — C1889 IMPLANT/INSERT DEVICE, NOC: HCPCS | Performed by: SURGERY

## 2023-05-08 PROCEDURE — 11622 PR EXC SKIN MALIG 1.1-2 CM REMAINDR BODY: ICD-10-PCS | Mod: ,,, | Performed by: SURGERY

## 2023-05-08 PROCEDURE — D9220A PRA ANESTHESIA: ICD-10-PCS | Mod: CRNA,,, | Performed by: NURSE ANESTHETIST, CERTIFIED REGISTERED

## 2023-05-08 PROCEDURE — D9220A PRA ANESTHESIA: Mod: CRNA,,, | Performed by: NURSE ANESTHETIST, CERTIFIED REGISTERED

## 2023-05-08 PROCEDURE — 63600175 PHARM REV CODE 636 W HCPCS: Performed by: NURSE ANESTHETIST, CERTIFIED REGISTERED

## 2023-05-08 PROCEDURE — 85025 COMPLETE CBC W/AUTO DIFF WBC: CPT | Performed by: SURGERY

## 2023-05-08 PROCEDURE — 63600175 PHARM REV CODE 636 W HCPCS

## 2023-05-08 PROCEDURE — D9220A PRA ANESTHESIA: Mod: ANES,,, | Performed by: ANESTHESIOLOGY

## 2023-05-08 PROCEDURE — 25000003 PHARM REV CODE 250: Performed by: ANESTHESIOLOGY

## 2023-05-08 PROCEDURE — 27201423 OPTIME MED/SURG SUP & DEVICES STERILE SUPPLY: Performed by: SURGERY

## 2023-05-08 PROCEDURE — 63600175 PHARM REV CODE 636 W HCPCS: Performed by: ANESTHESIOLOGY

## 2023-05-08 PROCEDURE — 25000003 PHARM REV CODE 250: Performed by: SURGERY

## 2023-05-08 PROCEDURE — C9290 INJ, BUPIVACAINE LIPOSOME: HCPCS

## 2023-05-08 PROCEDURE — 37000009 HC ANESTHESIA EA ADD 15 MINS: Performed by: SURGERY

## 2023-05-08 PROCEDURE — 88305 TISSUE EXAM BY PATHOLOGIST: ICD-10-PCS | Mod: 26,,, | Performed by: DERMATOLOGY

## 2023-05-08 PROCEDURE — 36000706: Performed by: SURGERY

## 2023-05-08 PROCEDURE — 88305 TISSUE EXAM BY PATHOLOGIST: CPT | Mod: 26,,, | Performed by: DERMATOLOGY

## 2023-05-08 PROCEDURE — D9220A PRA ANESTHESIA: ICD-10-PCS | Mod: ANES,,, | Performed by: ANESTHESIOLOGY

## 2023-05-08 PROCEDURE — 88305 TISSUE EXAM BY PATHOLOGIST: CPT | Performed by: DERMATOLOGY

## 2023-05-08 PROCEDURE — 25000003 PHARM REV CODE 250: Performed by: NURSE ANESTHETIST, CERTIFIED REGISTERED

## 2023-05-08 PROCEDURE — 36415 COLL VENOUS BLD VENIPUNCTURE: CPT | Performed by: SURGERY

## 2023-05-08 PROCEDURE — 71000015 HC POSTOP RECOV 1ST HR: Performed by: SURGERY

## 2023-05-08 PROCEDURE — 63600175 PHARM REV CODE 636 W HCPCS: Performed by: SURGERY

## 2023-05-08 PROCEDURE — 71000033 HC RECOVERY, INTIAL HOUR: Performed by: SURGERY

## 2023-05-08 PROCEDURE — 71000016 HC POSTOP RECOV ADDL HR: Performed by: SURGERY

## 2023-05-08 DEVICE — IMPLANTABLE DEVICE: Type: IMPLANTABLE DEVICE | Site: SCALP | Status: FUNCTIONAL

## 2023-05-08 RX ORDER — ACETAMINOPHEN 500 MG
1000 TABLET ORAL
Status: COMPLETED | OUTPATIENT
Start: 2023-05-08 | End: 2023-05-08

## 2023-05-08 RX ORDER — MORPHINE SULFATE 4 MG/ML
3 INJECTION, SOLUTION INTRAMUSCULAR; INTRAVENOUS
Status: DISCONTINUED | OUTPATIENT
Start: 2023-05-08 | End: 2023-05-09 | Stop reason: HOSPADM

## 2023-05-08 RX ORDER — ACETAMINOPHEN 10 MG/ML
1000 INJECTION, SOLUTION INTRAVENOUS ONCE
Status: DISCONTINUED | OUTPATIENT
Start: 2023-05-08 | End: 2023-05-09 | Stop reason: HOSPADM

## 2023-05-08 RX ORDER — HYDROMORPHONE HYDROCHLORIDE 2 MG/ML
0.2 INJECTION, SOLUTION INTRAMUSCULAR; INTRAVENOUS; SUBCUTANEOUS EVERY 5 MIN PRN
Status: DISCONTINUED | OUTPATIENT
Start: 2023-05-08 | End: 2023-05-09 | Stop reason: HOSPADM

## 2023-05-08 RX ORDER — LIDOCAINE HYDROCHLORIDE 10 MG/ML
1 INJECTION, SOLUTION EPIDURAL; INFILTRATION; INTRACAUDAL; PERINEURAL ONCE
Status: DISCONTINUED | OUTPATIENT
Start: 2023-05-08 | End: 2023-05-09 | Stop reason: HOSPADM

## 2023-05-08 RX ORDER — PHENYLEPHRINE HYDROCHLORIDE 10 MG/ML
INJECTION INTRAVENOUS
Status: DISCONTINUED | OUTPATIENT
Start: 2023-05-08 | End: 2023-05-08

## 2023-05-08 RX ORDER — SODIUM CHLORIDE 9 MG/ML
INJECTION, SOLUTION INTRAVENOUS CONTINUOUS
Status: ACTIVE | OUTPATIENT
Start: 2023-05-08

## 2023-05-08 RX ORDER — SODIUM CHLORIDE, SODIUM LACTATE, POTASSIUM CHLORIDE, CALCIUM CHLORIDE 600; 310; 30; 20 MG/100ML; MG/100ML; MG/100ML; MG/100ML
INJECTION, SOLUTION INTRAVENOUS CONTINUOUS
Status: DISCONTINUED | OUTPATIENT
Start: 2023-05-08 | End: 2023-05-09 | Stop reason: HOSPADM

## 2023-05-08 RX ORDER — CEFAZOLIN SODIUM 2 G/50ML
2 SOLUTION INTRAVENOUS
Status: COMPLETED | OUTPATIENT
Start: 2023-05-08 | End: 2023-05-08

## 2023-05-08 RX ORDER — ONDANSETRON 2 MG/ML
INJECTION INTRAMUSCULAR; INTRAVENOUS
Status: DISCONTINUED | OUTPATIENT
Start: 2023-05-08 | End: 2023-05-08

## 2023-05-08 RX ORDER — SODIUM CHLORIDE 9 MG/ML
INJECTION, SOLUTION INTRAVENOUS CONTINUOUS
Status: DISCONTINUED | OUTPATIENT
Start: 2023-05-08 | End: 2023-05-09 | Stop reason: HOSPADM

## 2023-05-08 RX ORDER — DEXAMETHASONE SODIUM PHOSPHATE 4 MG/ML
INJECTION, SOLUTION INTRA-ARTICULAR; INTRALESIONAL; INTRAMUSCULAR; INTRAVENOUS; SOFT TISSUE
Status: DISCONTINUED | OUTPATIENT
Start: 2023-05-08 | End: 2023-05-08

## 2023-05-08 RX ORDER — FENTANYL CITRATE 50 UG/ML
INJECTION, SOLUTION INTRAMUSCULAR; INTRAVENOUS
Status: DISCONTINUED | OUTPATIENT
Start: 2023-05-08 | End: 2023-05-08

## 2023-05-08 RX ORDER — ROCURONIUM BROMIDE 10 MG/ML
INJECTION, SOLUTION INTRAVENOUS
Status: DISCONTINUED | OUTPATIENT
Start: 2023-05-08 | End: 2023-05-08

## 2023-05-08 RX ORDER — MUPIROCIN 20 MG/G
OINTMENT TOPICAL
Status: DISPENSED | OUTPATIENT
Start: 2023-05-08

## 2023-05-08 RX ORDER — SODIUM CHLORIDE 0.9 % (FLUSH) 0.9 %
10 SYRINGE (ML) INJECTION
Status: DISCONTINUED | OUTPATIENT
Start: 2023-05-08 | End: 2023-05-09 | Stop reason: HOSPADM

## 2023-05-08 RX ORDER — LIDOCAINE HYDROCHLORIDE 20 MG/ML
INJECTION INTRAVENOUS
Status: DISCONTINUED | OUTPATIENT
Start: 2023-05-08 | End: 2023-05-08

## 2023-05-08 RX ORDER — OXYCODONE AND ACETAMINOPHEN 5; 325 MG/1; MG/1
1 TABLET ORAL EVERY 4 HOURS PRN
Qty: 30 TABLET | Refills: 0 | Status: SHIPPED | OUTPATIENT
Start: 2023-05-08 | End: 2023-07-06

## 2023-05-08 RX ORDER — PROPOFOL 10 MG/ML
VIAL (ML) INTRAVENOUS
Status: DISCONTINUED | OUTPATIENT
Start: 2023-05-08 | End: 2023-05-08

## 2023-05-08 RX ORDER — ONDANSETRON 2 MG/ML
4 INJECTION INTRAMUSCULAR; INTRAVENOUS DAILY PRN
Status: DISCONTINUED | OUTPATIENT
Start: 2023-05-08 | End: 2023-05-09 | Stop reason: HOSPADM

## 2023-05-08 RX ORDER — BUPIVACAINE HYDROCHLORIDE 2.5 MG/ML
INJECTION, SOLUTION INFILTRATION; PERINEURAL
Status: DISCONTINUED | OUTPATIENT
Start: 2023-05-08 | End: 2023-05-08 | Stop reason: HOSPADM

## 2023-05-08 RX ADMIN — PROPOFOL 100 MG: 10 INJECTION, EMULSION INTRAVENOUS at 09:05

## 2023-05-08 RX ADMIN — FENTANYL CITRATE 100 MCG: 50 INJECTION, SOLUTION INTRAMUSCULAR; INTRAVENOUS at 09:05

## 2023-05-08 RX ADMIN — FENTANYL CITRATE 50 MCG: 50 INJECTION, SOLUTION INTRAMUSCULAR; INTRAVENOUS at 10:05

## 2023-05-08 RX ADMIN — SODIUM CHLORIDE, POTASSIUM CHLORIDE, SODIUM LACTATE AND CALCIUM CHLORIDE: 600; 310; 30; 20 INJECTION, SOLUTION INTRAVENOUS at 06:05

## 2023-05-08 RX ADMIN — PHENYLEPHRINE HYDROCHLORIDE 200 MCG: 10 INJECTION INTRAVENOUS at 09:05

## 2023-05-08 RX ADMIN — LIDOCAINE HYDROCHLORIDE 50 MG: 20 INJECTION, SOLUTION INTRAVENOUS at 09:05

## 2023-05-08 RX ADMIN — ROCURONIUM BROMIDE 50 MG: 10 INJECTION, SOLUTION INTRAVENOUS at 09:05

## 2023-05-08 RX ADMIN — SODIUM CHLORIDE, SODIUM LACTATE, POTASSIUM CHLORIDE, AND CALCIUM CHLORIDE: .6; .31; .03; .02 INJECTION, SOLUTION INTRAVENOUS at 09:05

## 2023-05-08 RX ADMIN — SUGAMMADEX 100 MG: 100 INJECTION, SOLUTION INTRAVENOUS at 10:05

## 2023-05-08 RX ADMIN — DEXAMETHASONE SODIUM PHOSPHATE 4 MG: 4 INJECTION, SOLUTION INTRAMUSCULAR; INTRAVENOUS at 09:05

## 2023-05-08 RX ADMIN — ONDANSETRON 4 MG: 2 INJECTION, SOLUTION INTRAMUSCULAR; INTRAVENOUS at 09:05

## 2023-05-08 RX ADMIN — PHENYLEPHRINE HYDROCHLORIDE 100 MCG: 10 INJECTION INTRAVENOUS at 09:05

## 2023-05-08 RX ADMIN — CEFAZOLIN SODIUM 2 G: 2 SOLUTION INTRAVENOUS at 09:05

## 2023-05-08 RX ADMIN — MUPIROCIN: 20 OINTMENT TOPICAL at 07:05

## 2023-05-08 RX ADMIN — ACETAMINOPHEN 1000 MG: 500 TABLET ORAL at 07:05

## 2023-05-08 NOTE — PLAN OF CARE
Pre-op plan of care reviewed with patient and sister. Admit assessment complete. Questions encouraged and answered. Post-op education begun with pt.

## 2023-05-08 NOTE — OP NOTE
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  Under adequate general anesthesia I searched for her sentinel node.  The counts were significant on both sides going into the chest therefore no sentinel node was performed.  She did prepped and draped around his scalp in usual sterile fashion.  A time-out was taken surgical checklist was discussed.  The area was then excised along with another area of hyperkeratosis with 1 cm margins laterally or circumferentially.  This is deepened down to just above the galea and the entire specimen was sent marked for margins and sent to pathology.  Hemostasis was obtained electrocautery.  The operation was then turned over to Dr. Montano for closure.    Significant Surgical Tasks Conducted by the Assistant(s), if Applicable:  Greater than 50%    Estimated Blood Loss (EBL): 80 mL           Implants:   Implant Name Type Inv. Item Serial No.  Lot No. LRB No. Used Action   CORTIVA ALLOGRAFT DERMIS [id number] 4CM   [id number] Psonar  N/A 1 Implanted       Specimens:   Specimen (24h ago, onward)      None                    Condition:  Greater 50%    Disposition: PACU - hemodynamically stable.    Attestation: I was present and scrubbed for the entire procedure.

## 2023-05-08 NOTE — TRANSFER OF CARE
Anesthesia Transfer of Care Note    Patient: Antonio Torres Jr.    Procedure(s) Performed: Procedure(s) (LRB):  EXCISION-WIDE LOCAL (N/A)  RECONSTRUCTION USING FLAP WITH APPLICATION OF DERMAL GRAFT (N/A)    Patient location: PACU    Anesthesia Type: general    Transport from OR: Transported from OR on 6-10 L/min O2 by face mask with adequate spontaneous ventilation    Post pain: adequate analgesia    Post assessment: no apparent anesthetic complications and tolerated procedure well    Post vital signs: stable    Level of consciousness: lethargic and responds to stimulation    Nausea/Vomiting: no nausea/vomiting    Complications: none    Transfer of care protocol was followed      Last vitals:   Visit Vitals  /65 (BP Location: Left arm, Patient Position: Lying)   Pulse 86   Temp 36.5 °C (97.7 °F) (Temporal)   Resp 14   Wt 73 kg (160 lb 15 oz)   SpO2 100%   BMI 23.09 kg/m²

## 2023-05-08 NOTE — ANESTHESIA POSTPROCEDURE EVALUATION
Anesthesia Post Evaluation    Patient: Antonio Torres Jr.    Procedure(s) Performed: Procedure(s) (LRB):  EXCISION-WIDE LOCAL (N/A)  RECONSTRUCTION USING FLAP WITH APPLICATION OF DERMAL GRAFT (N/A)    Final Anesthesia Type: general      Patient location during evaluation: PACU  Patient participation: Yes- Able to Participate  Level of consciousness: awake and alert  Post-procedure vital signs: reviewed and stable  Pain management: adequate  Airway patency: patent    PONV status at discharge: No PONV  Anesthetic complications: no      Cardiovascular status: hemodynamically stable  Respiratory status: unassisted and spontaneous ventilation  Hydration status: euvolemic  Follow-up not needed.          Vitals Value Taken Time   /65 05/08/23 1049   Temp 36.5 °C (97.7 °F) 05/08/23 1049   Pulse 84 05/08/23 1056   Resp 12 05/08/23 1056   SpO2 95 % 05/08/23 1056   Vitals shown include unvalidated device data.      No case tracking events are documented in the log.      Pain/Shawnee Score: Pain Rating Prior to Med Admin: 0 (5/8/2023  7:01 AM)  Shawnee Score: 9 (5/8/2023 10:44 AM)

## 2023-05-08 NOTE — ANESTHESIA PROCEDURE NOTES
Intubation    Date/Time: 5/8/2023 9:17 AM  Performed by: Kalyan Mehta CRNA  Authorized by: Lawrence Melgar MD     Intubation:     Induction:  Intravenous    Intubated:  Postinduction    Mask Ventilation:  Easy with oral airway    Attempts:  1    Attempted By:  CRNA    Method of Intubation:  Video laryngoscopy    Blade:  Howard 3    Laryngeal View Grade: Grade I - full view of cords      Difficult Airway Encountered?: No      Complications:  None    Airway Device:  Oral endotracheal tube    Airway Device Size:  7.5    Style/Cuff Inflation:  Cuffed (inflated to minimal occlusive pressure)    Inflation Amount (mL):  6    Tube secured:  23    Secured at:  The lips    Placement Verified By:  Capnometry    Complicating Factors:  None    Findings Post-Intubation:  BS equal bilateral and atraumatic/condition of teeth unchanged

## 2023-05-08 NOTE — OP NOTE
[facility]  Surgery Department  Operative Note    SUMMARY     Date of Procedure: 5/8/2023     Procedure: Procedure(s) (LRB):  EXCISION-WIDE LOCAL (N/A)  RECONSTRUCTION USING FLAP WITH APPLICATION OF DERMAL GRAFT (N/A)     Surgeon(s) and Role:  Panel 1:     * Rhys Lehman MD - Primary  Panel 2:     * Mario Montano MD - Primary    Assisting Surgeon: None    Pre-Operative Diagnosis: Malignant melanoma of scalp [C43.4]    Post-Operative Diagnosis: Post-Op Diagnosis Codes:     * Malignant melanoma of scalp [C43.4]    Anesthesia: General    Technical Procedures Used: Bilateral rotational fasciocutaneous flaps, dermal graft    Description of the Findings of the Procedure:  The case of 78 years old male patient referred because of the melanoma scalp for closure after excision operative procedure as well complication explain to the patient who understood and signed the consent by about option of flap and or dermal graft.  Under state condition and general endotracheal anesthesia after excision of melanoma by Dr. Stinson bilateral rotational fasciocutaneous flap based on galea and then after release of the galea lateral anterior and posterior closure of flap with 4-0 Monocryl and then 5 0 nylon skin small area right lateral closed with dermal graft 2 x 4 cm Cartiva allograft reference number [id number] serial number [id number].  And anchored with 4-0 chromic patient tolerated the procedure without any complication with estimated blood loss for my part about 25 cc Xeroform gauze bacitracin ointment 4 x 4 and Kerlix applied patient is going to sent to recovery room and then to same-day surgery will be following my office in 10 days advised family to apply antibiotic ointment every other day to the graft area.      Mario Montano MD      Complications: No    Estimated Blood Loss (EBL): 80 mL           Implants:   Implant Name Type Inv. Item Serial No.  Lot No. LRB No. Used Action   CORTIVA ALLOGRAFT  DERMIS 2CMX 4CM   [id number] M-Files  N/A 1 Implanted       Specimens:   Specimen (24h ago, onward)      None                    Condition: Good    Disposition: PACU - hemodynamically stable.    Attestation: I was present and scrubbed for the entire procedure.

## 2023-05-08 NOTE — DISCHARGE INSTRUCTIONS
[facility]  Office # [phone]    Discharge Instructions for Same Day Surgery     Call the office for and appointment if one has not already been made.     Diet: Drink plenty of fluids the first 48 hours and you may resume your   usual diet.     Activity: No heavy lifting (over 10 pounds), pushing or pulling until your   post op visit. Your doctor's office may have told you to limit your lifting to less weight, or even no weight.  Be sure to follow those instructions.    Note: You may ride in a car and you may drive when comfortable.     Do not drive, drink alcohol, or sign legal documents for 24 hours, or if taking narcotic pain medication.    Dressings: Remove the dressing 24 hours after surgery. You may shower  24 hours after surgery and you may wash your hair.     If you have steri strips ( appears to be strips of white tape) on   your incision, leave them on. In 5-7 days they will begin to fall off.    Medical: Call the doctor for any of the following problems: fever above 101,   severe pain, bleeding, or abdominal distention (swelling).   If constipated you may take any stool softener you choose.     Occasionally small areas of skin numbness or an unpleasant skin sensation can result. Also, you may find that your incision is swollen and tender for a few days.  Some redness around sutures and staples is a normal reaction, but if the discomfort persists or worsens, call you doctor.

## 2023-05-08 NOTE — BRIEF OP NOTE
[facility] [place]rief [place] Note    Surgery Date: 5/8/2023     Surgeon(s) and Role:  Panel 1:     * Rhys Lehman MD - Primary  Panel 2:     * Mario Montano MD - Primary    Assisting Surgeon: Nani    Pre-op Diagnosis:  Malignant melanoma of scalp [C43.4]    Post-op Diagnosis:  Post-Op Diagnosis Codes:     * Malignant melanoma of scalp [C43.4]    Procedure(s) (LRB):  EXCISION-WIDE LOCAL (N/A)  RECONSTRUCTION USING FLAP WITH APPLICATION OF DERMAL GRAFT (N/A)    Anesthesia: General    Operative Findings:  1 cm lesion    Estimated Blood Loss: 80 mL         Specimens:   Specimen (24h ago, onward)      None              Discharge Note    OUTCOME: Patient tolerated treatment/procedure well without complication and is now ready for discharge.    DISPOSITION: Home or Self Care    FINAL DIAGNOSIS:  Malignant melanoma of scalp    FOLLOWUP: In clinic    DISCHARGE INSTRUCTIONS:    Discharge Procedure Orders   Comprehensive metabolic panel   Standing Status: Future Standing Exp. Date: 07/02/24     CBC auto differential   Standing Status: Future Standing Exp. Date: 07/02/24     Diet general     Remove dressing in 24 hours     Call MD for:  temperature >100.4     Call MD for:  persistent nausea and vomiting     Call MD for:  severe uncontrolled pain     Call MD for:  difficulty breathing, headache or visual disturbances     Call MD for:  redness, tenderness, or signs of infection (pain, swelling, redness, odor or green/yellow discharge around incision site)     Call MD for:  hives     Shower on day dressing removed (No bath)

## 2023-05-17 ENCOUNTER — OFFICE VISIT (OUTPATIENT)
Dept: SURGERY | Facility: CLINIC | Age: 78
End: 2023-05-17
Payer: MEDICARE

## 2023-05-17 VITALS — HEIGHT: 70 IN | WEIGHT: 160.94 LBS | BODY MASS INDEX: 23.04 KG/M2

## 2023-05-17 DIAGNOSIS — C43.4 MALIGNANT MELANOMA OF SCALP: Primary | ICD-10-CM

## 2023-05-17 LAB
FINAL PATHOLOGIC DIAGNOSIS: NORMAL
GROSS: NORMAL
Lab: NORMAL
MICROSCOPIC EXAM: NORMAL

## 2023-05-17 PROCEDURE — 1160F RVW MEDS BY RX/DR IN RCRD: CPT | Mod: CPTII,S$GLB,, | Performed by: SURGERY

## 2023-05-17 PROCEDURE — 99024 POSTOP FOLLOW-UP VISIT: CPT | Mod: S$GLB,,, | Performed by: SURGERY

## 2023-05-17 PROCEDURE — 99024 PR POST-OP FOLLOW-UP VISIT: ICD-10-PCS | Mod: S$GLB,,, | Performed by: SURGERY

## 2023-05-17 PROCEDURE — 1126F AMNT PAIN NOTED NONE PRSNT: CPT | Mod: CPTII,S$GLB,, | Performed by: SURGERY

## 2023-05-17 PROCEDURE — 1126F PR PAIN SEVERITY QUANTIFIED, NO PAIN PRESENT: ICD-10-PCS | Mod: CPTII,S$GLB,, | Performed by: SURGERY

## 2023-05-17 PROCEDURE — 1159F MED LIST DOCD IN RCRD: CPT | Mod: CPTII,S$GLB,, | Performed by: SURGERY

## 2023-05-17 PROCEDURE — 1159F PR MEDICATION LIST DOCUMENTED IN MEDICAL RECORD: ICD-10-PCS | Mod: CPTII,S$GLB,, | Performed by: SURGERY

## 2023-05-17 PROCEDURE — 1160F PR REVIEW ALL MEDS BY PRESCRIBER/CLIN PHARMACIST DOCUMENTED: ICD-10-PCS | Mod: CPTII,S$GLB,, | Performed by: SURGERY

## 2023-05-17 NOTE — PROGRESS NOTES
Subjective     Patient ID: Antonio Torres Jr. is a 78 y.o. male.    Chief Complaint: Post-op Evaluation    HPI 79 yo male with melanoma without complaints s/p excision with know flow to the nodes  Review of Systems   Constitutional: Negative.    HENT: Negative.     Eyes: Negative.    Respiratory: Negative.     Cardiovascular: Negative.    Gastrointestinal: Negative.    Endocrine: Negative.    Musculoskeletal: Negative.    Integumentary:  Negative.   Allergic/Immunologic: Negative.    Neurological: Negative.    Hematological: Negative.    Psychiatric/Behavioral: Negative.     All other systems reviewed and are negative.       Objective     Physical Exam  Vitals reviewed.   Constitutional:       Appearance: He is well-developed.   HENT:      Head: Normocephalic and atraumatic.        Right Ear: External ear normal.      Left Ear: External ear normal.      Nose: Nose normal.   Eyes:      Conjunctiva/sclera: Conjunctivae normal.      Pupils: Pupils are equal, round, and reactive to light.   Cardiovascular:      Rate and Rhythm: Normal rate and regular rhythm.      Heart sounds: Normal heart sounds.   Pulmonary:      Effort: Pulmonary effort is normal.      Breath sounds: Normal breath sounds.   Abdominal:      General: Bowel sounds are normal.      Palpations: Abdomen is soft.   Musculoskeletal:         General: Normal range of motion.      Cervical back: Normal range of motion and neck supple.   Skin:     General: Skin is warm and dry.   Neurological:      Mental Status: He is alert and oriented to person, place, and time.      Deep Tendon Reflexes: Reflexes are normal and symmetric.   Psychiatric:         Behavior: Behavior normal.         Thought Content: Thought content normal.          Assessment and Plan     Problem List Items Addressed This Visit       Malignant melanoma of scalp - Primary    Relevant Orders    NM PET CT Whole Body       I discussed his operation the need for a PET scan for staging and I will  see them back after that

## 2023-05-17 NOTE — PROGRESS NOTES
Answers submitted by the patient for this visit:  Review of Systems Questionnaire (Submitted on 5/16/2023)  activity change: Yes  unexpected weight change: No  neck pain: No  hearing loss: No  rhinorrhea: No  trouble swallowing: No  eye discharge: No  visual disturbance: No  chest tightness: No  wheezing: No  chest pain: No  palpitations: No  blood in stool: No  constipation: No  vomiting: No  diarrhea: No  polydipsia: No  polyuria: No  difficulty urinating: No  urgency: No  hematuria: No  joint swelling: No  arthralgias: No  headaches: No  weakness: No  confusion: No  dysphoric mood: No

## 2023-05-22 ENCOUNTER — EXTERNAL HOME HEALTH (OUTPATIENT)
Dept: HOME HEALTH SERVICES | Facility: HOSPITAL | Age: 78
End: 2023-05-22
Payer: MEDICARE

## 2023-06-01 ENCOUNTER — HOSPITAL ENCOUNTER (OUTPATIENT)
Dept: RADIOLOGY | Facility: HOSPITAL | Age: 78
Discharge: HOME | End: 2023-06-01
Attending: SURGERY
Payer: MEDICARE

## 2023-06-01 DIAGNOSIS — C43.4 MALIGNANT MELANOMA OF SCALP: ICD-10-CM

## 2023-06-01 PROCEDURE — 78816 PET IMAGE W/CT FULL BODY: CPT | Mod: 26,PI,, | Performed by: STUDENT IN AN ORGANIZED HEALTH CARE EDUCATION/TRAINING PROGRAM

## 2023-06-01 PROCEDURE — 78816 NM PET CT WHOLE BODY: ICD-10-PCS | Mod: 26,PI,, | Performed by: STUDENT IN AN ORGANIZED HEALTH CARE EDUCATION/TRAINING PROGRAM

## 2023-06-01 PROCEDURE — A9552 F18 FDG: HCPCS

## 2023-06-01 PROCEDURE — 25500020 PHARM REV CODE 255: Performed by: SURGERY

## 2023-06-01 PROCEDURE — 78816 PET IMAGE W/CT FULL BODY: CPT | Mod: TC

## 2023-06-01 PROCEDURE — A9698 NON-RAD CONTRAST MATERIALNOC: HCPCS | Performed by: SURGERY

## 2023-06-01 RX ADMIN — Medication 450 ML: at 11:06

## 2023-06-05 ENCOUNTER — ANESTHESIA EVENT (OUTPATIENT)
Dept: SURGERY | Facility: HOSPITAL | Age: 78
End: 2023-06-05
Payer: MEDICARE

## 2023-06-05 DIAGNOSIS — I10 HYPERTENSION: ICD-10-CM

## 2023-06-05 DIAGNOSIS — S91.001S ANKLE WOUND, RIGHT, SEQUELA: ICD-10-CM

## 2023-06-05 DIAGNOSIS — S91.001D ANKLE WOUND, RIGHT, SUBSEQUENT ENCOUNTER: Primary | ICD-10-CM

## 2023-06-05 RX ORDER — MUPIROCIN 20 MG/G
OINTMENT TOPICAL
Status: DISCONTINUED | OUTPATIENT
Start: 2023-06-05 | End: 2023-06-05 | Stop reason: HOSPADM

## 2023-06-05 RX ORDER — SODIUM CHLORIDE 9 MG/ML
INJECTION, SOLUTION INTRAVENOUS CONTINUOUS
Status: DISCONTINUED | OUTPATIENT
Start: 2023-06-05 | End: 2023-06-05 | Stop reason: HOSPADM

## 2023-06-05 NOTE — H&P (VIEW-ONLY)
CHIEF COMPLAINT:  Right ankle injury    HX OF PRESENT ILLNESS:  Patient presents for follow-up of a right lateral malleolus fracture that is being treated non operatively.  He also has a pressure wound over the anterior aspect of his ankle from his initial splint.  He has been receiving home health wound care.  The eschar has come off.  They are using Santyl.  Wound care comes once a week, and the patient's sister performs wound care twice daily.  He has completed his course of oral antibiotics.  He denies any fevers or chills.  They report there has been some blood on the bandages, but no vandana pus.    PAST MEDICAL HX:  Melanoma, kidney problems    PAST SURGICAL HX:  Hernia repair    MEDICATIONS:  Abilify    ALLERGIES:  None    SOCIAL HX:  Negative    FAMILY HX:  Alzheimer's, heart disease    REVIEW OF SYSTEMS: As above.    EXAM: No acute distress. Alert and oriented x 3. Non-labored respirations.  Right anterior ankle wound without overlying eschar.  It is now somewhat deep, no visible bone. Patient reports possibly able to see tendon at some point.  There is some light pink tissue in the base as well as some fibrinous exudate.  Slight bleeding at the edges.  No significant surrounding erythema or other signs of vandana infection.  Mildly tender to palpation over the lateral malleolus.  Sensation intact to light touch.    DIAGNOSTIC DATA:  X-rays of the right ankle show stable alignment of the mildly displaced lateral malleolus fracture, there does seem to be some interval healing.    IMPRESSION:  Right lateral malleolus fracture    PLAN:  At this point, with the depth of the anterior wound, I think it would be beneficial for formal debridement and wound VAC placement in the operating room.  I think this would help reduce the risk of infection as well as help promote wound healing.  We would then plan for outpatient wound care for wound VAC changes following the surgery.  All risks, benefits, and alternatives to  surgery were discussed with the patient.  Risks of surgery include but are not limited to bleeding, infection, nerve or vessel injury, pain, recurrence or worsening, functional limitation, need for additional procedures, complications associated with anesthesia or underlying medical conditions, and death.  All questions were answered and the patient consented to proceed with surgery.  He will be scheduled for this Friday 6/9 at [facility].      Abby Lemus M.D.       Please note this dictation has been transcribed with voice recognition software and may contain unrecognized errors.

## 2023-06-08 ENCOUNTER — HOSPITAL ENCOUNTER (OUTPATIENT)
Dept: PREADMISSION TESTING | Facility: HOSPITAL | Age: 78
Discharge: HOME | End: 2023-06-08
Attending: ORTHOPAEDIC SURGERY
Payer: MEDICARE

## 2023-06-08 ENCOUNTER — OFFICE VISIT (OUTPATIENT)
Dept: SURGERY | Facility: CLINIC | Age: 78
End: 2023-06-08
Payer: MEDICARE

## 2023-06-08 VITALS
HEART RATE: 81 BPM | BODY MASS INDEX: 23.09 KG/M2 | DIASTOLIC BLOOD PRESSURE: 83 MMHG | OXYGEN SATURATION: 98 % | SYSTOLIC BLOOD PRESSURE: 125 MMHG | HEIGHT: 70 IN

## 2023-06-08 VITALS
WEIGHT: 141 LBS | OXYGEN SATURATION: 100 % | HEART RATE: 78 BPM | HEIGHT: 71 IN | SYSTOLIC BLOOD PRESSURE: 120 MMHG | DIASTOLIC BLOOD PRESSURE: 71 MMHG | TEMPERATURE: 98 F | RESPIRATION RATE: 18 BRPM | BODY MASS INDEX: 19.74 KG/M2

## 2023-06-08 DIAGNOSIS — Z01.818 PREOPERATIVE TESTING: Primary | ICD-10-CM

## 2023-06-08 DIAGNOSIS — C43.4 MALIGNANT MELANOMA OF SCALP: Primary | ICD-10-CM

## 2023-06-08 LAB
ALBUMIN SERPL BCP-MCNC: 3.4 G/DL (ref 3.5–5.2)
ALP SERPL-CCNC: 86 U/L (ref 55–135)
ALT SERPL W/O P-5'-P-CCNC: 36 U/L (ref 10–44)
ANION GAP SERPL CALC-SCNC: 6 MMOL/L (ref 8–16)
AST SERPL-CCNC: 24 U/L (ref 10–40)
BASOPHILS # BLD AUTO: 0.05 K/UL (ref 0–0.2)
BASOPHILS NFR BLD: 0.6 % (ref 0–1.9)
BILIRUB SERPL-MCNC: 0.3 MG/DL (ref 0.1–1)
BUN SERPL-MCNC: 27 MG/DL (ref 8–23)
CALCIUM SERPL-MCNC: 9.7 MG/DL (ref 8.7–10.5)
CHLORIDE SERPL-SCNC: 106 MMOL/L (ref 95–110)
CO2 SERPL-SCNC: 28 MMOL/L (ref 23–29)
CREAT SERPL-MCNC: 1.4 MG/DL (ref 0.5–1.4)
DIFFERENTIAL METHOD: ABNORMAL
EOSINOPHIL # BLD AUTO: 0.1 K/UL (ref 0–0.5)
EOSINOPHIL NFR BLD: 1.5 % (ref 0–8)
ERYTHROCYTE [DISTWIDTH] IN BLOOD BY AUTOMATED COUNT: 13.2 % (ref 11.5–14.5)
EST. GFR  (NO RACE VARIABLE): 51 ML/MIN/1.73 M^2
GLUCOSE SERPL-MCNC: 94 MG/DL (ref 70–110)
HCT VFR BLD AUTO: 46.4 % (ref 40–54)
HGB BLD-MCNC: 14.8 G/DL (ref 14–18)
IMM GRANULOCYTES # BLD AUTO: 0.02 K/UL (ref 0–0.04)
IMM GRANULOCYTES NFR BLD AUTO: 0.3 % (ref 0–0.5)
LYMPHOCYTES # BLD AUTO: 1.4 K/UL (ref 1–4.8)
LYMPHOCYTES NFR BLD: 17.5 % (ref 18–48)
MCH RBC QN AUTO: 30.4 PG (ref 27–31)
MCHC RBC AUTO-ENTMCNC: 31.9 G/DL (ref 32–36)
MCV RBC AUTO: 95 FL (ref 82–98)
MONOCYTES # BLD AUTO: 0.6 K/UL (ref 0.3–1)
MONOCYTES NFR BLD: 8.1 % (ref 4–15)
NEUTROPHILS # BLD AUTO: 5.6 K/UL (ref 1.8–7.7)
NEUTROPHILS NFR BLD: 72 % (ref 38–73)
NRBC BLD-RTO: 0 /100 WBC
PLATELET # BLD AUTO: 283 K/UL (ref 150–450)
PMV BLD AUTO: 9.7 FL (ref 9.2–12.9)
POTASSIUM SERPL-SCNC: 4.2 MMOL/L (ref 3.5–5.1)
PROT SERPL-MCNC: 6.9 G/DL (ref 6–8.4)
RBC # BLD AUTO: 4.87 M/UL (ref 4.6–6.2)
SODIUM SERPL-SCNC: 140 MMOL/L (ref 136–145)
WBC # BLD AUTO: 7.82 K/UL (ref 3.9–12.7)

## 2023-06-08 PROCEDURE — 3079F DIAST BP 80-89 MM HG: CPT | Mod: CPTII,S$GLB,, | Performed by: SURGERY

## 2023-06-08 PROCEDURE — 1126F AMNT PAIN NOTED NONE PRSNT: CPT | Mod: CPTII,S$GLB,, | Performed by: SURGERY

## 2023-06-08 PROCEDURE — 1159F PR MEDICATION LIST DOCUMENTED IN MEDICAL RECORD: ICD-10-PCS | Mod: CPTII,S$GLB,, | Performed by: SURGERY

## 2023-06-08 PROCEDURE — 1159F MED LIST DOCD IN RCRD: CPT | Mod: CPTII,S$GLB,, | Performed by: SURGERY

## 2023-06-08 PROCEDURE — 3074F SYST BP LT 130 MM HG: CPT | Mod: CPTII,S$GLB,, | Performed by: SURGERY

## 2023-06-08 PROCEDURE — 99024 PR POST-OP FOLLOW-UP VISIT: ICD-10-PCS | Mod: S$GLB,,, | Performed by: SURGERY

## 2023-06-08 PROCEDURE — 1101F PR PT FALLS ASSESS DOC 0-1 FALLS W/OUT INJ PAST YR: ICD-10-PCS | Mod: CPTII,S$GLB,, | Performed by: SURGERY

## 2023-06-08 PROCEDURE — 1126F PR PAIN SEVERITY QUANTIFIED, NO PAIN PRESENT: ICD-10-PCS | Mod: CPTII,S$GLB,, | Performed by: SURGERY

## 2023-06-08 PROCEDURE — 85025 COMPLETE CBC W/AUTO DIFF WBC: CPT | Performed by: ORTHOPAEDIC SURGERY

## 2023-06-08 PROCEDURE — 3288F FALL RISK ASSESSMENT DOCD: CPT | Mod: CPTII,S$GLB,, | Performed by: SURGERY

## 2023-06-08 PROCEDURE — 3074F PR MOST RECENT SYSTOLIC BLOOD PRESSURE < 130 MM HG: ICD-10-PCS | Mod: CPTII,S$GLB,, | Performed by: SURGERY

## 2023-06-08 PROCEDURE — 99024 POSTOP FOLLOW-UP VISIT: CPT | Mod: S$GLB,,, | Performed by: SURGERY

## 2023-06-08 PROCEDURE — 1101F PT FALLS ASSESS-DOCD LE1/YR: CPT | Mod: CPTII,S$GLB,, | Performed by: SURGERY

## 2023-06-08 PROCEDURE — 36415 COLL VENOUS BLD VENIPUNCTURE: CPT | Performed by: ORTHOPAEDIC SURGERY

## 2023-06-08 PROCEDURE — 3288F PR FALLS RISK ASSESSMENT DOCUMENTED: ICD-10-PCS | Mod: CPTII,S$GLB,, | Performed by: SURGERY

## 2023-06-08 PROCEDURE — 3079F PR MOST RECENT DIASTOLIC BLOOD PRESSURE 80-89 MM HG: ICD-10-PCS | Mod: CPTII,S$GLB,, | Performed by: SURGERY

## 2023-06-08 PROCEDURE — 80053 COMPREHEN METABOLIC PANEL: CPT | Performed by: ORTHOPAEDIC SURGERY

## 2023-06-08 NOTE — PROGRESS NOTES
Subjective     Patient ID: Antonio Torres Jr. is a 78 y.o. male.    Chief Complaint: Post-op Evaluation (Patient presents post op/ follow up w/ NM PET CT WHOLE BODY. )    HPI 77 yo male with malignant melanoma without complaints today  Review of Systems   Constitutional: Negative.    HENT: Negative.     Eyes: Negative.    Respiratory: Negative.     Cardiovascular: Negative.    Gastrointestinal: Negative.    Endocrine: Negative.    Musculoskeletal: Negative.    Integumentary:  Negative.   Allergic/Immunologic: Negative.    Neurological: Negative.    Hematological: Negative.    Psychiatric/Behavioral: Negative.     All other systems reviewed and are negative.       Objective     Physical Exam  Vitals reviewed.   Constitutional:       Appearance: He is well-developed.   HENT:      Head: Normocephalic and atraumatic.      Right Ear: External ear normal.      Left Ear: External ear normal.      Nose: Nose normal.   Eyes:      Conjunctiva/sclera: Conjunctivae normal.      Pupils: Pupils are equal, round, and reactive to light.   Cardiovascular:      Rate and Rhythm: Normal rate and regular rhythm.      Heart sounds: Normal heart sounds.   Pulmonary:      Effort: Pulmonary effort is normal.      Breath sounds: Normal breath sounds.   Abdominal:      General: Bowel sounds are normal.      Palpations: Abdomen is soft.   Musculoskeletal:         General: Normal range of motion.      Cervical back: Normal range of motion and neck supple.   Skin:     General: Skin is warm and dry.   Neurological:      Mental Status: He is alert and oriented to person, place, and time.      Deep Tendon Reflexes: Reflexes are normal and symmetric.   Psychiatric:         Behavior: Behavior normal.         Thought Content: Thought content normal.          Assessment and Plan     1. Malignant melanoma of scalp    LUCIANO    I agree with PS and dermatology and I will see him back in 3-4 mths         No follow-ups on file.

## 2023-06-08 NOTE — DISCHARGE INSTRUCTIONS
Before 7 AM, enter through the Emergency Entrance..   After 7 AM enter through the Main Entrance.      Your procedure  is scheduled for ___6/9/2023_______.    Call [phone] between 2pm and 5pm on ___6/8/2023____to find out your arrival time for the day of surgery.    You may have one visitor.  No children allowed.     You will be going to the Same Day Surgery Unit on the 2nd floor of the hospital.    Important instructions:  Do not eat anything after midnight.  You may have plain water, non carbonated.  You may also have Gatorade or Powerade after midnight.    Stop all fluids 2 hours before your surgery.    It is okay to brush your teeth.  Do not have gum, candy or mints.    SEE MEDICATION SHEET.   TAKE MEDICATIONS AS DIRECTED WITH SIPS OF WATER.      STOP taking Aspirin, Ibuprofen,  Advil, Motrin, Mobic(meloxicam), Aleve (naproxen), Fish oil, and Vitamin E for at least 7 days before your surgery.     You may take Tylenol if needed which is not a blood thinner.    Please shower the night before and the morning of your surgery.      Contact lenses and removable denture work may not be worn during your procedure.    You may wear deodorant only. If you are having breast surgery, do not wear deodorant on the operative side.    Do not wear powder, body lotion, perfume/cologne or make-up.    Do not wear any jewelry or have any metal on your body.    You will be asked to remove any dentures or partials for the procedure.    If you are going home on the same day of surgery, you must arrange for a family member or a friend to drive you home.  Public transportation is prohibited.  You will not be able to drive home if you were given anesthesia or sedation.    Patients who want to have their Post-op prescriptions filled from our in-house [facility], bring a Credit/Debit Card or cash with you. A co-pay may be required.  The pharmacy closes at 5:30 pm.    Wear loose fitting clothes allowing for  bandages.    Please leave money and valuables home.      You may bring your cell phone.    Call the doctor if fever or illness should occur before your surgery.    Call [phone] to contact us here if needed.                            CLOTHES ON DAY OF SURGERY    SHOULDER surgery:  you must have a very oversized shirt.  Very, Very large.  You will probably have a large sling on with your arm strapped to your chest.  You will not be able to put the arm of the operated shoulder into a sleeve.  You can put the arm of the un-operated shoulder into the sleeve, but the shirt will need to be draped over the operated shoulder.       ARM or HAND surgery:  make sure that your sleeves are large and loose enough to pass over large dressings or cast.      BREAST or UNDERARM surgery:  wear a loose, button down shirt so that you can dress without raising your arms over your head.    ABDOMINAL surgery:  wear loose, comfortable clothing.  Nothing tight around the abdomen.  NO JEANS    PENIS or SCROTAL surgery:  loose comfortable clothing.  Large sweat pants, pajama pants or a robe.  ABSOLUTELY NO JEANS      LEG or FOOT surgery:  wear large loose pants that are able to pass over any large dressings or casts.  You could also wear loose shorts or a skirt.

## 2023-06-08 NOTE — ANESTHESIA PREPROCEDURE EVALUATION
06/08/2023  Antonio Torres Jr. is a 78 y.o., male scheduled for  INCISION AND DRAINAGE, LOWER EXTREMITY- ANKLE (Right) on 6/9/2023.    NPO >8  METS 1-3; walker    Vitals:    06/09/23 1130   BP: 121/64   Pulse: 73   Resp: 16   Temp: 36.7 °C (98.1 °F)         Past Medical History:   Diagnosis Date    Chronic kidney disease     Depression     Hematuria     had neg work up with Dr. Stafford    Hypercholesterolemia 03/01/2013    Schizophrenia     Skin cancer of scalp     Syncope 04/06/2023         Past Surgical History:   Procedure Laterality Date    EXCISION-WIDE LOCAL N/A 5/8/2023    Procedure: EXCISION-WIDE LOCAL;  Surgeon: Rhys Lehman MD;  Location: [facility] OR;  Service: General;  Laterality: N/A;  RN PREOP 5/1/23---    HERNIA REPAIR      RECONSTRUCTION USING FLAP N/A 5/8/2023    Procedure: RECONSTRUCTION USING FLAP WITH APPLICATION OF DERMAL GRAFT;  Surgeon: Mario Montano MD;  Location: [facility] OR;  Service: Plastics;  Laterality: N/A;  10 X 10 DERMAL GRAFT  NOTIFIED BOBBY IN [employer] ON 5/4/2023 @ 10:48AM. PATIENT WILL BE INJECTED ON AM OF SURGERY PER DR. LEHMAN -RENE  PT NEEDS TO BE IN [facility] FOR 7AM ON AM OF SURGERY-LO    SKIN CANCER EXCISION         Pre-op Assessment    I have reviewed the Patient Summary Reports.     I have reviewed the Nursing Notes. I have reviewed the NPO Status.   I have reviewed the Medications.     Review of Systems  Anesthesia Hx:  No problems with previous Anesthesia  History of prior surgery of interest to airway management or planning: Denies Family Hx of Anesthesia complications.   Denies Personal Hx of Anesthesia complications.   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  Denies Dysrhythmias.   Denies LOBO. ECG has been reviewed. 4/10/2023 stress: negative for ischemia; EF 69%    Pulmonary:  Pulmonary Normal    Renal/:   Chronic Renal Disease    Hepatic/GI:  Hepatic/GI Normal    Musculoskeletal:  Musculoskeletal Normal Right ankle fracture 2/2 syncopal episode ~2 months ago    Neurological:  Neurology Normal    Dermatological:  Skin Normal    Psych:   Psychiatric History depression          Physical Exam  General: Cooperative, Alert and Oriented    Airway:  Mallampati: III   Mouth Opening: Normal  TM Distance: Normal  Tongue: Normal  Neck ROM: Normal ROM    Dental:  Intact        Anesthesia Plan  Type of Anesthesia, risks & benefits discussed:    Anesthesia Type: MAC  Intra-op Monitoring Plan: Standard ASA Monitors  Post Op Pain Control Plan: multimodal analgesia  Induction:  IV  Informed Consent: Informed consent signed with the Patient and all parties understand the risks and agree with anesthesia plan.  All questions answered. Patient consented to blood products? Yes  ASA Score: 3    Ready For Surgery From Anesthesia Perspective.     .

## 2023-06-09 ENCOUNTER — HOSPITAL ENCOUNTER (OUTPATIENT)
Facility: HOSPITAL | Age: 78
Discharge: HOME | End: 2023-06-12
Attending: ORTHOPAEDIC SURGERY | Admitting: ORTHOPAEDIC SURGERY
Payer: MEDICARE

## 2023-06-09 ENCOUNTER — ANESTHESIA (OUTPATIENT)
Dept: SURGERY | Facility: HOSPITAL | Age: 78
End: 2023-06-09
Payer: MEDICARE

## 2023-06-09 DIAGNOSIS — S93.04XD: Primary | ICD-10-CM

## 2023-06-09 DIAGNOSIS — S91.001D ANKLE WOUND, RIGHT, SUBSEQUENT ENCOUNTER: ICD-10-CM

## 2023-06-09 DIAGNOSIS — S91.001D: Primary | ICD-10-CM

## 2023-06-09 PROCEDURE — 25000003 PHARM REV CODE 250: Performed by: SURGERY

## 2023-06-09 PROCEDURE — 36000704 HC OR TIME LEV I 1ST 15 MIN: Performed by: ORTHOPAEDIC SURGERY

## 2023-06-09 PROCEDURE — D9220A PRA ANESTHESIA: Mod: CRNA,,, | Performed by: NURSE ANESTHETIST, CERTIFIED REGISTERED

## 2023-06-09 PROCEDURE — 87070 CULTURE OTHR SPECIMN AEROBIC: CPT | Performed by: ORTHOPAEDIC SURGERY

## 2023-06-09 PROCEDURE — 37000009 HC ANESTHESIA EA ADD 15 MINS: Performed by: ORTHOPAEDIC SURGERY

## 2023-06-09 PROCEDURE — 87075 CULTR BACTERIA EXCEPT BLOOD: CPT | Performed by: ORTHOPAEDIC SURGERY

## 2023-06-09 PROCEDURE — 63600175 PHARM REV CODE 636 W HCPCS: Performed by: NURSE ANESTHETIST, CERTIFIED REGISTERED

## 2023-06-09 PROCEDURE — 37000008 HC ANESTHESIA 1ST 15 MINUTES: Performed by: ORTHOPAEDIC SURGERY

## 2023-06-09 PROCEDURE — 25000003 PHARM REV CODE 250: Performed by: ANESTHESIOLOGY

## 2023-06-09 PROCEDURE — 63600175 PHARM REV CODE 636 W HCPCS: Performed by: ORTHOPAEDIC SURGERY

## 2023-06-09 PROCEDURE — D9220A PRA ANESTHESIA: Mod: ANES,,, | Performed by: ANESTHESIOLOGY

## 2023-06-09 PROCEDURE — 87077 CULTURE AEROBIC IDENTIFY: CPT | Mod: 59 | Performed by: ORTHOPAEDIC SURGERY

## 2023-06-09 PROCEDURE — 36000705 HC OR TIME LEV I EA ADD 15 MIN: Performed by: ORTHOPAEDIC SURGERY

## 2023-06-09 PROCEDURE — 25000003 PHARM REV CODE 250: Performed by: NURSE ANESTHETIST, CERTIFIED REGISTERED

## 2023-06-09 PROCEDURE — D9220A PRA ANESTHESIA: ICD-10-PCS | Mod: CRNA,,, | Performed by: NURSE ANESTHETIST, CERTIFIED REGISTERED

## 2023-06-09 PROCEDURE — D9220A PRA ANESTHESIA: ICD-10-PCS | Mod: ANES,,, | Performed by: ANESTHESIOLOGY

## 2023-06-09 PROCEDURE — 87186 SC STD MICRODIL/AGAR DIL: CPT | Mod: 59 | Performed by: ORTHOPAEDIC SURGERY

## 2023-06-09 PROCEDURE — 87205 SMEAR GRAM STAIN: CPT | Performed by: ORTHOPAEDIC SURGERY

## 2023-06-09 PROCEDURE — 71000033 HC RECOVERY, INTIAL HOUR: Performed by: ORTHOPAEDIC SURGERY

## 2023-06-09 RX ORDER — PROPOFOL 10 MG/ML
VIAL (ML) INTRAVENOUS CONTINUOUS PRN
Status: DISCONTINUED | OUTPATIENT
Start: 2023-06-09 | End: 2023-06-09

## 2023-06-09 RX ORDER — LIDOCAINE HYDROCHLORIDE 10 MG/ML
1 INJECTION, SOLUTION EPIDURAL; INFILTRATION; INTRACAUDAL; PERINEURAL ONCE
Status: DISCONTINUED | OUTPATIENT
Start: 2023-06-09 | End: 2023-06-09 | Stop reason: HOSPADM

## 2023-06-09 RX ORDER — SODIUM CHLORIDE, SODIUM LACTATE, POTASSIUM CHLORIDE, CALCIUM CHLORIDE 600; 310; 30; 20 MG/100ML; MG/100ML; MG/100ML; MG/100ML
INJECTION, SOLUTION INTRAVENOUS CONTINUOUS
Status: DISCONTINUED | OUTPATIENT
Start: 2023-06-09 | End: 2023-06-09

## 2023-06-09 RX ORDER — SODIUM CHLORIDE 0.9 % (FLUSH) 0.9 %
10 SYRINGE (ML) INJECTION
Status: DISCONTINUED | OUTPATIENT
Start: 2023-06-09 | End: 2023-06-12 | Stop reason: HOSPADM

## 2023-06-09 RX ORDER — LIDOCAINE HYDROCHLORIDE 20 MG/ML
INJECTION INTRAVENOUS
Status: DISCONTINUED | OUTPATIENT
Start: 2023-06-09 | End: 2023-06-09

## 2023-06-09 RX ORDER — OXYCODONE AND ACETAMINOPHEN 5; 325 MG/1; MG/1
1 TABLET ORAL EVERY 6 HOURS PRN
Status: DISCONTINUED | OUTPATIENT
Start: 2023-06-09 | End: 2023-06-12 | Stop reason: HOSPADM

## 2023-06-09 RX ORDER — ACETAMINOPHEN 500 MG
1000 TABLET ORAL
Status: COMPLETED | OUTPATIENT
Start: 2023-06-09 | End: 2023-06-09

## 2023-06-09 RX ORDER — FENTANYL CITRATE 50 UG/ML
INJECTION, SOLUTION INTRAMUSCULAR; INTRAVENOUS
Status: DISCONTINUED | OUTPATIENT
Start: 2023-06-09 | End: 2023-06-09

## 2023-06-09 RX ORDER — CEFAZOLIN SODIUM 2 G/50ML
2 SOLUTION INTRAVENOUS
Status: COMPLETED | OUTPATIENT
Start: 2023-06-09 | End: 2023-06-09

## 2023-06-09 RX ORDER — FENTANYL CITRATE 50 UG/ML
25 INJECTION, SOLUTION INTRAMUSCULAR; INTRAVENOUS EVERY 5 MIN PRN
Status: DISCONTINUED | OUTPATIENT
Start: 2023-06-09 | End: 2023-06-09 | Stop reason: HOSPADM

## 2023-06-09 RX ORDER — ONDANSETRON 2 MG/ML
INJECTION INTRAMUSCULAR; INTRAVENOUS
Status: DISCONTINUED | OUTPATIENT
Start: 2023-06-09 | End: 2023-06-09

## 2023-06-09 RX ORDER — SODIUM CHLORIDE 9 MG/ML
INJECTION, SOLUTION INTRAVENOUS CONTINUOUS
Status: DISCONTINUED | OUTPATIENT
Start: 2023-06-09 | End: 2023-06-09

## 2023-06-09 RX ORDER — ARIPIPRAZOLE 5 MG/1
20 TABLET ORAL DAILY
Status: DISCONTINUED | OUTPATIENT
Start: 2023-06-10 | End: 2023-06-12 | Stop reason: HOSPADM

## 2023-06-09 RX ORDER — PHENYLEPHRINE HYDROCHLORIDE 10 MG/ML
INJECTION INTRAVENOUS
Status: DISCONTINUED | OUTPATIENT
Start: 2023-06-09 | End: 2023-06-09

## 2023-06-09 RX ORDER — IBUPROFEN 200 MG
200 TABLET ORAL EVERY 6 HOURS PRN
COMMUNITY
End: 2023-09-12

## 2023-06-09 RX ORDER — SODIUM CHLORIDE 0.9 % (FLUSH) 0.9 %
10 SYRINGE (ML) INJECTION
Status: DISCONTINUED | OUTPATIENT
Start: 2023-06-09 | End: 2023-06-09 | Stop reason: HOSPADM

## 2023-06-09 RX ORDER — ACETAMINOPHEN 325 MG/1
650 TABLET ORAL EVERY 4 HOURS PRN
Status: DISCONTINUED | OUTPATIENT
Start: 2023-06-09 | End: 2023-06-12 | Stop reason: HOSPADM

## 2023-06-09 RX ADMIN — CEFAZOLIN SODIUM 2 G: 2 SOLUTION INTRAVENOUS at 02:06

## 2023-06-09 RX ADMIN — FENTANYL CITRATE 50 MCG: 50 INJECTION, SOLUTION INTRAMUSCULAR; INTRAVENOUS at 02:06

## 2023-06-09 RX ADMIN — PHENYLEPHRINE HYDROCHLORIDE 100 MCG: 10 INJECTION INTRAVENOUS at 02:06

## 2023-06-09 RX ADMIN — SODIUM CHLORIDE: 0.9 INJECTION, SOLUTION INTRAVENOUS at 12:06

## 2023-06-09 RX ADMIN — ACETAMINOPHEN 1000 MG: 500 TABLET ORAL at 12:06

## 2023-06-09 RX ADMIN — PROPOFOL 100 MCG/KG/MIN: 10 INJECTION, EMULSION INTRAVENOUS at 02:06

## 2023-06-09 RX ADMIN — LIDOCAINE HYDROCHLORIDE 50 MG: 20 INJECTION, SOLUTION INTRAVENOUS at 02:06

## 2023-06-09 RX ADMIN — SODIUM CHLORIDE: 0.9 INJECTION, SOLUTION INTRAVENOUS at 02:06

## 2023-06-09 RX ADMIN — ONDANSETRON 4 MG: 2 INJECTION, SOLUTION INTRAMUSCULAR; INTRAVENOUS at 02:06

## 2023-06-09 NOTE — TRANSFER OF CARE
Anesthesia Transfer of Care Note    Patient: Antonio Torres Jr.    Procedure(s) Performed: Procedure(s) (LRB):  INCISION AND DRAINAGE, LOWER EXTREMITY- ANKLE (Right)    Patient location: PACU    Anesthesia Type: MAC    Transport from OR: Transported from OR on room air with adequate spontaneous ventilation    Post pain: adequate analgesia    Post assessment: no apparent anesthetic complications    Post vital signs: stable    Level of consciousness: awake    Nausea/Vomiting: no nausea/vomiting    Complications: none    Transfer of care protocol was followed      Last vitals:   Visit Vitals  BP (!) 94/57 (BP Location: Left arm, Patient Position: Lying)   Pulse 74   Temp 36.6 °C (97.9 °F)   Resp (!) 56   SpO2 97%

## 2023-06-09 NOTE — OP NOTE
[facility]  Orthopedic Surgery   Operative Note            Date of Procedure:  6/9/2023    Procedure: Procedure(s) (LRB):  1. Right ankle irrigation and debridement, wound vac placement     Surgeon(s) and Role:     * Abby Lemus MD - Primary     Pre-Operative Diagnosis: Right ankle wound     Post-Operative Diagnosis: Right ankle wound     Anesthesia: General      Indications: Patient sustained a lateral malleolus fracture being treated non operatively, however he also developed an anterior ankle pressure ulceration from the splint placed in the ED. The wound eschar has fallen off leaving an open wound. Surgical I&D with wound vac placement was recommended to reduce the risk of infection and help with wound healing. All risks, benefits, and alternatives to surgery were discussed. Risks of surgery include but are not limited to bleeding, infection, pain, nerve or vessel injury, functional limitation, deep vein thrombosis, poor wound healing, complications associated with anesthesia or underlying medical conditions, and death. All questions were answered and informed consent was obtained.      Description of the Findings of the Procedure:   The patient was seen and evaluated in the preoperative holding area. The correct operative site was verified and marked.The patient was brought to the operating room and placed supine on the operating table with all bony prominences well padded. Anesthesia was induced without complication. The right lower extremity was prepped and draped in a sterile fashion. A timeout was performed identifying the correct patient, correct procedure, and correct operative site.      The anterior ankle wound was evaluated. There was largely good pink granulation tissue in the base. There was tendon material present at the distal aspect of the wound determined to be a stump of the EHL tendon. Proximal stump likely eroded, unable to be found. The wound was bluntly debrided using  a curette. Friable tissue was removed with a rongeur. There was no vandana purulence. Deep cultures were taken. The wound edges were sharply excised with a knife and there was healthy bleeding tissue. The wound was copiously irrigated with normal saline. Wound vac sponge was placed. Tapes were placed and we got good seal and suction. The leg was wrapped with gauze and an Ace wrap. The patient tolerated the procedure well. He was extubated without complication and taken to the PACU in stable condition.     Complications: None; patient tolerated the procedure well.     Estimated Blood Loss (EBL): 5 mL     Implants: None     Specimens: Wound cultures x 2    Drains: Wound vac     Disposition: PACU - hemodynamically stable.     Post-Operative Instructions: Patient may be partial weight bearing to the right lower extremity. He will need a portable wound vac for home. He is already receiving home health wound care with [facility]. We will have them continue wound care for weekly vac changes.

## 2023-06-09 NOTE — ANESTHESIA POSTPROCEDURE EVALUATION
Anesthesia Post Evaluation    Patient: Antonio Torres Jr.    Procedure(s) Performed: Procedure(s) (LRB):  INCISION AND DRAINAGE, LOWER EXTREMITY- ANKLE (Right)    Final Anesthesia Type: MAC      Patient location during evaluation: PACU  Patient participation: Yes- Able to Participate  Level of consciousness: awake and alert  Post-procedure vital signs: reviewed and stable  Pain management: adequate  Airway patency: patent  MARGA mitigation strategies: Multimodal analgesia  PONV status at discharge: No PONV  Anesthetic complications: no      Cardiovascular status: blood pressure returned to baseline  Respiratory status: unassisted and spontaneous ventilation  Hydration status: euvolemic  Follow-up not needed.          Vitals Value Taken Time   /63 06/09/23 1502   Temp 36.6 °C (97.9 °F) 06/09/23 1446   Pulse 66 06/09/23 1504   Resp 18 06/09/23 1504   SpO2 100 % 06/09/23 1504   Vitals shown include unvalidated device data.      No case tracking events are documented in the log.      Pain/[place] Score: Pain Rating Prior to Med Admin: 0 (6/9/2023 12:11 PM)

## 2023-06-10 PROBLEM — S91.001D ANKLE WOUND, RIGHT, SUBSEQUENT ENCOUNTER: Status: ACTIVE | Noted: 2023-06-10

## 2023-06-10 LAB
GRAM STN SPEC: NORMAL

## 2023-06-10 PROCEDURE — 25000003 PHARM REV CODE 250: Performed by: ORTHOPAEDIC SURGERY

## 2023-06-10 PROCEDURE — 94761 N-INVAS EAR/PLS OXIMETRY MLT: CPT

## 2023-06-10 RX ADMIN — ARIPIPRAZOLE 20 MG: 5 TABLET ORAL at 08:06

## 2023-06-10 NOTE — PLAN OF CARE
TN  received call from Lupe with [employer] auth #[id number] for wound vac for home use .  TN called I [phone] ext [phone]/ [phone], was asked to refax wound vac packet with order request= done.    TN sent a secure chat to informe Dr. Lemus attending physician, med surg nurse and charge nurse that wound may not be fully processed for delivery this pm.    TN handed off to  Rosa to follow up on tomorrow morning. Packet placed on Rosa desk.    N also said patient will discharge with [facility].

## 2023-06-10 NOTE — PLAN OF CARE
06/10/23 0830   Discharge Planning   Assessment Type Discharge Planning Brief Assessment   Resource/Environmental Concerns none   Support Systems Family members   Patient/Family Anticipates Transition to home   DME Needed Upon Discharge  other (see comments)  (wound vac for home use)   Discharge Plan A Home Health;Home   Discharge Plan B Home Health;Home     Fabio Lennon MD    [facility] - [place] - [address]  [address]  [place] [address]  Phone: [phone] Fax: [phone]

## 2023-06-10 NOTE — PLAN OF CARE
TN faxed completed [employer] form signed by Dr. Vo to [facility] with  Medical Necessity Form to [phone] and packet with fs, hnp, mar, lab and  operative note.  Awaiting call back and TN will call [phone] to follow up.

## 2023-06-10 NOTE — NURSING
[facility], [facility] [place]  Nurses Note -- 4 Eyes      6/9/23       Skin assessed on: Q Shift      [x] No Pressure Injuries Present    []Prevention Measures Documented    [] Yes LDA  for Pressure Injury Previously documented     [] Yes New Pressure Injury Discovered   [] LDA for New Pressure Injury Added      Attending RN:  Alee Lowery RN     Second RN:  Tracie Bojorquez Rn     No pressure injuries noted. Incision site to right foot. Dressing intac with wound vac. Unable to assess wound site. Several wounds head. Slp skin flap.

## 2023-06-10 NOTE — NURSING
[facility], [facility] [place]  Nurses Note -- 4 Eyes      6/10/2023       Skin assessed on: Q Shift      [x] No Pressure Injuries Present    []Prevention Measures Documented    [] Yes LDA  for Pressure Injury Previously documented     [] Yes New Pressure Injury Discovered   [] LDA for New Pressure Injury Added      Attending RN:  Abena Merchant LPN     Second RN:  Alee Lowery RN

## 2023-06-10 NOTE — PLAN OF CARE
[employer] form placed in patient;s chart at nursing station to be completed by surgeon and signed to start process for home wound vac.  Extra form placed at the front of med surg as discussed with BRIANA Aguila.  Awaiting completed form.   06/10/23 0852   Post-Acute Status   Post-Acute Authorization [facility] Status Pending post-acute provider review/more information requested  (Need [employer] form filled out and signed by physician)   Discharge Delays (!) Orders Requiring Signature  (need [employer] form completed and signed by physician)   Discharge Plan   Discharge Plan A Home with family;Home Health   Discharge Plan B Home with family;Home Health

## 2023-06-10 NOTE — PROGRESS NOTES
POD#1  No new issues. Pain well-controlled  VSS    Rt ankle- dsg in place with wound vac. NVI. No drainage    A/P Rt ankle I&D with wound vac placement  Ok for D/C home once wound vac is set up  F/U with Dr perez in 2 weeks

## 2023-06-11 PROCEDURE — 25000003 PHARM REV CODE 250: Performed by: ORTHOPAEDIC SURGERY

## 2023-06-11 RX ADMIN — ARIPIPRAZOLE 20 MG: 5 TABLET ORAL at 09:06

## 2023-06-11 NOTE — NURSING
[facility], [facility] [place]  Nurses Note -- 4 Eyes      6/11/2023       Skin assessed on: Q Shift      [x] No Pressure Injuries Present    []Prevention Measures Documented    [] Yes LDA  for Pressure Injury Previously documented     [] Yes New Pressure Injury Discovered   [] LDA for New Pressure Injury Added      Attending RN:  Abena Merchant LPN     Second RN:  Alee Lowery RN

## 2023-06-11 NOTE — NURSING
[facility], [facility]  Nurses Note -- 4 Eyes      6/10/2023       Skin assessed on: Q Shift      [x] No Pressure Injuries Present    []Prevention Measures Documented    [] Yes LDA  for Pressure Injury Previously documented     [] Yes New Pressure Injury Discovered   [] LDA for New Pressure Injury Added      Attending RN:  Alee Lowery, RN     Second RN:  Abena Merchant

## 2023-06-11 NOTE — PLAN OF CARE
Jesús with [employer] notified SW that physician's name and NPI didn't match. ADRIANE made corrections and refaxed.     1:43 pm     Jesús with [employer] notified ADRIANE that wound vac was approved and will be delivered to hospital today.

## 2023-06-11 NOTE — PROGRESS NOTES
No new issues. Ready to go    Rt LE- wound vac in place and fxn well    Awaiting wound vac  Discharge once wound vac set up. Orders already written

## 2023-06-11 NOTE — NURSING
Wound vac was delivered to bedside but cannister and materials were not. Notified [employer] and they stated they would place order for dressing kit and cannister today ASAP and would report that those items were not delivered. She is unable to give ETA at this time but states it will be today.

## 2023-06-12 VITALS
RESPIRATION RATE: 18 BRPM | SYSTOLIC BLOOD PRESSURE: 114 MMHG | WEIGHT: 141.13 LBS | BODY MASS INDEX: 19.76 KG/M2 | OXYGEN SATURATION: 98 % | HEART RATE: 71 BPM | TEMPERATURE: 99 F | DIASTOLIC BLOOD PRESSURE: 62 MMHG | HEIGHT: 71 IN

## 2023-06-12 LAB
BACTERIA SPEC AEROBE CULT: ABNORMAL
BACTERIA SPEC AEROBE CULT: ABNORMAL

## 2023-06-12 PROCEDURE — 25000003 PHARM REV CODE 250: Performed by: ORTHOPAEDIC SURGERY

## 2023-06-12 RX ADMIN — ARIPIPRAZOLE 20 MG: 5 TABLET ORAL at 08:06

## 2023-06-12 NOTE — PLAN OF CARE
Received message from pt nurse, Abena, stated wound vac tubing and dressing delivered, but still awaiting canister.     Placed call to Jesús with [facility], stated will contact  for supplies to be delivered.     0826 Received return call from Jesús, stated  confirmed will deliver canister and supplies by 10am today. TN to continue to follow.

## 2023-06-12 NOTE — NURSING
Wound vac supplies still not delivered. Pt family stating they cannot wait all night and would just like the pt to stay another night.

## 2023-06-12 NOTE — NURSING
[facility], [facility] [place]  Nurses Note -- 4 Eyes      6/12/2023       Skin assessed on: Q Shift      [x] No Pressure Injuries Present    []Prevention Measures Documented    [] Yes LDA  for Pressure Injury Previously documented     [] Yes New Pressure Injury Discovered   [] LDA for New Pressure Injury Added      Attending RN:  Abena Merchant LPN     Second RN:  Alee Lowery RN

## 2023-06-12 NOTE — NURSING
[facility], [facility] [place]  Nurses Note -- 4 Eyes      6/11/2023       Skin assessed on: Q Shift      [x] No Pressure Injuries Present    []Prevention Measures Documented    [] Yes LDA  for Pressure Injury Previously documented     [] Yes New Pressure Injury Discovered   [] LDA for New Pressure Injury Added      Attending RN:  Alee Lowery RN     Second RN:  Abena Merchant Lpn

## 2023-06-12 NOTE — NURSING
Attempted to connect home vac but [employer] delivered dressings but still no cannister. Notified case management.

## 2023-06-12 NOTE — PROGRESS NOTES
No acute issues. Patient is feeling well.     Waiting for final wound vac supplies to be delivered. Some supplies delivered over the weekend but still waiting for canister.  to be here at 10am with needed supplies.     NAD, AAOx3, nonlabored respirations. R ankle vac in place with good seal and suction. No swelling, erythema, or drainage. NVI.     - Discharge once vac supplies arrive. Otherwise ready to go.  - Home health already arranged for home vac changes.  - Follow up in clinic in 1-2 weeks.     Abby Lemus MD  [facility]

## 2023-06-12 NOTE — PROGRESS NOTES
[facility]                    Pharmacy       Discharge Medication Education    Patient ACCEPTED medication education. Pharmacy has provided education on the name, indication, and possible side effects of the medication(s) prescribed, using teach-back method.     The following medications have also been discussed, during this admission.        Medication List        CONTINUE taking these medications      ARIPiprazole 20 MG Tab  Commonly known as: ABILIFY  TAKE ONE TABLET BY MOUTH EVERY DAY     fish oil-omega-3 fatty acids 300-1,000 mg capsule     ibuprofen 200 MG tablet  Commonly known as: ADVIL,MOTRIN     loratadine 10 mg tablet  Commonly known as: CLARITIN     multivitamin capsule     oxyCODONE-acetaminophen 5-325 mg per tablet  Commonly known as: PERCOCET  Take 1 tablet by mouth every 4 (four) hours as needed for Pain.               Thank you  Declan Nieves, PharmD  [phone]

## 2023-06-12 NOTE — PLAN OF CARE
Patient alert and oriented x4. Respirations even and unlabored. No distress noted. Skin warm and dry. Iv saline locked. No complications noted. Wound vac to right foot. Intact. No complications noted. Markings noted to scalp. Patient with history of skin cancer to scalp. Patient denies pain. Pain medication available as needed. Patient has no complaints at this time. Instructed to call for any needs. Bed in lowest position. Call bell within reach.        Problem: Adult Inpatient Plan of Care  Goal: Plan of Care Review  Outcome: Ongoing, Progressing  Goal: Patient-Specific Goal (Individualized)  Outcome: Ongoing, Progressing  Goal: Absence of Hospital-Acquired Illness or Injury  Outcome: Ongoing, Progressing  Goal: Optimal Comfort and Wellbeing  Intervention: Monitor Pain and Promote Comfort  Flowsheets (Taken 6/12/2023 0357)  Pain Management Interventions:   care clustered   pain management plan reviewed with patient/caregiver     Problem: Impaired Wound Healing  Goal: Optimal Wound Healing  Outcome: Ongoing, Progressing  Intervention: Promote Wound Healing  Flowsheets (Taken 6/12/2023 0357)  Activity Management: Rolling - L1  Pain Management Interventions:   care clustered   pain management plan reviewed with patient/caregiver     Problem: Skin Injury Risk Increased  Goal: Skin Health and Integrity  Outcome: Ongoing, Progressing  Intervention: Optimize Skin Protection  Flowsheets (Taken 6/12/2023 0357)  Pressure Reduction Techniques:   frequent weight shift encouraged   sit time limited to 2 hours  Head of Bed (HOB) Positioning: HOB elevated

## 2023-06-12 NOTE — NURSING
Pt discharged per MD order. IV removed. Catheter tip intact. No distress noted. Discharge instructions reviewed with pt and family. Given the opportunity for questions. All questions answered. AVS given to pt and placed in blue folder. Patient verbalized understanding of all instructions. Vitals per chart. afebrile. No complaints of pain, N/V, diarrhea, or SOB. Demonstrated how to seal wound vac leak with pt and family. Pt family opting to wheel; pt down themselves with their personal wheelchair.

## 2023-06-12 NOTE — PLAN OF CARE
Problem: Adult Inpatient Plan of Care  Goal: Plan of Care Review  Outcome: Met  Goal: Patient-Specific Goal (Individualized)  Outcome: Met  Goal: Absence of Hospital-Acquired Illness or Injury  Outcome: Met  Goal: Optimal Comfort and Wellbeing  Outcome: Met  Goal: Readiness for Transition of Care  Outcome: Met     Problem: Impaired Wound Healing  Goal: Optimal Wound Healing  Outcome: Met     Problem: Skin Injury Risk Increased  Goal: Skin Health and Integrity  Outcome: Met

## 2023-06-13 LAB — BACTERIA SPEC ANAEROBE CULT: NORMAL

## 2023-06-19 NOTE — DISCHARGE SUMMARY
[facility] S[facility]  Orthopedics  Discharge Summary      Patient Name: Antonio Torres Jr.  MRN: [id number]  Admission Date: 6/9/2023  Hospital Length of Stay: 0 days  Discharge Date and Time: 6/12/2023 10:25 AM  Attending Physician: No att. providers found   Discharging Provider: Abby Sánchez MD  Primary Care Provider: Fabio Lennon MD    HPI: Patient sustained a pressure wound to the anterior aspect of the right ankle that progressed over time. He was admitted to the hospital for incision and drainage of the wound.     Procedure(s) (LRB):  INCISION AND DRAINAGE, LOWER EXTREMITY- ANKLE (Right)      Hospital Course: The patient had surgery for right ankle incision and drainage and wound vac placement. He remained in the hospital for pain control and arrangement of home health wound vac supplies. He tolerated the procedure well and his pain was well controlled.     Consults (From admission, onward)          Status Ordering Provider     Inpatient consult to Social Work  Once        Provider:  (Not yet assigned)    Completed ABBY SÁNCHEZ            Significant Diagnostic Studies: No pertinent studies.    Pending Diagnostic Studies:       None          Final Active Diagnoses:    Diagnosis Date Noted POA    PRINCIPAL PROBLEM:  Ankle wound, right, subsequent encounter [S91.001D] 06/10/2023 Not Applicable      Problems Resolved During this Admission:      Discharged Condition: good    Disposition: Home-Health Care [facility]    Follow Up:   Follow-up Information       Abby Sánchez MD Follow up in 2 week(s).    Specialty: Orthopedic Surgery  Contact information:  [address]  [address]  [facility]  [place] [address]  [phone]               [facility] Follow up.    Why: [facility] will call to schedule first visit.  Contact information:  [address]  [place] [address] [phone]                           Patient Instructions:      Diet Adult Regular     Diet Adult Regular     Keep  surgical extremity elevated     Notify your health care provider if you experience any of the following:  temperature >100.4     Notify your health care provider if you experience any of the following:  persistent nausea and vomiting or diarrhea     Notify your health care provider if you experience any of the following:  severe uncontrolled pain     Notify your health care provider if you experience any of the following:  redness, tenderness, or signs of infection (pain, swelling, redness, odor or green/yellow discharge around incision site)     Leave dressing on - Keep it clean, dry, and intact until clinic visit   Order Comments: Follow wound care protocol for vac changes     Keep surgical extremity elevated     Notify your health care provider if you experience any of the following:  temperature >100.4     Notify your health care provider if you experience any of the following:  persistent nausea and vomiting or diarrhea     Notify your health care provider if you experience any of the following:  severe uncontrolled pain     Notify your health care provider if you experience any of the following:  redness, tenderness, or signs of infection (pain, swelling, redness, odor or green/yellow discharge around incision site)     Change dressing (specify)   Order Comments: Wound vac changes per home health     Medications:  Reconciled Home Medications:      Medication List        CONTINUE taking these medications      ARIPiprazole 20 MG Tab  Commonly known as: ABILIFY  TAKE ONE TABLET BY MOUTH EVERY DAY     fish oil-omega-3 fatty acids 300-1,000 mg capsule  Take 2 g by mouth once daily.     ibuprofen 200 MG tablet  Commonly known as: ADVIL,MOTRIN  Take 200 mg by mouth every 6 (six) hours as needed for Pain.     loratadine 10 mg tablet  Commonly known as: CLARITIN  Take 10 mg by mouth once daily.     multivitamin capsule  Take 1 capsule by mouth once daily.     oxyCODONE-acetaminophen 5-325 mg per tablet  Commonly known  as: PERCOCET  Take 1 tablet by mouth every 4 (four) hours as needed for Pain.              Abby Lemus MD  Orthopedics  [facility] S[facility]

## 2023-06-20 ENCOUNTER — DOCUMENT SCAN (OUTPATIENT)
Dept: HOME HEALTH SERVICES | Facility: HOSPITAL | Age: 78
End: 2023-06-20
Payer: MEDICARE

## 2023-06-22 ENCOUNTER — TELEPHONE (OUTPATIENT)
Dept: SURGERY | Facility: CLINIC | Age: 78
End: 2023-06-22
Payer: MEDICARE

## 2023-06-22 NOTE — TELEPHONE ENCOUNTER
Spoke with  regarding concerns she wanted to schedule her brother an appt with . Appt scheduled for 6/23 at 11am. She confirmed date and time.     ----- Message from Agueda Louis sent at 6/22/2023 11:23 AM CDT -----  .Type: Patient Call Back    Who called Caitlyn-sister    What is the request in detail:patient sister is requesting a callback to discuss medical concerns    Can the clinic reply by [facility]? call    Would the patient rather a call back or a response via [facility]? call    Best call back number:.[phone]     Additional Information:

## 2023-06-23 ENCOUNTER — OFFICE VISIT (OUTPATIENT)
Dept: SURGERY | Facility: CLINIC | Age: 78
End: 2023-06-23
Payer: MEDICARE

## 2023-06-23 ENCOUNTER — DOCUMENT SCAN (OUTPATIENT)
Dept: HOME HEALTH SERVICES | Facility: HOSPITAL | Age: 78
End: 2023-06-23
Payer: MEDICARE

## 2023-06-23 VITALS
HEIGHT: 71 IN | OXYGEN SATURATION: 98 % | DIASTOLIC BLOOD PRESSURE: 69 MMHG | BODY MASS INDEX: 19.96 KG/M2 | SYSTOLIC BLOOD PRESSURE: 102 MMHG | HEART RATE: 115 BPM

## 2023-06-23 DIAGNOSIS — C43.4 MALIGNANT MELANOMA OF SCALP: Primary | ICD-10-CM

## 2023-06-23 PROCEDURE — 1100F PTFALLS ASSESS-DOCD GE2>/YR: CPT | Mod: CPTII,S$GLB,, | Performed by: SURGERY

## 2023-06-23 PROCEDURE — 1100F PR PT FALLS ASSESS DOC 2+ FALLS/FALL W/INJURY/YR: ICD-10-PCS | Mod: CPTII,S$GLB,, | Performed by: SURGERY

## 2023-06-23 PROCEDURE — 3078F DIAST BP <80 MM HG: CPT | Mod: CPTII,S$GLB,, | Performed by: SURGERY

## 2023-06-23 PROCEDURE — 1159F PR MEDICATION LIST DOCUMENTED IN MEDICAL RECORD: ICD-10-PCS | Mod: CPTII,S$GLB,, | Performed by: SURGERY

## 2023-06-23 PROCEDURE — 1126F PR PAIN SEVERITY QUANTIFIED, NO PAIN PRESENT: ICD-10-PCS | Mod: CPTII,S$GLB,, | Performed by: SURGERY

## 2023-06-23 PROCEDURE — 99024 POSTOP FOLLOW-UP VISIT: CPT | Mod: S$GLB,,, | Performed by: SURGERY

## 2023-06-23 PROCEDURE — 1159F MED LIST DOCD IN RCRD: CPT | Mod: CPTII,S$GLB,, | Performed by: SURGERY

## 2023-06-23 PROCEDURE — 3288F PR FALLS RISK ASSESSMENT DOCUMENTED: ICD-10-PCS | Mod: CPTII,S$GLB,, | Performed by: SURGERY

## 2023-06-23 PROCEDURE — 3078F PR MOST RECENT DIASTOLIC BLOOD PRESSURE < 80 MM HG: ICD-10-PCS | Mod: CPTII,S$GLB,, | Performed by: SURGERY

## 2023-06-23 PROCEDURE — 99024 PR POST-OP FOLLOW-UP VISIT: ICD-10-PCS | Mod: S$GLB,,, | Performed by: SURGERY

## 2023-06-23 PROCEDURE — 1126F AMNT PAIN NOTED NONE PRSNT: CPT | Mod: CPTII,S$GLB,, | Performed by: SURGERY

## 2023-06-23 PROCEDURE — 3074F PR MOST RECENT SYSTOLIC BLOOD PRESSURE < 130 MM HG: ICD-10-PCS | Mod: CPTII,S$GLB,, | Performed by: SURGERY

## 2023-06-23 PROCEDURE — 3074F SYST BP LT 130 MM HG: CPT | Mod: CPTII,S$GLB,, | Performed by: SURGERY

## 2023-06-23 PROCEDURE — 3288F FALL RISK ASSESSMENT DOCD: CPT | Mod: CPTII,S$GLB,, | Performed by: SURGERY

## 2023-06-23 NOTE — PROGRESS NOTES
History & Physical    SUBJECTIVE:     History of Present Illness:  Patient is a 78 y.o. male presents with residual melanoma in his scalp.     Chief Complaint   Patient presents with    Malignant melanoma of scalp       Review of patient's allergies indicates:  No Known Allergies    Current Outpatient Medications   Medication Sig Dispense Refill    ARIPiprazole (ABILIFY) 20 MG Tab TAKE ONE TABLET BY MOUTH EVERY DAY 90 tablet 2    fish oil-omega-3 fatty acids 300-1,000 mg capsule Take 2 g by mouth once daily.      ibuprofen (ADVIL,MOTRIN) 200 MG tablet Take 200 mg by mouth every 6 (six) hours as needed for Pain.      loratadine (CLARITIN) 10 mg tablet Take 10 mg by mouth once daily.      multivitamin capsule Take 1 capsule by mouth once daily.      oxyCODONE-acetaminophen (PERCOCET) 5-325 mg per tablet Take 1 tablet by mouth every 4 (four) hours as needed for Pain. 30 tablet 0     No current facility-administered medications for this visit.     Facility-Administered Medications Ordered in Other Visits   Medication Dose Route Frequency Provider Last Rate Last Admin    0.9%  NaCl infusion   Intravenous Continuous Rhys Lehman MD   New Bag at 06/09/23 1405    mupirocin 2 % ointment   Nasal On Call Procedure Rhys Lehman MD   Given at 05/08/23 0701       Past Medical History:   Diagnosis Date    Chronic kidney disease     Depression     Hematuria     had neg work up with Dr. Stafford    Hypercholesterolemia 03/01/2013    Schizophrenia     Skin cancer of scalp     Syncope 04/06/2023     Past Surgical History:   Procedure Laterality Date    EXCISION-WIDE LOCAL N/A 5/8/2023    Procedure: EXCISION-WIDE LOCAL;  Surgeon: Rhys Lehman MD;  Location: [facility] OR;  Service: General;  Laterality: N/A;  RN PREOP 5/1/23---    HERNIA REPAIR      INCISION AND DRAINAGE, LOWER EXTREMITY Right 6/9/2023    Procedure: INCISION AND DRAINAGE, LOWER EXTREMITY- ANKLE;  Surgeon: Abby Lemus MD;  Location: [facility] OR;  Service:  "Orthopedics;  Laterality: Right;  Wound vac placement (medium)  RN PREOP 6/8/2023    RECONSTRUCTION USING FLAP N/A 5/8/2023    Procedure: RECONSTRUCTION USING FLAP WITH APPLICATION OF DERMAL GRAFT;  Surgeon: Mario Montano MD;  Location: [facility];  Service: Plastics;  Laterality: N/A;  10 X 10 DERMAL GRAFT  NOTIFIED BOBBY IN [facility] MED ON 5/4/2023 @ 10:48AM. PATIENT WILL BE INJECTED ON AM OF SURGERY PER DR. SOLOMON -RENE  PT NEEDS TO BE IN [facility] FOR 7AM ON AM OF SURGERY-LO    SKIN CANCER EXCISION       Family History   Problem Relation Age of Onset    Alzheimer's disease Mother     Heart disease Father     Pulmonary embolism Father     Hypertension Sister      Social History     Tobacco Use    Smoking status: Never    Smokeless tobacco: Never   Substance Use Topics    Alcohol use: No    Drug use: No        Review of Systems:  Review of Systems   Constitutional: Negative.    HENT: Negative.     Eyes: Negative.    Respiratory: Negative.     Cardiovascular: Negative.    Gastrointestinal: Negative.    Endocrine: Negative.    Musculoskeletal: Negative.    Skin: Negative.    Allergic/Immunologic: Negative.    Neurological: Negative.    Hematological: Negative.    Psychiatric/Behavioral: Negative.     All other systems reviewed and are negative.    OBJECTIVE:     Vital Signs (Most Recent)  Pulse: (!) 115 (06/23/23 1029)  BP: 102/69 (06/23/23 1029)  SpO2: 98 % (06/23/23 1029)  5' 10.5" (1.791 m)        Physical Exam:  Physical Exam  Vitals reviewed.   Constitutional:       Appearance: He is well-developed.   HENT:      Head: Normocephalic and atraumatic.        Right Ear: External ear normal.      Left Ear: External ear normal.      Nose: Nose normal.   Eyes:      Conjunctiva/sclera: Conjunctivae normal.      Pupils: Pupils are equal, round, and reactive to light.   Cardiovascular:      Rate and Rhythm: Normal rate and regular rhythm.      Heart sounds: Normal heart sounds.   Pulmonary:      Effort: Pulmonary effort " is normal.      Breath sounds: Normal breath sounds.   Abdominal:      General: Bowel sounds are normal.      Palpations: Abdomen is soft.   Musculoskeletal:         General: Normal range of motion.      Cervical back: Normal range of motion and neck supple.   Skin:     General: Skin is warm and dry.   Neurological:      Mental Status: He is alert and oriented to person, place, and time.      Deep Tendon Reflexes: Reflexes are normal and symmetric.   Psychiatric:         Behavior: Behavior normal.         Thought Content: Thought content normal.       Laboratory  none    Diagnostic Results:  none    ASSESSMENT/PLAN:     Malignant melanoma of scalp    PLAN:Plan     I discussed the status and he will need to see Montano for excision [facility]

## 2023-06-29 DIAGNOSIS — C43.4 MALIGNANT MELANOMA OF SKIN OF SCALP AND NECK: Primary | ICD-10-CM

## 2023-06-29 DIAGNOSIS — C43.4 MALIGNANT MELANOMA OF SCALP: ICD-10-CM

## 2023-06-29 RX ORDER — SODIUM CHLORIDE 0.9 % (FLUSH) 0.9 %
10 SYRINGE (ML) INJECTION EVERY 6 HOURS
Status: DISCONTINUED | OUTPATIENT
Start: 2023-06-30 | End: 2023-06-29 | Stop reason: HOSPADM

## 2023-06-29 RX ORDER — ONDANSETRON 4 MG/1
8 TABLET, ORALLY DISINTEGRATING ORAL EVERY 8 HOURS PRN
Status: DISCONTINUED | OUTPATIENT
Start: 2023-06-29 | End: 2023-06-29 | Stop reason: HOSPADM

## 2023-06-29 RX ORDER — LIDOCAINE HYDROCHLORIDE 10 MG/ML
1 INJECTION, SOLUTION EPIDURAL; INFILTRATION; INTRACAUDAL; PERINEURAL ONCE
Status: DISCONTINUED | OUTPATIENT
Start: 2023-06-29 | End: 2023-06-29 | Stop reason: HOSPADM

## 2023-06-29 RX ORDER — MUPIROCIN 20 MG/G
OINTMENT TOPICAL
Status: DISCONTINUED | OUTPATIENT
Start: 2023-06-29 | End: 2023-06-29 | Stop reason: HOSPADM

## 2023-07-05 ENCOUNTER — ANESTHESIA EVENT (OUTPATIENT)
Dept: SURGERY | Facility: HOSPITAL | Age: 78
End: 2023-07-05
Payer: MEDICARE

## 2023-07-06 ENCOUNTER — HOSPITAL ENCOUNTER (OUTPATIENT)
Dept: PREADMISSION TESTING | Facility: HOSPITAL | Age: 78
Discharge: HOME | End: 2023-07-06
Attending: PLASTIC SURGERY
Payer: MEDICARE

## 2023-07-06 VITALS — HEIGHT: 71 IN | WEIGHT: 141.06 LBS | BODY MASS INDEX: 19.75 KG/M2

## 2023-07-06 NOTE — PLAN OF CARE
Pre-operative instructions, medication directives and pain scales reviewed with patient's sister. All questions the patient's sister had were answered. Re-assurance about surgical procedure and day of surgery routine given as needed, patient's sister verbalized understanding of the pre-op instructions. Preop phone completed with pt's sister.

## 2023-07-07 ENCOUNTER — ANESTHESIA (OUTPATIENT)
Dept: SURGERY | Facility: HOSPITAL | Age: 78
End: 2023-07-07
Payer: MEDICARE

## 2023-07-07 ENCOUNTER — HOSPITAL ENCOUNTER (OUTPATIENT)
Facility: HOSPITAL | Age: 78
Discharge: HOME | End: 2023-07-07
Attending: PLASTIC SURGERY | Admitting: PLASTIC SURGERY
Payer: MEDICARE

## 2023-07-07 VITALS
BODY MASS INDEX: 19.95 KG/M2 | TEMPERATURE: 98 F | DIASTOLIC BLOOD PRESSURE: 67 MMHG | RESPIRATION RATE: 16 BRPM | WEIGHT: 141.06 LBS | SYSTOLIC BLOOD PRESSURE: 118 MMHG | OXYGEN SATURATION: 98 % | HEART RATE: 83 BPM

## 2023-07-07 DIAGNOSIS — C43.4 MALIGNANT MELANOMA OF SCALP: ICD-10-CM

## 2023-07-07 DIAGNOSIS — C43.4 MALIGNANT MELANOMA OF SKIN OF SCALP AND NECK: Primary | ICD-10-CM

## 2023-07-07 DIAGNOSIS — S91.001D ANKLE WOUND, RIGHT, SUBSEQUENT ENCOUNTER: ICD-10-CM

## 2023-07-07 PROCEDURE — 36000707: Performed by: PLASTIC SURGERY

## 2023-07-07 PROCEDURE — 71000015 HC POSTOP RECOV 1ST HR: Performed by: PLASTIC SURGERY

## 2023-07-07 PROCEDURE — C1889 IMPLANT/INSERT DEVICE, NOC: HCPCS | Performed by: PLASTIC SURGERY

## 2023-07-07 PROCEDURE — 37000009 HC ANESTHESIA EA ADD 15 MINS: Performed by: PLASTIC SURGERY

## 2023-07-07 PROCEDURE — 63600175 PHARM REV CODE 636 W HCPCS: Performed by: NURSE ANESTHETIST, CERTIFIED REGISTERED

## 2023-07-07 PROCEDURE — 63600175 PHARM REV CODE 636 W HCPCS: Performed by: PLASTIC SURGERY

## 2023-07-07 PROCEDURE — D9220A PRA ANESTHESIA: ICD-10-PCS | Mod: ANES,,, | Performed by: ANESTHESIOLOGY

## 2023-07-07 PROCEDURE — D9220A PRA ANESTHESIA: Mod: ANES,,, | Performed by: ANESTHESIOLOGY

## 2023-07-07 PROCEDURE — 71000016 HC POSTOP RECOV ADDL HR: Performed by: PLASTIC SURGERY

## 2023-07-07 PROCEDURE — 25000003 PHARM REV CODE 250: Performed by: NURSE ANESTHETIST, CERTIFIED REGISTERED

## 2023-07-07 PROCEDURE — D9220A PRA ANESTHESIA: ICD-10-PCS | Mod: CRNA,,, | Performed by: NURSE ANESTHETIST, CERTIFIED REGISTERED

## 2023-07-07 PROCEDURE — 25000003 PHARM REV CODE 250: Performed by: PLASTIC SURGERY

## 2023-07-07 PROCEDURE — 25000003 PHARM REV CODE 250: Performed by: ANESTHESIOLOGY

## 2023-07-07 PROCEDURE — 37000008 HC ANESTHESIA 1ST 15 MINUTES: Performed by: PLASTIC SURGERY

## 2023-07-07 PROCEDURE — D9220A PRA ANESTHESIA: Mod: CRNA,,, | Performed by: NURSE ANESTHETIST, CERTIFIED REGISTERED

## 2023-07-07 PROCEDURE — 36000706: Performed by: PLASTIC SURGERY

## 2023-07-07 PROCEDURE — 25000003 PHARM REV CODE 250: Performed by: SURGERY

## 2023-07-07 DEVICE — IMPLANTABLE DEVICE: Type: IMPLANTABLE DEVICE | Site: SCALP | Status: FUNCTIONAL

## 2023-07-07 RX ORDER — ONDANSETRON 2 MG/ML
4 INJECTION INTRAMUSCULAR; INTRAVENOUS DAILY PRN
Status: DISCONTINUED | OUTPATIENT
Start: 2023-07-07 | End: 2023-07-07 | Stop reason: HOSPADM

## 2023-07-07 RX ORDER — HYDROMORPHONE HYDROCHLORIDE 2 MG/ML
0.2 INJECTION, SOLUTION INTRAMUSCULAR; INTRAVENOUS; SUBCUTANEOUS EVERY 5 MIN PRN
Status: DISCONTINUED | OUTPATIENT
Start: 2023-07-07 | End: 2023-07-07 | Stop reason: HOSPADM

## 2023-07-07 RX ORDER — BUPIVACAINE HYDROCHLORIDE 2.5 MG/ML
INJECTION, SOLUTION EPIDURAL; INFILTRATION; INTRACAUDAL
Status: DISCONTINUED | OUTPATIENT
Start: 2023-07-07 | End: 2023-07-07 | Stop reason: HOSPADM

## 2023-07-07 RX ORDER — PROPOFOL 10 MG/ML
VIAL (ML) INTRAVENOUS CONTINUOUS PRN
Status: DISCONTINUED | OUTPATIENT
Start: 2023-07-07 | End: 2023-07-07

## 2023-07-07 RX ORDER — SODIUM CHLORIDE 0.9 % (FLUSH) 0.9 %
10 SYRINGE (ML) INJECTION EVERY 6 HOURS
Status: DISCONTINUED | OUTPATIENT
Start: 2023-07-07 | End: 2023-07-07 | Stop reason: HOSPADM

## 2023-07-07 RX ORDER — SODIUM CHLORIDE 9 MG/ML
INJECTION, SOLUTION INTRAVENOUS CONTINUOUS
Status: DISCONTINUED | OUTPATIENT
Start: 2023-07-07 | End: 2023-07-07 | Stop reason: HOSPADM

## 2023-07-07 RX ORDER — SODIUM CHLORIDE, SODIUM LACTATE, POTASSIUM CHLORIDE, CALCIUM CHLORIDE 600; 310; 30; 20 MG/100ML; MG/100ML; MG/100ML; MG/100ML
INJECTION, SOLUTION INTRAVENOUS CONTINUOUS
Status: DISCONTINUED | OUTPATIENT
Start: 2023-07-07 | End: 2023-07-07 | Stop reason: HOSPADM

## 2023-07-07 RX ORDER — LIDOCAINE HYDROCHLORIDE 10 MG/ML
1 INJECTION, SOLUTION EPIDURAL; INFILTRATION; INTRACAUDAL; PERINEURAL ONCE
Status: DISCONTINUED | OUTPATIENT
Start: 2023-07-07 | End: 2023-07-07 | Stop reason: HOSPADM

## 2023-07-07 RX ORDER — LIDOCAINE HYDROCHLORIDE 20 MG/ML
INJECTION INTRAVENOUS
Status: DISCONTINUED | OUTPATIENT
Start: 2023-07-07 | End: 2023-07-07

## 2023-07-07 RX ORDER — MUPIROCIN 20 MG/G
OINTMENT TOPICAL 2 TIMES DAILY
Status: DISCONTINUED | OUTPATIENT
Start: 2023-07-07 | End: 2023-07-07 | Stop reason: HOSPADM

## 2023-07-07 RX ORDER — HYDROCODONE BITARTRATE AND ACETAMINOPHEN 5; 325 MG/1; MG/1
1 TABLET ORAL EVERY 4 HOURS PRN
Status: DISCONTINUED | OUTPATIENT
Start: 2023-07-07 | End: 2023-07-07 | Stop reason: HOSPADM

## 2023-07-07 RX ORDER — CEFAZOLIN SODIUM 2 G/50ML
2 SOLUTION INTRAVENOUS
Status: COMPLETED | OUTPATIENT
Start: 2023-07-07 | End: 2023-07-07

## 2023-07-07 RX ORDER — SODIUM CHLORIDE 0.9 % (FLUSH) 0.9 %
10 SYRINGE (ML) INJECTION
Status: DISCONTINUED | OUTPATIENT
Start: 2023-07-07 | End: 2023-07-07 | Stop reason: HOSPADM

## 2023-07-07 RX ORDER — LIDOCAINE HYDROCHLORIDE AND EPINEPHRINE 10; 10 MG/ML; UG/ML
INJECTION, SOLUTION INFILTRATION; PERINEURAL
Status: DISCONTINUED | OUTPATIENT
Start: 2023-07-07 | End: 2023-07-07 | Stop reason: HOSPADM

## 2023-07-07 RX ORDER — ACETAMINOPHEN 500 MG
1000 TABLET ORAL
Status: COMPLETED | OUTPATIENT
Start: 2023-07-07 | End: 2023-07-07

## 2023-07-07 RX ORDER — FENTANYL CITRATE 50 UG/ML
INJECTION, SOLUTION INTRAMUSCULAR; INTRAVENOUS
Status: DISCONTINUED | OUTPATIENT
Start: 2023-07-07 | End: 2023-07-07

## 2023-07-07 RX ADMIN — LIDOCAINE HYDROCHLORIDE 100 MG: 20 INJECTION, SOLUTION INTRAVENOUS at 11:07

## 2023-07-07 RX ADMIN — SODIUM CHLORIDE: 9 INJECTION, SOLUTION INTRAVENOUS at 09:07

## 2023-07-07 RX ADMIN — FENTANYL CITRATE 50 MCG: 0.05 INJECTION, SOLUTION INTRAMUSCULAR; INTRAVENOUS at 12:07

## 2023-07-07 RX ADMIN — ACETAMINOPHEN 1000 MG: 500 TABLET ORAL at 09:07

## 2023-07-07 RX ADMIN — PROPOFOL 100 MCG/KG/MIN: 10 INJECTION, EMULSION INTRAVENOUS at 11:07

## 2023-07-07 RX ADMIN — CEFAZOLIN SODIUM 2 G: 2 SOLUTION INTRAVENOUS at 11:07

## 2023-07-07 RX ADMIN — SODIUM CHLORIDE: 0.9 INJECTION, SOLUTION INTRAVENOUS at 11:07

## 2023-07-07 NOTE — ANESTHESIA PREPROCEDURE EVALUATION
07/07/2023  Antonio Torres Jr. is a 78 y.o., male.  To undergo Procedure(s) (LRB):  EXCISION, CARCINOMA, ANTERIOR SCALP (N/A)  APPLICATION, ACELLULAR HUMAN DERMAL ALLOGRAFT, FOOT (Right)  APPLICSTAPLER RELOAD TITANIUM 30MMATION, GRAFT, ANTERIOR SCALP (N/A)     Denies CP/SOB/GERD/MI/CVA/URI symptoms.  METS > 4  NPO > 8    Past Medical History:  Past Medical History:   Diagnosis Date    Chronic kidney disease     Depression     Hematuria     had neg work up with Dr. Stafford    Hypercholesterolemia 03/01/2013    Schizophrenia     Skin cancer of scalp     Syncope 04/06/2023       Past Surgical History:  Past Surgical History:   Procedure Laterality Date    EXCISION-WIDE LOCAL N/A 5/8/2023    Procedure: EXCISION-WIDE LOCAL;  Surgeon: Rhys Lehman MD;  Location: [facility] OR;  Service: General;  Laterality: N/A;  RN PREOP 5/1/23---    HERNIA REPAIR      INCISION AND DRAINAGE, LOWER EXTREMITY Right 6/9/2023    Procedure: INCISION AND DRAINAGE, LOWER EXTREMITY- ANKLE;  Surgeon: Abby Lemus MD;  Location: [facility] OR;  Service: Orthopedics;  Laterality: Right;  Wound vac placement (medium)  RN PREOP 6/8/2023    RECONSTRUCTION USING FLAP N/A 5/8/2023    Procedure: RECONSTRUCTION USING FLAP WITH APPLICATION OF DERMAL GRAFT;  Surgeon: Mario Montano MD;  Location: [facility] OR;  Service: Plastics;  Laterality: N/A;  10 X 10 DERMAL GRAFT  NOTIFIED BOBBY IN [facility] ON 5/4/2023 @ 10:48AM. PATIENT WILL BE INJECTED ON AM OF SURGERY PER DR. LEHMAN -RENE  PT NEEDS TO BE IN [employer] [facility] FOR 7AM ON AM OF SURGERY-LO    SKIN CANCER EXCISION         Social History:  Social History     Socioeconomic History    Marital status: Single   Tobacco Use    Smoking status: Never    Smokeless tobacco: Never   Substance and Sexual Activity    Alcohol use: No    Drug use: No    Sexual activity: Not Currently     Social  Determinants of Health     Financial Resource Strain: Low Risk     Difficulty of Paying Living Expenses: Not hard at all   Food Insecurity: No Food Insecurity    Worried About Running Out of Food in the Last Year: Never true    Ran Out of Food in the Last Year: Never true   Transportation Needs: No Transportation Needs    Lack of Transportation (Medical): No    Lack of Transportation (Non-Medical): No   Physical Activity: Insufficiently Active    Days of Exercise per Week: 5 days    Minutes of Exercise per Session: 10 min   Stress: No Stress Concern Present    Feeling of Stress : Only a little   Social Connections: Socially Isolated    Frequency of Communication with Friends and Family: Once a week    Frequency of Social Gatherings with Friends and Family: Once a week    Attends Confucianism Services: More than 4 times per year    Active Member of Clubs or Organizations: No    Attends Club or Organization Meetings: Never    Marital Status: Never    Housing Stability: Low Risk     Unable to Pay for Housing in the Last Year: No    Number of Places Lived in the Last Year: 1    Unstable Housing in the Last Year: No       Medications:  Current Facility-Administered Medications on File Prior to Encounter   Medication Dose Route Frequency Provider Last Rate Last Admin    0.9%  NaCl infusion   Intravenous Continuous Rhys Lehman MD   New Bag at 06/09/23 1405    mupirocin 2 % ointment   Nasal On Call Procedure Rhys Lehman MD   Given at 05/08/23 0701     Current Outpatient Medications on File Prior to Encounter   Medication Sig Dispense Refill    ARIPiprazole (ABILIFY) 20 MG Tab TAKE ONE TABLET BY MOUTH EVERY DAY 90 tablet 2    fish oil-omega-3 fatty acids 300-1,000 mg capsule Take 2 g by mouth once daily.      ibuprofen (ADVIL,MOTRIN) 200 MG tablet Take 200 mg by mouth every 6 (six) hours as needed for Pain.      loratadine (CLARITIN) 10 mg tablet Take 10 mg by mouth once daily.       multivitamin capsule Take 1 capsule by mouth once daily.         Allergies:  Review of patient's allergies indicates:  No Known Allergies    Active Problems:  Patient Active Problem List   Diagnosis    Schizophrenia    Hypercholesterolemia    CKD (chronic kidney disease) stage 3, GFR 30-59 ml/min    BPH with urinary obstruction    Hyperuricemia    Neoplasm of uncertain behavior    Weakness    Syncope    Closed right fibular fracture    Elevated troponin level not due to acute coronary syndrome    Malignant melanoma of scalp    Ankle wound, right, subsequent encounter       Diagnostic Studies:   Latest Reference Range & Units 06/08/23 11:25   WBC 3.90 - 12.70 K/uL 7.82   RBC 4.60 - 6.20 M/uL 4.87   Hemoglobin 14.0 - 18.0 g/dL 14.8   Hematocrit 40.0 - 54.0 % 46.4   MCV 82 - 98 fL 95   MCH 27.0 - 31.0 pg 30.4   MCHC 32.0 - 36.0 g/dL 31.9 (L)   RDW 11.5 - 14.5 % 13.2   Platelets 150 - 450 K/uL 283   MPV 9.2 - 12.9 fL 9.7   Gran % 38.0 - 73.0 % 72.0   Lymph % 18.0 - 48.0 % 17.5 (L)   Mono % 4.0 - 15.0 % 8.1   Eosinophil % 0.0 - 8.0 % 1.5   Basophil % 0.0 - 1.9 % 0.6   Immature Granulocytes 0.0 - 0.5 % 0.3   Gran # (ANC) 1.8 - 7.7 K/uL 5.6   Lymph # 1.0 - 4.8 K/uL 1.4   Mono # 0.3 - 1.0 K/uL 0.6   Eos # 0.0 - 0.5 K/uL 0.1   Baso # 0.00 - 0.20 K/uL 0.05   Immature Grans (Abs) 0.00 - 0.04 K/uL 0.02   nRBC 0 /100 WBC 0   Differential Method  Automated      Latest Reference Range & Units 06/08/23 11:25   Sodium 136 - 145 mmol/L 140   Potassium 3.5 - 5.1 mmol/L 4.2   Chloride 95 - 110 mmol/L 106   CO2 23 - 29 mmol/L 28   Anion Gap 8 - 16 mmol/L 6 (L)   BUN 8 - 23 mg/dL 27 (H)   Creatinine 0.5 - 1.4 mg/dL 1.4   eGFR >60 mL/min/1.73 m^2 51 !     EKG (4/6/23):  Normal sinus rhythm   Possible Lateral infarct ,age undetermined    Nuclear Stress (4/10/23):    Normal myocardial perfusion scan. There is no evidence of myocardial ischemia or infarction.    There is a  moderate to severe intensity fixed perfusion  abnormality in the inferior wall of the left ventricle secondary to diaphragm attenuation.    The gated perfusion images showed an ejection fraction of 69% post stress.    There is normal wall motion post stress.    LV cavity size is  and normal at stress.    The ECG portion of the study is negative for ischemia.    The patient reported no chest pain during the stress test.    There were no arrhythmias during stress.    TTE (4/7/23):   Technically difficult study.   The left ventricle is normal in size with concentric remodeling and normal systolic function.   The estimated ejection fraction is 55%.   Normal left ventricular diastolic function.   Normal right ventricular size with normal right ventricular systolic function.   Mild tricuspid regurgitation.   Normal central venous pressure (3 mmHg).   The estimated PA systolic pressure is 34 mmHg.   There is mild pulmonary hypertension.   The sinuses of Valsalva is mildly dilated. 4.0cm.    24 Hour Vitals:      See Nursing Charting For Additional Vitals      Pre-op Assessment    I have reviewed the Patient Summary Reports.     I have reviewed the Nursing Notes.       Review of Systems  Anesthesia Hx:  No problems with previous Anesthesia   Denies Personal Hx of Anesthesia complications.   Social:  Non-Smoker, No Alcohol Use    Hematology/Oncology:        Current/Recent Cancer. Oncology Comments: Melanoma of scalp   Cardiovascular:  Cardiovascular Normal Exercise tolerance: good  ECG has been reviewed.    Pulmonary:  Pulmonary Normal    Renal/:   Chronic Renal Disease, CKD BPH    Hepatic/GI:  Hepatic/GI Normal    Neurological:  Neurology Normal    Endocrine:  Endocrine Normal    Psych:   Psychiatric History depression          Physical Exam  General: Well nourished and Cooperative    Airway:  Mallampati: II   Mouth Opening: Normal  TM Distance: Normal      Dental:  Intact    Chest/Lungs:  Clear to auscultation, Normal Respiratory Rate    Heart:  Rate:  Normal  Rhythm: Regular Rhythm        Anesthesia Plan  Type of Anesthesia, risks & benefits discussed:    Anesthesia Type: MAC, Gen Natural Airway, Gen Supraglottic Airway  Intra-op Monitoring Plan: Standard ASA Monitors  Post Op Pain Control Plan: multimodal analgesia and IV/PO Opioids PRN  Induction:  IV  Informed Consent: Informed consent signed with the Patient and all parties understand the risks and agree with anesthesia plan.  All questions answered. Patient consented to blood products? Yes  ASA Score: 3    Ready For Surgery From Anesthesia Perspective.     .

## 2023-07-07 NOTE — PLAN OF CARE
Consult received for HH.  CM spoke with Dr Mnotano via phone who stated he forgot to write the HH orders.   CM took a verbal order and passes it on to Zari at [facility]:  R dorsal and scalp cleanse with saline, APPLY BACTROBAN cover with Xerogoam gauze 4x4 every other day.

## 2023-07-07 NOTE — PLAN OF CARE
[facility] notified that all CM needs are met       07/07/23 3061   Final Note   Assessment Type Final Discharge Note   Anticipated Discharge Disposition Home-Health   Hospital Resources/Appts/Education Provided Appointments scheduled and added to AVS   Post-Acute Status   Post-Acute Authorization Other   Other Status No Post-Acute Service Needs   Discharge Delays None known at this time

## 2023-07-07 NOTE — INTERVAL H&P NOTE
The patient has been examined and the H&P has been reviewed:    I concur with the findings and no changes have occurred since H&P was written.6/29/23    Surgery risks, benefits and alternative options discussed and understood by patient/family.          There are no hospital problems to display for this patient.

## 2023-07-07 NOTE — DISCHARGE INSTRUCTIONS
[facility]  Office # [phone]    Discharge Instructions for Same Day Surgery     Call the office for and appointment if one has not already been made.     Diet: Drink plenty of fluids the first 48 hours and you may resume your   usual diet.     Activity: No heavy lifting (over 10 pounds), pushing or pulling until your   post op visit. Your doctor's office may have told you to limit your lifting to less weight, or even no weight.  Be sure to follow those instructions.    Note: You may ride in a car and you may drive when comfortable.     Do not drive, drink alcohol, or sign legal documents for 24 hours, or if taking narcotic pain medication.    Medical: Call the doctor for any of the following problems: fever above 101,   severe pain, bleeding, or abdominal distention (swelling).   If constipated you may take any stool softener you choose.     Occasionally small areas of skin numbness or an unpleasant skin sensation can result. Also, you may find that your incision is swollen and tender for a few days.  Some redness around sutures and staples is a normal reaction, but if the discomfort persists or worsens, call you doctor.        Fall Prevention  Millions of people fall every year and injure themselves. You may have had anesthesia or sedation which may increase your risk of falling. You may have health issues that put you at an increased risk of falling.     Here are ways to reduce your risk of falling.    Make your home safe by keeping walkways clear of objects you may trip over.  Use non-slip pads under rugs. Do not use area rugs or small throw rugs.  Use non-slip mats in bathtubs and showers.  Install handrails and lights on staircases.  Do not walk in poorly lit areas.  Do not stand on chairs or wobbly ladders.  Use caution when reaching overhead or looking upward. This position can cause a loss of balance.  Be sure your shoes fit properly, have non-slip bottoms and are in good condition.    Wear shoes both inside and out. Avoid going barefoot or wearing slippers.  Be cautious when going up and down stairs, curbs, and when walking on uneven sidewalks.  If your balance is poor, consider using a cane or walker.  If your fall was related to alcohol use, stop or limit alcohol intake.   If your fall was related to use of sleeping medicines, talk to your doctor about this. You may need to reduce your dosage at bedtime if you awaken during the night to go to the bathroom.    To reduce the need for nighttime bathroom trips:  Avoid drinking fluids for several hours before going to bed  Empty your bladder before going to bed  Men can keep a urinal at the bedside  Stay as active as you can. Balance, flexibility, strength, and endurance all come from exercise. They all play a role in preventing falls. Ask your healthcare provider which types of activity are right for you.  Get your vision checked on a regular basis.  If you have pets, know where they are before you stand up or walk so you don't trip over them.  Use night lights.

## 2023-07-07 NOTE — TRANSFER OF CARE
Anesthesia Transfer of Care Note    Patient: Antonio Torres Jr.    Procedure(s) Performed: Procedure(s) (LRB):  EXCISION, CARCINOMA, ANTERIOR SCALP (N/A)  APPLICATION, ACELLULAR HUMAN DERMAL ALLOGRAFT, FOOT (Right)  APPLICSTAPLER RELOAD TITANIUM 30MMATION, GRAFT, ANTERIOR SCALP (N/A)    Patient location: [facility]    Anesthesia Type: MAC    Transport from OR: Transported from OR on room air with adequate spontaneous ventilation    Post pain: adequate analgesia    Post assessment: no apparent anesthetic complications and tolerated procedure well    Post vital signs: stable    Level of consciousness: awake and alert    Nausea/Vomiting: no nausea/vomiting    Complications: none    Transfer of care protocol was followed      Last vitals:   Visit Vitals  /67 (BP Location: Left arm, Patient Position: Lying)   Pulse 83   Temp 36.4 °C (97.6 °F) (Oral)   Resp 16   Wt 64 kg (141 lb 1 oz)   SpO2 98%   BMI 19.95 kg/m²

## 2023-07-07 NOTE — OP NOTE
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    Description of the Findings of the Procedure:  This is the case over 78 years old male patient with history of melanoma anterior scalp scheduled for excision and closure with dermal graft also presenting with right dorsal foot wound planning debridement and closure with dermal graft.  Under sterile condition and local anesthesia using 1% lidocaine with epi 0.25% Marcaine plain about 7 cc around the scalp area 1 cm margins done around the melanoma well the lesion down to the galea coagulation the bleeders with the bipolar then closure with a dermal graft of 4 x 4 cm sarcoid Deyeva allograft reference number [id number] serial 3.  [id number] anchored with 4-0 chromic running bacitracin ointment Xeroform gauze 4 x 4 and tape.  Then after changing gloves and other set of instruments the right foot was approached debridement with a curette down to the tendon irrigation with 1 L of normal saline solution and then vancomycin 500 mg, then excision of the edges with a 15 blade, dermal graft 6 x 6 cm with 4-0 chromic then bacitracin ointment Xeroform gauze ABDs and Kerlix was applied patient tolerated the procedure well without any complication with estimated blood loss of less than 25 cc patient received 2 g of Ancef  to sent to same-day surgery and was followed in 10 days with instruction to clean areolar day starting Sunday normal saline solution Vaseline gauze bacitracin ointment and 4 x 4 following 10 days in the clinic.    Mario Montano MD    Significant Surgical Tasks Conducted by the Assistant(s), if Applicable:     Complications: No    Estimated Blood Loss (EBL): * No values recorded between 7/7/2023 12:12 PM and 7/7/2023 12:58 PM *           Implants:   Implant Name Type Inv. Item Serial No.  Lot No. LRB No. Used Action   Cortiva allograft dermis [id number]   [id number] Moasis [id number] N/A 1 Implanted       Specimens:   Specimen (24h ago, onward)      None                    Condition: Good    Disposition: PACU - hemodynamically stable.    Attestation: I was present and scrubbed for the entire procedure.    Discharge Note    SUMMARY     Admit Date: 7/7/2023    Discharge Date and Time:  07/07/2023 12:59 PM    Hospital Course (synopsis of major diagnoses, care, treatment, and services provided during the course of the hospital stay): discharge after excision melanoma scalp and closure with dermal graft, debridement right foot dorsal wound and closure with dermal graft.     Final Diagnosis: Post-Op Diagnosis Codes:     * Malignant melanoma of skin of scalp and neck [C43.4]    Disposition: Home or Self Care    Follow Up/Patient Instructions:     Medications:  Reconciled Home Medications:      Medication List        ASK your doctor about these medications      ARIPiprazole 20 MG Tab  Commonly known as: ABILIFY  TAKE ONE TABLET BY MOUTH EVERY DAY     fish oil-omega-3 fatty acids 300-1,000 mg capsule  Take 2 g by mouth once daily.     ibuprofen 200 MG tablet  Commonly known as: ADVIL,MOTRIN  Take 200 mg by mouth every 6 (six) hours as needed for Pain.     loratadine 10 mg tablet  Commonly known as: CLARITIN  Take 10 mg by mouth once daily.     multivitamin capsule  Take 1 capsule by mouth once daily.             No discharge procedures on file.

## 2023-07-10 NOTE — ANESTHESIA POSTPROCEDURE EVALUATION
Anesthesia Post Evaluation    Patient: Antonio Torres Jr.    Procedure(s) Performed: Procedure(s) (LRB):  EXCISION, CARCINOMA, ANTERIOR SCALP (N/A)  APPLICATION, ACELLULAR HUMAN DERMAL ALLOGRAFT, FOOT (Right)  APPLICSTAPLER RELOAD TITANIUM 30MMATION, GRAFT, ANTERIOR SCALP (N/A)    Final Anesthesia Type: MAC      Patient location during evaluation: PACU  Patient participation: Yes- Able to Participate  Level of consciousness: awake and alert and oriented  Post-procedure vital signs: reviewed and stable  Pain management: adequate  Airway patency: patent    PONV status at discharge: No PONV  Anesthetic complications: no      Cardiovascular status: hemodynamically stable and blood pressure returned to baseline  Respiratory status: spontaneous ventilation, room air and unassisted  Hydration status: euvolemic  Follow-up not needed.          Vitals Value Taken Time   /67 07/07/23 1312   Temp 36.4 °C (97.6 °F) 07/07/23 1312   Pulse 83 07/07/23 1312   Resp 16 07/07/23 1312   SpO2 98 % 07/07/23 1312         No case tracking events are documented in the log.      Pain/[place] Score: No data recorded

## 2023-07-14 LAB
COMMENT: NORMAL
FINAL PATHOLOGIC DIAGNOSIS: NORMAL
GROSS: NORMAL
Lab: NORMAL
MICROSCOPIC EXAM: NORMAL

## 2023-07-17 ENCOUNTER — TELEPHONE (OUTPATIENT)
Dept: HEMATOLOGY/ONCOLOGY | Facility: CLINIC | Age: 78
End: 2023-07-17
Payer: MEDICARE

## 2023-07-17 DIAGNOSIS — C43.4 MALIGNANT MELANOMA OF SKIN OF SCALP AND NECK: Primary | ICD-10-CM

## 2023-07-20 ENCOUNTER — OFFICE VISIT (OUTPATIENT)
Dept: SURGERY | Facility: CLINIC | Age: 78
End: 2023-07-20
Payer: MEDICARE

## 2023-07-20 VITALS
HEIGHT: 71 IN | DIASTOLIC BLOOD PRESSURE: 77 MMHG | HEART RATE: 72 BPM | BODY MASS INDEX: 19.95 KG/M2 | OXYGEN SATURATION: 99 % | SYSTOLIC BLOOD PRESSURE: 123 MMHG

## 2023-07-20 DIAGNOSIS — C43.4 MALIGNANT MELANOMA OF SCALP: Primary | ICD-10-CM

## 2023-07-20 PROCEDURE — 1159F MED LIST DOCD IN RCRD: CPT | Mod: CPTII,S$GLB,, | Performed by: SURGERY

## 2023-07-20 PROCEDURE — 3078F DIAST BP <80 MM HG: CPT | Mod: CPTII,S$GLB,, | Performed by: SURGERY

## 2023-07-20 PROCEDURE — 1126F PR PAIN SEVERITY QUANTIFIED, NO PAIN PRESENT: ICD-10-PCS | Mod: CPTII,S$GLB,, | Performed by: SURGERY

## 2023-07-20 PROCEDURE — 3074F SYST BP LT 130 MM HG: CPT | Mod: CPTII,S$GLB,, | Performed by: SURGERY

## 2023-07-20 PROCEDURE — 1159F PR MEDICATION LIST DOCUMENTED IN MEDICAL RECORD: ICD-10-PCS | Mod: CPTII,S$GLB,, | Performed by: SURGERY

## 2023-07-20 PROCEDURE — 1160F PR REVIEW ALL MEDS BY PRESCRIBER/CLIN PHARMACIST DOCUMENTED: ICD-10-PCS | Mod: CPTII,S$GLB,, | Performed by: SURGERY

## 2023-07-20 PROCEDURE — 3074F PR MOST RECENT SYSTOLIC BLOOD PRESSURE < 130 MM HG: ICD-10-PCS | Mod: CPTII,S$GLB,, | Performed by: SURGERY

## 2023-07-20 PROCEDURE — 1160F RVW MEDS BY RX/DR IN RCRD: CPT | Mod: CPTII,S$GLB,, | Performed by: SURGERY

## 2023-07-20 PROCEDURE — 99024 POSTOP FOLLOW-UP VISIT: CPT | Mod: S$GLB,,, | Performed by: SURGERY

## 2023-07-20 PROCEDURE — 3078F PR MOST RECENT DIASTOLIC BLOOD PRESSURE < 80 MM HG: ICD-10-PCS | Mod: CPTII,S$GLB,, | Performed by: SURGERY

## 2023-07-20 PROCEDURE — 1126F AMNT PAIN NOTED NONE PRSNT: CPT | Mod: CPTII,S$GLB,, | Performed by: SURGERY

## 2023-07-20 PROCEDURE — 99024 PR POST-OP FOLLOW-UP VISIT: ICD-10-PCS | Mod: S$GLB,,, | Performed by: SURGERY

## 2023-07-20 NOTE — PROGRESS NOTES
Subjective     Patient ID: Antonio Torres Jr. is a 78 y.o. male.    Chief Complaint: Malignant melanoma of scalp    HPI 77 yo male with malignant melanoma and recent re excision of his melanoma without complaints  Review of Systems   Constitutional: Negative.    HENT: Negative.     Eyes: Negative.    Respiratory: Negative.     Cardiovascular: Negative.    Gastrointestinal: Negative.    Endocrine: Negative.    Musculoskeletal: Negative.    Integumentary:  Negative.   Allergic/Immunologic: Negative.    Neurological: Negative.    Hematological: Negative.    Psychiatric/Behavioral: Negative.     All other systems reviewed and are negative.       Objective     Physical Exam  Vitals reviewed.   Constitutional:       Appearance: He is well-developed.   HENT:      Head: Normocephalic and atraumatic.      Right Ear: External ear normal.      Left Ear: External ear normal.      Nose: Nose normal.   Eyes:      Conjunctiva/sclera: Conjunctivae normal.      Pupils: Pupils are equal, round, and reactive to light.   Cardiovascular:      Rate and Rhythm: Normal rate and regular rhythm.      Heart sounds: Normal heart sounds.   Pulmonary:      Effort: Pulmonary effort is normal.      Breath sounds: Normal breath sounds.   Abdominal:      General: Bowel sounds are normal.      Palpations: Abdomen is soft.   Musculoskeletal:         General: Normal range of motion.      Cervical back: Normal range of motion and neck supple.   Skin:     General: Skin is warm and dry.   Neurological:      Mental Status: He is alert and oriented to person, place, and time.      Deep Tendon Reflexes: Reflexes are normal and symmetric.   Psychiatric:         Behavior: Behavior normal.         Thought Content: Thought content normal.          Assessment and Plan     1. Malignant melanoma of scalp    LUCIANO    We discussed his operation and the pathology findings and I will see him back in 3 months with a chest xray and I will send him to see Med Onc          No follow-ups on file.

## 2023-07-21 ENCOUNTER — PATIENT MESSAGE (OUTPATIENT)
Dept: SURGERY | Facility: CLINIC | Age: 78
End: 2023-07-21
Payer: MEDICARE

## 2023-07-22 ENCOUNTER — HOSPITAL ENCOUNTER (OUTPATIENT)
Dept: RADIOLOGY | Facility: HOSPITAL | Age: 78
Discharge: HOME | End: 2023-07-22
Attending: SURGERY
Payer: MEDICARE

## 2023-07-22 DIAGNOSIS — C43.4 MALIGNANT MELANOMA OF SCALP: ICD-10-CM

## 2023-07-22 PROCEDURE — 71046 XR CHEST PA AND LATERAL: ICD-10-PCS | Mod: 26,,, | Performed by: RADIOLOGY

## 2023-07-22 PROCEDURE — 71046 X-RAY EXAM CHEST 2 VIEWS: CPT | Mod: 26,,, | Performed by: RADIOLOGY

## 2023-07-22 PROCEDURE — 71046 X-RAY EXAM CHEST 2 VIEWS: CPT | Mod: TC,FY

## 2023-07-27 ENCOUNTER — OFFICE VISIT (OUTPATIENT)
Dept: HEMATOLOGY/ONCOLOGY | Facility: CLINIC | Age: 78
End: 2023-07-27
Payer: MEDICARE

## 2023-07-27 VITALS
BODY MASS INDEX: 20.93 KG/M2 | DIASTOLIC BLOOD PRESSURE: 85 MMHG | WEIGHT: 149.5 LBS | TEMPERATURE: 98 F | HEIGHT: 71 IN | OXYGEN SATURATION: 96 % | HEART RATE: 85 BPM | SYSTOLIC BLOOD PRESSURE: 129 MMHG

## 2023-07-27 DIAGNOSIS — C43.4 MALIGNANT MELANOMA OF SCALP: Primary | ICD-10-CM

## 2023-07-27 PROCEDURE — 1126F AMNT PAIN NOTED NONE PRSNT: CPT | Mod: CPTII,S$GLB,, | Performed by: INTERNAL MEDICINE

## 2023-07-27 PROCEDURE — 1126F PR PAIN SEVERITY QUANTIFIED, NO PAIN PRESENT: ICD-10-PCS | Mod: CPTII,S$GLB,, | Performed by: INTERNAL MEDICINE

## 2023-07-27 PROCEDURE — 3079F DIAST BP 80-89 MM HG: CPT | Mod: CPTII,S$GLB,, | Performed by: INTERNAL MEDICINE

## 2023-07-27 PROCEDURE — 99205 OFFICE O/P NEW HI 60 MIN: CPT | Mod: S$GLB,,, | Performed by: INTERNAL MEDICINE

## 2023-07-27 PROCEDURE — 99999 PR PBB SHADOW E&M-EST. PATIENT-LVL IV: CPT | Mod: PBBFAC,,, | Performed by: INTERNAL MEDICINE

## 2023-07-27 PROCEDURE — 3074F SYST BP LT 130 MM HG: CPT | Mod: CPTII,S$GLB,, | Performed by: INTERNAL MEDICINE

## 2023-07-27 PROCEDURE — 3074F PR MOST RECENT SYSTOLIC BLOOD PRESSURE < 130 MM HG: ICD-10-PCS | Mod: CPTII,S$GLB,, | Performed by: INTERNAL MEDICINE

## 2023-07-27 PROCEDURE — 3079F PR MOST RECENT DIASTOLIC BLOOD PRESSURE 80-89 MM HG: ICD-10-PCS | Mod: CPTII,S$GLB,, | Performed by: INTERNAL MEDICINE

## 2023-07-27 PROCEDURE — 99999 PR PBB SHADOW E&M-EST. PATIENT-LVL IV: ICD-10-PCS | Mod: PBBFAC,,, | Performed by: INTERNAL MEDICINE

## 2023-07-27 PROCEDURE — 99205 PR OFFICE/OUTPT VISIT, NEW, LEVL V, 60-74 MIN: ICD-10-PCS | Mod: S$GLB,,, | Performed by: INTERNAL MEDICINE

## 2023-07-27 NOTE — PROGRESS NOTES
Subjective     Patient ID: Antonio Torres Jr. is a 78 y.o. male.    Chief Complaint: No chief complaint on file.  Reason For Consultation: Melanoma  HPI  77 yo male with history of malignant melanoma anterior scalp diagnosed  and  re excision of his melanoma with dermal graft placement on 7/7/23  Pathology shows MALIGNANT MELANOMA, ULCERATED, 4.0 MM IN DEPTH (pT4b),  without complaints. He is also treated for  right dorsal foot wound s/p  debridement .Accompanied by family members whom provide most of history. Family reports first noted skin lesion May [id number]. She reports 2 skin lesions on scalp present and one was removed at that time. She is also followed by plastic surgery, Dr Montano. He is also followed closed by Dermatology, Dr. Pearl. He also has history of squamous cell carcinoma of skin. No complaints today. His scalp wounds are healing slowly. No family history of melanoma. PET/CT on 6/1/2023 no evidence of distant mets He is here for further evaluation.          Past Medical History:   Diagnosis Date    Chronic kidney disease     Depression     Hematuria     had neg work up with Dr. Stafford    Hypercholesterolemia 03/01/2013    Schizophrenia     Skin cancer of scalp     Syncope 04/06/2023       Review of Systems   Constitutional:  Negative for appetite change, fatigue, fever and unexpected weight change.   HENT:  Negative for mouth sores.    Eyes:  Negative for visual disturbance.   Respiratory:  Negative for cough and shortness of breath.    Cardiovascular:  Negative for chest pain.   Gastrointestinal:  Negative for abdominal pain and diarrhea.   Genitourinary:  Negative for frequency.   Musculoskeletal:  Negative for back pain.   Integumentary:  Positive for wound. Negative for rash.   Neurological:  Negative for headaches.   Hematological:  Negative for adenopathy.   Psychiatric/Behavioral:  The patient is not nervous/anxious.           Objective     Vitals:    07/27/23 1321   BP: 129/85   BP  "Location: Left arm   Patient Position: Sitting   BP Method: Large (Automatic)   Pulse: 85   Temp: 98.2 °F (36.8 °C)   TempSrc: Oral   SpO2: 96%   Weight: 67.8 kg (149 lb 7.6 oz)   Height: 5' 10.5" (1.791 m)             Physical Exam  Constitutional:       Appearance: He is well-developed.   HENT:      Head: Normocephalic.      Mouth/Throat:      Pharynx: No oropharyngeal exudate.   Eyes:      General: No scleral icterus.        Right eye: No discharge.         Left eye: No discharge.      Conjunctiva/sclera: Conjunctivae normal.   Neck:      Thyroid: No thyromegaly.   Cardiovascular:      Rate and Rhythm: Normal rate and regular rhythm.      Heart sounds: Normal heart sounds. No murmur heard.  Pulmonary:      Effort: Pulmonary effort is normal.      Breath sounds: Normal breath sounds. No wheezing or rales.   Abdominal:      General: Bowel sounds are normal.      Palpations: Abdomen is soft.      Tenderness: There is no abdominal tenderness. There is no guarding or rebound.   Musculoskeletal:         General: No swelling.      Cervical back: Normal range of motion and neck supple.      Comments: Rt foot wound   Lymphadenopathy:      Cervical: No cervical adenopathy.      Upper Body:      Right upper body: No supraclavicular adenopathy.      Left upper body: No supraclavicular adenopathy.   Skin:     Comments: Scalp wounds covered by graft   Neurological:      Mental Status: He is alert and oriented to person, place, and time.      Cranial Nerves: No cranial nerve deficit.   Psychiatric:         Mood and Affect: Mood normal.         Behavior: Behavior normal.           Skin, posterior scalp, excision: 5/8/23  -SCAR (POST-SURGICAL)   -NO RESIDUAL MELANOMA IN-SITU OR INVASIVE MELANOMA IDENTIFIED, see comment     Comment:  Please refer to synoptic report from previous biopsy from outside report,1/20/2023 ( [email or web address], 75697-1). There is an early invasive squamous cell carcinoma in the left posterior quadrant  " that is completely excised through the   excision.  There is a focus of squamous cell carcinoma in-situ at the posterior tip that is completely excised through the excision.       Skin, scalp, excision:   -MALIGNANT MELANOMA, ULCERATED, 4.0 MM IN DEPTH (pT4b), see synoptic report below     SYNOPTIC REPORT   Procedure:  Excision   Specimen Laterality:  Not specified   Tumor Site:  Scalp   Macroscopic Satellite Nodule(s): Not identified   Histologic Type:  Nodular melanoma with desmoplastic features   Maximum Tumor (Breslow) Thickness:  4.0 mm   Ulceration:  Present   Microsatellite(s): Not identified   Margins:   Peripheral:  Uninvolved by invasive malignant melanoma or malignant melanoma in-situ.   Deep: Involved by invasive malignant melanoma.   Mitotic rate: 11 mm2   Anatomic (Nabeel) level: V   Lympho-vascular invasion: Not identified   Neurotropism:  Present   Tumor infiltrating lymphocytes: Present, non-brisk   Tumor regression: Not identified       PET/CT 6/1/23    Impression:     Two hypermetabolic areas within the right aspect of the scalp noting reported operative change status post melanoma resection.  Recommend clinical correlation.  No enlarged or hypermetabolic lymph nodes.     Nonspecific areas of increased FDG avidity within the medial aspect the spleen without CT correlation.  Attention on follow-up.     Ulceration and inflammatory changes of the subcutaneous tissues with increased FDG avidity overlying the right ankle.  Known chronic right distal fibular fracture. Recommend clinical correlation.       Assessment and Plan     1 Malignant melanoma of scalp   77 y/o with MALIGNANT MELANOMA, ULCERATED, 4.0 MM IN DEPTH (pT4b), of scalp s/p re excision on 7/7/23 \  Pt with high risk node negative disease  We discussed surveillance vs adjuvant immunotherapy x 1 yr namely pembrolizumab in this setting  We discussed potential toxicities of IOT  Plan to follow up on any available clinical trials [place] [facility]  PET/CT  on 6/1/2023 no evidence of distant mets  He will need to continue close follow up with his treating dermatologist every 3-4mos for first 2-3 yrs, then q 6mos for up to 5 yrs  CT of the chest, abdomen, and pelvis every six months for up to three years, then annually for up to five years. A  -     CBC Auto Differential; Future; Expected date: 07/27/2023  -     Comprehensive Metabolic Panel; Future; Expected date: 07/27/2023  -     Lactate Dehydrogenase; Future; Expected date: 07/27/2023    Following detailed discussion and question and answer session, patient and family hesitant to undergo adjuvant therapy   He will follow up in 1month

## 2023-08-09 ENCOUNTER — PATIENT MESSAGE (OUTPATIENT)
Dept: SURGERY | Facility: CLINIC | Age: 78
End: 2023-08-09
Payer: MEDICARE

## 2023-08-11 ENCOUNTER — LAB VISIT (OUTPATIENT)
Dept: LAB | Facility: HOSPITAL | Age: 78
End: 2023-08-11
Attending: INTERNAL MEDICINE
Payer: MEDICARE

## 2023-08-11 DIAGNOSIS — C43.4 MALIGNANT MELANOMA OF SKIN OF SCALP AND NECK: ICD-10-CM

## 2023-08-11 LAB
ALBUMIN SERPL BCP-MCNC: 3.9 G/DL (ref 3.5–5.2)
ALP SERPL-CCNC: 102 U/L (ref 55–135)
ALT SERPL W/O P-5'-P-CCNC: 28 U/L (ref 10–44)
ANION GAP SERPL CALC-SCNC: 7 MMOL/L (ref 8–16)
AST SERPL-CCNC: 24 U/L (ref 10–40)
BASOPHILS # BLD AUTO: 0.04 K/UL (ref 0–0.2)
BASOPHILS NFR BLD: 0.4 % (ref 0–1.9)
BILIRUB SERPL-MCNC: 0.6 MG/DL (ref 0.1–1)
BUN SERPL-MCNC: 24 MG/DL (ref 8–23)
CALCIUM SERPL-MCNC: 10.6 MG/DL (ref 8.7–10.5)
CHLORIDE SERPL-SCNC: 106 MMOL/L (ref 95–110)
CO2 SERPL-SCNC: 29 MMOL/L (ref 23–29)
CREAT SERPL-MCNC: 1.6 MG/DL (ref 0.5–1.4)
DIFFERENTIAL METHOD: ABNORMAL
EOSINOPHIL # BLD AUTO: 0.1 K/UL (ref 0–0.5)
EOSINOPHIL NFR BLD: 0.5 % (ref 0–8)
ERYTHROCYTE [DISTWIDTH] IN BLOOD BY AUTOMATED COUNT: 14.6 % (ref 11.5–14.5)
EST. GFR  (NO RACE VARIABLE): 43 ML/MIN/1.73 M^2
GLUCOSE SERPL-MCNC: 88 MG/DL (ref 70–110)
HCT VFR BLD AUTO: 50.2 % (ref 40–54)
HGB BLD-MCNC: 15.6 G/DL (ref 14–18)
IMM GRANULOCYTES # BLD AUTO: 0.03 K/UL (ref 0–0.04)
IMM GRANULOCYTES NFR BLD AUTO: 0.3 % (ref 0–0.5)
LDH SERPL L TO P-CCNC: 142 U/L (ref 110–260)
LYMPHOCYTES # BLD AUTO: 1.3 K/UL (ref 1–4.8)
LYMPHOCYTES NFR BLD: 12.6 % (ref 18–48)
MCH RBC QN AUTO: 29.3 PG (ref 27–31)
MCHC RBC AUTO-ENTMCNC: 31.1 G/DL (ref 32–36)
MCV RBC AUTO: 94 FL (ref 82–98)
MONOCYTES # BLD AUTO: 0.8 K/UL (ref 0.3–1)
MONOCYTES NFR BLD: 7.9 % (ref 4–15)
NEUTROPHILS # BLD AUTO: 8 K/UL (ref 1.8–7.7)
NEUTROPHILS NFR BLD: 78.3 % (ref 38–73)
NRBC BLD-RTO: 0 /100 WBC
PLATELET # BLD AUTO: 254 K/UL (ref 150–450)
PMV BLD AUTO: 9.9 FL (ref 9.2–12.9)
POTASSIUM SERPL-SCNC: 4.7 MMOL/L (ref 3.5–5.1)
PROT SERPL-MCNC: 7.5 G/DL (ref 6–8.4)
RBC # BLD AUTO: 5.33 M/UL (ref 4.6–6.2)
SODIUM SERPL-SCNC: 142 MMOL/L (ref 136–145)
WBC # BLD AUTO: 10.18 K/UL (ref 3.9–12.7)

## 2023-08-11 PROCEDURE — 85025 COMPLETE CBC W/AUTO DIFF WBC: CPT | Performed by: INTERNAL MEDICINE

## 2023-08-11 PROCEDURE — 80053 COMPREHEN METABOLIC PANEL: CPT | Performed by: INTERNAL MEDICINE

## 2023-08-11 PROCEDURE — 36415 COLL VENOUS BLD VENIPUNCTURE: CPT | Performed by: INTERNAL MEDICINE

## 2023-08-11 PROCEDURE — 83615 LACTATE (LD) (LDH) ENZYME: CPT | Performed by: INTERNAL MEDICINE

## 2023-08-14 ENCOUNTER — OFFICE VISIT (OUTPATIENT)
Dept: HEMATOLOGY/ONCOLOGY | Facility: CLINIC | Age: 78
End: 2023-08-14
Payer: MEDICARE

## 2023-08-14 VITALS
TEMPERATURE: 98 F | OXYGEN SATURATION: 97 % | SYSTOLIC BLOOD PRESSURE: 124 MMHG | HEIGHT: 70 IN | BODY MASS INDEX: 21.11 KG/M2 | HEART RATE: 77 BPM | WEIGHT: 147.5 LBS | DIASTOLIC BLOOD PRESSURE: 80 MMHG

## 2023-08-14 DIAGNOSIS — C43.4 MALIGNANT MELANOMA OF SCALP: Primary | ICD-10-CM

## 2023-08-14 PROCEDURE — 3074F PR MOST RECENT SYSTOLIC BLOOD PRESSURE < 130 MM HG: ICD-10-PCS | Mod: CPTII,S$GLB,, | Performed by: INTERNAL MEDICINE

## 2023-08-14 PROCEDURE — 3074F SYST BP LT 130 MM HG: CPT | Mod: CPTII,S$GLB,, | Performed by: INTERNAL MEDICINE

## 2023-08-14 PROCEDURE — 1100F PR PT FALLS ASSESS DOC 2+ FALLS/FALL W/INJURY/YR: ICD-10-PCS | Mod: CPTII,S$GLB,, | Performed by: INTERNAL MEDICINE

## 2023-08-14 PROCEDURE — 99214 PR OFFICE/OUTPT VISIT, EST, LEVL IV, 30-39 MIN: ICD-10-PCS | Mod: S$GLB,,, | Performed by: INTERNAL MEDICINE

## 2023-08-14 PROCEDURE — 3079F DIAST BP 80-89 MM HG: CPT | Mod: CPTII,S$GLB,, | Performed by: INTERNAL MEDICINE

## 2023-08-14 PROCEDURE — 1100F PTFALLS ASSESS-DOCD GE2>/YR: CPT | Mod: CPTII,S$GLB,, | Performed by: INTERNAL MEDICINE

## 2023-08-14 PROCEDURE — 3288F FALL RISK ASSESSMENT DOCD: CPT | Mod: CPTII,S$GLB,, | Performed by: INTERNAL MEDICINE

## 2023-08-14 PROCEDURE — 99999 PR PBB SHADOW E&M-EST. PATIENT-LVL III: CPT | Mod: PBBFAC,,, | Performed by: INTERNAL MEDICINE

## 2023-08-14 PROCEDURE — 99999 PR PBB SHADOW E&M-EST. PATIENT-LVL III: ICD-10-PCS | Mod: PBBFAC,,, | Performed by: INTERNAL MEDICINE

## 2023-08-14 PROCEDURE — 99214 OFFICE O/P EST MOD 30 MIN: CPT | Mod: S$GLB,,, | Performed by: INTERNAL MEDICINE

## 2023-08-14 PROCEDURE — 3288F PR FALLS RISK ASSESSMENT DOCUMENTED: ICD-10-PCS | Mod: CPTII,S$GLB,, | Performed by: INTERNAL MEDICINE

## 2023-08-14 PROCEDURE — 1126F PR PAIN SEVERITY QUANTIFIED, NO PAIN PRESENT: ICD-10-PCS | Mod: CPTII,S$GLB,, | Performed by: INTERNAL MEDICINE

## 2023-08-14 PROCEDURE — 3079F PR MOST RECENT DIASTOLIC BLOOD PRESSURE 80-89 MM HG: ICD-10-PCS | Mod: CPTII,S$GLB,, | Performed by: INTERNAL MEDICINE

## 2023-08-14 PROCEDURE — 1126F AMNT PAIN NOTED NONE PRSNT: CPT | Mod: CPTII,S$GLB,, | Performed by: INTERNAL MEDICINE

## 2023-08-14 NOTE — PROGRESS NOTES
Subjective     Patient ID: Antonio Torres Jr. is a 78 y.o. male.    Chief Complaint: Follow-up (3 week follow up.  Patient states that he is just a little week, but states that that's normal for him. )    HPI 77 yo male with history of malignant melanoma anterior scalp diagnosed  and  re excision of his melanoma with dermal graft placement on 7/7/23  Pathology shows MALIGNANT MELANOMA, ULCERATED, 4.0 MM IN DEPTH (pT4b),  without complaints. He is also treated for  right dorsal foot wound s/p  debridement .Accompanied by family members whom provide most of history. Family reports first noted skin lesion May [address]. She reports 2 skin lesions on scalp present and one was removed at that time. She is also followed by plastic surgery, Dr Montano. He is also followed closed by Dermatology, Dr. Pearl. He also has history of squamous cell carcinoma of skin. No complaints today. His scalp wounds are healing slowly. No family history of melanoma. PET/CT on 6/1/2023 no evidence of distant mets        Interval Hx: Accompanied by family members  No new issues  Doing well      Review of Systems   Constitutional:  Negative for appetite change, fatigue, fever and unexpected weight change.   HENT:  Negative for mouth sores.    Eyes:  Negative for visual disturbance.   Respiratory:  Negative for cough and shortness of breath.    Cardiovascular:  Negative for chest pain.   Gastrointestinal:  Negative for abdominal pain and diarrhea.   Genitourinary:  Negative for frequency.   Musculoskeletal:  Negative for back pain.        Rt foot wound   Integumentary:  Positive for wound. Negative for rash.   Neurological:  Negative for headaches.   Hematological:  Negative for adenopathy.   Psychiatric/Behavioral:  The patient is not nervous/anxious.           Objective       Vitals:    08/14/23 0808   BP: 124/80   BP Location: Right arm   Patient Position: Sitting   BP Method: Medium (Manual)   Pulse: 77   Temp: 98.3 °F (36.8 °C)   TempSrc:  "Oral   SpO2: 97%   Weight: 66.9 kg (147 lb 7.8 oz)   Height: 5' 10" (1.778 m)         Physical Exam  Constitutional:       Appearance: He is well-developed.   HENT:      Head: Normocephalic.      Mouth/Throat:      Pharynx: No oropharyngeal exudate.   Eyes:      General: No scleral icterus.        Right eye: No discharge.         Left eye: No discharge.      Conjunctiva/sclera: Conjunctivae normal.   Neck:      Thyroid: No thyromegaly.   Cardiovascular:      Rate and Rhythm: Normal rate and regular rhythm.      Heart sounds: Normal heart sounds. No murmur heard.  Pulmonary:      Effort: Pulmonary effort is normal.      Breath sounds: Normal breath sounds. No wheezing or rales.   Abdominal:      General: Bowel sounds are normal.      Palpations: Abdomen is soft.      Tenderness: There is no abdominal tenderness. There is no guarding or rebound.   Musculoskeletal:         General: No swelling.      Cervical back: Normal range of motion and neck supple.      Comments: Rt foot wound   Lymphadenopathy:      Cervical: No cervical adenopathy.      Upper Body:      Right upper body: No supraclavicular adenopathy.      Left upper body: No supraclavicular adenopathy.   Skin:     Findings: No erythema or rash.      Comments: Scalp wounds covered by grafts   Neurological:      Mental Status: He is alert and oriented to person, place, and time.      Cranial Nerves: No cranial nerve deficit.   Psychiatric:         Mood and Affect: Mood normal.         Behavior: Behavior normal.                   Skin, scalp, excision:   -MALIGNANT MELANOMA, ULCERATED, 4.0 MM IN DEPTH (pT4b), see synoptic report below     SYNOPTIC REPORT   Procedure:  Excision   Specimen Laterality:  Not specified   Tumor Site:  Scalp   Macroscopic Satellite Nodule(s): Not identified   Histologic Type:  Nodular melanoma with desmoplastic features   Maximum Tumor (Breslow) Thickness:  4.0 mm   Ulceration:  Present   Microsatellite(s): Not identified   Margins: "   Peripheral:  Uninvolved by invasive malignant melanoma or malignant melanoma in-situ.   Deep: Involved by invasive malignant melanoma.   Mitotic rate: 11 mm2   Anatomic (Nabeel) level: V   Lympho-vascular invasion: Not identified   Neurotropism:  Present   Tumor infiltrating lymphocytes: Present, non-brisk   Tumor regression: Not identified       PET/CT 6/1/23    Impression:     Two hypermetabolic areas within the right aspect of the scalp noting reported operative change status post melanoma resection.  Recommend clinical correlation.  No enlarged or hypermetabolic lymph nodes.     Nonspecific areas of increased FDG avidity within the medial aspect the spleen without CT correlation.  Attention on follow-up.     Ulceration and inflammatory changes of the subcutaneous tissues with increased FDG avidity overlying the right ankle.  Known chronic right distal fibular fracture. Recommend clinical correlation.           Assessment and Plan     1. Malignant melanoma of skin of scalp and neck  79 y/o with MALIGNANT MELANOMA, ULCERATED, 4.0 MM IN DEPTH (pT4b), of scalp s/p re excision on 7/7/23 \  Pt with high risk node negative disease  We discussed surveillance vs adjuvant immunotherapy x 1 yr namely pembrolizumab in this setting  We discussed potential toxicities of IOT  Currently [place] [facility] interested   PET/CT on 6/1/2023 no evidence of distant mets  Following detailed discussion, it has been elected to continue surveillance   He will need to continue close follow up with his treating dermatologist every 3-4mos for first 2-3 yrs, then q 6mos for up to 5 yrs  CT of the chest, abdomen, and pelvis every six months for up to three years, then annually for up to five years. A  -     CBC Auto Differential; Future; Expected date: 07/27/2023  -     Comprehensive Metabolic Panel; Future; Expected date: 07/27/2023  -     Lactate Dehydrogenase; Future; Expected date: 07/27/2023

## 2023-08-16 DIAGNOSIS — C43.4 MALIGNANT MELANOMA OF SKIN OF SCALP AND NECK: Primary | ICD-10-CM

## 2023-08-17 ENCOUNTER — TELEPHONE (OUTPATIENT)
Dept: HEMATOLOGY/ONCOLOGY | Facility: CLINIC | Age: 78
End: 2023-08-17
Payer: MEDICARE

## 2023-08-17 DIAGNOSIS — C43.4 MALIGNANT MELANOMA OF SKIN OF SCALP AND NECK: Primary | ICD-10-CM

## 2023-08-21 ENCOUNTER — LAB VISIT (OUTPATIENT)
Dept: LAB | Facility: HOSPITAL | Age: 78
End: 2023-08-21
Attending: INTERNAL MEDICINE
Payer: MEDICARE

## 2023-08-21 DIAGNOSIS — C43.4 MALIGNANT MELANOMA OF SKIN OF SCALP AND NECK: ICD-10-CM

## 2023-08-21 PROCEDURE — 36415 COLL VENOUS BLD VENIPUNCTURE: CPT | Performed by: INTERNAL MEDICINE

## 2023-08-28 LAB
DNA RANGE(S) EXAMINED NAR: NORMAL
GENE DIS ANL INTERP-IMP: POSITIVE
GENE DIS ASSESSED: NORMAL
MSI CA SPEC-IMP: NOT DETECTED
REASON FOR STUDY: NORMAL
TEMPUS LCA: NORMAL
TEMPUS PORTAL: NORMAL

## 2023-09-07 DIAGNOSIS — F20.9 SCHIZOPHRENIA, UNSPECIFIED TYPE: ICD-10-CM

## 2023-09-07 RX ORDER — ARIPIPRAZOLE 20 MG/1
TABLET ORAL
Qty: 90 TABLET | Refills: 2 | Status: SHIPPED | OUTPATIENT
Start: 2023-09-07

## 2023-09-07 NOTE — TELEPHONE ENCOUNTER
No care due was identified.  [facility] Embedded Care Due Messages. Reference number: [id number].   9/07/2023 8:03:22 AM CDT

## 2023-09-11 LAB
DNA RANGE(S) EXAMINED NAR: NORMAL
GENE DIS ANL INTERP-IMP: POSITIVE
GENE DIS ASSESSED: NORMAL
GENE MUT TESTED BLD/T: 47.9 M/MB
MSI CA SPEC-IMP: NORMAL
REASON FOR STUDY: NORMAL
TEMPUS AMENDMENTNOTE1: NORMAL
TEMPUS FUSIONADDENDUM: NORMAL
TEMPUS LCA: NORMAL
TEMPUS PD-L1 (28-8) CELLS.PD-L1/100 VIAB TUM NFR TISS IMSTN: 30 %
TEMPUS PERTINENTNEGATIVES: NORMAL
TEMPUS PORTAL: NORMAL
TEMPUS THERAPY1: NORMAL
TEMPUS THERAPYCOUNT: 1
TEMPUS TRIAL1: NORMAL
TEMPUS TRIAL2: NORMAL
TEMPUS TRIAL3: NORMAL
TEMPUS TRIALCOUNT: 3

## 2023-09-12 ENCOUNTER — OFFICE VISIT (OUTPATIENT)
Dept: FAMILY MEDICINE | Facility: CLINIC | Age: 78
End: 2023-09-12
Payer: MEDICARE

## 2023-09-12 VITALS
BODY MASS INDEX: 22.09 KG/M2 | TEMPERATURE: 99 F | HEIGHT: 70 IN | DIASTOLIC BLOOD PRESSURE: 80 MMHG | OXYGEN SATURATION: 96 % | HEART RATE: 77 BPM | WEIGHT: 154.31 LBS | SYSTOLIC BLOOD PRESSURE: 118 MMHG

## 2023-09-12 DIAGNOSIS — Z00.00 ANNUAL PHYSICAL EXAM: Primary | ICD-10-CM

## 2023-09-12 DIAGNOSIS — F20.9 SCHIZOPHRENIA, UNSPECIFIED TYPE: ICD-10-CM

## 2023-09-12 DIAGNOSIS — D48.9 NEOPLASM OF UNCERTAIN BEHAVIOR: ICD-10-CM

## 2023-09-12 DIAGNOSIS — N18.32 STAGE 3B CHRONIC KIDNEY DISEASE: ICD-10-CM

## 2023-09-12 DIAGNOSIS — E79.0 HYPERURICEMIA: ICD-10-CM

## 2023-09-12 DIAGNOSIS — N13.8 BPH WITH URINARY OBSTRUCTION: ICD-10-CM

## 2023-09-12 DIAGNOSIS — E78.00 HYPERCHOLESTEROLEMIA: ICD-10-CM

## 2023-09-12 DIAGNOSIS — N40.1 BPH WITH URINARY OBSTRUCTION: ICD-10-CM

## 2023-09-12 PROCEDURE — 1100F PTFALLS ASSESS-DOCD GE2>/YR: CPT | Mod: CPTII,S$GLB,, | Performed by: FAMILY MEDICINE

## 2023-09-12 PROCEDURE — 99397 PR PREVENTIVE VISIT,EST,65 & OVER: ICD-10-PCS | Mod: GZ,S$GLB,, | Performed by: FAMILY MEDICINE

## 2023-09-12 PROCEDURE — 99999 PR PBB SHADOW E&M-EST. PATIENT-LVL III: ICD-10-PCS | Mod: PBBFAC,,, | Performed by: FAMILY MEDICINE

## 2023-09-12 PROCEDURE — 3288F PR FALLS RISK ASSESSMENT DOCUMENTED: ICD-10-PCS | Mod: CPTII,S$GLB,, | Performed by: FAMILY MEDICINE

## 2023-09-12 PROCEDURE — 1159F MED LIST DOCD IN RCRD: CPT | Mod: CPTII,S$GLB,, | Performed by: FAMILY MEDICINE

## 2023-09-12 PROCEDURE — 99999 PR PBB SHADOW E&M-EST. PATIENT-LVL III: CPT | Mod: PBBFAC,,, | Performed by: FAMILY MEDICINE

## 2023-09-12 PROCEDURE — 99397 PER PM REEVAL EST PAT 65+ YR: CPT | Mod: GZ,S$GLB,, | Performed by: FAMILY MEDICINE

## 2023-09-12 PROCEDURE — 3079F DIAST BP 80-89 MM HG: CPT | Mod: CPTII,S$GLB,, | Performed by: FAMILY MEDICINE

## 2023-09-12 PROCEDURE — 3074F PR MOST RECENT SYSTOLIC BLOOD PRESSURE < 130 MM HG: ICD-10-PCS | Mod: CPTII,S$GLB,, | Performed by: FAMILY MEDICINE

## 2023-09-12 PROCEDURE — 1100F PR PT FALLS ASSESS DOC 2+ FALLS/FALL W/INJURY/YR: ICD-10-PCS | Mod: CPTII,S$GLB,, | Performed by: FAMILY MEDICINE

## 2023-09-12 PROCEDURE — 1159F PR MEDICATION LIST DOCUMENTED IN MEDICAL RECORD: ICD-10-PCS | Mod: CPTII,S$GLB,, | Performed by: FAMILY MEDICINE

## 2023-09-12 PROCEDURE — 1126F AMNT PAIN NOTED NONE PRSNT: CPT | Mod: CPTII,S$GLB,, | Performed by: FAMILY MEDICINE

## 2023-09-12 PROCEDURE — 3288F FALL RISK ASSESSMENT DOCD: CPT | Mod: CPTII,S$GLB,, | Performed by: FAMILY MEDICINE

## 2023-09-12 PROCEDURE — 1126F PR PAIN SEVERITY QUANTIFIED, NO PAIN PRESENT: ICD-10-PCS | Mod: CPTII,S$GLB,, | Performed by: FAMILY MEDICINE

## 2023-09-12 PROCEDURE — 3079F PR MOST RECENT DIASTOLIC BLOOD PRESSURE 80-89 MM HG: ICD-10-PCS | Mod: CPTII,S$GLB,, | Performed by: FAMILY MEDICINE

## 2023-09-12 PROCEDURE — 3074F SYST BP LT 130 MM HG: CPT | Mod: CPTII,S$GLB,, | Performed by: FAMILY MEDICINE

## 2023-09-12 RX ORDER — MUPIROCIN 20 MG/G
OINTMENT TOPICAL
COMMUNITY
Start: 2023-07-06

## 2023-09-17 PROBLEM — R79.89 ELEVATED TROPONIN LEVEL NOT DUE TO ACUTE CORONARY SYNDROME: Status: RESOLVED | Noted: 2023-04-07 | Resolved: 2023-09-17

## 2023-09-17 PROBLEM — S82.401A CLOSED RIGHT FIBULAR FRACTURE: Status: RESOLVED | Noted: 2023-04-06 | Resolved: 2023-09-17

## 2023-09-17 PROBLEM — R55 SYNCOPE: Status: RESOLVED | Noted: 2023-04-06 | Resolved: 2023-09-17

## 2023-09-18 ENCOUNTER — LAB VISIT (OUTPATIENT)
Dept: LAB | Facility: HOSPITAL | Age: 78
End: 2023-09-18
Attending: FAMILY MEDICINE
Payer: MEDICARE

## 2023-09-18 ENCOUNTER — IMMUNIZATION (OUTPATIENT)
Dept: FAMILY MEDICINE | Facility: CLINIC | Age: 78
End: 2023-09-18
Payer: MEDICARE

## 2023-09-18 DIAGNOSIS — N18.32 STAGE 3B CHRONIC KIDNEY DISEASE: ICD-10-CM

## 2023-09-18 LAB
ANION GAP SERPL CALC-SCNC: 14 MMOL/L (ref 8–16)
BUN SERPL-MCNC: 21 MG/DL (ref 8–23)
CALCIUM SERPL-MCNC: 10.4 MG/DL (ref 8.7–10.5)
CHLORIDE SERPL-SCNC: 102 MMOL/L (ref 95–110)
CO2 SERPL-SCNC: 24 MMOL/L (ref 23–29)
CREAT SERPL-MCNC: 1.5 MG/DL (ref 0.5–1.4)
EST. GFR  (NO RACE VARIABLE): 47 ML/MIN/1.73 M^2
GLUCOSE SERPL-MCNC: 75 MG/DL (ref 70–110)
POTASSIUM SERPL-SCNC: 4.3 MMOL/L (ref 3.5–5.1)
SODIUM SERPL-SCNC: 140 MMOL/L (ref 136–145)

## 2023-09-18 PROCEDURE — 90694 FLU VACCINE - QUADRIVALENT - ADJUVANTED: ICD-10-PCS | Mod: S$GLB,,, | Performed by: FAMILY MEDICINE

## 2023-09-18 PROCEDURE — G0008 ADMIN INFLUENZA VIRUS VAC: HCPCS | Mod: S$GLB,,, | Performed by: FAMILY MEDICINE

## 2023-09-18 PROCEDURE — G0008 FLU VACCINE - QUADRIVALENT - ADJUVANTED: ICD-10-PCS | Mod: S$GLB,,, | Performed by: FAMILY MEDICINE

## 2023-09-18 PROCEDURE — 36415 COLL VENOUS BLD VENIPUNCTURE: CPT | Mod: PO | Performed by: FAMILY MEDICINE

## 2023-09-18 PROCEDURE — 80048 BASIC METABOLIC PNL TOTAL CA: CPT | Performed by: FAMILY MEDICINE

## 2023-09-18 PROCEDURE — 90694 VACC AIIV4 NO PRSRV 0.5ML IM: CPT | Mod: S$GLB,,, | Performed by: FAMILY MEDICINE

## 2023-09-26 ENCOUNTER — PATIENT MESSAGE (OUTPATIENT)
Dept: FAMILY MEDICINE | Facility: CLINIC | Age: 78
End: 2023-09-26
Payer: MEDICARE

## 2023-11-03 ENCOUNTER — LAB VISIT (OUTPATIENT)
Dept: LAB | Facility: HOSPITAL | Age: 78
End: 2023-11-03
Attending: INTERNAL MEDICINE
Payer: MEDICARE

## 2023-11-03 DIAGNOSIS — C43.4 MALIGNANT MELANOMA OF SCALP: ICD-10-CM

## 2023-11-03 LAB
ALBUMIN SERPL BCP-MCNC: 4 G/DL (ref 3.5–5.2)
ALP SERPL-CCNC: 89 U/L (ref 55–135)
ALT SERPL W/O P-5'-P-CCNC: 28 U/L (ref 10–44)
ANION GAP SERPL CALC-SCNC: 9 MMOL/L (ref 8–16)
AST SERPL-CCNC: 25 U/L (ref 10–40)
BASOPHILS # BLD AUTO: 0.04 K/UL (ref 0–0.2)
BASOPHILS NFR BLD: 0.5 % (ref 0–1.9)
BILIRUB SERPL-MCNC: 0.7 MG/DL (ref 0.1–1)
BUN SERPL-MCNC: 19 MG/DL (ref 8–23)
CALCIUM SERPL-MCNC: 10.1 MG/DL (ref 8.7–10.5)
CALCIUM SERPL-MCNC: 10.1 MG/DL (ref 8.7–10.5)
CHLORIDE SERPL-SCNC: 106 MMOL/L (ref 95–110)
CO2 SERPL-SCNC: 26 MMOL/L (ref 23–29)
CREAT SERPL-MCNC: 1.6 MG/DL (ref 0.5–1.4)
DIFFERENTIAL METHOD: ABNORMAL
EOSINOPHIL # BLD AUTO: 0.1 K/UL (ref 0–0.5)
EOSINOPHIL NFR BLD: 1.4 % (ref 0–8)
ERYTHROCYTE [DISTWIDTH] IN BLOOD BY AUTOMATED COUNT: 13.6 % (ref 11.5–14.5)
EST. GFR  (NO RACE VARIABLE): 43 ML/MIN/1.73 M^2
GLUCOSE SERPL-MCNC: 88 MG/DL (ref 70–110)
HCT VFR BLD AUTO: 49 % (ref 40–54)
HGB BLD-MCNC: 16.1 G/DL (ref 14–18)
IMM GRANULOCYTES # BLD AUTO: 0.02 K/UL (ref 0–0.04)
IMM GRANULOCYTES NFR BLD AUTO: 0.2 % (ref 0–0.5)
LDH SERPL L TO P-CCNC: 160 U/L (ref 110–260)
LYMPHOCYTES # BLD AUTO: 1.3 K/UL (ref 1–4.8)
LYMPHOCYTES NFR BLD: 16 % (ref 18–48)
MCH RBC QN AUTO: 30.5 PG (ref 27–31)
MCHC RBC AUTO-ENTMCNC: 32.9 G/DL (ref 32–36)
MCV RBC AUTO: 93 FL (ref 82–98)
MONOCYTES # BLD AUTO: 0.6 K/UL (ref 0.3–1)
MONOCYTES NFR BLD: 7.7 % (ref 4–15)
NEUTROPHILS # BLD AUTO: 6 K/UL (ref 1.8–7.7)
NEUTROPHILS NFR BLD: 74.2 % (ref 38–73)
NRBC BLD-RTO: 0 /100 WBC
PLATELET # BLD AUTO: 206 K/UL (ref 150–450)
PMV BLD AUTO: 9.6 FL (ref 9.2–12.9)
POTASSIUM SERPL-SCNC: 4.2 MMOL/L (ref 3.5–5.1)
PROT SERPL-MCNC: 7.2 G/DL (ref 6–8.4)
RBC # BLD AUTO: 5.28 M/UL (ref 4.6–6.2)
SODIUM SERPL-SCNC: 141 MMOL/L (ref 136–145)
WBC # BLD AUTO: 8.1 K/UL (ref 3.9–12.7)

## 2023-11-03 PROCEDURE — 36415 COLL VENOUS BLD VENIPUNCTURE: CPT | Performed by: INTERNAL MEDICINE

## 2023-11-03 PROCEDURE — 83615 LACTATE (LD) (LDH) ENZYME: CPT | Performed by: INTERNAL MEDICINE

## 2023-11-03 PROCEDURE — 85025 COMPLETE CBC W/AUTO DIFF WBC: CPT | Performed by: INTERNAL MEDICINE

## 2023-11-03 PROCEDURE — 80053 COMPREHEN METABOLIC PANEL: CPT | Performed by: INTERNAL MEDICINE

## 2023-11-07 ENCOUNTER — OFFICE VISIT (OUTPATIENT)
Dept: HEMATOLOGY/ONCOLOGY | Facility: CLINIC | Age: 78
End: 2023-11-07
Payer: MEDICARE

## 2023-11-07 VITALS
BODY MASS INDEX: 21.55 KG/M2 | OXYGEN SATURATION: 98 % | DIASTOLIC BLOOD PRESSURE: 75 MMHG | SYSTOLIC BLOOD PRESSURE: 116 MMHG | HEIGHT: 70 IN | HEART RATE: 75 BPM | WEIGHT: 150.56 LBS

## 2023-11-07 DIAGNOSIS — N18.32 STAGE 3B CHRONIC KIDNEY DISEASE: ICD-10-CM

## 2023-11-07 DIAGNOSIS — C43.4 MALIGNANT MELANOMA OF SCALP: Primary | ICD-10-CM

## 2023-11-07 PROCEDURE — 3288F FALL RISK ASSESSMENT DOCD: CPT | Mod: CPTII,S$GLB,, | Performed by: INTERNAL MEDICINE

## 2023-11-07 PROCEDURE — 99999 PR PBB SHADOW E&M-EST. PATIENT-LVL III: CPT | Mod: PBBFAC,,, | Performed by: INTERNAL MEDICINE

## 2023-11-07 PROCEDURE — 99214 OFFICE O/P EST MOD 30 MIN: CPT | Mod: S$GLB,,, | Performed by: INTERNAL MEDICINE

## 2023-11-07 PROCEDURE — 3288F PR FALLS RISK ASSESSMENT DOCUMENTED: ICD-10-PCS | Mod: CPTII,S$GLB,, | Performed by: INTERNAL MEDICINE

## 2023-11-07 PROCEDURE — 3078F DIAST BP <80 MM HG: CPT | Mod: CPTII,S$GLB,, | Performed by: INTERNAL MEDICINE

## 2023-11-07 PROCEDURE — 1101F PT FALLS ASSESS-DOCD LE1/YR: CPT | Mod: CPTII,S$GLB,, | Performed by: INTERNAL MEDICINE

## 2023-11-07 PROCEDURE — 1126F AMNT PAIN NOTED NONE PRSNT: CPT | Mod: CPTII,S$GLB,, | Performed by: INTERNAL MEDICINE

## 2023-11-07 PROCEDURE — 3074F PR MOST RECENT SYSTOLIC BLOOD PRESSURE < 130 MM HG: ICD-10-PCS | Mod: CPTII,S$GLB,, | Performed by: INTERNAL MEDICINE

## 2023-11-07 PROCEDURE — 1126F PR PAIN SEVERITY QUANTIFIED, NO PAIN PRESENT: ICD-10-PCS | Mod: CPTII,S$GLB,, | Performed by: INTERNAL MEDICINE

## 2023-11-07 PROCEDURE — 3078F PR MOST RECENT DIASTOLIC BLOOD PRESSURE < 80 MM HG: ICD-10-PCS | Mod: CPTII,S$GLB,, | Performed by: INTERNAL MEDICINE

## 2023-11-07 PROCEDURE — 1159F PR MEDICATION LIST DOCUMENTED IN MEDICAL RECORD: ICD-10-PCS | Mod: CPTII,S$GLB,, | Performed by: INTERNAL MEDICINE

## 2023-11-07 PROCEDURE — 1159F MED LIST DOCD IN RCRD: CPT | Mod: CPTII,S$GLB,, | Performed by: INTERNAL MEDICINE

## 2023-11-07 PROCEDURE — 1101F PR PT FALLS ASSESS DOC 0-1 FALLS W/OUT INJ PAST YR: ICD-10-PCS | Mod: CPTII,S$GLB,, | Performed by: INTERNAL MEDICINE

## 2023-11-07 PROCEDURE — 99999 PR PBB SHADOW E&M-EST. PATIENT-LVL III: ICD-10-PCS | Mod: PBBFAC,,, | Performed by: INTERNAL MEDICINE

## 2023-11-07 PROCEDURE — 3074F SYST BP LT 130 MM HG: CPT | Mod: CPTII,S$GLB,, | Performed by: INTERNAL MEDICINE

## 2023-11-07 PROCEDURE — 99214 PR OFFICE/OUTPT VISIT, EST, LEVL IV, 30-39 MIN: ICD-10-PCS | Mod: S$GLB,,, | Performed by: INTERNAL MEDICINE

## 2023-11-07 NOTE — PROGRESS NOTES
Subjective     Patient ID: Antonio Torres Jr. is a 78 y.o. male.    Chief Complaint: No chief complaint on file.  Diagnosis: Malignant Melanoma  HPI 79 yo male with history of malignant melanoma anterior scalp diagnosed  and  re excision of his melanoma with dermal graft placement on 7/7/23  Pathology shows MALIGNANT MELANOMA, ULCERATED, 4.0 MM IN DEPTH (pT4b),  without complaints. He is also treated for  right dorsal foot wound s/p  debridement .Accompanied by family members whom provide most of history. Family reports first noted skin lesion May [id number]. She reports 2 skin lesions on scalp present and one was removed at that time. She is also followed by plastic surgery, Dr Montano. He is also followed closed by Dermatology, Dr. Pearl. He also has history of squamous cell carcinoma of skin. No complaints today. His scalp wounds are healing slowly. No family history of melanoma. PET/CT on 6/1/2023 no evidence of distant mets        Interval Hx: Accompanied by family members  Doing well  No SOB/CP  Pt recently underwent repeat scalp bx  Pathology positive for Squamous cell CA   Results currently not available for review        Review of Systems   Constitutional:  Negative for appetite change, fatigue, fever and unexpected weight change.   HENT:  Negative for mouth sores.    Eyes:  Negative for visual disturbance.   Respiratory:  Negative for cough and shortness of breath.    Cardiovascular:  Negative for chest pain.   Gastrointestinal:  Negative for abdominal pain and diarrhea.   Genitourinary:  Negative for frequency.   Musculoskeletal:  Negative for back pain.        Rt foot wound   Integumentary:  Positive for wound. Negative for rash.   Neurological:  Negative for headaches.   Hematological:  Negative for adenopathy.   Psychiatric/Behavioral:  The patient is not nervous/anxious.           Objective       Vitals:    11/07/23 0820   BP: 116/75   Pulse: 75   SpO2: 98%   Weight: 68.3 kg (150 lb 9.2 oz)   Height: 5'  "10" (1.778 m)         Physical Exam  Constitutional:       Appearance: He is well-developed.   HENT:      Head: Normocephalic.      Mouth/Throat:      Pharynx: No oropharyngeal exudate.   Eyes:      General: No scleral icterus.        Right eye: No discharge.         Left eye: No discharge.      Conjunctiva/sclera: Conjunctivae normal.   Neck:      Thyroid: No thyromegaly.   Cardiovascular:      Rate and Rhythm: Normal rate and regular rhythm.      Heart sounds: Normal heart sounds. No murmur heard.  Pulmonary:      Effort: Pulmonary effort is normal.      Breath sounds: Normal breath sounds. No wheezing or rales.   Abdominal:      General: Bowel sounds are normal.      Palpations: Abdomen is soft.      Tenderness: There is no abdominal tenderness. There is no guarding or rebound.   Musculoskeletal:         General: No swelling.      Cervical back: Normal range of motion and neck supple.      Comments: Rt foot wound   Lymphadenopathy:      Cervical: No cervical adenopathy.      Upper Body:      Right upper body: No supraclavicular adenopathy.      Left upper body: No supraclavicular adenopathy.   Skin:     Findings: No erythema or rash.      Comments: Scalp wounds covered by bandage   Neurological:      Mental Status: He is alert and oriented to person, place, and time.      Cranial Nerves: No cranial nerve deficit.   Psychiatric:         Mood and Affect: Mood normal.         Behavior: Behavior normal.                   Skin, scalp, excision:   -MALIGNANT MELANOMA, ULCERATED, 4.0 MM IN DEPTH (pT4b), see synoptic report below     SYNOPTIC REPORT   Procedure:  Excision   Specimen Laterality:  Not specified   Tumor Site:  Scalp   Macroscopic Satellite Nodule(s): Not identified   Histologic Type:  Nodular melanoma with desmoplastic features   Maximum Tumor (Breslow) Thickness:  4.0 mm   Ulceration:  Present   Microsatellite(s): Not identified   Margins:   Peripheral:  Uninvolved by invasive malignant melanoma or " malignant melanoma in-situ.   Deep: Involved by invasive malignant melanoma.   Mitotic rate: 11 mm2   Anatomic (Nabeel) level: V   Lympho-vascular invasion: Not identified   Neurotropism:  Present   Tumor infiltrating lymphocytes: Present, non-brisk   Tumor regression: Not identified       PET/CT 6/1/23    Impression:     Two hypermetabolic areas within the right aspect of the scalp noting reported operative change status post melanoma resection.  Recommend clinical correlation.  No enlarged or hypermetabolic lymph nodes.     Nonspecific areas of increased FDG avidity within the medial aspect the spleen without CT correlation.  Attention on follow-up.     Ulceration and inflammatory changes of the subcutaneous tissues with increased FDG avidity overlying the right ankle.  Known chronic right distal fibular fracture. Recommend clinical correlation.    Lab Results   Component Value Date    WBC 8.10 11/03/2023    HGB 16.1 11/03/2023    HCT 49.0 11/03/2023    MCV 93 11/03/2023     11/03/2023              Assessment and Plan     1. Malignant melanoma of skin of scalp and neck  79 y/o with MALIGNANT MELANOMA, ULCERATED, 4.0 MM IN DEPTH (pT4b), of scalp s/p re excision on 7/7/23   Pt with high risk node negative disease  We discussed surveillance vs adjuvant immunotherapy x 1 yr namely pembrolizumab in this setting  We discussed potential toxicities of IOT  Currently no available clinical trials at [facility], nor is pt interested   PET/CT on 6/1/2023 no evidence of distant mets  Following detailed discussion, it has been elected to continue surveillance   He will need to continue close follow up with his treating dermatologist every 3-4mos for first 2-3 yrs, then q 6mos for up to 5 yrs  CT of the chest, abdomen, and pelvis every six months for up to three years, then annually for up to five years. A  -     CBC Auto Differential; Future; Expected date:   -     Comprehensive Metabolic Panel; Future;  -     Lactate  Dehydrogenase; Future;    Labs reviewed  Clinically doing well  Plan follow up imaging Jan 2. CKD stage 3  Cr 1.6mg/dl on 11/3/23 --stable  Follow up with PCP for med mgt    Labs  3- 4 days prior to CT first week of Jan   F/u 2nd wk of Jan 2024

## 2023-12-29 ENCOUNTER — LAB VISIT (OUTPATIENT)
Dept: LAB | Facility: HOSPITAL | Age: 78
End: 2023-12-29
Attending: INTERNAL MEDICINE
Payer: MEDICARE

## 2023-12-29 DIAGNOSIS — C43.4 MALIGNANT MELANOMA OF SCALP: ICD-10-CM

## 2023-12-29 LAB
ALBUMIN SERPL BCP-MCNC: 4.1 G/DL (ref 3.5–5.2)
ALP SERPL-CCNC: 79 U/L (ref 55–135)
ALT SERPL W/O P-5'-P-CCNC: 28 U/L (ref 10–44)
ANION GAP SERPL CALC-SCNC: 10 MMOL/L (ref 8–16)
AST SERPL-CCNC: 24 U/L (ref 10–40)
BASOPHILS # BLD AUTO: 0.04 K/UL (ref 0–0.2)
BASOPHILS NFR BLD: 0.5 % (ref 0–1.9)
BILIRUB SERPL-MCNC: 0.8 MG/DL (ref 0.1–1)
BUN SERPL-MCNC: 27 MG/DL (ref 8–23)
CALCIUM SERPL-MCNC: 10.4 MG/DL (ref 8.7–10.5)
CHLORIDE SERPL-SCNC: 107 MMOL/L (ref 95–110)
CO2 SERPL-SCNC: 25 MMOL/L (ref 23–29)
CREAT SERPL-MCNC: 1.7 MG/DL (ref 0.5–1.4)
DIFFERENTIAL METHOD BLD: ABNORMAL
EOSINOPHIL # BLD AUTO: 0.1 K/UL (ref 0–0.5)
EOSINOPHIL NFR BLD: 1 % (ref 0–8)
ERYTHROCYTE [DISTWIDTH] IN BLOOD BY AUTOMATED COUNT: 13.5 % (ref 11.5–14.5)
EST. GFR  (NO RACE VARIABLE): 40 ML/MIN/1.73 M^2
GLUCOSE SERPL-MCNC: 100 MG/DL (ref 70–110)
HCT VFR BLD AUTO: 48.6 % (ref 40–54)
HGB BLD-MCNC: 15.6 G/DL (ref 14–18)
IMM GRANULOCYTES # BLD AUTO: 0.02 K/UL (ref 0–0.04)
IMM GRANULOCYTES NFR BLD AUTO: 0.2 % (ref 0–0.5)
LDH SERPL L TO P-CCNC: 171 U/L (ref 110–260)
LYMPHOCYTES # BLD AUTO: 1.3 K/UL (ref 1–4.8)
LYMPHOCYTES NFR BLD: 15 % (ref 18–48)
MCH RBC QN AUTO: 30.5 PG (ref 27–31)
MCHC RBC AUTO-ENTMCNC: 32.1 G/DL (ref 32–36)
MCV RBC AUTO: 95 FL (ref 82–98)
MONOCYTES # BLD AUTO: 0.7 K/UL (ref 0.3–1)
MONOCYTES NFR BLD: 7.5 % (ref 4–15)
NEUTROPHILS # BLD AUTO: 6.6 K/UL (ref 1.8–7.7)
NEUTROPHILS NFR BLD: 75.8 % (ref 38–73)
NRBC BLD-RTO: 0 /100 WBC
PLATELET # BLD AUTO: 203 K/UL (ref 150–450)
PMV BLD AUTO: 9.3 FL (ref 9.2–12.9)
POTASSIUM SERPL-SCNC: 4.6 MMOL/L (ref 3.5–5.1)
PROT SERPL-MCNC: 7.4 G/DL (ref 6–8.4)
RBC # BLD AUTO: 5.11 M/UL (ref 4.6–6.2)
SODIUM SERPL-SCNC: 142 MMOL/L (ref 136–145)
WBC # BLD AUTO: 8.67 K/UL (ref 3.9–12.7)

## 2023-12-29 PROCEDURE — 83615 LACTATE (LD) (LDH) ENZYME: CPT | Performed by: INTERNAL MEDICINE

## 2023-12-29 PROCEDURE — 80053 COMPREHEN METABOLIC PANEL: CPT | Performed by: INTERNAL MEDICINE

## 2023-12-29 PROCEDURE — 36415 COLL VENOUS BLD VENIPUNCTURE: CPT | Performed by: INTERNAL MEDICINE

## 2023-12-29 PROCEDURE — 85025 COMPLETE CBC W/AUTO DIFF WBC: CPT | Performed by: INTERNAL MEDICINE

## 2024-01-02 ENCOUNTER — HOSPITAL ENCOUNTER (OUTPATIENT)
Dept: RADIOLOGY | Facility: HOSPITAL | Age: 79
Discharge: HOME | End: 2024-01-02
Attending: INTERNAL MEDICINE
Payer: MEDICARE

## 2024-01-02 DIAGNOSIS — C43.4 MALIGNANT MELANOMA OF SCALP: ICD-10-CM

## 2024-01-02 PROCEDURE — 25500020 PHARM REV CODE 255: Performed by: INTERNAL MEDICINE

## 2024-01-02 PROCEDURE — 71260 CT THORAX DX C+: CPT | Mod: 26,,, | Performed by: RADIOLOGY

## 2024-01-02 PROCEDURE — 74177 CT ABD & PELVIS W/CONTRAST: CPT | Mod: TC

## 2024-01-02 PROCEDURE — 74177 CT ABD & PELVIS W/CONTRAST: CPT | Mod: 26,,, | Performed by: RADIOLOGY

## 2024-01-02 RX ADMIN — IOHEXOL 15 ML: 300 INJECTION, SOLUTION INTRAVENOUS at 08:01

## 2024-01-02 RX ADMIN — IOHEXOL 75 ML: 350 INJECTION, SOLUTION INTRAVENOUS at 08:01

## 2024-01-09 NOTE — PROGRESS NOTES
Subjective     Patient ID: Antonio Torres Jr. is a 78 y.o. male.    Chief Complaint: No chief complaint on file.  Diagnosis: Malignant Melanoma  HPI 77 yo male with history of malignant melanoma anterior scalp diagnosed  and  re excision of his melanoma with dermal graft placement on 7/7/23  Pathology shows MALIGNANT MELANOMA, ULCERATED, 4.0 MM IN DEPTH (pT4b),   He is also treated for  right dorsal foot wound s/p  debridement .Accompanied by family members whom provide most of history. Family reports first noted skin lesion May [id number]. She reports 2 skin lesions on scalp present and one was removed at that time. She is also followed by plastic surgery, Dr Montano. He is also followed closed by Dermatology, Dr. Pearl. He also has history of squamous cell carcinoma of skin. No complaints today. His scalp wounds are healing slowly. No family history of melanoma. PET/CT on 6/1/2023 no evidence of distant mets        Interval Hx: Accompanied by family members  Doing well  No SOB/CP  underwent repeat scalp bx  history of melanoma anterior scalp scheduled for excision and closure with dermal graft also presenting with right dorsal foot wound planning debridement and closure with dermal graft.  7/2023     Scalp wound completely healed   Rt foot wound healed  No longer followed by wound care     Pt followed by Dr. Eden dermatology and Dr. Pearl    Review of Systems   Constitutional:  Negative for appetite change, fatigue, fever and unexpected weight change.   HENT:  Negative for mouth sores.    Eyes:  Negative for visual disturbance.   Respiratory:  Negative for cough and shortness of breath.    Cardiovascular:  Negative for chest pain.   Gastrointestinal:  Negative for abdominal pain and diarrhea.   Genitourinary:  Negative for frequency.   Musculoskeletal:  Negative for back pain.   Integumentary:  Negative for rash and wound.   Neurological:  Negative for headaches.   Hematological:  Negative for adenopathy.  "  Psychiatric/Behavioral:  The patient is not nervous/anxious.           Objective       There were no vitals filed for this visit.    Vitals:    01/10/24 0802   BP: 119/75   Pulse: 78   SpO2: 98%   Weight: 69.6 kg (153 lb 7 oz)   Height: 5' 10" (1.778 m)             Physical Exam  Constitutional:       Appearance: He is well-developed.   HENT:      Head: Normocephalic.      Mouth/Throat:      Pharynx: No oropharyngeal exudate.   Eyes:      General: No scleral icterus.        Right eye: No discharge.         Left eye: No discharge.      Conjunctiva/sclera: Conjunctivae normal.   Neck:      Thyroid: No thyromegaly.   Cardiovascular:      Rate and Rhythm: Normal rate and regular rhythm.      Heart sounds: Normal heart sounds. No murmur heard.  Pulmonary:      Effort: Pulmonary effort is normal.      Breath sounds: Normal breath sounds. No wheezing or rales.   Abdominal:      General: Bowel sounds are normal.      Palpations: Abdomen is soft.      Tenderness: There is no abdominal tenderness. There is no guarding or rebound.   Musculoskeletal:         General: No swelling.      Cervical back: Normal range of motion and neck supple.   Lymphadenopathy:      Cervical: No cervical adenopathy.      Upper Body:      Right upper body: No supraclavicular adenopathy.      Left upper body: No supraclavicular adenopathy.   Skin:     Findings: No erythema or rash.      Comments: Scalp wounds covered by bandage   Neurological:      Mental Status: He is alert and oriented to person, place, and time.      Cranial Nerves: No cranial nerve deficit.   Psychiatric:         Mood and Affect: Mood normal.         Behavior: Behavior normal.                 Skin, scalp, excision:   -MALIGNANT MELANOMA, ULCERATED, 4.0 MM IN DEPTH (pT4b), see synoptic report below     SYNOPTIC REPORT   Procedure:  Excision   Specimen Laterality:  Not specified   Tumor Site:  Scalp   Macroscopic Satellite Nodule(s): Not identified   Histologic Type:  Nodular " melanoma with desmoplastic features   Maximum Tumor (Breslow) Thickness:  4.0 mm   Ulceration:  Present   Microsatellite(s): Not identified   Margins:   Peripheral:  Uninvolved by invasive malignant melanoma or malignant melanoma in-situ.   Deep: Involved by invasive malignant melanoma.   Mitotic rate: 11 mm2   Anatomic (Nabeel) level: V   Lympho-vascular invasion: Not identified   Neurotropism:  Present   Tumor infiltrating lymphocytes: Present, non-brisk   Tumor regression: Not identified       PET/CT 6/1/23    Impression:     Two hypermetabolic areas within the right aspect of the scalp noting reported operative change status post melanoma resection.  Recommend clinical correlation.  No enlarged or hypermetabolic lymph nodes.     Nonspecific areas of increased FDG avidity within the medial aspect the spleen without CT correlation.  Attention on follow-up.     Ulceration and inflammatory changes of the subcutaneous tissues with increased FDG avidity overlying the right ankle.  Known chronic right distal fibular fracture. Recommend clinical correlation.    Lab Results   Component Value Date    WBC 8.67 12/29/2023    HGB 15.6 12/29/2023    HCT 48.6 12/29/2023    MCV 95 12/29/2023     12/29/2023       CT c/a/p 1/2/2024  Impression:     No CT evidence for metastatic disease in this patient with history of malignant melanoma.     Stable 5 mm hypodensity within the left lobe of the liver, likely a small cyst but too small to adequately characterize.     Moderate size hiatal hernia.     1.2 cm low-density right adrenal nodule, likely a benign adrenal adenoma.     Bilateral renal cysts.     Left inguinal hernia containing fat and a nonobstructed loop of colon.     Small fat containing right inguinal hernia.     Small fat containing paraumbilical hernia.            Assessment and Plan     1. Malignant melanoma of skin of scalp and neck  77 y/o with MALIGNANT MELANOMA, ULCERATED, 4.0 MM IN DEPTH (pT4b), of scalp s/p  re excision on 7/7/23   Pt with high risk node negative disease  We previously discussed surveillance vs adjuvant immunotherapy x 1 yr namely pembrolizumab in this setting  We discussed potential toxicities of IOT  Currently no available clinical trials at [facility], nor is pt interested   PET/CT on 6/1/2023 no evidence of distant mets  Following detailed discussion, it had been elected to continue surveillance   He will need to continue close follow up with his treating dermatologist every 3-4mos for first 2-3 yrs, then q 6mos for up to 5 yrs  CT of the chest, abdomen, and pelvis every six months for up to three years, then annually for up to five years.   -     CBC Auto Differential; Future; Expected date:   -     Comprehensive Metabolic Panel; Future;  -     Lactate Dehydrogenase; Future;    Labs reviewed  Clinically doing well  Cont to monitor  Follow up imaging Jan 2024 No evid of metastatic disease      2. CKD stage 3  Cr 1.7mg/dl on 12/29/23 --stable  Follow up with PCP for med mgt    Labs prior to f/u 3mos  Cbc,cmp, LDH prior to f/u

## 2024-01-10 ENCOUNTER — OFFICE VISIT (OUTPATIENT)
Dept: HEMATOLOGY/ONCOLOGY | Facility: CLINIC | Age: 79
End: 2024-01-10
Payer: MEDICARE

## 2024-01-10 VITALS
HEIGHT: 70 IN | HEART RATE: 78 BPM | SYSTOLIC BLOOD PRESSURE: 119 MMHG | BODY MASS INDEX: 21.97 KG/M2 | DIASTOLIC BLOOD PRESSURE: 75 MMHG | OXYGEN SATURATION: 98 % | WEIGHT: 153.44 LBS

## 2024-01-10 DIAGNOSIS — C43.4 MALIGNANT MELANOMA OF SCALP: Primary | ICD-10-CM

## 2024-01-10 DIAGNOSIS — N18.32 STAGE 3B CHRONIC KIDNEY DISEASE: ICD-10-CM

## 2024-01-10 PROCEDURE — 99999 PR PBB SHADOW E&M-EST. PATIENT-LVL III: CPT | Mod: PBBFAC,,, | Performed by: INTERNAL MEDICINE

## 2024-01-10 PROCEDURE — 99214 OFFICE O/P EST MOD 30 MIN: CPT | Mod: S$GLB,,, | Performed by: INTERNAL MEDICINE

## 2024-01-19 NOTE — CONSULTS
[facility]  Cardiology  Consult Note    Patient Name: Antonio Torres Jr.  MRN: [id number]  Admission Date: 4/6/2023  Hospital Length of Stay: 0 days  Code Status: Full Code   Attending Provider: Yaakov Echevarria MD   Consulting Provider: Eduar Vasquez MD  Primary Care Physician: Fabio Lennon MD  Principal Problem:Syncope    Patient information was obtained from patient, past medical records and ER records.     Inpatient consult to Cardiology  Consult performed by: Eduar Vasquez MD  Consult ordered by: James Mcfarland NP        Subjective:     Chief Complaint:  syncope     HPI:   Antonio Torres Jr. Is an 78 y.o. male who presents after a syncopal episode.  Patient states episode occurred after having a bowel movement.  He did note some straining.  When he stood up he felt dizzy, weak.  He went to the floor and was there for roughly a few hours.  He notes 1 other prior syncopal episode roughly 2 years ago.  He denies any history of coronary artery disease.  Initial troponin was 0.4.  Subsequent troponins have remained flat at 0.8.  His echocardiogram shows normal left ventricular systolic function with estimated ejection fraction of 55 percent.  EKG shows sinus rhythm.  No acute ischemic changes.  History of chronic kidney disease.  Creatinine was 2.1 on presentation.  Latest 1.7 which is his baseline.  Leukocytosis.  X-ray of his knees showed no acute abnormalities.  Right ankle showed acute distal fibular shaft fracture.    Echocardiogram 04/07/2023:     Technically difficult study.   The left ventricle is normal in size with concentric remodeling and normal systolic function.   The estimated ejection fraction is 55%.   Normal left ventricular diastolic function.   Normal right ventricular size with normal right ventricular systolic function.   Mild tricuspid regurgitation.   Normal central venous pressure (3 mmHg).   The estimated PA systolic pressure is 34 mmHg.   There is mild  pulmonary hypertension.   The sinuses of Valsalva is mildly dilated. 4.0cm.      Past Medical History:   Diagnosis Date    Chronic kidney disease     Depression     Hematuria     had neg work up with Dr. Stafford    Hypercholesterolemia 03/01/2013    Schizophrenia     Skin cancer of scalp     Syncope 04/06/2023       Past Surgical History:   Procedure Laterality Date    HERNIA REPAIR      SKIN CANCER EXCISION         Review of patient's allergies indicates:  No Known Allergies    No current facility-administered medications on file prior to encounter.     Current Outpatient Medications on File Prior to Encounter   Medication Sig    ARIPiprazole (ABILIFY) 20 MG Tab TAKE ONE TABLET BY MOUTH EVERY DAY    fish oil-omega-3 fatty acids 300-1,000 mg capsule Take 2 g by mouth once daily.    loratadine (CLARITIN) 10 mg tablet Take 10 mg by mouth once daily.    multivitamin capsule Take 1 capsule by mouth once daily.     Family History       Problem Relation (Age of Onset)    Alzheimer's disease Mother    Heart disease Father    Hypertension Sister    Pulmonary embolism Father          Tobacco Use    Smoking status: Never    Smokeless tobacco: Never   Substance and Sexual Activity    Alcohol use: No    Drug use: No    Sexual activity: Not on file     Review of Systems   Constitutional: Negative for chills, decreased appetite, diaphoresis, malaise/fatigue, weight gain and weight loss.   HENT:  Negative for congestion, hearing loss, hoarse voice, nosebleeds, odynophagia, stridor and tinnitus.    Eyes:  Negative for blurred vision, double vision, photophobia, visual disturbance and visual halos.   Cardiovascular:  Positive for syncope. Negative for chest pain, dyspnea on exertion, irregular heartbeat, leg swelling, near-syncope, orthopnea, palpitations and paroxysmal nocturnal dyspnea.   Respiratory:  Negative for shortness of breath, sputum production and wheezing.    Musculoskeletal:  Negative for falls,  joint pain, muscle cramps, muscle weakness, myalgias, neck pain and stiffness.   Gastrointestinal:  Negative for abdominal pain, anorexia, change in bowel habit, bowel incontinence, constipation, diarrhea, heartburn, hematemesis and melena.   Neurological:  Negative for disturbances in coordination, dizziness, focal weakness, headaches, light-headedness, loss of balance, numbness, paresthesias, seizures, sensory change, tremors, vertigo and weakness.   Psychiatric/Behavioral:  Negative for altered mental status, depression, hallucinations and memory loss. The patient does not have insomnia and is not nervous/anxious.    Objective:     Vital Signs (Most Recent):  Temp: 97.9 °F (36.6 °C) (04/06/23 2105)  Pulse: 85 (04/07/23 0932)  Resp: 14 (04/07/23 0932)  BP: 120/71 (04/07/23 0932)  SpO2: 98 % (04/07/23 0932) Vital Signs (24h Range):  Temp:  [97.9 °F (36.6 °C)] 97.9 °F (36.6 °C)  Pulse:  [] 85  Resp:  [10-20] 14  SpO2:  [95 %-100 %] 98 %  BP: ()/(57-76) 120/71     Weight: 72.1 kg (159 lb)  Body mass index is 22.81 kg/m².    SpO2: 98 %         Intake/Output Summary (Last 24 hours) at 4/7/2023 1330  Last data filed at 4/7/2023 0005  Gross per 24 hour   Intake 1000 ml   Output --   Net 1000 ml       Lines/Drains/Airways       Peripheral Intravenous Line  Duration                  Peripheral IV - Single Lumen 04/06/23 2135 20 G Left Forearm <1 day                    Physical Exam  Vitals reviewed.   Constitutional:       General: He is not in acute distress.     Appearance: He is not diaphoretic.   Neck:      Vascular: No carotid bruit or JVD.   Cardiovascular:      Rate and Rhythm: Normal rate and regular rhythm.      Pulses: Normal pulses.            Right dorsalis pedis pulse not accessible.         Right posterior tibial pulse not accessible.   Pulmonary:      Effort: Pulmonary effort is normal.      Breath sounds: Normal breath sounds.   Abdominal:      General: Bowel sounds are normal.      Palpations:  Abdomen is soft.      Tenderness: There is no abdominal tenderness.   Musculoskeletal:      Right knee: Erythema present.      Right lower leg: No edema.      Left lower leg: No edema.   Neurological:      Mental Status: He is alert.   Psychiatric:         Mood and Affect: Affect is flat.         Speech: Speech normal.         Behavior: Behavior normal.       Significant Labs: CMP   Recent Labs   Lab 04/06/23 2134 04/07/23  0404    143   K 4.5 4.1    112*   CO2 21* 19*   * 107   BUN 25* 24*   CREATININE 2.1* 1.7*   CALCIUM 10.2 9.2   PROT 6.7  --    ALBUMIN 4.0  --    BILITOT 1.2*  --    ALKPHOS 73  --    AST 46*  --    ALT 34  --    ANIONGAP 17* 12   , CBC   Recent Labs   Lab 04/06/23 2134 04/07/23  0404   WBC 20.07* 13.40*   HGB 15.8 14.2   HCT 45.9 44.1    197   , and Troponin   Recent Labs   Lab 04/06/23 2359 04/07/23  0404 04/07/23  0748   TROPONINI 0.803* 0.897* 0.809*       Significant Imaging: Echocardiogram: Transthoracic echo (TTE) complete (Cupid Only):   Results for orders placed or performed during the hospital encounter of 04/06/23   Echo Saline Bubble? Yes   Result Value Ref Range    BSA 1.89 m2    LA WIDTH 3.90 cm    IVC diameter 0.79 cm    Left Ventricular Outflow Tract Mean Velocity 0.55 cm/s    Left Ventricular Outflow Tract Mean Gradient 1.31 mmHg    AORTIC VALVE CUSP SEPERATION 2.11 cm    PV PEAK VELOCITY 0.59 cm/s    LVIDd 4.65 3.5 - 6.0 cm    IVS 1.24 (A) 0.6 - 1.1 cm    Posterior Wall 1.23 (A) 0.6 - 1.1 cm    Ao root annulus 3.27 cm    LVIDs 3.29 2.1 - 4.0 cm    FS 29 28 - 44 %    LA volume 37.90 cm3    Sinus 3.98 cm    STJ 3.45 cm    Ascending aorta 3.65 cm    LV mass 217.25 g    LA size 2.92 cm    Left Ventricle Relative Wall Thickness 0.53 cm    AV mean gradient 2 mmHg    AV valve area 4.11 cm2    AV Velocity Ratio 0.85     AV index (prosthetic) 0.84     MV valve area p 1/2 method 4.55 cm2    E/A ratio 1.02     E wave deceleration time 149.55 msec    LVOT  diameter 2.49 cm    LVOT area 4.9 cm2    LVOT peak rm 0.73 m/s    LVOT peak VTI 15.70 cm    Ao peak rm 0.86 m/s    Ao VTI 18.6 cm    LVOT stroke volume 76.41 cm3    AV peak gradient 3 mmHg    MV Peak E Rm 0.63 m/s    TR Max Mr 2.79 m/s    MV stenosis pressure 1/2 time 48.38 ms    MV Peak A Rm 0.62 m/s    LV Systolic Volume 43.69 mL    LV Systolic Volume Index 23.1 mL/m2    LV Diastolic Volume 99.61 mL    LV Diastolic Volume Index 52.70 mL/m2    LA Volume Index 20.1 mL/m2    LV Mass Index 115 g/m2    RA Major Axis 4.30 cm    Left Atrium Minor Axis 3.93 cm    Left Atrium Major Axis 3.90 cm    Triscuspid Valve Regurgitation Peak Gradient 31 mmHg    RA Width 3.31 cm    Right Atrial Pressure (from IVC) 3 mmHg    EF 55 %    TV rest pulmonary artery pressure 34 mmHg    Narrative    · Technically difficult study.  · The left ventricle is normal in size with concentric remodeling and   normal systolic function.  · The estimated ejection fraction is 55%.  · Normal left ventricular diastolic function.  · Normal right ventricular size with normal right ventricular systolic   function.  · Mild tricuspid regurgitation.  · Normal central venous pressure (3 mmHg).  · The estimated PA systolic pressure is 34 mmHg.  · There is mild pulmonary hypertension.        Assessment and Plan:     * Syncope  04/07/2023-episode sounds vagal.      Elevated troponin level not due to acute coronary syndrome  04/07/2023-Abnormal troponin in light of chronic kidney disease.  Latest have remained flat.  Echocardiogram shows normal function.  Can pursue ischemic evaluation with nuclear stress.          VTE Risk Mitigation (From admission, onward)         Ordered     enoxaparin injection 30 mg  Daily         04/06/23 2315     IP VTE HIGH RISK PATIENT  Once         04/06/23 2315     Place sequential compression device  Until discontinued         04/06/23 2315                Thank you for your consult. I will follow-up with patient. Please contact us  if you have any additional questions.    Eduar Vasquez MD  Cardiology   [facility] - Emergency Dept     negative...

## 2024-04-03 ENCOUNTER — LAB VISIT (OUTPATIENT)
Dept: LAB | Facility: HOSPITAL | Age: 79
End: 2024-04-03
Attending: INTERNAL MEDICINE
Payer: MEDICARE

## 2024-04-03 DIAGNOSIS — C43.4 MALIGNANT MELANOMA OF SCALP: ICD-10-CM

## 2024-04-03 LAB
ALBUMIN SERPL BCP-MCNC: 4 G/DL (ref 3.5–5.2)
ALP SERPL-CCNC: 83 U/L (ref 55–135)
ALT SERPL W/O P-5'-P-CCNC: 35 U/L (ref 10–44)
ANION GAP SERPL CALC-SCNC: 8 MMOL/L (ref 8–16)
AST SERPL-CCNC: 26 U/L (ref 10–40)
BASOPHILS # BLD AUTO: 0.05 K/UL (ref 0–0.2)
BASOPHILS NFR BLD: 0.6 % (ref 0–1.9)
BILIRUB SERPL-MCNC: 0.7 MG/DL (ref 0.1–1)
BUN SERPL-MCNC: 24 MG/DL (ref 8–23)
CALCIUM SERPL-MCNC: 10.2 MG/DL (ref 8.7–10.5)
CHLORIDE SERPL-SCNC: 107 MMOL/L (ref 95–110)
CO2 SERPL-SCNC: 26 MMOL/L (ref 23–29)
CREAT SERPL-MCNC: 1.9 MG/DL (ref 0.5–1.4)
DIFFERENTIAL METHOD BLD: ABNORMAL
EOSINOPHIL # BLD AUTO: 0.1 K/UL (ref 0–0.5)
EOSINOPHIL NFR BLD: 1 % (ref 0–8)
ERYTHROCYTE [DISTWIDTH] IN BLOOD BY AUTOMATED COUNT: 12.6 % (ref 11.5–14.5)
EST. GFR  (NO RACE VARIABLE): 35 ML/MIN/1.73 M^2
GLUCOSE SERPL-MCNC: 94 MG/DL (ref 70–110)
HCT VFR BLD AUTO: 49.8 % (ref 40–54)
HGB BLD-MCNC: 16.1 G/DL (ref 14–18)
IMM GRANULOCYTES # BLD AUTO: 0.03 K/UL (ref 0–0.04)
IMM GRANULOCYTES NFR BLD AUTO: 0.3 % (ref 0–0.5)
LDH SERPL L TO P-CCNC: 172 U/L (ref 110–260)
LYMPHOCYTES # BLD AUTO: 1.2 K/UL (ref 1–4.8)
LYMPHOCYTES NFR BLD: 13.5 % (ref 18–48)
MCH RBC QN AUTO: 31.4 PG (ref 27–31)
MCHC RBC AUTO-ENTMCNC: 32.3 G/DL (ref 32–36)
MCV RBC AUTO: 97 FL (ref 82–98)
MONOCYTES # BLD AUTO: 0.7 K/UL (ref 0.3–1)
MONOCYTES NFR BLD: 7.3 % (ref 4–15)
NEUTROPHILS # BLD AUTO: 6.9 K/UL (ref 1.8–7.7)
NEUTROPHILS NFR BLD: 77.3 % (ref 38–73)
NRBC BLD-RTO: 0 /100 WBC
PLATELET # BLD AUTO: 189 K/UL (ref 150–450)
PMV BLD AUTO: 9.5 FL (ref 9.2–12.9)
POTASSIUM SERPL-SCNC: 4.2 MMOL/L (ref 3.5–5.1)
PROT SERPL-MCNC: 7.2 G/DL (ref 6–8.4)
RBC # BLD AUTO: 5.13 M/UL (ref 4.6–6.2)
SODIUM SERPL-SCNC: 141 MMOL/L (ref 136–145)
WBC # BLD AUTO: 8.91 K/UL (ref 3.9–12.7)

## 2024-04-03 PROCEDURE — 83615 LACTATE (LD) (LDH) ENZYME: CPT | Performed by: INTERNAL MEDICINE

## 2024-04-03 PROCEDURE — 36415 COLL VENOUS BLD VENIPUNCTURE: CPT | Performed by: INTERNAL MEDICINE

## 2024-04-03 PROCEDURE — 85025 COMPLETE CBC W/AUTO DIFF WBC: CPT | Performed by: INTERNAL MEDICINE

## 2024-04-03 PROCEDURE — 80053 COMPREHEN METABOLIC PANEL: CPT | Performed by: INTERNAL MEDICINE

## 2024-04-10 ENCOUNTER — OFFICE VISIT (OUTPATIENT)
Dept: HEMATOLOGY/ONCOLOGY | Facility: CLINIC | Age: 79
End: 2024-04-10
Payer: MEDICARE

## 2024-04-10 ENCOUNTER — TELEPHONE (OUTPATIENT)
Dept: HEMATOLOGY/ONCOLOGY | Facility: CLINIC | Age: 79
End: 2024-04-10
Payer: MEDICARE

## 2024-04-10 DIAGNOSIS — N18.32 STAGE 3B CHRONIC KIDNEY DISEASE: ICD-10-CM

## 2024-04-10 DIAGNOSIS — C43.4 MALIGNANT MELANOMA OF SCALP: Primary | ICD-10-CM

## 2024-04-10 PROCEDURE — 99214 OFFICE O/P EST MOD 30 MIN: CPT | Mod: 95,,, | Performed by: INTERNAL MEDICINE

## 2024-04-10 NOTE — TELEPHONE ENCOUNTER
Good morning,    Dr. Medrano put in a referral for patient, please contact his sister Miss Brady ([phone]) at your convenience to schedule an appointment.    Dx  N18.32 (ICD-10-CM) - Stage 3b chronic kidney disease     Thank you  ELIEZER Orellana

## 2024-04-10 NOTE — TELEPHONE ENCOUNTER
----- Message from Shae Medrano MD sent at 4/10/2024 10:09 AM CDT -----  Labs prior to f/u 4mos  Cbc,cmp, LDH prior to f/u   Ambulatory Referral to Nephrology-Dr. Lal

## 2024-04-10 NOTE — PROGRESS NOTES
The patient location is: home  The chief complaint leading to consultation is: melanoma    Visit type: audiovisual    Face to Face time with patient: 8 min  15 minutes of total time spent on the encounter, which includes face to face time and non-face to face time preparing to see the patient (eg, review of tests), Obtaining and/or reviewing separately obtained history, Documenting clinical information in the electronic or other health record, Independently interpreting results (not separately reported) and communicating results to the patient/family/caregiver, or Care coordination (not separately reported).         Each patient to whom he or she provides medical services by telemedicine is:  (1) informed of the relationship between the physician and patient and the respective role of any other health care provider with respect to management of the patient; and (2) notified that he or she may decline to receive medical services by telemedicine and may withdraw from such care at any time.    Notes:      Subjective         Patient ID: Antonio Torres Jr. is a 79 y.o. male.    Chief Complaint: No chief complaint on file.  Diagnosis: Malignant Melanoma  HPI 80 yo male with history of malignant melanoma anterior scalp diagnosed  and  re excision of his melanoma with dermal graft placement on 7/7/23  Pathology shows MALIGNANT MELANOMA, ULCERATED, 4.0 MM IN DEPTH (pT4b),   He is also treated for  right dorsal foot wound s/p  debridement .Accompanied by family members whom provide most of history. Family reports first noted skin lesion May 70511. She reports 2 skin lesions on scalp present and one was removed at that time. She is also followed by plastic surgery, Dr Montano. He is also followed closed by Dermatology, Dr. Pearl. He also has history of squamous cell carcinoma of skin. No complaints today. His scalp wounds are healing slowly. No family history of melanoma. PET/CT on 6/1/2023 no evidence of distant mets         Interval Hx: Accompanied by family member  Doing well  No SOB/CP  No new issues  history of melanoma anterior scalp scheduled for excision and closure with dermal graft also presenting with right dorsal foot wound planning debridement and closure with dermal graft.  7/2023     Scalp wound completely healed   Rt foot wound healed  No longer followed by wound care     Pt followed by Dr. Eden dermatology and Dr. Pearl    Review of Systems   Constitutional:  Negative for appetite change, fatigue, fever and unexpected weight change.   HENT:  Negative for mouth sores.    Eyes:  Negative for visual disturbance.   Respiratory:  Negative for cough and shortness of breath.    Cardiovascular:  Negative for chest pain.   Gastrointestinal:  Negative for abdominal pain and diarrhea.   Genitourinary:  Negative for frequency.   Musculoskeletal:  Negative for back pain.   Integumentary:  Negative for rash and wound.   Neurological:  Negative for headaches.   Hematological:  Negative for adenopathy.   Psychiatric/Behavioral:  The patient is not nervous/anxious.           Objective       There were no vitals filed for this visit.    There were no vitals filed for this visit.    televisit    Skin, scalp, excision:   -MALIGNANT MELANOMA, ULCERATED, 4.0 MM IN DEPTH (pT4b), see synoptic report below     SYNOPTIC REPORT   Procedure:  Excision   Specimen Laterality:  Not specified   Tumor Site:  Scalp   Macroscopic Satellite Nodule(s): Not identified   Histologic Type:  Nodular melanoma with desmoplastic features   Maximum Tumor (Breslow) Thickness:  4.0 mm   Ulceration:  Present   Microsatellite(s): Not identified   Margins:   Peripheral:  Uninvolved by invasive malignant melanoma or malignant melanoma in-situ.   Deep: Involved by invasive malignant melanoma.   Mitotic rate: 11 mm2   Anatomic (Nabeel) level: V   Lympho-vascular invasion: Not identified   Neurotropism:  Present   Tumor infiltrating lymphocytes: Present, non-brisk    Tumor regression: Not identified       PET/CT 6/1/23    Impression:     Two hypermetabolic areas within the right aspect of the scalp noting reported operative change status post melanoma resection.  Recommend clinical correlation.  No enlarged or hypermetabolic lymph nodes.     Nonspecific areas of increased FDG avidity within the medial aspect the spleen without CT correlation.  Attention on follow-up.     Ulceration and inflammatory changes of the subcutaneous tissues with increased FDG avidity overlying the right ankle.  Known chronic right distal fibular fracture. Recommend clinical correlation.    Lab Results   Component Value Date    WBC 8.91 04/03/2024    HGB 16.1 04/03/2024    HCT 49.8 04/03/2024    MCV 97 04/03/2024     04/03/2024       CT c/a/p 1/2/2024  Impression:     No CT evidence for metastatic disease in this patient with history of malignant melanoma.     Stable 5 mm hypodensity within the left lobe of the liver, likely a small cyst but too small to adequately characterize.     Moderate size hiatal hernia.     1.2 cm low-density right adrenal nodule, likely a benign adrenal adenoma.     Bilateral renal cysts.     Left inguinal hernia containing fat and a nonobstructed loop of colon.     Small fat containing right inguinal hernia.     Small fat containing paraumbilical hernia.            Assessment and Plan     1. Malignant melanoma of skin of scalp and neck  77 y/o with MALIGNANT MELANOMA, ULCERATED, 4.0 MM IN DEPTH (pT4b), of scalp s/p re excision on 7/7/23   Pt with high risk node negative disease  We previously discussed surveillance vs adjuvant immunotherapy x 1 yr namely pembrolizumab in this setting  We discussed potential toxicities of IOT  Currently no available clinical trials at [facility], nor is pt interested   PET/CT on 6/1/2023 no evidence of distant mets  Following detailed discussion, it had been elected to continue surveillance   He will need to continue close follow up with his  treating dermatologist every 3-4mos for first 2-3 yrs, then q 6mos for up to 5 yrs  CT of the chest, abdomen, and pelvis every six months for up to three years, then annually for up to five years.   -     CBC Auto Differential; Future; Expected date:   -     Comprehensive Metabolic Panel; Future;  -     Lactate Dehydrogenase; Future;    Labs reviewed  Clinically doing well  Cont to monitor  Follow up imaging Jan 2024 No evid of metastatic disease      2. CKD stage 3  Cr 1.7mg/dL on 12/29/23 to 1.9mg/dl on 4/3/24   Ambulatory Referral to Nephrology  Follow up with PCP for med mgt    Labs prior to f/u 4mos  Cbc,cmp, LDH prior to f/u   Ambulatory Referral to Nephrology-Dr. Castellaon

## 2024-04-17 ENCOUNTER — TELEPHONE (OUTPATIENT)
Dept: NEPHROLOGY | Facility: CLINIC | Age: 79
End: 2024-04-17
Payer: MEDICARE

## 2024-04-17 ENCOUNTER — TELEPHONE (OUTPATIENT)
Dept: HEMATOLOGY/ONCOLOGY | Facility: CLINIC | Age: 79
End: 2024-04-17
Payer: MEDICARE

## 2024-04-17 DIAGNOSIS — N18.32 STAGE 3B CHRONIC KIDNEY DISEASE: Primary | ICD-10-CM

## 2024-04-17 NOTE — TELEPHONE ENCOUNTER
I have spoke with this patient's wife in regards of scheduling 's referral to see Dr. Terrazas. They are currently away from home, and need to take a look at the calendar. They will give me a call back in order to confirm that they are openly available.

## 2024-04-17 NOTE — TELEPHONE ENCOUNTER
Spoke with pts sister Caitlyn in regards to patients appt. She was also notified she will be contacted with a call for Dr. Terrazas(to be scheduled)

## 2024-04-25 ENCOUNTER — TELEPHONE (OUTPATIENT)
Dept: NEPHROLOGY | Facility: CLINIC | Age: 79
End: 2024-04-25
Payer: MEDICARE

## 2024-04-25 NOTE — TELEPHONE ENCOUNTER
Patient's wife contacted in regards of reminding that labs/urine scheduled for tomorrow for visit with Dr. Terrazas.

## 2024-04-26 ENCOUNTER — LAB VISIT (OUTPATIENT)
Dept: LAB | Facility: HOSPITAL | Age: 79
End: 2024-04-26
Attending: INTERNAL MEDICINE
Payer: MEDICARE

## 2024-04-26 DIAGNOSIS — N18.32 STAGE 3B CHRONIC KIDNEY DISEASE: ICD-10-CM

## 2024-04-26 LAB
ALBUMIN SERPL BCP-MCNC: 3.9 G/DL (ref 3.5–5.2)
ANION GAP SERPL CALC-SCNC: 8 MMOL/L (ref 8–16)
BASOPHILS # BLD AUTO: 0.03 K/UL (ref 0–0.2)
BASOPHILS NFR BLD: 0.4 % (ref 0–1.9)
BILIRUB UR QL STRIP: NEGATIVE
BUN SERPL-MCNC: 28 MG/DL (ref 8–23)
CALCIUM SERPL-MCNC: 10.7 MG/DL (ref 8.7–10.5)
CHLORIDE SERPL-SCNC: 106 MMOL/L (ref 95–110)
CLARITY UR: CLEAR
CO2 SERPL-SCNC: 28 MMOL/L (ref 23–29)
COLOR UR: YELLOW
CREAT SERPL-MCNC: 1.9 MG/DL (ref 0.5–1.4)
CREAT UR-MCNC: 114.5 MG/DL (ref 23–375)
DIFFERENTIAL METHOD BLD: ABNORMAL
EOSINOPHIL # BLD AUTO: 0.1 K/UL (ref 0–0.5)
EOSINOPHIL NFR BLD: 1.6 % (ref 0–8)
ERYTHROCYTE [DISTWIDTH] IN BLOOD BY AUTOMATED COUNT: 12.8 % (ref 11.5–14.5)
EST. GFR  (NO RACE VARIABLE): 35 ML/MIN/1.73 M^2
GLUCOSE SERPL-MCNC: 82 MG/DL (ref 70–110)
GLUCOSE UR QL STRIP: NEGATIVE
HCT VFR BLD AUTO: 50.6 % (ref 40–54)
HGB BLD-MCNC: 16.4 G/DL (ref 14–18)
HGB UR QL STRIP: NEGATIVE
IMM GRANULOCYTES # BLD AUTO: 0.02 K/UL (ref 0–0.04)
IMM GRANULOCYTES NFR BLD AUTO: 0.2 % (ref 0–0.5)
KETONES UR QL STRIP: NEGATIVE
LEUKOCYTE ESTERASE UR QL STRIP: NEGATIVE
LYMPHOCYTES # BLD AUTO: 1.2 K/UL (ref 1–4.8)
LYMPHOCYTES NFR BLD: 14.5 % (ref 18–48)
MCH RBC QN AUTO: 31.2 PG (ref 27–31)
MCHC RBC AUTO-ENTMCNC: 32.4 G/DL (ref 32–36)
MCV RBC AUTO: 96 FL (ref 82–98)
MONOCYTES # BLD AUTO: 0.9 K/UL (ref 0.3–1)
MONOCYTES NFR BLD: 10.6 % (ref 4–15)
NEUTROPHILS # BLD AUTO: 6 K/UL (ref 1.8–7.7)
NEUTROPHILS NFR BLD: 72.7 % (ref 38–73)
NITRITE UR QL STRIP: NEGATIVE
NRBC BLD-RTO: 0 /100 WBC
PH UR STRIP: 6 [PH] (ref 5–8)
PHOSPHATE SERPL-MCNC: 2.7 MG/DL (ref 2.7–4.5)
PLATELET # BLD AUTO: 198 K/UL (ref 150–450)
PMV BLD AUTO: 9.4 FL (ref 9.2–12.9)
POTASSIUM SERPL-SCNC: 4.7 MMOL/L (ref 3.5–5.1)
PROT UR QL STRIP: NEGATIVE
PROT UR-MCNC: <7 MG/DL
PROT/CREAT UR: NORMAL MG/G{CREAT} (ref 0–0.2)
PTH-INTACT SERPL-MCNC: 58.2 PG/ML (ref 9–77)
RBC # BLD AUTO: 5.26 M/UL (ref 4.6–6.2)
SODIUM SERPL-SCNC: 142 MMOL/L (ref 136–145)
SP GR UR STRIP: 1.01 (ref 1–1.03)
URATE SERPL-MCNC: 8.9 MG/DL (ref 3.4–7)
URN SPEC COLLECT METH UR: NORMAL
UROBILINOGEN UR STRIP-ACNC: NEGATIVE EU/DL
WBC # BLD AUTO: 8.23 K/UL (ref 3.9–12.7)

## 2024-04-26 PROCEDURE — 85025 COMPLETE CBC W/AUTO DIFF WBC: CPT | Performed by: INTERNAL MEDICINE

## 2024-04-26 PROCEDURE — 82570 ASSAY OF URINE CREATININE: CPT | Performed by: INTERNAL MEDICINE

## 2024-04-26 PROCEDURE — 80069 RENAL FUNCTION PANEL: CPT | Performed by: INTERNAL MEDICINE

## 2024-04-26 PROCEDURE — 81003 URINALYSIS AUTO W/O SCOPE: CPT | Performed by: INTERNAL MEDICINE

## 2024-04-26 PROCEDURE — 83970 ASSAY OF PARATHORMONE: CPT | Performed by: INTERNAL MEDICINE

## 2024-04-26 PROCEDURE — 84550 ASSAY OF BLOOD/URIC ACID: CPT | Performed by: INTERNAL MEDICINE

## 2024-04-26 PROCEDURE — 36415 COLL VENOUS BLD VENIPUNCTURE: CPT | Performed by: INTERNAL MEDICINE

## 2024-05-03 PROBLEM — E83.52 HYPERCALCEMIA: Status: ACTIVE | Noted: 2024-05-03

## 2024-05-03 NOTE — PROGRESS NOTES
[facility]  [address] [facility], [address]  [place]  [phone]    Referring Provider: Shae Medrano MD  PCP: Fabio Lennon MD  Hem/Onc: Mitchell      HPI: Mr. Antonio Torres Jr. is a 79 y.o. male with DLD, PVD, BPH, schizophrenia, and melanoma (diagnosed 7/2023; s/p surgery; active surveillance) who is here for new patient evaluation of Chronic Kidney Disease  Prior records obtained and reviewed. Baseline Cr has been 1.5-1.7 with GFR 40-47% since at least 2013; however Cr 1.9 since 4/2024. No prior proteinuria.  He is here today with his sister who provided most of history.  He denies h/o HTN, T2DM, gout, kidney stones, or family h/o CKD. He reports longstanding h/o CKD but was never told the etiology. He admits to poor water intake (~24oz per day) due to LUTS/nocturia. No edema.   His Ca is high today and was also elevated in 2023. He denies prior lithium use. He does not take calcium or vitamin D supplementation (only an OTC MVN). He does not drink excessive amounts of milk or take Tums.         Past Medical History:   Diagnosis Date    Chronic kidney disease     Depression     Hematuria     had neg work up with Dr. Stafford    Hypercholesterolemia 03/01/2013    Schizophrenia     Skin cancer of scalp     Syncope 04/06/2023       Past Surgical History:   Procedure Laterality Date    APPLICATION, GRAFT N/A 07/07/2023    Procedure: APPLICSTAPLER RELOAD TITANIUM 30MMATION, GRAFT, ANTERIOR SCALP;  Surgeon: Mario Montano MD;  Location: [facility] OR;  Service: Plastics;  Laterality: N/A;  application acellular human dermal allograft anterior scalp 2x4cm    EXCISION OF CARCINOMA N/A 07/07/2023    Procedure: EXCISION, CARCINOMA, ANTERIOR SCALP;  Surgeon: Mario Montano MD;  Location: [facility] OR;  Service: Plastics;  Laterality: N/A;  anterior scalp- melanoma----NEED ORDERS OFFICE NOTIFIED    EXCISION-WIDE LOCAL N/A 05/08/2023    Procedure: EXCISION-WIDE LOCAL;  Surgeon: Rhys Lehman MD;  Location: [facility] OR;   Service: General;  Laterality: N/A;  RN PREOP 5/1/23---    HERNIA REPAIR      INCISION AND DRAINAGE, LOWER EXTREMITY Right 06/09/2023    Procedure: INCISION AND DRAINAGE, LOWER EXTREMITY- ANKLE;  Surgeon: Abby Lemus MD;  Location: [facility] OR;  Service: Orthopedics;  Laterality: Right;  Wound vac placement (medium)  RN PREOP 6/8/2023    PLACEMENT OF ACELLULAR HUMAN DERMAL ALLOGRAFT Right 07/07/2023    Procedure: APPLICATION, ACELLULAR HUMAN DERMAL ALLOGRAFT, FOOT;  Surgeon: Mario Montano MD;  Location: [facility] OR;  Service: Plastics;  Laterality: Right;  dermal graft to right dorsal foot wound 6x6 cm---PHONE PREOP DONE-7/6/23    RECONSTRUCTION USING FLAP N/A 05/08/2023    Procedure: RECONSTRUCTION USING FLAP WITH APPLICATION OF DERMAL GRAFT;  Surgeon: Mario Montano MD;  Location: [facility] OR;  Service: Plastics;  Laterality: N/A;  10 X 10 DERMAL GRAFT  NOTIFIED [facility] IN [facility] ON 5/4/2023 @ 10:48AM. PATIENT WILL BE INJECTED ON AM OF SURGERY PER DR. NEHA ZAVALA  PT NEEDS TO BE IN "[employer] [facility] FOR 7AM ON AM OF SURGERY-LO    SKIN CANCER EXCISION         Family History   Problem Relation Name Age of Onset    Alzheimer's disease Mother      Heart disease Father Antonio Torres     Pulmonary embolism Father Antonio Torres     Hypertension Sister Caitlyn Manzo        Social History     Tobacco Use    Smoking status: Never    Smokeless tobacco: Never   Substance Use Topics    Alcohol use: No    Drug use: No         ROS:  Complete ROS otherwise negative except as indicated above.         Current Outpatient Medications:     ARIPiprazole (ABILIFY) 20 MG Tab, TAKE ONE TABLET BY MOUTH EVERY DAY, Disp: 90 tablet, Rfl: 2    loratadine (CLARITIN) 10 mg tablet, Take 10 mg by mouth once daily., Disp: , Rfl:     multivitamin capsule, Take 1 capsule by mouth once daily., Disp: , Rfl:     fish oil-omega-3 fatty acids 300-1,000 mg capsule, Take 2 g by mouth once daily., Disp: , Rfl:     mupirocin (BACTROBAN) 2 % ointment, APPLY  "EVERY DAY to scalp, Disp: , Rfl:   No current facility-administered medications for this visit.    Facility-Administered Medications Ordered in Other Visits:     0.9%  NaCl infusion, , Intravenous, Continuous, Rhys Lehman MD, Stopped at 07/07/23 1256    mupirocin 2 % ointment, , Nasal, On Call Procedure, Rhys Lehman MD, Given at 05/08/23 0701      Vitals: Blood pressure (!) 142/80, pulse 75, resp. rate 18, height 5' 10.5" (1.791 m), weight 70.4 kg (155 lb 3.3 oz), SpO2 97%. Body mass index is 21.95 kg/m².    Physical Exam  Vitals reviewed.   Constitutional:       General: He is awake. He is not in acute distress.     Appearance: Normal appearance. He is well-developed.   HENT:      Head: Normocephalic and atraumatic.      Nose: Nose normal.      Mouth/Throat:      Mouth: Mucous membranes are moist.   Eyes:      Extraocular Movements: Extraocular movements intact.      Conjunctiva/sclera: Conjunctivae normal.   Pulmonary:      Effort: Pulmonary effort is normal.   Musculoskeletal:         General: No tenderness or signs of injury.      Right lower leg: No edema.      Left lower leg: No edema.   Skin:     General: Skin is warm and dry.      Findings: No erythema or rash.   Neurological:      General: No focal deficit present.      Mental Status: He is alert. Mental status is at baseline.   Psychiatric:         Mood and Affect: Mood normal.         Behavior: Behavior normal.           Labs/Imaging:  Sodium   Date Value Ref Range Status   04/26/2024 142 136 - 145 mmol/L Final   04/03/2024 141 136 - 145 mmol/L Final   12/29/2023 142 136 - 145 mmol/L Final     Potassium   Date Value Ref Range Status   04/26/2024 4.7 3.5 - 5.1 mmol/L Final   04/03/2024 4.2 3.5 - 5.1 mmol/L Final   12/29/2023 4.6 3.5 - 5.1 mmol/L Final     Chloride   Date Value Ref Range Status   04/26/2024 106 95 - 110 mmol/L Final   04/03/2024 107 95 - 110 mmol/L Final   12/29/2023 107 95 - 110 mmol/L Final     CO2   Date Value Ref Range " Status   04/26/2024 28 23 - 29 mmol/L Final   04/03/2024 26 23 - 29 mmol/L Final   12/29/2023 25 23 - 29 mmol/L Final     BUN   Date Value Ref Range Status   04/26/2024 28 (H) 8 - 23 mg/dL Final   04/03/2024 24 (H) 8 - 23 mg/dL Final   12/29/2023 27 (H) 8 - 23 mg/dL Final     Creatinine   Date Value Ref Range Status   04/26/2024 1.9 (H) 0.5 - 1.4 mg/dL Final   04/03/2024 1.9 (H) 0.5 - 1.4 mg/dL Final   12/29/2023 1.7 (H) 0.5 - 1.4 mg/dL Final     eGFR   Date Value Ref Range Status   04/26/2024 35 (A) >60 mL/min/1.73 m^2 Final   04/03/2024 35 (A) >60 mL/min/1.73 m^2 Final   12/29/2023 40 (A) >60 mL/min/1.73 m^2 Final     Calcium   Date Value Ref Range Status   04/26/2024 10.7 (H) 8.7 - 10.5 mg/dL Final   04/03/2024 10.2 8.7 - 10.5 mg/dL Final   12/29/2023 10.4 8.7 - 10.5 mg/dL Final     Phosphorus   Date Value Ref Range Status   04/26/2024 2.7 2.7 - 4.5 mg/dL Final   11/14/2014 2.7 2.7 - 4.5 mg/dL Final   05/02/2014 2.4 (L) 2.7 - 4.5 mg/dL Final     Albumin   Date Value Ref Range Status   04/26/2024 3.9 3.5 - 5.2 g/dL Final   04/03/2024 4.0 3.5 - 5.2 g/dL Final   12/29/2023 4.1 3.5 - 5.2 g/dL Final       PTH, Intact   Date Value Ref Range Status   04/26/2024 58.2 9.0 - 77.0 pg/mL Final   05/02/2014 37 14 - 85 pg/mL Final     Comment:     Test performed at [facility],  [place] [address]     Vit D, 25-Hydroxy   Date Value Ref Range Status   05/02/2014 45 30 - 96 ng/mL Final     Comment:     Vitamin D deficiency.........<10 ng/mL                              Vitamin D insufficiency......10-29 ng/mL       Vitamin D sufficiency........> or equal to 30 ng/mL  Vitamin D toxicity............>100 ng/mL     Uric Acid   Date Value Ref Range Status   04/26/2024 8.9 (H) 3.4 - 7.0 mg/dL Final   10/24/2019 9.5 (H) 3.4 - 7.0 mg/dL Final   10/24/2017 9.3 (H) 3.4 - 7.0 mg/dL Final       Hemoglobin   Date Value Ref Range Status   04/26/2024 16.4 14.0 - 18.0 g/dL Final   04/03/2024 16.1 14.0 - 18.0 g/dL Final    12/29/2023 15.6 14.0 - 18.0 g/dL Final       Prot/Creat Ratio, Urine   Date Value Ref Range Status   04/26/2024 Unable to calculate 0.00 - 0.20 Final   12/09/2015 Unable to calculate 0.00 - 0.20 Final   11/14/2014 Unable to calculate 0.0 - 0.2 Final           Renal US: 2015; bilateral cyst, L stone      Impression/Plan:    Stage 3b chronic kidney disease  - baseline Cr 1.5-1.7 with GFR 40-47% from 2013-12/2023; unclear etiology  - Cr 1.9 on 4/3/24 and 1.9 on 4/26/24. Possibly related to hypercalcemia/volume depletion vs progression of disease  - recommended increasing water intake to 48-64oz/day  - addressing hypercalcemia as below  - ordered renal US    Hypercalcemia  - first noted 2023  - CCa 10.7 on 4/26/24  - recommended increasing water intake as above  - ordered SPEP, EDYTA, FLC, vitamin D, calcitriol, PTHrP, and renal US    Hyperuricemia  - no h/o gout or stones  - uric acid 8.9 (previously 9.5)  - will repeat    Elevated blood pressure reading  - /80 in clinic today; no h/o HTN  - will repeat      Visit today included increased complexity associated with the care of the episodic problem CKD addressed and managing the longitudinal care of the patient due to the serious and/or complex managed problem(s) hyperCa, CKD.      Treatment options and plan were discussed with the patient and/or caregiver.   RTC 2 months.       Dariela Terrazas MD  [facility]  [phone]

## 2024-05-06 ENCOUNTER — OFFICE VISIT (OUTPATIENT)
Dept: NEPHROLOGY | Facility: CLINIC | Age: 79
End: 2024-05-06
Payer: MEDICARE

## 2024-05-06 VITALS
HEIGHT: 71 IN | SYSTOLIC BLOOD PRESSURE: 142 MMHG | BODY MASS INDEX: 21.73 KG/M2 | OXYGEN SATURATION: 97 % | WEIGHT: 155.19 LBS | DIASTOLIC BLOOD PRESSURE: 80 MMHG | RESPIRATION RATE: 18 BRPM | HEART RATE: 75 BPM

## 2024-05-06 DIAGNOSIS — E83.52 HYPERCALCEMIA: ICD-10-CM

## 2024-05-06 DIAGNOSIS — R03.0 ELEVATED BLOOD PRESSURE READING: ICD-10-CM

## 2024-05-06 DIAGNOSIS — E79.0 HYPERURICEMIA: ICD-10-CM

## 2024-05-06 DIAGNOSIS — N18.32 STAGE 3B CHRONIC KIDNEY DISEASE: Primary | ICD-10-CM

## 2024-05-06 PROCEDURE — 1159F MED LIST DOCD IN RCRD: CPT | Mod: CPTII,S$GLB,, | Performed by: INTERNAL MEDICINE

## 2024-05-06 PROCEDURE — 1101F PT FALLS ASSESS-DOCD LE1/YR: CPT | Mod: CPTII,S$GLB,, | Performed by: INTERNAL MEDICINE

## 2024-05-06 PROCEDURE — 3079F DIAST BP 80-89 MM HG: CPT | Mod: CPTII,S$GLB,, | Performed by: INTERNAL MEDICINE

## 2024-05-06 PROCEDURE — 1126F AMNT PAIN NOTED NONE PRSNT: CPT | Mod: CPTII,S$GLB,, | Performed by: INTERNAL MEDICINE

## 2024-05-06 PROCEDURE — 99999 PR PBB SHADOW E&M-EST. PATIENT-LVL IV: CPT | Mod: PBBFAC,,, | Performed by: INTERNAL MEDICINE

## 2024-05-06 PROCEDURE — 3077F SYST BP >= 140 MM HG: CPT | Mod: CPTII,S$GLB,, | Performed by: INTERNAL MEDICINE

## 2024-05-06 PROCEDURE — 99204 OFFICE O/P NEW MOD 45 MIN: CPT | Mod: S$GLB,,, | Performed by: INTERNAL MEDICINE

## 2024-05-06 PROCEDURE — 1160F RVW MEDS BY RX/DR IN RCRD: CPT | Mod: CPTII,S$GLB,, | Performed by: INTERNAL MEDICINE

## 2024-05-06 PROCEDURE — 3288F FALL RISK ASSESSMENT DOCD: CPT | Mod: CPTII,S$GLB,, | Performed by: INTERNAL MEDICINE

## 2024-05-06 NOTE — ASSESSMENT & PLAN NOTE
- first noted 2023  - CCa 10.7 on 4/26/24  - recommended increasing water intake as above  - ordered SPEP, EDYTA, FLC, vitamin D, calcitriol, PTHrP, and renal US

## 2024-05-06 NOTE — ASSESSMENT & PLAN NOTE
- baseline Cr 1.5-1.7 with GFR 40-47% from 2013-12/2023; unclear etiology  - Cr 1.9 on 4/3/24 and 1.9 on 4/26/24. Possibly related to hypercalcemia/volume depletion vs progression of disease  - recommended increasing water intake to 48-64oz/day  - addressing hypercalcemia as below  - ordered renal US

## 2024-05-30 ENCOUNTER — HOSPITAL ENCOUNTER (OUTPATIENT)
Dept: RADIOLOGY | Facility: HOSPITAL | Age: 79
Discharge: HOME | End: 2024-05-30
Attending: INTERNAL MEDICINE
Payer: MEDICARE

## 2024-05-30 DIAGNOSIS — F20.9 SCHIZOPHRENIA, UNSPECIFIED TYPE: ICD-10-CM

## 2024-05-30 DIAGNOSIS — E83.52 HYPERCALCEMIA: ICD-10-CM

## 2024-05-30 DIAGNOSIS — N18.32 STAGE 3B CHRONIC KIDNEY DISEASE: ICD-10-CM

## 2024-05-30 DIAGNOSIS — E79.0 HYPERURICEMIA: ICD-10-CM

## 2024-05-30 PROCEDURE — 76770 US EXAM ABDO BACK WALL COMP: CPT | Mod: 26,,, | Performed by: RADIOLOGY

## 2024-05-30 PROCEDURE — 76770 US EXAM ABDO BACK WALL COMP: CPT | Mod: TC

## 2024-05-30 RX ORDER — ARIPIPRAZOLE 20 MG/1
TABLET ORAL
Qty: 90 TABLET | Refills: 2 | Status: SHIPPED | OUTPATIENT
Start: 2024-05-30

## 2024-05-30 NOTE — TELEPHONE ENCOUNTER
No care due was identified.  [facility] Embedded Care Due Messages. Reference number: [id number].   5/30/2024 8:03:34 AM CDT

## 2024-05-30 NOTE — TELEPHONE ENCOUNTER
Last Office Visit Info:   The patient's last visit with Fabio Lennon MD was on 9/12/2023.    The patient's last visit in current department was on Visit date not found.        Last CBC Results:   Lab Results   Component Value Date    WBC 8.23 04/26/2024    HGB 16.4 04/26/2024    HCT 50.6 04/26/2024     04/26/2024       Last CMP Results  Lab Results   Component Value Date     04/26/2024    K 4.7 04/26/2024     04/26/2024    CO2 28 04/26/2024    BUN 28 (H) 04/26/2024    CREATININE 1.9 (H) 04/26/2024    CALCIUM 10.7 (H) 04/26/2024    ALBUMIN 3.9 04/26/2024    AST 26 04/03/2024    ALT 35 04/03/2024       Last Lipids  Lab Results   Component Value Date    CHOL 178 04/07/2023    TRIG 47 04/07/2023    HDL 48 04/07/2023    LDLCALC 120.6 04/07/2023       Last A1C  Lab Results   Component Value Date    HGBA1C 5.2 09/21/2022       Last TSH  Lab Results   Component Value Date    TSH 1.398 04/06/2023             Current Med Refills  Medication List with Changes/Refills   Current Medications    ARIPIPRAZOLE (ABILIFY) 20 MG TAB    TAKE ONE TABLET BY MOUTH EVERY DAY       Start Date: 9/7/2023  End Date: --    LORATADINE (CLARITIN) 10 MG TABLET    Take 10 mg by mouth once daily.       Start Date: --        End Date: --    MULTIVITAMIN CAPSULE    Take 1 capsule by mouth once daily.       Start Date: --        End Date: --       Order(s) placed per written order guidelines:     Please advise.

## 2024-07-08 ENCOUNTER — LAB VISIT (OUTPATIENT)
Dept: LAB | Facility: HOSPITAL | Age: 79
End: 2024-07-08
Attending: INTERNAL MEDICINE
Payer: MEDICARE

## 2024-07-08 DIAGNOSIS — E83.52 HYPERCALCEMIA: ICD-10-CM

## 2024-07-08 DIAGNOSIS — N18.32 STAGE 3B CHRONIC KIDNEY DISEASE: ICD-10-CM

## 2024-07-08 DIAGNOSIS — E79.0 HYPERURICEMIA: ICD-10-CM

## 2024-07-08 LAB
25(OH)D3+25(OH)D2 SERPL-MCNC: 67 NG/ML (ref 30–96)
ALBUMIN SERPL BCP-MCNC: 3.8 G/DL (ref 3.5–5.2)
ANION GAP SERPL CALC-SCNC: 11 MMOL/L (ref 8–16)
BASOPHILS # BLD AUTO: 0.05 K/UL (ref 0–0.2)
BASOPHILS NFR BLD: 0.7 % (ref 0–1.9)
BUN SERPL-MCNC: 26 MG/DL (ref 8–23)
CALCIUM SERPL-MCNC: 10 MG/DL (ref 8.7–10.5)
CHLORIDE SERPL-SCNC: 104 MMOL/L (ref 95–110)
CO2 SERPL-SCNC: 26 MMOL/L (ref 23–29)
CREAT SERPL-MCNC: 1.8 MG/DL (ref 0.5–1.4)
DIFFERENTIAL METHOD BLD: ABNORMAL
EOSINOPHIL # BLD AUTO: 0.1 K/UL (ref 0–0.5)
EOSINOPHIL NFR BLD: 1.8 % (ref 0–8)
ERYTHROCYTE [DISTWIDTH] IN BLOOD BY AUTOMATED COUNT: 12.8 % (ref 11.5–14.5)
EST. GFR  (NO RACE VARIABLE): 37 ML/MIN/1.73 M^2
GLUCOSE SERPL-MCNC: 90 MG/DL (ref 70–110)
HCT VFR BLD AUTO: 49.6 % (ref 40–54)
HGB BLD-MCNC: 16 G/DL (ref 14–18)
IMM GRANULOCYTES # BLD AUTO: 0.02 K/UL (ref 0–0.04)
IMM GRANULOCYTES NFR BLD AUTO: 0.3 % (ref 0–0.5)
LYMPHOCYTES # BLD AUTO: 1.1 K/UL (ref 1–4.8)
LYMPHOCYTES NFR BLD: 16.1 % (ref 18–48)
MCH RBC QN AUTO: 31.3 PG (ref 27–31)
MCHC RBC AUTO-ENTMCNC: 32.3 G/DL (ref 32–36)
MCV RBC AUTO: 97 FL (ref 82–98)
MONOCYTES # BLD AUTO: 0.6 K/UL (ref 0.3–1)
MONOCYTES NFR BLD: 8.1 % (ref 4–15)
NEUTROPHILS # BLD AUTO: 5.2 K/UL (ref 1.8–7.7)
NEUTROPHILS NFR BLD: 73 % (ref 38–73)
NRBC BLD-RTO: 0 /100 WBC
PHOSPHATE SERPL-MCNC: 2.8 MG/DL (ref 2.7–4.5)
PLATELET # BLD AUTO: 204 K/UL (ref 150–450)
PMV BLD AUTO: 9.8 FL (ref 9.2–12.9)
POTASSIUM SERPL-SCNC: 4.4 MMOL/L (ref 3.5–5.1)
PTH-INTACT SERPL-MCNC: 50 PG/ML (ref 9–77)
RBC # BLD AUTO: 5.12 M/UL (ref 4.6–6.2)
SODIUM SERPL-SCNC: 141 MMOL/L (ref 136–145)
URATE SERPL-MCNC: 9 MG/DL (ref 3.4–7)
WBC # BLD AUTO: 7.06 K/UL (ref 3.9–12.7)

## 2024-07-08 PROCEDURE — 84165 PROTEIN E-PHORESIS SERUM: CPT | Performed by: INTERNAL MEDICINE

## 2024-07-08 PROCEDURE — 84550 ASSAY OF BLOOD/URIC ACID: CPT | Performed by: INTERNAL MEDICINE

## 2024-07-08 PROCEDURE — 86334 IMMUNOFIX E-PHORESIS SERUM: CPT | Performed by: INTERNAL MEDICINE

## 2024-07-08 PROCEDURE — 86334 IMMUNOFIX E-PHORESIS SERUM: CPT | Mod: 26,,, | Performed by: PATHOLOGY

## 2024-07-08 PROCEDURE — 80069 RENAL FUNCTION PANEL: CPT | Performed by: INTERNAL MEDICINE

## 2024-07-08 PROCEDURE — 36415 COLL VENOUS BLD VENIPUNCTURE: CPT | Performed by: INTERNAL MEDICINE

## 2024-07-08 PROCEDURE — 84165 PROTEIN E-PHORESIS SERUM: CPT | Mod: 26,,, | Performed by: PATHOLOGY

## 2024-07-08 PROCEDURE — 82397 CHEMILUMINESCENT ASSAY: CPT | Performed by: INTERNAL MEDICINE

## 2024-07-08 PROCEDURE — 85025 COMPLETE CBC W/AUTO DIFF WBC: CPT | Performed by: INTERNAL MEDICINE

## 2024-07-08 PROCEDURE — 82652 VIT D 1 25-DIHYDROXY: CPT | Performed by: INTERNAL MEDICINE

## 2024-07-08 PROCEDURE — 83970 ASSAY OF PARATHORMONE: CPT | Performed by: INTERNAL MEDICINE

## 2024-07-08 PROCEDURE — 83521 IG LIGHT CHAINS FREE EACH: CPT | Performed by: INTERNAL MEDICINE

## 2024-07-08 PROCEDURE — 82306 VITAMIN D 25 HYDROXY: CPT | Performed by: INTERNAL MEDICINE

## 2024-07-09 LAB
ALBUMIN SERPL ELPH-MCNC: 4.05 G/DL (ref 3.35–5.55)
ALPHA1 GLOB SERPL ELPH-MCNC: 0.26 G/DL (ref 0.17–0.41)
ALPHA2 GLOB SERPL ELPH-MCNC: 0.58 G/DL (ref 0.43–0.99)
B-GLOBULIN SERPL ELPH-MCNC: 0.78 G/DL (ref 0.5–1.1)
GAMMA GLOB SERPL ELPH-MCNC: 0.73 G/DL (ref 0.67–1.58)
INTERPRETATION SERPL IFE-IMP: NORMAL
KAPPA LC SER QL IA: 2.01 MG/DL (ref 0.33–1.94)
KAPPA LC/LAMBDA SER IA: 1.1 (ref 0.26–1.65)
LAMBDA LC SER QL IA: 1.82 MG/DL (ref 0.57–2.63)
PATHOLOGIST INTERPRETATION IFE: NORMAL
PATHOLOGIST INTERPRETATION SPE: NORMAL
PROT SERPL-MCNC: 6.4 G/DL (ref 6–8.4)

## 2024-07-11 LAB — 1,25(OH)2D3 SERPL-MCNC: 45 PG/ML (ref 20–79)

## 2024-07-12 LAB — PTH RELATED PROT SERPL-SCNC: 0.9 PMOL/L

## 2024-07-15 PROBLEM — N20.0 NEPHROLITHIASIS: Status: ACTIVE | Noted: 2024-07-15

## 2024-07-15 NOTE — PROGRESS NOTES
[facility]  [address] [facility], [address]  [place]  [phone]    PCP: Fabio Lennon MD  Hem/Onc: Mitchell       HPI: Mr. Antonio Torres Jr. is a 79 y.o. male with DLD, PVD, BPH, schizophrenia, and melanoma (diagnosed 7/2023; s/p surgery; active surveillance) who is here for established patient evaluation of Chronic Kidney Disease  Initial visit was 5/6/24.   Baseline Cr has been 1.5-1.7 with GFR 40-47% since at least 2013; however Cr 1.9 since 4/2024. No prior proteinuria.  He is here today with his sister who provided most of history.  He denies h/o HTN, T2DM, gout, kidney stones, or family h/o CKD. He reports longstanding h/o CKD but was never told the etiology. He admits to poor water intake (~24oz per day) due to LUTS/nocturia. No edema.   His Ca is high today and was also elevated in 2023. He denies prior lithium use. He does not take calcium or vitamin D supplementation (only an OTC MVN). He does not drink excessive amounts of milk or take Tums.        Interval Hx:   LV 5/6/24: recommended increasing water intake; ordered renal US.   Today he reports to be doing well. He has increased his water intake. Currently drinking ~50oz/day.   No edema. No flank pain or hematuria.  Not taking vitamin D; doesn't get much sun exposure.   No h/o gout.   Here today with his sister.       ROS:  Complete ROS otherwise negative except as indicated above.         Current Outpatient Medications:     ARIPiprazole (ABILIFY) 20 MG Tab, TAKE ONE TABLET BY MOUTH EVERY DAY, Disp: 90 tablet, Rfl: 2    loratadine (CLARITIN) 10 mg tablet, Take 10 mg by mouth once daily., Disp: , Rfl:     multivitamin capsule, Take 1 capsule by mouth once daily., Disp: , Rfl:   No current facility-administered medications for this visit.    Facility-Administered Medications Ordered in Other Visits:     0.9%  NaCl infusion, , Intravenous, Continuous, Rhys Lehman MD, Stopped at 07/07/23 1256    mupirocin 2 % ointment, , Nasal, On Call  "Magali, Rhys Lehman MD, Given at 05/08/23 0701      Vitals: Blood pressure 128/72, pulse 80, resp. rate 18, height 5' 10.5" (1.791 m), weight 71.2 kg (156 lb 15.5 oz), SpO2 97%. Body mass index is 22.2 kg/m².    Physical Exam  Vitals reviewed.   Constitutional:       General: He is awake. He is not in acute distress.     Appearance: Normal appearance. He is well-developed.   HENT:      Head: Normocephalic and atraumatic.      Nose: Nose normal.      Mouth/Throat:      Mouth: Mucous membranes are moist.   Eyes:      Extraocular Movements: Extraocular movements intact.      Conjunctiva/sclera: Conjunctivae normal.   Pulmonary:      Effort: Pulmonary effort is normal.   Musculoskeletal:         General: No tenderness or signs of injury.      Right lower leg: No edema.      Left lower leg: No edema.   Skin:     General: Skin is warm and dry.      Findings: No erythema or rash.   Neurological:      General: No focal deficit present.      Mental Status: He is alert. Mental status is at baseline.   Psychiatric:         Mood and Affect: Mood normal.         Behavior: Behavior normal.           Labs/Imaging:  Sodium   Date Value Ref Range Status   07/08/2024 141 136 - 145 mmol/L Final   04/26/2024 142 136 - 145 mmol/L Final   04/03/2024 141 136 - 145 mmol/L Final     Potassium   Date Value Ref Range Status   07/08/2024 4.4 3.5 - 5.1 mmol/L Final   04/26/2024 4.7 3.5 - 5.1 mmol/L Final   04/03/2024 4.2 3.5 - 5.1 mmol/L Final     Chloride   Date Value Ref Range Status   07/08/2024 104 95 - 110 mmol/L Final   04/26/2024 106 95 - 110 mmol/L Final   04/03/2024 107 95 - 110 mmol/L Final     CO2   Date Value Ref Range Status   07/08/2024 26 23 - 29 mmol/L Final   04/26/2024 28 23 - 29 mmol/L Final   04/03/2024 26 23 - 29 mmol/L Final     BUN   Date Value Ref Range Status   07/08/2024 26 (H) 8 - 23 mg/dL Final   04/26/2024 28 (H) 8 - 23 mg/dL Final   04/03/2024 24 (H) 8 - 23 mg/dL Final     Creatinine   Date Value Ref " Range Status   07/08/2024 1.8 (H) 0.5 - 1.4 mg/dL Final   04/26/2024 1.9 (H) 0.5 - 1.4 mg/dL Final   04/03/2024 1.9 (H) 0.5 - 1.4 mg/dL Final     eGFR   Date Value Ref Range Status   07/08/2024 37 (A) >60 mL/min/1.73 m^2 Final   04/26/2024 35 (A) >60 mL/min/1.73 m^2 Final   04/03/2024 35 (A) >60 mL/min/1.73 m^2 Final     Calcium   Date Value Ref Range Status   07/08/2024 10.0 8.7 - 10.5 mg/dL Final   04/26/2024 10.7 (H) 8.7 - 10.5 mg/dL Final   04/03/2024 10.2 8.7 - 10.5 mg/dL Final     Phosphorus   Date Value Ref Range Status   07/08/2024 2.8 2.7 - 4.5 mg/dL Final   04/26/2024 2.7 2.7 - 4.5 mg/dL Final   11/14/2014 2.7 2.7 - 4.5 mg/dL Final     Albumin   Date Value Ref Range Status   07/08/2024 3.8 3.5 - 5.2 g/dL Final   04/26/2024 3.9 3.5 - 5.2 g/dL Final   04/03/2024 4.0 3.5 - 5.2 g/dL Final       PTH, Intact   Date Value Ref Range Status   07/08/2024 50.0 9.0 - 77.0 pg/mL Final   04/26/2024 58.2 9.0 - 77.0 pg/mL Final   05/02/2014 37 14 - 85 pg/mL Final     Comment:     Test performed at [facility],  [place] [address]     Vit D, 25-Hydroxy   Date Value Ref Range Status   07/08/2024 67 30 - 96 ng/mL Final     Comment:     Vitamin D deficiency.........<10 ng/mL                              Vitamin D insufficiency......10-29 ng/mL       Vitamin D sufficiency........> or equal to 30 ng/mL  Vitamin D toxicity............>100 ng/mL       Uric Acid   Date Value Ref Range Status   07/08/2024 9.0 (H) 3.4 - 7.0 mg/dL Final   04/26/2024 8.9 (H) 3.4 - 7.0 mg/dL Final   10/24/2019 9.5 (H) 3.4 - 7.0 mg/dL Final       Hemoglobin   Date Value Ref Range Status   07/08/2024 16.0 14.0 - 18.0 g/dL Final   04/26/2024 16.4 14.0 - 18.0 g/dL Final   04/03/2024 16.1 14.0 - 18.0 g/dL Final       Prot/Creat Ratio, Urine   Date Value Ref Range Status   07/08/2024 0.04 0.00 - 0.20 Final   04/26/2024 Unable to calculate 0.00 - 0.20 Final   12/09/2015 Unable to calculate 0.00 - 0.20 Final           Renal US: 5/30/24:   Right  kidney measures 8.9 cm, resistive index 0.67.  Left kidney measures 9.1 cm, resistive index 0.67.  There are 2 cysts left kidney measuring 1.5, and 2.3 cm.  There are 2 calculi left kidney measuring 5 mm, and 3 mm.  There is no evidence of obstruction.  The renal parenchyma is echogenic.       2015: bilateral cysts, L stone       Impression/Plan:    Stage 3b chronic kidney disease  - baseline Cr 1.5-1.7 with GFR 40-47% from 2013-12/2023; unclear etiology. Renal US with smaller kidneys.   - Cr 1.9 in 4/2024 --> Cr 1.8 today; improving. CCa normal today. Continue increased water intake  - UPCR 0.04; remains negative.  - low salt diet. Avoid NSAIDs    Hypercalcemia  - first noted 2023  - CCa 10.7 on 4/26/24; recommended increasing water intake  - CCa 10.0 today; improved  - PTH 35 --> 50; stable  - SPEP, EDYTA, FLC, calcitriol, PTHrP: negative  - vitamin D 67; not currently on vitamin D supplementation. Continue to avoid  - continue increased water intake    Hyperuricemia  - no h/o gout or symptomatic stones  - uric acid 8.9 --> 9.0; stable  - briefly discussed avoidance of high purine food  - will repeat at next visit    Nephrolithiasis  - asymptomatic  - first noted on US in 2015  - US 7/2024 with ongoing stone burden  - not interested in litholink at this time  - recommended increasing water intake to at least 64oz/day  - (also with renal cysts; stable in size. No comment on complexity)    Elevated blood pressure reading  - BP controlled today      Visit today included increased complexity associated with the care of the episodic problem hypercalcemia addressed and managing the longitudinal care of the patient due to the serious and/or complex managed problem(s) CKD.      Treatment options and plan were discussed with the patient and/or caregiver.   RTC 3 months.       Dariela Terrazas MD  [facility]  [phone]

## 2024-07-16 ENCOUNTER — OFFICE VISIT (OUTPATIENT)
Dept: NEPHROLOGY | Facility: CLINIC | Age: 79
End: 2024-07-16
Payer: MEDICARE

## 2024-07-16 VITALS
HEART RATE: 80 BPM | BODY MASS INDEX: 21.97 KG/M2 | HEIGHT: 71 IN | RESPIRATION RATE: 18 BRPM | WEIGHT: 156.94 LBS | OXYGEN SATURATION: 97 % | SYSTOLIC BLOOD PRESSURE: 128 MMHG | DIASTOLIC BLOOD PRESSURE: 72 MMHG

## 2024-07-16 DIAGNOSIS — E79.0 HYPERURICEMIA: ICD-10-CM

## 2024-07-16 DIAGNOSIS — E83.52 HYPERCALCEMIA: ICD-10-CM

## 2024-07-16 DIAGNOSIS — N18.32 STAGE 3B CHRONIC KIDNEY DISEASE: Primary | ICD-10-CM

## 2024-07-16 DIAGNOSIS — N20.0 NEPHROLITHIASIS: ICD-10-CM

## 2024-07-16 DIAGNOSIS — R03.0 ELEVATED BLOOD PRESSURE READING: ICD-10-CM

## 2024-07-16 PROCEDURE — 1101F PT FALLS ASSESS-DOCD LE1/YR: CPT | Mod: CPTII,S$GLB,, | Performed by: INTERNAL MEDICINE

## 2024-07-16 PROCEDURE — G2211 COMPLEX E/M VISIT ADD ON: HCPCS | Mod: S$GLB,,, | Performed by: INTERNAL MEDICINE

## 2024-07-16 PROCEDURE — 1159F MED LIST DOCD IN RCRD: CPT | Mod: CPTII,S$GLB,, | Performed by: INTERNAL MEDICINE

## 2024-07-16 PROCEDURE — 1126F AMNT PAIN NOTED NONE PRSNT: CPT | Mod: CPTII,S$GLB,, | Performed by: INTERNAL MEDICINE

## 2024-07-16 PROCEDURE — 3288F FALL RISK ASSESSMENT DOCD: CPT | Mod: CPTII,S$GLB,, | Performed by: INTERNAL MEDICINE

## 2024-07-16 PROCEDURE — 1160F RVW MEDS BY RX/DR IN RCRD: CPT | Mod: CPTII,S$GLB,, | Performed by: INTERNAL MEDICINE

## 2024-07-16 PROCEDURE — 3078F DIAST BP <80 MM HG: CPT | Mod: CPTII,S$GLB,, | Performed by: INTERNAL MEDICINE

## 2024-07-16 PROCEDURE — 99214 OFFICE O/P EST MOD 30 MIN: CPT | Mod: S$GLB,,, | Performed by: INTERNAL MEDICINE

## 2024-07-16 PROCEDURE — 3074F SYST BP LT 130 MM HG: CPT | Mod: CPTII,S$GLB,, | Performed by: INTERNAL MEDICINE

## 2024-07-16 PROCEDURE — 99999 PR PBB SHADOW E&M-EST. PATIENT-LVL III: CPT | Mod: PBBFAC,,, | Performed by: INTERNAL MEDICINE

## 2024-07-16 NOTE — ASSESSMENT & PLAN NOTE
- first noted 2023  - CCa 10.7 on 4/26/24; recommended increasing water intake  - CCa 10.0 today; improved  - PTH 35 --> 50; stable  - SPEP, EDYTA, FLC, calcitriol, PTHrP: negative  - vitamin D 67; not currently on vitamin D supplementation. Continue to avoid  - continue increased water intake

## 2024-07-16 NOTE — ASSESSMENT & PLAN NOTE
- asymptomatic  - first noted on US in 2015  - US 7/2024 with ongoing stone burden  - not interested in litholink at this time  - recommended increasing water intake to at least 64oz/day  - (also with renal cysts; stable in size. No comment on complexity)

## 2024-07-16 NOTE — ASSESSMENT & PLAN NOTE
- no h/o gout or symptomatic stones  - uric acid 8.9 --> 9.0; stable  - briefly discussed avoidance of high purine food  - will repeat at next visit

## 2024-07-16 NOTE — ASSESSMENT & PLAN NOTE
- baseline Cr 1.5-1.7 with GFR 40-47% from 2013-12/2023; unclear etiology. Renal US with smaller kidneys.   - Cr 1.9 in 4/2024 --> Cr 1.8 today; improving. CCa normal today. Continue increased water intake  - UPCR 0.04; remains negative.  - low salt diet. Avoid NSAIDs

## 2024-08-08 ENCOUNTER — LAB VISIT (OUTPATIENT)
Dept: LAB | Facility: HOSPITAL | Age: 79
End: 2024-08-08
Attending: INTERNAL MEDICINE
Payer: MEDICARE

## 2024-08-08 DIAGNOSIS — N18.32 STAGE 3B CHRONIC KIDNEY DISEASE: ICD-10-CM

## 2024-08-08 DIAGNOSIS — C43.4 MALIGNANT MELANOMA OF SCALP: ICD-10-CM

## 2024-08-08 LAB
ALBUMIN SERPL BCP-MCNC: 3.6 G/DL (ref 3.5–5.2)
ALP SERPL-CCNC: 82 U/L (ref 55–135)
ALT SERPL W/O P-5'-P-CCNC: 28 U/L (ref 10–44)
ANION GAP SERPL CALC-SCNC: 11 MMOL/L (ref 8–16)
AST SERPL-CCNC: 22 U/L (ref 10–40)
BASOPHILS # BLD AUTO: 0.04 K/UL (ref 0–0.2)
BASOPHILS NFR BLD: 0.4 % (ref 0–1.9)
BILIRUB SERPL-MCNC: 0.5 MG/DL (ref 0.1–1)
BUN SERPL-MCNC: 24 MG/DL (ref 8–23)
CALCIUM SERPL-MCNC: 10.2 MG/DL (ref 8.7–10.5)
CHLORIDE SERPL-SCNC: 106 MMOL/L (ref 95–110)
CO2 SERPL-SCNC: 26 MMOL/L (ref 23–29)
CREAT SERPL-MCNC: 2 MG/DL (ref 0.5–1.4)
DIFFERENTIAL METHOD BLD: ABNORMAL
EOSINOPHIL # BLD AUTO: 0.1 K/UL (ref 0–0.5)
EOSINOPHIL NFR BLD: 0.8 % (ref 0–8)
ERYTHROCYTE [DISTWIDTH] IN BLOOD BY AUTOMATED COUNT: 13.1 % (ref 11.5–14.5)
EST. GFR  (NO RACE VARIABLE): 33 ML/MIN/1.73 M^2
GLUCOSE SERPL-MCNC: 84 MG/DL (ref 70–110)
HCT VFR BLD AUTO: 50 % (ref 40–54)
HGB BLD-MCNC: 16.5 G/DL (ref 14–18)
IMM GRANULOCYTES # BLD AUTO: 0.03 K/UL (ref 0–0.04)
IMM GRANULOCYTES NFR BLD AUTO: 0.3 % (ref 0–0.5)
LDH SERPL L TO P-CCNC: 244 U/L (ref 110–260)
LYMPHOCYTES # BLD AUTO: 0.8 K/UL (ref 1–4.8)
LYMPHOCYTES NFR BLD: 9 % (ref 18–48)
MCH RBC QN AUTO: 31.8 PG (ref 27–31)
MCHC RBC AUTO-ENTMCNC: 33 G/DL (ref 32–36)
MCV RBC AUTO: 96 FL (ref 82–98)
MONOCYTES # BLD AUTO: 1 K/UL (ref 0.3–1)
MONOCYTES NFR BLD: 10.3 % (ref 4–15)
NEUTROPHILS # BLD AUTO: 7.3 K/UL (ref 1.8–7.7)
NEUTROPHILS NFR BLD: 79.2 % (ref 38–73)
NRBC BLD-RTO: 0 /100 WBC
PLATELET # BLD AUTO: 203 K/UL (ref 150–450)
PMV BLD AUTO: 10 FL (ref 9.2–12.9)
POTASSIUM SERPL-SCNC: 4.3 MMOL/L (ref 3.5–5.1)
PROT SERPL-MCNC: 7.3 G/DL (ref 6–8.4)
RBC # BLD AUTO: 5.19 M/UL (ref 4.6–6.2)
SODIUM SERPL-SCNC: 143 MMOL/L (ref 136–145)
WBC # BLD AUTO: 9.19 K/UL (ref 3.9–12.7)

## 2024-08-08 PROCEDURE — 36415 COLL VENOUS BLD VENIPUNCTURE: CPT | Performed by: INTERNAL MEDICINE

## 2024-08-08 PROCEDURE — 85025 COMPLETE CBC W/AUTO DIFF WBC: CPT | Performed by: INTERNAL MEDICINE

## 2024-08-08 PROCEDURE — 83615 LACTATE (LD) (LDH) ENZYME: CPT | Performed by: INTERNAL MEDICINE

## 2024-08-08 PROCEDURE — 80053 COMPREHEN METABOLIC PANEL: CPT | Performed by: INTERNAL MEDICINE

## 2024-08-12 ENCOUNTER — OFFICE VISIT (OUTPATIENT)
Dept: HEMATOLOGY/ONCOLOGY | Facility: CLINIC | Age: 79
End: 2024-08-12
Payer: MEDICARE

## 2024-08-12 VITALS
OXYGEN SATURATION: 97 % | SYSTOLIC BLOOD PRESSURE: 137 MMHG | WEIGHT: 153.69 LBS | BODY MASS INDEX: 21.52 KG/M2 | HEIGHT: 71 IN | HEART RATE: 74 BPM | DIASTOLIC BLOOD PRESSURE: 82 MMHG

## 2024-08-12 DIAGNOSIS — C43.4 MALIGNANT MELANOMA OF SCALP: Primary | ICD-10-CM

## 2024-08-12 DIAGNOSIS — N18.32 STAGE 3B CHRONIC KIDNEY DISEASE: ICD-10-CM

## 2024-08-12 PROCEDURE — 3075F SYST BP GE 130 - 139MM HG: CPT | Mod: CPTII,S$GLB,, | Performed by: INTERNAL MEDICINE

## 2024-08-12 PROCEDURE — 3288F FALL RISK ASSESSMENT DOCD: CPT | Mod: CPTII,S$GLB,, | Performed by: INTERNAL MEDICINE

## 2024-08-12 PROCEDURE — 1126F AMNT PAIN NOTED NONE PRSNT: CPT | Mod: CPTII,S$GLB,, | Performed by: INTERNAL MEDICINE

## 2024-08-12 PROCEDURE — 1159F MED LIST DOCD IN RCRD: CPT | Mod: CPTII,S$GLB,, | Performed by: INTERNAL MEDICINE

## 2024-08-12 PROCEDURE — 1101F PT FALLS ASSESS-DOCD LE1/YR: CPT | Mod: CPTII,S$GLB,, | Performed by: INTERNAL MEDICINE

## 2024-08-12 PROCEDURE — 99214 OFFICE O/P EST MOD 30 MIN: CPT | Mod: S$GLB,,, | Performed by: INTERNAL MEDICINE

## 2024-08-12 PROCEDURE — 3079F DIAST BP 80-89 MM HG: CPT | Mod: CPTII,S$GLB,, | Performed by: INTERNAL MEDICINE

## 2024-08-12 PROCEDURE — 99999 PR PBB SHADOW E&M-EST. PATIENT-LVL III: CPT | Mod: PBBFAC,,, | Performed by: INTERNAL MEDICINE

## 2024-08-12 NOTE — PROGRESS NOTES
Subjective         Patient ID: Antonio Torres Jr. is a 79 y.o. male.    Chief Complaint: No chief complaint on file.  Diagnosis: Malignant Melanoma  HPI 78 yo male with history of malignant melanoma anterior scalp diagnosed  and  re excision of his melanoma with dermal graft placement on 7/7/23  Pathology shows MALIGNANT MELANOMA, ULCERATED, 4.0 MM IN DEPTH (pT4b),   He is also treated for  right dorsal foot wound s/p  debridement .Accompanied by family members whom provide most of history. Family reports first noted skin lesion May [id number]. She reports 2 skin lesions on scalp present and one was removed at that time. She is also followed by plastic surgery, Dr Montano. He is also followed closed by Dermatology, Dr. Pearl. He also has history of squamous cell carcinoma of skin. No complaints today. His scalp wounds are healing slowly. No family history of melanoma. PET/CT on 6/1/2023 no evidence of distant mets        Interval Hx: Accompanied by family member whom provides most of history   Doing well  No new issues  No SOB/CP  history of melanoma anterior scalp scheduled for excision and closure with dermal graft also presenting with right dorsal foot wound planning debridement and closure with dermal graft.  7/2023   Scalp wound completely healed   Rt foot wound healed  No longer followed by wound care       Pt followed closely  by Dr. Eden dermatology and Dr. Pearl Derm  Last follow up with Dr. Pearl approx 2 weeks ago  No areas of concern per pt       Review of Systems   Constitutional:  Negative for appetite change, fatigue, fever and unexpected weight change.   HENT:  Negative for mouth sores.    Eyes:  Negative for visual disturbance.   Respiratory:  Negative for cough and shortness of breath.    Cardiovascular:  Negative for chest pain.   Gastrointestinal:  Negative for abdominal pain and diarrhea.   Genitourinary:  Negative for frequency.   Musculoskeletal:  Negative for back pain.   Integumentary:   "Negative for rash and wound.   Neurological:  Negative for headaches.   Hematological:  Negative for adenopathy.   Psychiatric/Behavioral:  The patient is not nervous/anxious.           Objective     Vitals:    08/12/24 0959   BP: 137/82   Pulse: 74   SpO2: 97%   Weight: 69.7 kg (153 lb 10.6 oz)   Height: 5' 10.5" (1.791 m)     Physical Exam   Constitutional: He is oriented to person, place, and time. He appears well-developed and well-nourished.   HENT:   Head: Normocephalic.    Eyes: No scleral icterus.   Neck: Normal range of motion. Neck supple. No thyromegaly present.   Cardiovascular: Normal rate, regular rhythm and normal heart sounds.    No murmur heard.  Pulmonary/Chest: Effort normal and breath sounds normal. He has no wheezes. He has no rales.   Abdominal: Soft. Bowel sounds are normal. He exhibits no distension. There is no tenderness. There is no rebound and no guarding.   Musculoskeletal: scalp lesion healed, no lesions, masses   Lymphadenopathy:     He has no cervical adenopathy.     He has no axillary adenopathy.        Right: No supraclavicular adenopathy present.        Left: No supraclavicular adenopathy present.   Neurological: He is alert and oriented to person, place, and time. No cranial nerve deficit.   Skin: No rash noted. No erythema.   Psychiatric: He has a normal mood and affect.              Skin, scalp, excision:   -MALIGNANT MELANOMA, ULCERATED, 4.0 MM IN DEPTH (pT4b), see synoptic report below     SYNOPTIC REPORT   Procedure:  Excision   Specimen Laterality:  Not specified   Tumor Site:  Scalp   Macroscopic Satellite Nodule(s): Not identified   Histologic Type:  Nodular melanoma with desmoplastic features   Maximum Tumor (Breslow) Thickness:  4.0 mm   Ulceration:  Present   Microsatellite(s): Not identified   Margins:   Peripheral:  Uninvolved by invasive malignant melanoma or malignant melanoma in-situ.   Deep: Involved by invasive malignant melanoma.   Mitotic rate: 11 mm2 "   Anatomic (Nabeel) level: V   Lympho-vascular invasion: Not identified   Neurotropism:  Present   Tumor infiltrating lymphocytes: Present, non-brisk   Tumor regression: Not identified       PET/CT 6/1/23    Impression:     Two hypermetabolic areas within the right aspect of the scalp noting reported operative change status post melanoma resection.  Recommend clinical correlation.  No enlarged or hypermetabolic lymph nodes.     Nonspecific areas of increased FDG avidity within the medial aspect the spleen without CT correlation.  Attention on follow-up.     Ulceration and inflammatory changes of the subcutaneous tissues with increased FDG avidity overlying the right ankle.  Known chronic right distal fibular fracture. Recommend clinical correlation.    Lab Results   Component Value Date    WBC 9.19 08/08/2024    HGB 16.5 08/08/2024    HCT 50.0 08/08/2024    MCV 96 08/08/2024     08/08/2024       CT c/a/p 1/2/2024  Impression:     No CT evidence for metastatic disease in this patient with history of malignant melanoma.     Stable 5 mm hypodensity within the left lobe of the liver, likely a small cyst but too small to adequately characterize.     Moderate size hiatal hernia.     1.2 cm low-density right adrenal nodule, likely a benign adrenal adenoma.     Bilateral renal cysts.     Left inguinal hernia containing fat and a nonobstructed loop of colon.     Small fat containing right inguinal hernia.     Small fat containing paraumbilical hernia.            Assessment and Plan       1. Malignant melanoma of scalp  78 y/o with MALIGNANT MELANOMA, ULCERATED, 4.0 MM IN DEPTH (pT4b), of scalp s/p re excision on 7/7/23   Pt with high risk node negative disease  We previously discussed surveillance vs adjuvant immunotherapy x 1 yr namely pembrolizumab in this setting  We discussed potential toxicities of IOT  Currently [place] [facility] [place] interested   PET/CT on 6/1/2023 no evidence of distant  mets  Following detailed discussion, it had been elected to continue surveillance   He will need to continue close follow up with his treating dermatologist every 3-4mos for first 2-3 yrs, then q 6mos for up to 5 yrs  CT of the chest, abdomen, and pelvis every six months for up to three years, then annually for up to five years.   - CT Chest Abdomen Pelvis Without Contrast (XPD); Future  - CBC Auto Differential; Future  - Comprehensive Metabolic Panel; Future  - Lactate Dehydrogenase; Future    Labs reviewed  Clinically doing well   Last Follow up imaging Jan 2024 No evid of metastatic disease  Plan follow up CT imaging ( w/out con 2/2 renal fxn) prior to f/u   Cbc,cmp, LDH prior to f/u 4mos      2. Stage 3b chronic kidney disease   CKD stage 3  Cr 1.7mg/dL on 12/29/23 to 1.9mg/dl on 4/3/24   Cr 2.0 today   Advised to increase water intake  F/b Nephrology      Plan  CT in next 3 weeks  Labs prior to f/u 4mos  Cbc,cmp, LDH prior to f/u 4mos

## 2024-09-04 ENCOUNTER — OFFICE VISIT (OUTPATIENT)
Dept: FAMILY MEDICINE | Facility: CLINIC | Age: 79
End: 2024-09-04
Payer: MEDICARE

## 2024-09-04 VITALS
BODY MASS INDEX: 23.53 KG/M2 | HEART RATE: 67 BPM | DIASTOLIC BLOOD PRESSURE: 76 MMHG | WEIGHT: 149.94 LBS | TEMPERATURE: 98 F | SYSTOLIC BLOOD PRESSURE: 122 MMHG | HEIGHT: 67 IN | OXYGEN SATURATION: 97 %

## 2024-09-04 DIAGNOSIS — E78.00 HYPERCHOLESTEROLEMIA: ICD-10-CM

## 2024-09-04 DIAGNOSIS — F20.9 SCHIZOPHRENIA, UNSPECIFIED TYPE: ICD-10-CM

## 2024-09-04 DIAGNOSIS — I27.20 PULMONARY HYPERTENSION, UNSPECIFIED: ICD-10-CM

## 2024-09-04 DIAGNOSIS — I77.1 STRICTURE OF ARTERY: ICD-10-CM

## 2024-09-04 DIAGNOSIS — N18.32 STAGE 3B CHRONIC KIDNEY DISEASE: ICD-10-CM

## 2024-09-04 DIAGNOSIS — Z00.00 ANNUAL PHYSICAL EXAM: Primary | ICD-10-CM

## 2024-09-04 PROCEDURE — 3078F DIAST BP <80 MM HG: CPT | Mod: CPTII,S$GLB,, | Performed by: FAMILY MEDICINE

## 2024-09-04 PROCEDURE — 99397 PER PM REEVAL EST PAT 65+ YR: CPT | Mod: S$GLB,,, | Performed by: FAMILY MEDICINE

## 2024-09-04 PROCEDURE — 1159F MED LIST DOCD IN RCRD: CPT | Mod: CPTII,S$GLB,, | Performed by: FAMILY MEDICINE

## 2024-09-04 PROCEDURE — 99999 PR PBB SHADOW E&M-EST. PATIENT-LVL III: CPT | Mod: PBBFAC,,, | Performed by: FAMILY MEDICINE

## 2024-09-04 PROCEDURE — 1101F PT FALLS ASSESS-DOCD LE1/YR: CPT | Mod: CPTII,S$GLB,, | Performed by: FAMILY MEDICINE

## 2024-09-04 PROCEDURE — 1126F AMNT PAIN NOTED NONE PRSNT: CPT | Mod: CPTII,S$GLB,, | Performed by: FAMILY MEDICINE

## 2024-09-04 PROCEDURE — 3288F FALL RISK ASSESSMENT DOCD: CPT | Mod: CPTII,S$GLB,, | Performed by: FAMILY MEDICINE

## 2024-09-04 PROCEDURE — 3074F SYST BP LT 130 MM HG: CPT | Mod: CPTII,S$GLB,, | Performed by: FAMILY MEDICINE

## 2024-09-04 RX ORDER — ARIPIPRAZOLE 20 MG/1
20 TABLET ORAL DAILY
Qty: 90 TABLET | Refills: 3 | Status: SHIPPED | OUTPATIENT
Start: 2024-09-04

## 2024-09-04 NOTE — PROGRESS NOTES
"Chief Complaint   Patient presents with    Annual Exam       SUBJECTIVE:   Antonio Torres Jr. is a 79 y.o. male presenting for his annual checkup.   Current Outpatient Medications   Medication Sig Dispense Refill    loratadine (CLARITIN) 10 mg tablet Take 10 mg by mouth once daily.      multivitamin capsule Take 1 capsule by mouth once daily.      ARIPiprazole (ABILIFY) 20 MG Tab Take 1 tablet (20 mg total) by mouth once daily. 90 tablet 3     No current facility-administered medications for this visit.     Facility-Administered Medications Ordered in Other Visits   Medication Dose Route Frequency Provider Last Rate Last Admin    0.9%  NaCl infusion   Intravenous Continuous Rhys Lehman MD   Stopped at 07/07/23 1256    mupirocin 2 % ointment   Nasal On Call Procedure Rhys Lehman MD   Given at 05/08/23 0701     Allergies: Patient has no known allergies.   Patient Active Problem List    Diagnosis Date Noted    Pulmonary hypertension, unspecified 09/05/2024    Stricture of artery 09/05/2024    Nephrolithiasis 07/15/2024    Elevated blood pressure reading 05/06/2024    Hypercalcemia 05/03/2024    Ankle wound, right, subsequent encounter 06/10/2023    Malignant melanoma of skin of scalp and neck 05/08/2023    Weakness 11/20/2022    Hyperuricemia 10/21/2016    BPH with urinary obstruction 06/09/2015    Stage 3b chronic kidney disease 10/04/2013    Hypercholesterolemia     Schizophrenia 07/27/2012       ROS:  Feeling well. No dyspnea or chest pain on exertion. No abdominal pain, change in bowel habits, black or bloody stools. No urinary tract or prostatic symptoms. No neurological complaints.  Still not doing the best with hygiene and ADL's  But not terrible  Mostly independent  Family is doing their best to help  He is losing weight  OBJECTIVE:   The patient appears well, alert, oriented x 3, in no distress.   /76   Pulse 67   Temp 98.4 °F (36.9 °C) (Oral)   Ht 5' 7" (1.702 m)   Wt 68 kg (149 lb " 14.6 oz)   SpO2 97%   BMI 23.48 kg/m²   Wt Readings from Last 5 Encounters:   09/04/24 68 kg (149 lb 14.6 oz)   08/12/24 69.7 kg (153 lb 10.6 oz)   07/16/24 71.2 kg (156 lb 15.5 oz)   05/06/24 70.4 kg (155 lb 3.3 oz)   01/10/24 69.6 kg (153 lb 7 oz)       ENT abnormal.  Chronic skin chagnes  S1 and S2 normal, no murmurs, clicks, gallops or rubs. Regular rate and rhythm. Chest is clear; no wheezes or rales. No edema or JVD.  Has chronic neurological changes.    ASSESSMENT:   1. Annual physical exam    2. Stage 3b chronic kidney disease    3. Hypercholesterolemia    4. Schizophrenia, unspecified type    5. Pulmonary hypertension, unspecified    6. Stricture of artery          PLAN:   Counseled on age appropriate medical preventative services, including age appropriate cancer screenings, over all nutritional health, need for a consistent exercise regimen and an over all push towards maintaining a vigorous and active lifestyle.  Counseled on age appropriate vaccines and discussed upcoming health care needs based on age/gender.  Spent time with patient counseling on need for a good patient/doctor relationship moving forward.  Discussed use of common OTC medications and supplements.  Discussed common dietary aids and use of caffeine and the need for good sleep hygiene and stress management.    Problem List Items Addressed This Visit       Schizophrenia    Relevant Medications    ARIPiprazole (ABILIFY) 20 MG Tab    Other Relevant Orders    Ambulatory referral/consult to Outpatient Case Management    Hypercholesterolemia    Relevant Orders    Ambulatory referral/consult to Outpatient Case Management    Stage 3b chronic kidney disease    Relevant Orders    Ambulatory referral/consult to Outpatient Case Management    Pulmonary hypertension, unspecified    Current Assessment & Plan     The current medical regimen is effective;  continue present plan and medications.  Watch the lungs           Stricture of artery    Current  Assessment & Plan     The current medical regimen is effective;  continue present plan and medications.  Monitor for worsening          Other Visit Diagnoses       Annual physical exam    -  Primary

## 2024-09-05 ENCOUNTER — HOSPITAL ENCOUNTER (OUTPATIENT)
Dept: RADIOLOGY | Facility: HOSPITAL | Age: 79
Discharge: HOME | End: 2024-09-05
Attending: INTERNAL MEDICINE
Payer: MEDICARE

## 2024-09-05 DIAGNOSIS — C43.4 MALIGNANT MELANOMA OF SCALP: ICD-10-CM

## 2024-09-05 PROBLEM — I27.20 PULMONARY HYPERTENSION, UNSPECIFIED: Status: ACTIVE | Noted: 2024-09-05

## 2024-09-05 PROBLEM — I77.1 STRICTURE OF ARTERY: Status: ACTIVE | Noted: 2024-09-05

## 2024-09-05 PROCEDURE — 74176 CT ABD & PELVIS W/O CONTRAST: CPT | Mod: TC

## 2024-09-05 PROCEDURE — 71250 CT THORAX DX C-: CPT | Mod: TC

## 2024-09-05 PROCEDURE — 71250 CT THORAX DX C-: CPT | Mod: 26,,, | Performed by: STUDENT IN AN ORGANIZED HEALTH CARE EDUCATION/TRAINING PROGRAM

## 2024-09-05 PROCEDURE — 74176 CT ABD & PELVIS W/O CONTRAST: CPT | Mod: 26,,, | Performed by: STUDENT IN AN ORGANIZED HEALTH CARE EDUCATION/TRAINING PROGRAM

## 2024-09-06 NOTE — ASSESSMENT & PLAN NOTE
The current medical regimen is effective;  continue present plan and medications.  Watch the lungs

## 2024-09-06 NOTE — ASSESSMENT & PLAN NOTE
The current medical regimen is effective;  continue present plan and medications.  Monitor for worsening

## 2024-09-27 ENCOUNTER — PATIENT OUTREACH (OUTPATIENT)
Dept: ADMINISTRATIVE | Facility: OTHER | Age: 79
End: 2024-09-27
Payer: MEDICARE

## 2024-09-27 NOTE — PROGRESS NOTES
CHW - Case Closure    This Community Health Worker spoke to caregiver via telephone today.   Pt/Caregiver reported: Spoke to the pt's sister, she notified me that in order for her brother to get an aide or be able to be put into a nursing home. The other services his sister states pt doesn't need at this moment. Will reach out if needed im the future.   Pt/Caregiver denied any additional needs at this time and agrees with episode closure at this time.  Provided caregiver with Community Health Worker's contact information and encouraged him/her to contact this Community Health Worker if additional needs arise.          CHW - Outreach Attempt    Community Health Worker left a voicemail message for 1st attempt to contact patient regarding: lvm notifying pt if in need of assistance to reach out to me. 1st attempt, will follow up in two weeks.   Community Health Worker to attempt to contact caregiver on:  10/11/24

## 2024-11-05 ENCOUNTER — LAB VISIT (OUTPATIENT)
Dept: LAB | Facility: HOSPITAL | Age: 79
End: 2024-11-05
Attending: INTERNAL MEDICINE
Payer: MEDICARE

## 2024-11-05 DIAGNOSIS — N18.32 STAGE 3B CHRONIC KIDNEY DISEASE: ICD-10-CM

## 2024-11-05 DIAGNOSIS — E79.0 HYPERURICEMIA: ICD-10-CM

## 2024-11-05 DIAGNOSIS — R03.0 ELEVATED BLOOD PRESSURE READING: ICD-10-CM

## 2024-11-05 DIAGNOSIS — E83.52 HYPERCALCEMIA: ICD-10-CM

## 2024-11-05 LAB
ALBUMIN SERPL BCP-MCNC: 3.9 G/DL (ref 3.5–5.2)
ANION GAP SERPL CALC-SCNC: 11 MMOL/L (ref 8–16)
BASOPHILS # BLD AUTO: 0.04 K/UL (ref 0–0.2)
BASOPHILS NFR BLD: 0.5 % (ref 0–1.9)
BUN SERPL-MCNC: 22 MG/DL (ref 8–23)
CALCIUM SERPL-MCNC: 10 MG/DL (ref 8.7–10.5)
CHLORIDE SERPL-SCNC: 105 MMOL/L (ref 95–110)
CO2 SERPL-SCNC: 26 MMOL/L (ref 23–29)
CREAT SERPL-MCNC: 1.9 MG/DL (ref 0.5–1.4)
DIFFERENTIAL METHOD BLD: ABNORMAL
EOSINOPHIL # BLD AUTO: 0.1 K/UL (ref 0–0.5)
EOSINOPHIL NFR BLD: 0.9 % (ref 0–8)
ERYTHROCYTE [DISTWIDTH] IN BLOOD BY AUTOMATED COUNT: 13.7 % (ref 11.5–14.5)
EST. GFR  (NO RACE VARIABLE): 35 ML/MIN/1.73 M^2
GLUCOSE SERPL-MCNC: 84 MG/DL (ref 70–110)
HCT VFR BLD AUTO: 49.9 % (ref 40–54)
HGB BLD-MCNC: 15.9 G/DL (ref 14–18)
IMM GRANULOCYTES # BLD AUTO: 0.04 K/UL (ref 0–0.04)
IMM GRANULOCYTES NFR BLD AUTO: 0.5 % (ref 0–0.5)
LYMPHOCYTES # BLD AUTO: 1.3 K/UL (ref 1–4.8)
LYMPHOCYTES NFR BLD: 15.2 % (ref 18–48)
MCH RBC QN AUTO: 31 PG (ref 27–31)
MCHC RBC AUTO-ENTMCNC: 31.9 G/DL (ref 32–36)
MCV RBC AUTO: 97 FL (ref 82–98)
MONOCYTES # BLD AUTO: 0.8 K/UL (ref 0.3–1)
MONOCYTES NFR BLD: 8.6 % (ref 4–15)
NEUTROPHILS # BLD AUTO: 6.5 K/UL (ref 1.8–7.7)
NEUTROPHILS NFR BLD: 74.3 % (ref 38–73)
NRBC BLD-RTO: 0 /100 WBC
PHOSPHATE SERPL-MCNC: 2.8 MG/DL (ref 2.7–4.5)
PLATELET # BLD AUTO: 266 K/UL (ref 150–450)
PMV BLD AUTO: 10 FL (ref 9.2–12.9)
POTASSIUM SERPL-SCNC: 4.2 MMOL/L (ref 3.5–5.1)
PTH-INTACT SERPL-MCNC: 53.9 PG/ML (ref 9–77)
RBC # BLD AUTO: 5.13 M/UL (ref 4.6–6.2)
SODIUM SERPL-SCNC: 142 MMOL/L (ref 136–145)
URATE SERPL-MCNC: 9.1 MG/DL (ref 3.4–7)
WBC # BLD AUTO: 8.7 K/UL (ref 3.9–12.7)

## 2024-11-05 PROCEDURE — 85025 COMPLETE CBC W/AUTO DIFF WBC: CPT | Performed by: INTERNAL MEDICINE

## 2024-11-05 PROCEDURE — 36415 COLL VENOUS BLD VENIPUNCTURE: CPT | Mod: PO | Performed by: INTERNAL MEDICINE

## 2024-11-05 PROCEDURE — 83970 ASSAY OF PARATHORMONE: CPT | Performed by: INTERNAL MEDICINE

## 2024-11-05 PROCEDURE — 80069 RENAL FUNCTION PANEL: CPT | Performed by: INTERNAL MEDICINE

## 2024-11-05 PROCEDURE — 84550 ASSAY OF BLOOD/URIC ACID: CPT | Performed by: INTERNAL MEDICINE

## 2024-11-11 ENCOUNTER — OFFICE VISIT (OUTPATIENT)
Dept: NEPHROLOGY | Facility: CLINIC | Age: 79
End: 2024-11-11
Payer: MEDICARE

## 2024-11-11 ENCOUNTER — PATIENT MESSAGE (OUTPATIENT)
Dept: FAMILY MEDICINE | Facility: CLINIC | Age: 79
End: 2024-11-11
Payer: MEDICARE

## 2024-11-11 VITALS
HEART RATE: 85 BPM | RESPIRATION RATE: 18 BRPM | HEIGHT: 67 IN | BODY MASS INDEX: 23.88 KG/M2 | DIASTOLIC BLOOD PRESSURE: 68 MMHG | OXYGEN SATURATION: 96 % | SYSTOLIC BLOOD PRESSURE: 116 MMHG | WEIGHT: 152.13 LBS

## 2024-11-11 DIAGNOSIS — E83.52 HYPERCALCEMIA: ICD-10-CM

## 2024-11-11 DIAGNOSIS — N39.0 UTI (URINARY TRACT INFECTION), UNCOMPLICATED: ICD-10-CM

## 2024-11-11 DIAGNOSIS — N18.32 STAGE 3B CHRONIC KIDNEY DISEASE: Primary | ICD-10-CM

## 2024-11-11 DIAGNOSIS — E79.0 HYPERURICEMIA: ICD-10-CM

## 2024-11-11 DIAGNOSIS — N20.0 NEPHROLITHIASIS: ICD-10-CM

## 2024-11-11 PROCEDURE — 1101F PT FALLS ASSESS-DOCD LE1/YR: CPT | Mod: CPTII,S$GLB,, | Performed by: INTERNAL MEDICINE

## 2024-11-11 PROCEDURE — 3288F FALL RISK ASSESSMENT DOCD: CPT | Mod: CPTII,S$GLB,, | Performed by: INTERNAL MEDICINE

## 2024-11-11 PROCEDURE — 1159F MED LIST DOCD IN RCRD: CPT | Mod: CPTII,S$GLB,, | Performed by: INTERNAL MEDICINE

## 2024-11-11 PROCEDURE — 3078F DIAST BP <80 MM HG: CPT | Mod: CPTII,S$GLB,, | Performed by: INTERNAL MEDICINE

## 2024-11-11 PROCEDURE — G2211 COMPLEX E/M VISIT ADD ON: HCPCS | Mod: S$GLB,,, | Performed by: INTERNAL MEDICINE

## 2024-11-11 PROCEDURE — 3074F SYST BP LT 130 MM HG: CPT | Mod: CPTII,S$GLB,, | Performed by: INTERNAL MEDICINE

## 2024-11-11 PROCEDURE — 99999 PR PBB SHADOW E&M-EST. PATIENT-LVL III: CPT | Mod: PBBFAC,,, | Performed by: INTERNAL MEDICINE

## 2024-11-11 PROCEDURE — 1160F RVW MEDS BY RX/DR IN RCRD: CPT | Mod: CPTII,S$GLB,, | Performed by: INTERNAL MEDICINE

## 2024-11-11 PROCEDURE — 1126F AMNT PAIN NOTED NONE PRSNT: CPT | Mod: CPTII,S$GLB,, | Performed by: INTERNAL MEDICINE

## 2024-11-11 PROCEDURE — 99214 OFFICE O/P EST MOD 30 MIN: CPT | Mod: S$GLB,,, | Performed by: INTERNAL MEDICINE

## 2024-11-11 RX ORDER — DOXYCYCLINE 100 MG/1
100 CAPSULE ORAL EVERY 12 HOURS
Qty: 10 CAPSULE | Refills: 0 | Status: SHIPPED | OUTPATIENT
Start: 2024-11-11 | End: 2024-11-16

## 2024-11-11 NOTE — ASSESSMENT & PLAN NOTE
- baseline Cr 1.7-2.0 since 12/2023; unclear etiology. Renal US with smaller kidneys.   - Cr 1.8 --> 1.9 today; stable and at baseline  - UPCR 0.16; negative  - discussed KFRE today. Risk of progression to ESRD in 5 years: 2%. Low risk.   - goal water intake 48-64oz/day  - low salt diet. Avoid NSAIDs

## 2024-11-11 NOTE — PROGRESS NOTES
[facility]  [address] [facility], [address]  [place]  [phone]    PCP: Fabio Lennon MD  Hem/Onc: Mitchell       HPI: Mr. Antonio Torres Jr. is a 79 y.o. male with DLD, PVD, BPH, schizophrenia, and melanoma (diagnosed 7/2023; s/p surgery; active surveillance) who is here for established patient evaluation of Chronic Kidney Disease  Initial visit was 5/6/24.   Baseline Cr has been 1.5-1.7 with GFR 40-47% since at least 2013; however Cr 1.9 since 4/2024. No prior proteinuria.  He is here today with his sister who provided most of history.  He denies h/o HTN, T2DM, gout, kidney stones, or family h/o CKD. He reports longstanding h/o CKD but was never told the etiology. He admits to poor water intake (~24oz per day) due to LUTS/nocturia. No edema.   His Ca is high today and was also elevated in 2023. He denies prior lithium use. He does not take calcium or vitamin D supplementation (only an OTC MVN). He does not drink excessive amounts of milk or take Tums.      5/6/24: recommended increasing water intake; ordered renal US.      Interval Hx:   LV 7/16/24: recommended increasing water intake from 50oz/day to 64oz/day.  Today he reports to be doing well. No new issues. Kidney function stable. No leg swelling. Denies dysuria. Last UTI was in 2015.   Not interested in starting allopurinol at this time; asked to wait to see what uric acid level is at next visit.       ROS:  Complete ROS otherwise negative except as indicated above.         Current Outpatient Medications:     ARIPiprazole (ABILIFY) 20 MG Tab, Take 1 tablet (20 mg total) by mouth once daily., Disp: 90 tablet, Rfl: 3    loratadine (CLARITIN) 10 mg tablet, Take 10 mg by mouth once daily., Disp: , Rfl:     multivitamin capsule, Take 1 capsule by mouth once daily., Disp: , Rfl:     doxycycline (VIBRAMYCIN) 100 MG Cap, Take 1 capsule (100 mg total) by mouth every 12 (twelve) hours. for 5 days, Disp: 10 capsule, Rfl: 0  No current facility-administered  "medications for this visit.    Facility-Administered Medications Ordered in Other Visits:     0.9%  NaCl infusion, , Intravenous, Continuous, Rhys Lehman MD, Stopped at 07/07/23 1256    mupirocin 2 % ointment, , Nasal, On Call Procedure, Rhys Lehman MD, Given at 05/08/23 0701      Vitals: Blood pressure 116/68, pulse 85, resp. rate 18, height 5' 7" (1.702 m), weight 69 kg (152 lb 1.9 oz), SpO2 96%. Body mass index is 23.82 kg/m².    Physical Exam  Vitals reviewed.   Constitutional:       General: He is awake. He is not in acute distress.     Appearance: Normal appearance. He is well-developed.   HENT:      Head: Normocephalic and atraumatic.      Nose: Nose normal.      Mouth/Throat:      Mouth: Mucous membranes are moist.   Eyes:      Extraocular Movements: Extraocular movements intact.      Conjunctiva/sclera: Conjunctivae normal.   Pulmonary:      Effort: Pulmonary effort is normal.   Musculoskeletal:         General: No tenderness or signs of injury.      Right lower leg: No edema.      Left lower leg: No edema.   Skin:     General: Skin is warm and dry.      Findings: No erythema or rash.   Neurological:      General: No focal deficit present.      Mental Status: He is alert. Mental status is at baseline.   Psychiatric:         Mood and Affect: Mood normal.         Behavior: Behavior normal.           Labs/Imaging:  Sodium   Date Value Ref Range Status   11/05/2024 142 136 - 145 mmol/L Final   08/08/2024 143 136 - 145 mmol/L Final   07/08/2024 141 136 - 145 mmol/L Final     Potassium   Date Value Ref Range Status   11/05/2024 4.2 3.5 - 5.1 mmol/L Final   08/08/2024 4.3 3.5 - 5.1 mmol/L Final   07/08/2024 4.4 3.5 - 5.1 mmol/L Final     Chloride   Date Value Ref Range Status   11/05/2024 105 95 - 110 mmol/L Final   08/08/2024 106 95 - 110 mmol/L Final   07/08/2024 104 95 - 110 mmol/L Final     CO2   Date Value Ref Range Status   11/05/2024 26 23 - 29 mmol/L Final   08/08/2024 26 23 - 29 mmol/L Final "   07/08/2024 26 23 - 29 mmol/L Final     BUN   Date Value Ref Range Status   11/05/2024 22 8 - 23 mg/dL Final   08/08/2024 24 (H) 8 - 23 mg/dL Final   07/08/2024 26 (H) 8 - 23 mg/dL Final     Creatinine   Date Value Ref Range Status   11/05/2024 1.9 (H) 0.5 - 1.4 mg/dL Final   08/08/2024 2.0 (H) 0.5 - 1.4 mg/dL Final   07/08/2024 1.8 (H) 0.5 - 1.4 mg/dL Final     eGFR   Date Value Ref Range Status   11/05/2024 35 (A) >60 mL/min/1.73 m^2 Final   08/08/2024 33 (A) >60 mL/min/1.73 m^2 Final   07/08/2024 37 (A) >60 mL/min/1.73 m^2 Final     Calcium   Date Value Ref Range Status   11/05/2024 10.0 8.7 - 10.5 mg/dL Final   08/08/2024 10.2 8.7 - 10.5 mg/dL Final   07/08/2024 10.0 8.7 - 10.5 mg/dL Final     Phosphorus   Date Value Ref Range Status   11/05/2024 2.8 2.7 - 4.5 mg/dL Final   07/08/2024 2.8 2.7 - 4.5 mg/dL Final   04/26/2024 2.7 2.7 - 4.5 mg/dL Final     Albumin   Date Value Ref Range Status   11/05/2024 3.9 3.5 - 5.2 g/dL Final   08/08/2024 3.6 3.5 - 5.2 g/dL Final   07/08/2024 3.8 3.5 - 5.2 g/dL Final       PTH, Intact   Date Value Ref Range Status   11/05/2024 53.9 9.0 - 77.0 pg/mL Final   07/08/2024 50.0 9.0 - 77.0 pg/mL Final   04/26/2024 58.2 9.0 - 77.0 pg/mL Final   05/02/2014 37 14 - 85 pg/mL Final     Comment:     Test performed at [facility],  [place] [address]     Vit D, 25-Hydroxy   Date Value Ref Range Status   07/08/2024 67 30 - 96 ng/mL Final     Comment:     Vitamin D deficiency.........<10 ng/mL                              Vitamin D insufficiency......10-29 ng/mL       Vitamin D sufficiency........> or equal to 30 ng/mL  Vitamin D toxicity............>100 ng/mL       Uric Acid   Date Value Ref Range Status   11/05/2024 9.1 (H) 3.4 - 7.0 mg/dL Final   07/08/2024 9.0 (H) 3.4 - 7.0 mg/dL Final   04/26/2024 8.9 (H) 3.4 - 7.0 mg/dL Final       Hemoglobin   Date Value Ref Range Status   11/05/2024 15.9 14.0 - 18.0 g/dL Final   08/08/2024 16.5 14.0 - 18.0 g/dL Final   07/08/2024 16.0  14.0 - 18.0 g/dL Final       Prot/Creat Ratio, Urine   Date Value Ref Range Status   11/05/2024 0.16 0.00 - 0.20 Final   07/08/2024 0.04 0.00 - 0.20 Final   04/26/2024 Unable to calculate 0.00 - 0.20 Final           Renal US: 5/30/24:   Right kidney measures 8.9 cm, resistive index 0.67.  Left kidney measures 9.1 cm, resistive index 0.67.  There are 2 cysts left kidney measuring 1.5, and 2.3 cm.  There are 2 calculi left kidney measuring 5 mm, and 3 mm.  There is no evidence of obstruction.  The renal parenchyma is echogenic.        2015: bilateral cysts, L stone          Impression/Plan:    Stage 3b chronic kidney disease  - baseline Cr 1.7-2.0 since 12/2023; unclear etiology. Renal US with smaller kidneys.   - Cr 1.8 --> 1.9 today; stable and at baseline  - UPCR 0.16; negative  - discussed KFRE today. Risk of progression to ESRD in 5 years: 2%. Low risk.   - goal water intake 48-64oz/day  - low salt diet. Avoid NSAIDs    Hypercalcemia  - first noted 2023  - CCa 10.7 on 4/26/24; recommended increasing water intake  - CCa 10.0 --> 10.0; stable/improved  - PTH 50 --> 54; controlled. Phos normal  - SPEP, EDYTA, FLC, calcitriol, PTHrP: negative. Last vitamin D 67. Avoid vitamin D supplementation  - continue increased water intake    Hyperuricemia  - no h/o gout or symptomatic stones  - uric acid 8.9 --> 9.0 --> 9.1; slowly worsening  - patient requested to wait on starting allopurinol for now  - will repeat at next visit    Nephrolithiasis  - asymptomatic  - first noted on US in 2015  - US 5/2024 with ongoing stone burden  - not interested in litholink at this time  - recommended increasing water intake to at least 64oz/day  - (also with renal cysts; stable in size. No comment on complexity)    UTI (urinary tract infection), uncomplicated  - UA with nitrite, WBC, WBC clumps. Asymptomatic  - doxycycline 100mg BID x 5 days        Visit today included increased complexity associated with the care of the episodic problem UTI  addressed and managing the longitudinal care of the patient due to the serious and/or complex managed problem(s) CKD.      Treatment options and plan were discussed with the patient and/or caregiver.   RTC 6 months.       Dariela Terrazas MD  [facility]  [phone]

## 2024-11-11 NOTE — ASSESSMENT & PLAN NOTE
- asymptomatic  - first noted on US in 2015  - US 5/2024 with ongoing stone burden  - not interested in litholink at this time  - recommended increasing water intake to at least 64oz/day  - (also with renal cysts; stable in size. No comment on complexity)

## 2024-11-11 NOTE — ASSESSMENT & PLAN NOTE
- first noted 2023  - CCa 10.7 on 4/26/24; recommended increasing water intake  - CCa 10.0 --> 10.0; stable/improved  - PTH 50 --> 54; controlled. Phos normal  - SPEP, EDYTA, FLC, calcitriol, PTHrP: negative. Last vitamin D 67. Avoid vitamin D supplementation  - continue increased water intake

## 2024-11-11 NOTE — ASSESSMENT & PLAN NOTE
- no h/o gout or symptomatic stones  - uric acid 8.9 --> 9.0 --> 9.1; slowly worsening  - patient requested to wait on starting allopurinol for now  - will repeat at next visit

## 2024-11-11 NOTE — ASSESSMENT & PLAN NOTE
- UA with nitrite, WBC, WBC clumps. Asymptomatic aside from BPH symptoms  - doxycycline 100mg BID x 5 days

## 2024-12-12 ENCOUNTER — LAB VISIT (OUTPATIENT)
Dept: LAB | Facility: HOSPITAL | Age: 79
End: 2024-12-12
Attending: INTERNAL MEDICINE
Payer: MEDICARE

## 2024-12-12 DIAGNOSIS — C43.4 MALIGNANT MELANOMA OF SCALP: ICD-10-CM

## 2024-12-12 LAB
ALBUMIN SERPL BCP-MCNC: 3.6 G/DL (ref 3.5–5.2)
ALP SERPL-CCNC: 75 U/L (ref 40–150)
ALT SERPL W/O P-5'-P-CCNC: 37 U/L (ref 10–44)
ANION GAP SERPL CALC-SCNC: 10 MMOL/L (ref 8–16)
AST SERPL-CCNC: 27 U/L (ref 10–40)
BASOPHILS # BLD AUTO: 0.03 K/UL (ref 0–0.2)
BASOPHILS NFR BLD: 0.3 % (ref 0–1.9)
BILIRUB SERPL-MCNC: 0.6 MG/DL (ref 0.1–1)
BUN SERPL-MCNC: 24 MG/DL (ref 8–23)
CALCIUM SERPL-MCNC: 9.7 MG/DL (ref 8.7–10.5)
CHLORIDE SERPL-SCNC: 102 MMOL/L (ref 95–110)
CO2 SERPL-SCNC: 26 MMOL/L (ref 23–29)
CREAT SERPL-MCNC: 1.7 MG/DL (ref 0.5–1.4)
DIFFERENTIAL METHOD BLD: ABNORMAL
EOSINOPHIL # BLD AUTO: 0.1 K/UL (ref 0–0.5)
EOSINOPHIL NFR BLD: 0.8 % (ref 0–8)
ERYTHROCYTE [DISTWIDTH] IN BLOOD BY AUTOMATED COUNT: 13.7 % (ref 11.5–14.5)
EST. GFR  (NO RACE VARIABLE): 40 ML/MIN/1.73 M^2
GLUCOSE SERPL-MCNC: 82 MG/DL (ref 70–110)
HCT VFR BLD AUTO: 47.1 % (ref 40–54)
HGB BLD-MCNC: 14.5 G/DL (ref 14–18)
IMM GRANULOCYTES # BLD AUTO: 0.05 K/UL (ref 0–0.04)
IMM GRANULOCYTES NFR BLD AUTO: 0.5 % (ref 0–0.5)
LDH SERPL L TO P-CCNC: 176 U/L (ref 110–260)
LYMPHOCYTES # BLD AUTO: 1.2 K/UL (ref 1–4.8)
LYMPHOCYTES NFR BLD: 12 % (ref 18–48)
MCH RBC QN AUTO: 29.7 PG (ref 27–31)
MCHC RBC AUTO-ENTMCNC: 30.8 G/DL (ref 32–36)
MCV RBC AUTO: 97 FL (ref 82–98)
MONOCYTES # BLD AUTO: 0.6 K/UL (ref 0.3–1)
MONOCYTES NFR BLD: 6.1 % (ref 4–15)
NEUTROPHILS # BLD AUTO: 8.2 K/UL (ref 1.8–7.7)
NEUTROPHILS NFR BLD: 80.3 % (ref 38–73)
NRBC BLD-RTO: 0 /100 WBC
PLATELET # BLD AUTO: 283 K/UL (ref 150–450)
PMV BLD AUTO: 10.2 FL (ref 9.2–12.9)
POTASSIUM SERPL-SCNC: 4.5 MMOL/L (ref 3.5–5.1)
PROT SERPL-MCNC: 6.8 G/DL (ref 6–8.4)
RBC # BLD AUTO: 4.88 M/UL (ref 4.6–6.2)
SODIUM SERPL-SCNC: 138 MMOL/L (ref 136–145)
WBC # BLD AUTO: 10.17 K/UL (ref 3.9–12.7)

## 2024-12-12 PROCEDURE — 80053 COMPREHEN METABOLIC PANEL: CPT | Performed by: INTERNAL MEDICINE

## 2024-12-12 PROCEDURE — 85025 COMPLETE CBC W/AUTO DIFF WBC: CPT | Performed by: INTERNAL MEDICINE

## 2024-12-12 PROCEDURE — 83615 LACTATE (LD) (LDH) ENZYME: CPT | Performed by: INTERNAL MEDICINE

## 2024-12-12 PROCEDURE — 36415 COLL VENOUS BLD VENIPUNCTURE: CPT | Mod: PO | Performed by: INTERNAL MEDICINE

## 2024-12-17 ENCOUNTER — OFFICE VISIT (OUTPATIENT)
Dept: HEMATOLOGY/ONCOLOGY | Facility: CLINIC | Age: 79
End: 2024-12-17
Payer: MEDICARE

## 2024-12-17 VITALS
HEIGHT: 70 IN | SYSTOLIC BLOOD PRESSURE: 127 MMHG | DIASTOLIC BLOOD PRESSURE: 78 MMHG | BODY MASS INDEX: 21.55 KG/M2 | WEIGHT: 150.56 LBS | HEART RATE: 81 BPM

## 2024-12-17 DIAGNOSIS — N18.32 STAGE 3B CHRONIC KIDNEY DISEASE: ICD-10-CM

## 2024-12-17 DIAGNOSIS — C43.4 MALIGNANT MELANOMA OF SCALP: Primary | ICD-10-CM

## 2024-12-17 PROCEDURE — 1101F PT FALLS ASSESS-DOCD LE1/YR: CPT | Mod: CPTII,S$GLB,, | Performed by: INTERNAL MEDICINE

## 2024-12-17 PROCEDURE — 3074F SYST BP LT 130 MM HG: CPT | Mod: CPTII,S$GLB,, | Performed by: INTERNAL MEDICINE

## 2024-12-17 PROCEDURE — 1126F AMNT PAIN NOTED NONE PRSNT: CPT | Mod: CPTII,S$GLB,, | Performed by: INTERNAL MEDICINE

## 2024-12-17 PROCEDURE — 99213 OFFICE O/P EST LOW 20 MIN: CPT | Mod: S$GLB,,, | Performed by: INTERNAL MEDICINE

## 2024-12-17 PROCEDURE — 1159F MED LIST DOCD IN RCRD: CPT | Mod: CPTII,S$GLB,, | Performed by: INTERNAL MEDICINE

## 2024-12-17 PROCEDURE — 99999 PR PBB SHADOW E&M-EST. PATIENT-LVL III: CPT | Mod: PBBFAC,,, | Performed by: INTERNAL MEDICINE

## 2024-12-17 PROCEDURE — 3078F DIAST BP <80 MM HG: CPT | Mod: CPTII,S$GLB,, | Performed by: INTERNAL MEDICINE

## 2024-12-17 PROCEDURE — 3288F FALL RISK ASSESSMENT DOCD: CPT | Mod: CPTII,S$GLB,, | Performed by: INTERNAL MEDICINE

## 2024-12-17 NOTE — PROGRESS NOTES
Subjective         Patient ID: Antonio Torres Jr. is a 79 y.o. male.    Chief Complaint: No chief complaint on file.  Diagnosis: Malignant Melanoma  HPI 80 yo male with history of malignant melanoma anterior scalp diagnosed  and  re excision of his melanoma with dermal graft placement on 7/7/23  Pathology shows MALIGNANT MELANOMA, ULCERATED, 4.0 MM IN DEPTH (pT4b),   He is also treated for  right dorsal foot wound s/p  debridement .Accompanied by family members whom provide most of history. Family reports first noted skin lesion May [id number]. She reports 2 skin lesions on scalp present and one was removed at that time. She is also followed by plastic surgery, Dr Montano. He is also followed closed by Dermatology, Dr. Pearl. He also has history of squamous cell carcinoma of skin. No complaints today. His scalp wounds are healing slowly. No family history of melanoma. PET/CT on 6/1/2023 no evidence of distant mets        Interval Hx: Accompanied by family member whom provides most of history   Doing well  He underwent repeat Mohs surgery for squamous cell ca of scalp this am   history of melanoma anterior scalp scheduled for excision and closure with dermal graft also presenting with right dorsal foot wound planning debridement and closure with dermal graft.  7/2023   No longer followed by wound care     Pt followed closely  by Dr. Eden dermatology and Dr. Pearl Derm      Review of Systems   Constitutional:  Negative for appetite change, fatigue, fever and unexpected weight change.   HENT:  Negative for mouth sores.    Eyes:  Negative for visual disturbance.   Respiratory:  Negative for cough and shortness of breath.    Cardiovascular:  Negative for chest pain.   Gastrointestinal:  Negative for abdominal pain and diarrhea.   Genitourinary:  Negative for frequency.   Musculoskeletal:  Negative for back pain.   Integumentary:  Negative for rash and wound.   Neurological:  Negative for headaches.   Hematological:   "Negative for adenopathy.   Psychiatric/Behavioral:  The patient is not nervous/anxious.           Objective     Vitals:    12/17/24 1313   BP: 127/78   BP Location: Left arm   Patient Position: Sitting   Pulse: 81   Weight: 68.3 kg (150 lb 9.2 oz)   Height: 5' 10" (1.778 m)         Physical Exam   Constitutional: He is oriented to person, place, and time. He appears well-developed and well-nourished.   HENT:   Head: Normocephalic.  Scalp bandaged  Eyes: No scleral icterus.   Neck: Normal range of motion. Neck supple.  Cardiovascular: Normal rate, regular rhythm and normal heart sounds.    No murmur heard.  Pulmonary/Chest: Effort normal and breath sounds normal. He has no wheezes. He has no rales.   Abdominal: Soft. Bowel sounds are normal. He exhibits no distension. There is no tenderness. There is no rebound and no guarding.   Musculoskeletal: scalp lesion healed, no lesions, masses   Lymphadenopathy:     He has no cervical adenopathy.     He has no axillary adenopathy.        Right: No supraclavicular adenopathy present.        Left: No supraclavicular adenopathy present.   Neurological: He is alert and oriented to person, place, and time. No cranial nerve deficit.   Skin: No rash noted. No erythema.   Psychiatric: He has a normal mood and affect.      Skin, scalp, excision:   -MALIGNANT MELANOMA, ULCERATED, 4.0 MM IN DEPTH (pT4b), see synoptic report below     SYNOPTIC REPORT   Procedure:  Excision   Specimen Laterality:  Not specified   Tumor Site:  Scalp   Macroscopic Satellite Nodule(s): Not identified   Histologic Type:  Nodular melanoma with desmoplastic features   Maximum Tumor (Breslow) Thickness:  4.0 mm   Ulceration:  Present   Microsatellite(s): Not identified   Margins:   Peripheral:  Uninvolved by invasive malignant melanoma or malignant melanoma in-situ.   Deep: Involved by invasive malignant melanoma.   Mitotic rate: 11 mm2   Anatomic (Nabeel) level: V   Lympho-vascular invasion: Not identified "   Neurotropism:  Present   Tumor infiltrating lymphocytes: Present, non-brisk   Tumor regression: Not identified       PET/CT 6/1/23    Impression:     Two hypermetabolic areas within the right aspect of the scalp noting reported operative change status post melanoma resection.  Recommend clinical correlation.  No enlarged or hypermetabolic lymph nodes.     Nonspecific areas of increased FDG avidity within the medial aspect the spleen without CT correlation.  Attention on follow-up.     Ulceration and inflammatory changes of the subcutaneous tissues with increased FDG avidity overlying the right ankle.  Known chronic right distal fibular fracture. Recommend clinical correlation.    Lab Results   Component Value Date    WBC 10.17 12/12/2024    HGB 14.5 12/12/2024    HCT 47.1 12/12/2024    MCV 97 12/12/2024     12/12/2024       CT c/a/p 1/2/2024  Impression:     No CT evidence for metastatic disease in this patient with history of malignant melanoma.     Stable 5 mm hypodensity within the left lobe of the liver, likely a small cyst but too small to adequately characterize.     Moderate size hiatal hernia.     1.2 cm low-density right adrenal nodule, likely a benign adrenal adenoma.     Bilateral renal cysts.     Left inguinal hernia containing fat and a nonobstructed loop of colon.     Small fat containing right inguinal hernia.     Small fat containing paraumbilical hernia.       CT c/a/p 9/5/24   Impression:     In this patient with history of malignant melanoma of the scalp, there is no evidence for metastatic disease about the chest, abdomen, or pelvis.     The scattered pulmonary micro nodules with no new or enlarging pulmonary nodules on today's imaging.     Sequela of old granulomatous disease in the lungs, mediastinal/hilar lymph nodes, and spleen.     Hiatal hernia.     Fat containing umbilical hernia.     Bilateral inguinal hernias, with left hernia containing fat and bowel.  No evidence for  obstructive process.     Additional stable findings as above.       Assessment and Plan       1. Malignant melanoma of scalp  80 y/o with MALIGNANT MELANOMA, ULCERATED, 4.0 MM IN DEPTH (pT4b), of scalp s/p re excision on 7/7/23   Pt with high risk node negative disease  We previously discussed surveillance vs adjuvant immunotherapy x 1 yr namely pembrolizumab in this setting  We discussed potential toxicities of IOT  Currently no available clinical trials at [facility], nor is pt interested   PET/CT on 6/1/2023 no evidence of distant mets  Following detailed discussion, it had been elected to continue surveillance   He will need to continue close follow up with his treating dermatologist every 3-4mos for first 2-3 yrs, then q 6mos for up to 5 yrs  CT of the chest, abdomen, and pelvis every six months for up to three years, then annually for up to five years.   - CT Chest Abdomen Pelvis Without Contrast (XPD); Future  - CBC Auto Differential; Future  - Comprehensive Metabolic Panel; Future  - Lactate Dehydrogenase; Future    Labs reviewed  Clinically doing well   Last Follow up imaging Jan 2024 No evid of metastatic disease  Plan follow up CT imaging ( w/out con 2/2 renal fxn) prior to f/u   Cbc,cmp, LDH prior to f/u 4mos      2. Stage 3b chronic kidney disease   CKD stage 3  Cr 1.7mg/dL on 12/29/23 to 1.9mg/dl on 4/3/24   Cr 1.7mg/dL on 12/12/24   F/b Nephrology        Labs prior to f/u 4mos  Cbc,cmp, LDH prior to f/u 4mos

## 2024-12-18 ENCOUNTER — PATIENT MESSAGE (OUTPATIENT)
Dept: HEMATOLOGY/ONCOLOGY | Facility: CLINIC | Age: 79
End: 2024-12-18
Payer: MEDICARE

## 2025-02-13 ENCOUNTER — HOSPITAL ENCOUNTER (INPATIENT)
Facility: HOSPITAL | Age: 80
LOS: 13 days | DRG: 557 | End: 2025-02-27
Attending: STUDENT IN AN ORGANIZED HEALTH CARE EDUCATION/TRAINING PROGRAM | Admitting: STUDENT IN AN ORGANIZED HEALTH CARE EDUCATION/TRAINING PROGRAM
Payer: MEDICARE

## 2025-02-13 DIAGNOSIS — R07.9 CHEST PAIN: ICD-10-CM

## 2025-02-13 DIAGNOSIS — I48.91 ATRIAL FIBRILLATION WITH RVR: ICD-10-CM

## 2025-02-13 DIAGNOSIS — M25.529 ELBOW PAIN: ICD-10-CM

## 2025-02-13 DIAGNOSIS — R79.89 ELEVATED TROPONIN: ICD-10-CM

## 2025-02-13 DIAGNOSIS — F20.9 SCHIZOPHRENIA, UNSPECIFIED TYPE: ICD-10-CM

## 2025-02-13 DIAGNOSIS — M62.82 NON-TRAUMATIC RHABDOMYOLYSIS: Primary | ICD-10-CM

## 2025-02-13 DIAGNOSIS — R00.0 TACHYCARDIA: ICD-10-CM

## 2025-02-13 DIAGNOSIS — N17.9 AKI (ACUTE KIDNEY INJURY): ICD-10-CM

## 2025-02-13 DIAGNOSIS — N17.9 ACUTE RENAL FAILURE SUPERIMPOSED ON STAGE 3 CHRONIC KIDNEY DISEASE: ICD-10-CM

## 2025-02-13 DIAGNOSIS — N18.30 ACUTE RENAL FAILURE SUPERIMPOSED ON STAGE 3 CHRONIC KIDNEY DISEASE: ICD-10-CM

## 2025-02-13 LAB
ALBUMIN SERPL BCP-MCNC: 4 G/DL (ref 3.5–5.2)
ALP SERPL-CCNC: 86 U/L (ref 40–150)
ALT SERPL W/O P-5'-P-CCNC: 73 U/L (ref 10–44)
ANION GAP SERPL CALC-SCNC: 18 MMOL/L (ref 8–16)
AST SERPL-CCNC: 267 U/L (ref 10–40)
BASOPHILS # BLD AUTO: 0.07 K/UL (ref 0–0.2)
BASOPHILS NFR BLD: 0.3 % (ref 0–1.9)
BILIRUB SERPL-MCNC: 1.1 MG/DL (ref 0.1–1)
BNP SERPL-MCNC: 35 PG/ML (ref 0–99)
BUN SERPL-MCNC: 42 MG/DL (ref 8–23)
CALCIUM SERPL-MCNC: 9.9 MG/DL (ref 8.7–10.5)
CHLORIDE SERPL-SCNC: 105 MMOL/L (ref 95–110)
CK SERPL-CCNC: ABNORMAL U/L (ref 20–200)
CO2 SERPL-SCNC: 17 MMOL/L (ref 23–29)
CREAT SERPL-MCNC: 3.3 MG/DL (ref 0.5–1.4)
CTP QC/QA: YES
CTP QC/QA: YES
DIFFERENTIAL METHOD BLD: ABNORMAL
EOSINOPHIL # BLD AUTO: 0 K/UL (ref 0–0.5)
EOSINOPHIL NFR BLD: 0 % (ref 0–8)
ERYTHROCYTE [DISTWIDTH] IN BLOOD BY AUTOMATED COUNT: 13.9 % (ref 11.5–14.5)
EST. GFR  (NO RACE VARIABLE): 18 ML/MIN/1.73 M^2
GLUCOSE SERPL-MCNC: 157 MG/DL (ref 70–110)
HCT VFR BLD AUTO: 51.6 % (ref 40–54)
HGB BLD-MCNC: 17 G/DL (ref 14–18)
IMM GRANULOCYTES # BLD AUTO: 0.24 K/UL (ref 0–0.04)
IMM GRANULOCYTES NFR BLD AUTO: 0.9 % (ref 0–0.5)
LACTATE SERPL-SCNC: 4.8 MMOL/L (ref 0.5–2.2)
LYMPHOCYTES # BLD AUTO: 0.6 K/UL (ref 1–4.8)
LYMPHOCYTES NFR BLD: 2.2 % (ref 18–48)
MAGNESIUM SERPL-MCNC: 2.5 MG/DL (ref 1.6–2.6)
MCH RBC QN AUTO: 31.7 PG (ref 27–31)
MCHC RBC AUTO-ENTMCNC: 32.9 G/DL (ref 32–36)
MCV RBC AUTO: 96 FL (ref 82–98)
MONOCYTES # BLD AUTO: 1.6 K/UL (ref 0.3–1)
MONOCYTES NFR BLD: 6.3 % (ref 4–15)
NEUTROPHILS # BLD AUTO: 22.9 K/UL (ref 1.8–7.7)
NEUTROPHILS NFR BLD: 90.3 % (ref 38–73)
NRBC BLD-RTO: 0 /100 WBC
PHOSPHATE SERPL-MCNC: 1.7 MG/DL (ref 2.7–4.5)
PLATELET # BLD AUTO: 205 K/UL (ref 150–450)
PMV BLD AUTO: 10 FL (ref 9.2–12.9)
POC MOLECULAR INFLUENZA A AGN: NEGATIVE
POC MOLECULAR INFLUENZA B AGN: NEGATIVE
POCT GLUCOSE: 163 MG/DL (ref 70–110)
POTASSIUM SERPL-SCNC: 4.7 MMOL/L (ref 3.5–5.1)
PROCALCITONIN SERPL IA-MCNC: 2.53 NG/ML
PROT SERPL-MCNC: 7.9 G/DL (ref 6–8.4)
RBC # BLD AUTO: 5.36 M/UL (ref 4.6–6.2)
SARS-COV-2 RDRP RESP QL NAA+PROBE: POSITIVE
SODIUM SERPL-SCNC: 140 MMOL/L (ref 136–145)
TROPONIN I SERPL DL<=0.01 NG/ML-MCNC: 0.53 NG/ML (ref 0–0.03)
WBC # BLD AUTO: 25.36 K/UL (ref 3.9–12.7)

## 2025-02-13 PROCEDURE — 84484 ASSAY OF TROPONIN QUANT: CPT | Performed by: STUDENT IN AN ORGANIZED HEALTH CARE EDUCATION/TRAINING PROGRAM

## 2025-02-13 PROCEDURE — 83735 ASSAY OF MAGNESIUM: CPT | Performed by: STUDENT IN AN ORGANIZED HEALTH CARE EDUCATION/TRAINING PROGRAM

## 2025-02-13 PROCEDURE — 83605 ASSAY OF LACTIC ACID: CPT | Performed by: STUDENT IN AN ORGANIZED HEALTH CARE EDUCATION/TRAINING PROGRAM

## 2025-02-13 PROCEDURE — 84100 ASSAY OF PHOSPHORUS: CPT | Performed by: STUDENT IN AN ORGANIZED HEALTH CARE EDUCATION/TRAINING PROGRAM

## 2025-02-13 PROCEDURE — 87040 BLOOD CULTURE FOR BACTERIA: CPT | Performed by: STUDENT IN AN ORGANIZED HEALTH CARE EDUCATION/TRAINING PROGRAM

## 2025-02-13 PROCEDURE — 96361 HYDRATE IV INFUSION ADD-ON: CPT

## 2025-02-13 PROCEDURE — 25000003 PHARM REV CODE 250: Performed by: STUDENT IN AN ORGANIZED HEALTH CARE EDUCATION/TRAINING PROGRAM

## 2025-02-13 PROCEDURE — 84145 PROCALCITONIN (PCT): CPT | Performed by: STUDENT IN AN ORGANIZED HEALTH CARE EDUCATION/TRAINING PROGRAM

## 2025-02-13 PROCEDURE — 82550 ASSAY OF CK (CPK): CPT | Performed by: STUDENT IN AN ORGANIZED HEALTH CARE EDUCATION/TRAINING PROGRAM

## 2025-02-13 PROCEDURE — 83880 ASSAY OF NATRIURETIC PEPTIDE: CPT | Performed by: STUDENT IN AN ORGANIZED HEALTH CARE EDUCATION/TRAINING PROGRAM

## 2025-02-13 PROCEDURE — 93010 ELECTROCARDIOGRAM REPORT: CPT | Mod: ,,, | Performed by: INTERNAL MEDICINE

## 2025-02-13 PROCEDURE — 87502 INFLUENZA DNA AMP PROBE: CPT

## 2025-02-13 PROCEDURE — 63600175 PHARM REV CODE 636 W HCPCS: Performed by: STUDENT IN AN ORGANIZED HEALTH CARE EDUCATION/TRAINING PROGRAM

## 2025-02-13 PROCEDURE — 82962 GLUCOSE BLOOD TEST: CPT

## 2025-02-13 PROCEDURE — 80053 COMPREHEN METABOLIC PANEL: CPT | Performed by: STUDENT IN AN ORGANIZED HEALTH CARE EDUCATION/TRAINING PROGRAM

## 2025-02-13 PROCEDURE — 96365 THER/PROPH/DIAG IV INF INIT: CPT

## 2025-02-13 PROCEDURE — 85025 COMPLETE CBC W/AUTO DIFF WBC: CPT | Performed by: STUDENT IN AN ORGANIZED HEALTH CARE EDUCATION/TRAINING PROGRAM

## 2025-02-13 PROCEDURE — 87635 SARS-COV-2 COVID-19 AMP PRB: CPT | Performed by: STUDENT IN AN ORGANIZED HEALTH CARE EDUCATION/TRAINING PROGRAM

## 2025-02-13 PROCEDURE — 93005 ELECTROCARDIOGRAM TRACING: CPT

## 2025-02-13 RX ORDER — ACETAMINOPHEN 500 MG
1000 TABLET ORAL
Status: COMPLETED | OUTPATIENT
Start: 2025-02-13 | End: 2025-02-13

## 2025-02-13 RX ADMIN — ACETAMINOPHEN 1000 MG: 500 TABLET, FILM COATED ORAL at 10:02

## 2025-02-13 RX ADMIN — SODIUM CHLORIDE, POTASSIUM CHLORIDE, SODIUM LACTATE AND CALCIUM CHLORIDE 1000 ML: 600; 310; 30; 20 INJECTION, SOLUTION INTRAVENOUS at 10:02

## 2025-02-13 RX ADMIN — PIPERACILLIN AND TAZOBACTAM 4.5 G: 4; .5 INJECTION, POWDER, LYOPHILIZED, FOR SOLUTION INTRAVENOUS; PARENTERAL at 11:02

## 2025-02-13 RX ADMIN — SODIUM CHLORIDE, POTASSIUM CHLORIDE, SODIUM LACTATE AND CALCIUM CHLORIDE 1000 ML: 600; 310; 30; 20 INJECTION, SOLUTION INTRAVENOUS at 11:02

## 2025-02-13 NOTE — Clinical Note
Diagnosis: Non-traumatic rhabdomyolysis [[id number]]   Future Attending Provider: JIMMY AMADO [[id number]]   Reason for IP Medical Treatment  (Clinical interventions that can only be accomplished in the IP setting? ) :: Rhabdo, sepsis, COVID  
RUL wedge

## 2025-02-14 PROBLEM — N17.9 AKI (ACUTE KIDNEY INJURY): Status: ACTIVE | Noted: 2025-02-14

## 2025-02-14 PROBLEM — R79.89 ELEVATED TROPONIN: Status: ACTIVE | Noted: 2025-02-14

## 2025-02-14 PROBLEM — D72.829 LEUKOCYTOSIS: Status: ACTIVE | Noted: 2025-02-14

## 2025-02-14 PROBLEM — M62.82 NON-TRAUMATIC RHABDOMYOLYSIS: Status: ACTIVE | Noted: 2025-02-14

## 2025-02-14 PROBLEM — U07.1 COVID-19: Status: ACTIVE | Noted: 2025-02-14

## 2025-02-14 LAB
ALBUMIN SERPL BCP-MCNC: 3.3 G/DL (ref 3.5–5.2)
ALP SERPL-CCNC: 69 U/L (ref 40–150)
ALT SERPL W/O P-5'-P-CCNC: 186 U/L (ref 10–44)
AMORPH CRY URNS QL MICRO: ABNORMAL
ANION GAP SERPL CALC-SCNC: 17 MMOL/L (ref 8–16)
ASCENDING AORTA: 3.14 CM
AST SERPL-CCNC: 852 U/L (ref 10–40)
AV INDEX (PROSTH): 1.06
AV MEAN GRADIENT: 1 MMHG
AV PEAK GRADIENT: 2 MMHG
AV VALVE AREA BY VELOCITY RATIO: 2.7 CM²
AV VALVE AREA: 3.3 CM²
AV VELOCITY RATIO: 0.86
BACTERIA #/AREA URNS HPF: ABNORMAL /HPF
BASOPHILS # BLD AUTO: 0.02 K/UL (ref 0–0.2)
BASOPHILS NFR BLD: 0.1 % (ref 0–1.9)
BILIRUB SERPL-MCNC: 1 MG/DL (ref 0.1–1)
BILIRUB UR QL STRIP: NEGATIVE
BUN SERPL-MCNC: 48 MG/DL (ref 8–23)
CALCIUM SERPL-MCNC: 9.1 MG/DL (ref 8.7–10.5)
CHLORIDE SERPL-SCNC: 106 MMOL/L (ref 95–110)
CK SERPL-CCNC: ABNORMAL U/L (ref 20–200)
CLARITY UR: ABNORMAL
CO2 SERPL-SCNC: 19 MMOL/L (ref 23–29)
COLOR UR: ABNORMAL
CREAT SERPL-MCNC: 3.5 MG/DL (ref 0.5–1.4)
CV ECHO LV RWT: 0.47 CM
DIFFERENTIAL METHOD BLD: ABNORMAL
DOP CALC AO PEAK VEL: 0.7 M/S
DOP CALC AO VTI: 12.5 CM
DOP CALC LVOT AREA: 3.1 CM2
DOP CALC LVOT DIAMETER: 2 CM
DOP CALC LVOT PEAK VEL: 0.6 M/S
DOP CALC LVOT STROKE VOLUME: 41.4 CM3
DOP CALC MV VTI: 21.8 CM
DOP CALCLVOT PEAK VEL VTI: 13.2 CM
E WAVE DECELERATION TIME: 184 MSEC
E/A RATIO: 1.16
E/E' RATIO: 7 M/S
ECHO LV POSTERIOR WALL: 1 CM (ref 0.6–1.1)
EOSINOPHIL # BLD AUTO: 0 K/UL (ref 0–0.5)
EOSINOPHIL NFR BLD: 0 % (ref 0–8)
ERYTHROCYTE [DISTWIDTH] IN BLOOD BY AUTOMATED COUNT: 14.1 % (ref 11.5–14.5)
EST. GFR  (NO RACE VARIABLE): 17 ML/MIN/1.73 M^2
FRACTIONAL SHORTENING: 25.6 % (ref 28–44)
GLUCOSE SERPL-MCNC: 114 MG/DL (ref 70–110)
GLUCOSE UR QL STRIP: ABNORMAL
HCT VFR BLD AUTO: 46.2 % (ref 40–54)
HGB BLD-MCNC: 14.7 G/DL (ref 14–18)
HGB UR QL STRIP: ABNORMAL
HYALINE CASTS #/AREA URNS LPF: 0 /LPF
IMM GRANULOCYTES # BLD AUTO: 0.06 K/UL (ref 0–0.04)
IMM GRANULOCYTES NFR BLD AUTO: 0.4 % (ref 0–0.5)
INTERVENTRICULAR SEPTUM: 0.9 CM (ref 0.6–1.1)
IVC DIAMETER: 1.51 CM
KETONES UR QL STRIP: NEGATIVE
LA MAJOR: 4.9 CM
LA MINOR: 4.2 CM
LA WIDTH: 3.7 CM
LACTATE SERPL-SCNC: 2.1 MMOL/L (ref 0.5–2.2)
LEFT ATRIUM SIZE: 2.8 CM
LEFT ATRIUM VOLUME: 40 CM3
LEFT INTERNAL DIMENSION IN SYSTOLE: 3.2 CM (ref 2.1–4)
LEFT VENTRICLE DIASTOLIC VOLUME: 83.15 ML
LEFT VENTRICLE SYSTOLIC VOLUME: 40.9 ML
LEFT VENTRICULAR INTERNAL DIMENSION IN DIASTOLE: 4.3 CM (ref 3.5–6)
LEFT VENTRICULAR MASS: 132.7 G
LEUKOCYTE ESTERASE UR QL STRIP: ABNORMAL
LV LATERAL E/E' RATIO: 5.9 M/S
LV SEPTAL E/E' RATIO: 8.4 M/S
LVED V (TEICH): 83.15 ML
LVES V (TEICH): 40.9 ML
LVOT MG: 1.05 MMHG
LVOT MV: 0.5 CM/S
LYMPHOCYTES # BLD AUTO: 0.9 K/UL (ref 1–4.8)
LYMPHOCYTES NFR BLD: 5.6 % (ref 18–48)
MAGNESIUM SERPL-MCNC: 2.5 MG/DL (ref 1.6–2.6)
MCH RBC QN AUTO: 30.8 PG (ref 27–31)
MCHC RBC AUTO-ENTMCNC: 31.8 G/DL (ref 32–36)
MCV RBC AUTO: 97 FL (ref 82–98)
MICROSCOPIC COMMENT: ABNORMAL
MONOCYTES # BLD AUTO: 1.3 K/UL (ref 0.3–1)
MONOCYTES NFR BLD: 8.2 % (ref 4–15)
MV MEAN GRADIENT: 1 MMHG
MV PEAK A VEL: 0.51 M/S
MV PEAK E VEL: 0.59 M/S
MV PEAK GRADIENT: 2 MMHG
MV STENOSIS PRESSURE HALF TIME: 53.5 MS
MV VALVE AREA BY CONTINUITY EQUATION: 1.9 CM2
MV VALVE AREA P 1/2 METHOD: 4.11 CM2
NEUTROPHILS # BLD AUTO: 13.1 K/UL (ref 1.8–7.7)
NEUTROPHILS NFR BLD: 85.7 % (ref 38–73)
NITRITE UR QL STRIP: NEGATIVE
NRBC BLD-RTO: 0 /100 WBC
PH UR STRIP: 6 [PH] (ref 5–8)
PHOSPHATE SERPL-MCNC: 3.8 MG/DL (ref 2.7–4.5)
PISA TR MAX VEL: 2.2 M/S
PLATELET # BLD AUTO: 182 K/UL (ref 150–450)
PMV BLD AUTO: 10.7 FL (ref 9.2–12.9)
POTASSIUM SERPL-SCNC: 3.8 MMOL/L (ref 3.5–5.1)
PROT SERPL-MCNC: 6.5 G/DL (ref 6–8.4)
PROT UR QL STRIP: ABNORMAL
PV PEAK GRADIENT: 2 MMHG
PV PEAK VELOCITY: 0.77 M/S
RA MAJOR: 4.41 CM
RA PRESSURE ESTIMATED: 3 MMHG
RA WIDTH: 3.1 CM
RBC # BLD AUTO: 4.78 M/UL (ref 4.6–6.2)
RBC #/AREA URNS HPF: 88 /HPF (ref 0–4)
RV TB RVSP: 5 MMHG
RV TISSUE DOPPLER FREE WALL SYSTOLIC VELOCITY 1 (APICAL 4 CHAMBER VIEW): 8.77 CM/S
SINUS: 4.03 CM
SODIUM SERPL-SCNC: 142 MMOL/L (ref 136–145)
SP GR UR STRIP: 1.02 (ref 1–1.03)
SQUAMOUS #/AREA URNS HPF: 7 /HPF
STJ: 2.88 CM
TDI LATERAL: 0.1 M/S
TDI SEPTAL: 0.07 M/S
TDI: 0.09 M/S
TR MAX PG: 19 MMHG
TRICUSPID ANNULAR PLANE SYSTOLIC EXCURSION: 1.65 CM
TROPONIN I SERPL DL<=0.01 NG/ML-MCNC: 0.84 NG/ML (ref 0–0.03)
TV REST PULMONARY ARTERY PRESSURE: 22 MMHG
URN SPEC COLLECT METH UR: ABNORMAL
UROBILINOGEN UR STRIP-ACNC: NEGATIVE EU/DL
WBC # BLD AUTO: 15.24 K/UL (ref 3.9–12.7)
WBC #/AREA URNS HPF: >100 /HPF (ref 0–5)
WBC CLUMPS URNS QL MICRO: ABNORMAL

## 2025-02-14 PROCEDURE — 87088 URINE BACTERIA CULTURE: CPT | Performed by: STUDENT IN AN ORGANIZED HEALTH CARE EDUCATION/TRAINING PROGRAM

## 2025-02-14 PROCEDURE — 84484 ASSAY OF TROPONIN QUANT: CPT | Performed by: STUDENT IN AN ORGANIZED HEALTH CARE EDUCATION/TRAINING PROGRAM

## 2025-02-14 PROCEDURE — 87186 SC STD MICRODIL/AGAR DIL: CPT | Performed by: STUDENT IN AN ORGANIZED HEALTH CARE EDUCATION/TRAINING PROGRAM

## 2025-02-14 PROCEDURE — 83735 ASSAY OF MAGNESIUM: CPT | Performed by: STUDENT IN AN ORGANIZED HEALTH CARE EDUCATION/TRAINING PROGRAM

## 2025-02-14 PROCEDURE — 63600175 PHARM REV CODE 636 W HCPCS: Performed by: STUDENT IN AN ORGANIZED HEALTH CARE EDUCATION/TRAINING PROGRAM

## 2025-02-14 PROCEDURE — 25000003 PHARM REV CODE 250: Performed by: STUDENT IN AN ORGANIZED HEALTH CARE EDUCATION/TRAINING PROGRAM

## 2025-02-14 PROCEDURE — 51798 US URINE CAPACITY MEASURE: CPT

## 2025-02-14 PROCEDURE — 80053 COMPREHEN METABOLIC PANEL: CPT | Performed by: STUDENT IN AN ORGANIZED HEALTH CARE EDUCATION/TRAINING PROGRAM

## 2025-02-14 PROCEDURE — 63600175 PHARM REV CODE 636 W HCPCS: Mod: JZ,TB | Performed by: STUDENT IN AN ORGANIZED HEALTH CARE EDUCATION/TRAINING PROGRAM

## 2025-02-14 PROCEDURE — 96367 TX/PROPH/DG ADDL SEQ IV INF: CPT

## 2025-02-14 PROCEDURE — XW033E5 INTRODUCTION OF REMDESIVIR ANTI-INFECTIVE INTO PERIPHERAL VEIN, PERCUTANEOUS APPROACH, NEW TECHNOLOGY GROUP 5: ICD-10-PCS | Performed by: HOSPITALIST

## 2025-02-14 PROCEDURE — 82550 ASSAY OF CK (CPK): CPT | Performed by: STUDENT IN AN ORGANIZED HEALTH CARE EDUCATION/TRAINING PROGRAM

## 2025-02-14 PROCEDURE — 99223 1ST HOSP IP/OBS HIGH 75: CPT | Mod: ,,, | Performed by: INTERNAL MEDICINE

## 2025-02-14 PROCEDURE — 85025 COMPLETE CBC W/AUTO DIFF WBC: CPT | Performed by: STUDENT IN AN ORGANIZED HEALTH CARE EDUCATION/TRAINING PROGRAM

## 2025-02-14 PROCEDURE — 94761 N-INVAS EAR/PLS OXIMETRY MLT: CPT

## 2025-02-14 PROCEDURE — 87086 URINE CULTURE/COLONY COUNT: CPT | Performed by: STUDENT IN AN ORGANIZED HEALTH CARE EDUCATION/TRAINING PROGRAM

## 2025-02-14 PROCEDURE — 21400001 HC TELEMETRY ROOM

## 2025-02-14 PROCEDURE — 83605 ASSAY OF LACTIC ACID: CPT | Performed by: STUDENT IN AN ORGANIZED HEALTH CARE EDUCATION/TRAINING PROGRAM

## 2025-02-14 PROCEDURE — 27000207 HC ISOLATION

## 2025-02-14 PROCEDURE — 97163 PT EVAL HIGH COMPLEX 45 MIN: CPT

## 2025-02-14 PROCEDURE — 81000 URINALYSIS NONAUTO W/SCOPE: CPT | Performed by: STUDENT IN AN ORGANIZED HEALTH CARE EDUCATION/TRAINING PROGRAM

## 2025-02-14 PROCEDURE — 99285 EMERGENCY DEPT VISIT HI MDM: CPT | Mod: 25

## 2025-02-14 PROCEDURE — 84100 ASSAY OF PHOSPHORUS: CPT | Performed by: STUDENT IN AN ORGANIZED HEALTH CARE EDUCATION/TRAINING PROGRAM

## 2025-02-14 RX ORDER — IBUPROFEN 200 MG
24 TABLET ORAL
Status: DISCONTINUED | OUTPATIENT
Start: 2025-02-14 | End: 2025-02-27 | Stop reason: HOSPADM

## 2025-02-14 RX ORDER — TALC
6 POWDER (GRAM) TOPICAL NIGHTLY PRN
Status: DISCONTINUED | OUTPATIENT
Start: 2025-02-14 | End: 2025-02-27 | Stop reason: HOSPADM

## 2025-02-14 RX ORDER — NALOXONE HCL 0.4 MG/ML
0.02 VIAL (ML) INJECTION
Status: DISCONTINUED | OUTPATIENT
Start: 2025-02-14 | End: 2025-02-27 | Stop reason: HOSPADM

## 2025-02-14 RX ORDER — SODIUM,POTASSIUM PHOSPHATES 280-250MG
2 POWDER IN PACKET (EA) ORAL
Status: DISCONTINUED | OUTPATIENT
Start: 2025-02-14 | End: 2025-02-19

## 2025-02-14 RX ORDER — CETIRIZINE HYDROCHLORIDE 10 MG/1
10 TABLET ORAL DAILY
Status: DISCONTINUED | OUTPATIENT
Start: 2025-02-14 | End: 2025-02-15

## 2025-02-14 RX ORDER — GLUCAGON 1 MG
1 KIT INJECTION
Status: DISCONTINUED | OUTPATIENT
Start: 2025-02-14 | End: 2025-02-27 | Stop reason: HOSPADM

## 2025-02-14 RX ORDER — ONDANSETRON HYDROCHLORIDE 2 MG/ML
4 INJECTION, SOLUTION INTRAVENOUS EVERY 8 HOURS PRN
Status: DISCONTINUED | OUTPATIENT
Start: 2025-02-14 | End: 2025-02-27 | Stop reason: HOSPADM

## 2025-02-14 RX ORDER — LANOLIN ALCOHOL/MO/W.PET/CERES
800 CREAM (GRAM) TOPICAL
Status: DISCONTINUED | OUTPATIENT
Start: 2025-02-14 | End: 2025-02-19

## 2025-02-14 RX ORDER — IBUPROFEN 200 MG
16 TABLET ORAL
Status: DISCONTINUED | OUTPATIENT
Start: 2025-02-14 | End: 2025-02-27 | Stop reason: HOSPADM

## 2025-02-14 RX ORDER — ALUMINUM HYDROXIDE, MAGNESIUM HYDROXIDE, AND SIMETHICONE 1200; 120; 1200 MG/30ML; MG/30ML; MG/30ML
30 SUSPENSION ORAL 4 TIMES DAILY PRN
Status: DISCONTINUED | OUTPATIENT
Start: 2025-02-14 | End: 2025-02-27 | Stop reason: HOSPADM

## 2025-02-14 RX ORDER — BISACODYL 10 MG/1
10 SUPPOSITORY RECTAL DAILY PRN
Status: DISCONTINUED | OUTPATIENT
Start: 2025-02-14 | End: 2025-02-27 | Stop reason: HOSPADM

## 2025-02-14 RX ORDER — SODIUM CHLORIDE, SODIUM LACTATE, POTASSIUM CHLORIDE, CALCIUM CHLORIDE 600; 310; 30; 20 MG/100ML; MG/100ML; MG/100ML; MG/100ML
INJECTION, SOLUTION INTRAVENOUS CONTINUOUS
Status: ACTIVE | OUTPATIENT
Start: 2025-02-14 | End: 2025-02-14

## 2025-02-14 RX ORDER — ACETAMINOPHEN 325 MG/1
650 TABLET ORAL EVERY 4 HOURS PRN
Status: DISCONTINUED | OUTPATIENT
Start: 2025-02-14 | End: 2025-02-19

## 2025-02-14 RX ORDER — ACETAMINOPHEN 325 MG/1
650 TABLET ORAL EVERY 8 HOURS PRN
Status: DISCONTINUED | OUTPATIENT
Start: 2025-02-14 | End: 2025-02-19

## 2025-02-14 RX ORDER — AMOXICILLIN 250 MG
1 CAPSULE ORAL DAILY PRN
Status: DISCONTINUED | OUTPATIENT
Start: 2025-02-14 | End: 2025-02-27 | Stop reason: HOSPADM

## 2025-02-14 RX ORDER — SODIUM CHLORIDE 0.9 % (FLUSH) 0.9 %
10 SYRINGE (ML) INJECTION EVERY 12 HOURS PRN
Status: DISCONTINUED | OUTPATIENT
Start: 2025-02-14 | End: 2025-02-27 | Stop reason: HOSPADM

## 2025-02-14 RX ORDER — SIMETHICONE 80 MG
1 TABLET,CHEWABLE ORAL 4 TIMES DAILY PRN
Status: DISCONTINUED | OUTPATIENT
Start: 2025-02-14 | End: 2025-02-27 | Stop reason: HOSPADM

## 2025-02-14 RX ORDER — HEPARIN SODIUM 5000 [USP'U]/ML
5000 INJECTION, SOLUTION INTRAVENOUS; SUBCUTANEOUS EVERY 8 HOURS
Status: DISCONTINUED | OUTPATIENT
Start: 2025-02-14 | End: 2025-02-19

## 2025-02-14 RX ORDER — ARIPIPRAZOLE 10 MG/1
20 TABLET ORAL DAILY
Status: DISCONTINUED | OUTPATIENT
Start: 2025-02-14 | End: 2025-02-27 | Stop reason: HOSPADM

## 2025-02-14 RX ADMIN — SODIUM CHLORIDE 200 MG: 9 INJECTION, SOLUTION INTRAVENOUS at 08:02

## 2025-02-14 RX ADMIN — HEPARIN SODIUM 5000 UNITS: 5000 INJECTION INTRAVENOUS; SUBCUTANEOUS at 01:02

## 2025-02-14 RX ADMIN — SODIUM CHLORIDE, POTASSIUM CHLORIDE, SODIUM LACTATE AND CALCIUM CHLORIDE: 600; 310; 30; 20 INJECTION, SOLUTION INTRAVENOUS at 07:02

## 2025-02-14 RX ADMIN — CETIRIZINE HYDROCHLORIDE 10 MG: 10 TABLET, FILM COATED ORAL at 08:02

## 2025-02-14 RX ADMIN — SODIUM CHLORIDE, POTASSIUM CHLORIDE, SODIUM LACTATE AND CALCIUM CHLORIDE: 600; 310; 30; 20 INJECTION, SOLUTION INTRAVENOUS at 01:02

## 2025-02-14 RX ADMIN — THERA TABS 1 TABLET: TAB at 08:02

## 2025-02-14 RX ADMIN — VANCOMYCIN HYDROCHLORIDE 1250 MG: 1.25 INJECTION, POWDER, LYOPHILIZED, FOR SOLUTION INTRAVENOUS at 12:02

## 2025-02-14 RX ADMIN — SODIUM CHLORIDE, POTASSIUM CHLORIDE, SODIUM LACTATE AND CALCIUM CHLORIDE: 600; 310; 30; 20 INJECTION, SOLUTION INTRAVENOUS at 02:02

## 2025-02-14 RX ADMIN — HEPARIN SODIUM 5000 UNITS: 5000 INJECTION INTRAVENOUS; SUBCUTANEOUS at 09:02

## 2025-02-14 RX ADMIN — HEPARIN SODIUM 5000 UNITS: 5000 INJECTION INTRAVENOUS; SUBCUTANEOUS at 06:02

## 2025-02-14 NOTE — NURSING
Received report from RICO Howe. Patient arrived to floor via stretcher. Patient AAOx4. Vital signs stable. Patient did not complain of any pain throughout shift. Patient not showing signs of distress. Bed in lowest position, wheels locked, call bell in reach, side rails up x2.

## 2025-02-14 NOTE — SUBJECTIVE & OBJECTIVE
Past Medical History:   Diagnosis Date    Chronic kidney disease     Depression     Hematuria     had neg work up with Dr. Stafford    Hypercholesterolemia 03/01/2013    Nephrolithiasis 7/15/2024    Schizophrenia     Skin cancer of scalp     Syncope 04/06/2023    UTI (urinary tract infection), uncomplicated 11/11/2024       Past Surgical History:   Procedure Laterality Date    APPLICATION, GRAFT N/A 07/07/2023    Procedure: APPLICSTAPLER RELOAD TITANIUM 30MMATION, GRAFT, ANTERIOR SCALP;  Surgeon: Mario Montano MD;  Location: [facility] OR;  Service: Plastics;  Laterality: N/A;  application acellular human dermal allograft anterior scalp 2x4cm    EXCISION OF CARCINOMA N/A 07/07/2023    Procedure: EXCISION, CARCINOMA, ANTERIOR SCALP;  Surgeon: Mario Montano MD;  Location: [facility] OR;  Service: Plastics;  Laterality: N/A;  anterior scalp- melanoma----NEED ORDERS OFFICE NOTIFIED    EXCISION-WIDE LOCAL N/A 05/08/2023    Procedure: EXCISION-WIDE LOCAL;  Surgeon: Rhys Lehman MD;  Location: [facility] OR;  Service: General;  Laterality: N/A;  RN PREOP 5/1/23---    HERNIA REPAIR      INCISION AND DRAINAGE, LOWER EXTREMITY Right 06/09/2023    Procedure: INCISION AND DRAINAGE, LOWER EXTREMITY- ANKLE;  Surgeon: Abby Lemus MD;  Location: [facility] OR;  Service: Orthopedics;  Laterality: Right;  Wound vac placement (medium)  RN PREOP 6/8/2023    PLACEMENT OF ACELLULAR HUMAN DERMAL ALLOGRAFT Right 07/07/2023    Procedure: APPLICATION, ACELLULAR HUMAN DERMAL ALLOGRAFT, FOOT;  Surgeon: Mario Montano MD;  Location: [facility] OR;  Service: Plastics;  Laterality: Right;  dermal graft to right dorsal foot wound 6x6 cm---PHONE PREOP DONE-7/6/23    RECONSTRUCTION USING FLAP N/A 05/08/2023    Procedure: RECONSTRUCTION USING FLAP WITH APPLICATION OF DERMAL GRAFT;  Surgeon: Mario Montano MD;  Location: [facility] OR;  Service: Plastics;  Laterality: N/A;  10 X 10 DERMAL GRAFT  NOTIFIED BOBBY IN NUCLEAR [facility] ON 5/4/2023 @ 10:48AM. PATIENT WILL BE  INJECTED ON AM OF SURGERY PER DR. NEHA ZAVALA  PT NEEDS TO BE IN [facility] FOR 7AM ON AM OF SURGERY-    SKIN CANCER EXCISION         Review of patient's allergies indicates:  No Known Allergies  Current Facility-Administered Medications   Medication Frequency    acetaminophen tablet 650 mg Q8H PRN    acetaminophen tablet 650 mg Q4H PRN    aluminum-magnesium hydroxide-simethicone 200-200-20 mg/5 mL suspension 30 mL QID PRN    ARIPiprazole tablet 20 mg Daily    bisacodyL suppository 10 mg Daily PRN    cetirizine tablet 10 mg Daily    glucagon (human recombinant) injection 1 mg PRN    glucose chewable tablet 16 g PRN    glucose chewable tablet 24 g PRN    heparin (porcine) injection 5,000 Units Q8H    lactated ringers infusion Continuous    magnesium oxide tablet 800 mg PRN    magnesium oxide tablet 800 mg PRN    melatonin tablet 6 mg Nightly PRN    multivitamin tablet Daily    naloxone 0.4 mg/mL injection 0.02 mg PRN    ondansetron injection 4 mg Q8H PRN    potassium, sodium phosphates 280-160-250 mg packet 2 packet PRN    potassium, sodium phosphates 280-160-250 mg packet 2 packet PRN    potassium, sodium phosphates 280-160-250 mg packet 2 packet PRN    [START ON 2/15/2025] remdesivir 100 mg in 0.9% NaCl 250 mL infusion Daily    senna-docusate 8.6-50 mg per tablet 1 tablet Daily PRN    simethicone chewable tablet 80 mg QID PRN    sodium chloride 0.9% flush 10 mL Q12H PRN    Tdap vaccine injection 0.5 mL vaccine x 1 dose     Facility-Administered Medications Ordered in Other Encounters   Medication Frequency    0.9%  NaCl infusion Continuous    mupirocin 2 % ointment On Call Procedure     Family History       Problem Relation (Age of Onset)    Alzheimer's disease Mother    Heart disease Father    Hypertension Sister    Pulmonary embolism Father          Tobacco Use    Smoking status: Never     Passive exposure: Never    Smokeless tobacco: Never   Substance and Sexual Activity    Alcohol use: No    Drug use: No     Sexual activity: Not Currently     Review of Systems   All other systems reviewed and are negative.    Objective:     Vital Signs (Most Recent):  Temp: 98.1 °F (36.7 °C) (02/14/25 1619)  Pulse: 66 (02/14/25 1619)  Resp: 18 (02/14/25 1619)  BP: 98/63 (02/14/25 1619)  SpO2: 98 % (02/14/25 1452) Vital Signs (24h Range):  Temp:  [98.1 °F (36.7 °C)-98.6 °F (37 °C)] 98.1 °F (36.7 °C)  Pulse:  [] 66  Resp:  [13-34] 18  SpO2:  [94 %-100 %] 98 %  BP: ()/(50-70) 98/63     Weight: 68.9 kg (151 lb 14.4 oz) (02/14/25 1515)  Body mass index is 21.79 kg/m².  Body surface area is 1.84 meters squared.    I/O last 3 completed shifts:  In: 1098.8 [IV Piggyback:1098.8]  Out: -      Physical Exam  Vitals and nursing note reviewed.   Constitutional:       General: He is awake. He is not in acute distress.     Appearance: Normal appearance. He is well-developed.   HENT:      Head: Normocephalic and atraumatic.      Nose: Nose normal.      Mouth/Throat:      Mouth: Mucous membranes are moist.   Eyes:      Extraocular Movements: Extraocular movements intact.      Conjunctiva/sclera: Conjunctivae normal.   Cardiovascular:      Rate and Rhythm: Normal rate and regular rhythm.   Pulmonary:      Effort: Pulmonary effort is normal.      Breath sounds: Normal breath sounds.   Abdominal:      General: There is no distension.      Palpations: Abdomen is soft.   Musculoskeletal:         General: No tenderness or signs of injury.      Right lower leg: No edema.      Left lower leg: No edema.   Skin:     General: Skin is warm and dry.      Findings: Bruising present. No erythema or rash.   Neurological:      General: No focal deficit present.      Mental Status: He is alert. Mental status is at baseline.   Psychiatric:         Mood and Affect: Mood normal.         Behavior: Behavior normal.          Significant Labs:  CBC:   Recent Labs   Lab 02/14/25  0435   WBC 15.24*   RBC 4.78   HGB 14.7   HCT 46.2      MCV 97   MCH 30.8   MCHC  31.8*     CMP:   Recent Labs   Lab 02/14/25  0435   *   CALCIUM 9.1   ALBUMIN 3.3*   PROT 6.5      K 3.8   CO2 19*      BUN 48*   CREATININE 3.5*   ALKPHOS 69   *   *   BILITOT 1.0     All labs within the past 24 hours have been reviewed.    Significant Imaging:  Labs: Reviewed  Echo: Reviewed

## 2025-02-14 NOTE — PLAN OF CARE
Problem: Adult Inpatient Plan of Care  Goal: Plan of Care Review  Outcome: Progressing  Goal: Patient-Specific Goal (Individualized)  Outcome: Progressing  Goal: Absence of Hospital-Acquired Illness or Injury  Outcome: Progressing  Goal: Optimal Comfort and Wellbeing  Outcome: Progressing  Goal: Readiness for Transition of Care  Outcome: Progressing     Problem: Infection  Goal: Absence of Infection Signs and Symptoms  Outcome: Progressing     Problem: Acute Kidney Injury/Impairment  Goal: Fluid and Electrolyte Balance  Outcome: Progressing  Goal: Improved Oral Intake  Outcome: Progressing  Goal: Effective Renal Function  Outcome: Progressing

## 2025-02-14 NOTE — ASSESSMENT & PLAN NOTE
Recent Labs   Lab 02/13/25  2208   TROPONINI 0.525*       Chronically elevated  No chest pain    Continue to monitor

## 2025-02-14 NOTE — ED NOTES
Pt arrive to ED following a fall, reports he was putting sheets on his bed and passed out and fall over. Pt has multiple bruises and cuts to face, reports he hit his face on dresser. Has bruises to abdomen and right elbow. Pt reports only pain he has now is right elbow, denies cp or sob. AAOx4. Pt reports he had a vagal episode  two years ago and this is the first time it has happened again however, pts sister reports he has been falling a lot more often. Pt has yellow drainage coming from both eyes, reports he seen  the doctor about one year ago but was not prescribed anything.

## 2025-02-14 NOTE — CARE UPDATE
Patient seen and Examined.  H&P reviewed.  Updates below.    Mr. Torres is an 80-year-old male with history of schizophrenia, CKD 3 and hyperlipidemia who was admitted to the hospital with rhabdomyolysis and acute renal injury.  Also found to be COVID positive.  Noted to have significant bruising on upper abdomen, chest and face.  Imaging with no acute fractures.  He was started on IV fluids.  Nephrology consulted.  On remdesivir, not hypoxic.  Continue current management.    Emigdio Warner MD  Department of Hospital Medicine  [facility]

## 2025-02-14 NOTE — CONSULTS
[facility]  Nephrology  Consult Note    Patient Name: Antonio Torres Jr.  MRN: [id number]  Admission Date: 2/13/2025  Hospital Length of Stay: 0 days  Attending Provider: Emigdio Warner MD   Primary Care Physician: Fabio Lennon MD  Principal Problem:Non-traumatic rhabdomyolysis    Inpatient consult to Nephrology  Consult performed by: Dariela Terrazas MD  Consult ordered by: Emigdio Warner MD  Reason for consult: BISI; rhabdo        Subjective:     HPI: Mr. Torres is an 79 yo male with schizophrenia, BPH, CKD, and h/o melanoma who presented to the ED overnight s/p fall. HR 120s on arrival. He was also found to be COVID+. CPK 23k. He was started on IVF. Nephrology consulted for BISI on CKD. Prior records obtained and reviewed. He is followed by myself outpatient; last visit was 11/11/24. His baseline Cr is 1.7-2.0; his CKD is of unknown etiology. His Cr was 3.3 on arrival --> 3.5 this morning. CPK now > 40k. Abnormal LFTs. He reports to be doing okay this afternoon. Complaining of soreness. No SOB or leg swelling.    Past Medical History:   Diagnosis Date    Chronic kidney disease     Depression     Hematuria     had neg work up with Dr. Stafford    Hypercholesterolemia 03/01/2013    Nephrolithiasis 7/15/2024    Schizophrenia     Skin cancer of scalp     Syncope 04/06/2023    UTI (urinary tract infection), uncomplicated 11/11/2024       Past Surgical History:   Procedure Laterality Date    APPLICATION, GRAFT N/A 07/07/2023    Procedure: APPLICSTAPLER RELOAD TITANIUM 30MMATION, GRAFT, ANTERIOR SCALP;  Surgeon: Mario Montano MD;  Location: [facility] OR;  Service: Plastics;  Laterality: N/A;  application acellular human dermal allograft anterior scalp 2x4cm    EXCISION OF CARCINOMA N/A 07/07/2023    Procedure: EXCISION, CARCINOMA, ANTERIOR SCALP;  Surgeon: Mario Montano MD;  Location: [facility] OR;  Service: Plastics;  Laterality: N/A;  anterior scalp- melanoma----NEED ORDERS OFFICE NOTIFIED    EXCISION-WIDE  LOCAL N/A 05/08/2023    Procedure: EXCISION-WIDE LOCAL;  Surgeon: Rhys Solomon MD;  Location: [facility] OR;  Service: General;  Laterality: N/A;  RN PREOP 5/1/23---    HERNIA REPAIR      INCISION AND DRAINAGE, LOWER EXTREMITY Right 06/09/2023    Procedure: INCISION AND DRAINAGE, LOWER EXTREMITY- ANKLE;  Surgeon: Abby Lemus MD;  Location: [facility] OR;  Service: Orthopedics;  Laterality: Right;  Wound vac placement (medium)  RN PREOP 6/8/2023    PLACEMENT OF ACELLULAR HUMAN DERMAL ALLOGRAFT Right 07/07/2023    Procedure: APPLICATION, ACELLULAR HUMAN DERMAL ALLOGRAFT, FOOT;  Surgeon: Mario Montano MD;  Location: [facility] OR;  Service: Plastics;  Laterality: Right;  dermal graft to right dorsal foot wound 6x6 cm---PHONE PREOP DONE-7/6/23    RECONSTRUCTION USING FLAP N/A 05/08/2023    Procedure: RECONSTRUCTION USING FLAP WITH APPLICATION OF DERMAL GRAFT;  Surgeon: Mario Montano MD;  Location: [facility] OR;  Service: Plastics;  Laterality: N/A;  10 X 10 DERMAL GRAFT  NOTIFIED BOBBY IN [employer] ON 5/4/2023 @ 10:48AM. PATIENT WILL BE INJECTED ON AM OF SURGERY PER DR. SOLOMON -RENE  PT NEEDS TO BE IN [employer] FOR 7AM ON AM OF SURGERY-    SKIN CANCER EXCISION         Review of patient's allergies indicates:  No Known Allergies  Current Facility-Administered Medications   Medication Frequency    acetaminophen tablet 650 mg Q8H PRN    acetaminophen tablet 650 mg Q4H PRN    aluminum-magnesium hydroxide-simethicone 200-200-20 mg/5 mL suspension 30 mL QID PRN    ARIPiprazole tablet 20 mg Daily    bisacodyL suppository 10 mg Daily PRN    cetirizine tablet 10 mg Daily    glucagon (human recombinant) injection 1 mg PRN    glucose chewable tablet 16 g PRN    glucose chewable tablet 24 g PRN    heparin (porcine) injection 5,000 Units Q8H    lactated ringers infusion Continuous    magnesium oxide tablet 800 mg PRN    magnesium oxide tablet 800 mg PRN    melatonin tablet 6 mg Nightly PRN    multivitamin tablet Daily    naloxone 0.4  mg/mL injection 0.02 mg PRN    ondansetron injection 4 mg Q8H PRN    potassium, sodium phosphates 280-160-250 mg packet 2 packet PRN    potassium, sodium phosphates 280-160-250 mg packet 2 packet PRN    potassium, sodium phosphates 280-160-250 mg packet 2 packet PRN    [START ON 2/15/2025] remdesivir 100 mg in 0.9% NaCl 250 mL infusion Daily    senna-docusate 8.6-50 mg per tablet 1 tablet Daily PRN    simethicone chewable tablet 80 mg QID PRN    sodium chloride 0.9% flush 10 mL Q12H PRN    Tdap vaccine injection 0.5 mL vaccine x 1 dose     Facility-Administered Medications Ordered in Other Encounters   Medication Frequency    0.9%  NaCl infusion Continuous    mupirocin 2 % ointment On Call Procedure     Family History       Problem Relation (Age of Onset)    Alzheimer's disease Mother    Heart disease Father    Hypertension Sister    Pulmonary embolism Father          Tobacco Use    Smoking status: Never     Passive exposure: Never    Smokeless tobacco: Never   Substance and Sexual Activity    Alcohol use: No    Drug use: No    Sexual activity: Not Currently     Review of Systems   All other systems reviewed and are negative.    Objective:     Vital Signs (Most Recent):  Temp: 98.1 °F (36.7 °C) (02/14/25 1619)  Pulse: 66 (02/14/25 1619)  Resp: 18 (02/14/25 1619)  BP: 98/63 (02/14/25 1619)  SpO2: 98 % (02/14/25 1452) Vital Signs (24h Range):  Temp:  [98.1 °F (36.7 °C)-98.6 °F (37 °C)] 98.1 °F (36.7 °C)  Pulse:  [] 66  Resp:  [13-34] 18  SpO2:  [94 %-100 %] 98 %  BP: ()/(50-70) 98/63     Weight: 68.9 kg (151 lb 14.4 oz) (02/14/25 1515)  Body mass index is 21.79 kg/m².  Body surface area is 1.84 meters squared.    I/O last 3 completed shifts:  In: 1098.8 [IV Piggyback:1098.8]  Out: -      Physical Exam  Vitals and nursing note reviewed.   Constitutional:       General: He is awake. He is not in acute distress.     Appearance: Normal appearance. He is well-developed.   HENT:      Head: Normocephalic and  atraumatic.      Nose: Nose normal.      Mouth/Throat:      Mouth: Mucous membranes are moist.   Eyes:      Extraocular Movements: Extraocular movements intact.      Conjunctiva/sclera: Conjunctivae normal.   Cardiovascular:      Rate and Rhythm: Normal rate and regular rhythm.   Pulmonary:      Effort: Pulmonary effort is normal.      Breath sounds: Normal breath sounds.   Abdominal:      General: There is no distension.      Palpations: Abdomen is soft.   Musculoskeletal:         General: No tenderness or signs of injury.      Right lower leg: No edema.      Left lower leg: No edema.   Skin:     General: Skin is warm and dry.      Findings: Bruising present. No erythema or rash.   Neurological:      General: No focal deficit present.      Mental Status: He is alert. Mental status is at baseline.   Psychiatric:         Mood and Affect: Mood normal.         Behavior: Behavior normal.          Significant Labs:  CBC:   Recent Labs   Lab 02/14/25  0435   WBC 15.24*   RBC 4.78   HGB 14.7   HCT 46.2      MCV 97   MCH 30.8   MCHC 31.8*     CMP:   Recent Labs   Lab 02/14/25  0435   *   CALCIUM 9.1   ALBUMIN 3.3*   PROT 6.5      K 3.8   CO2 19*      BUN 48*   CREATININE 3.5*   ALKPHOS 69   *   *   BILITOT 1.0     All labs within the past 24 hours have been reviewed.    Significant Imaging:  Labs: Reviewed  Echo: Reviewed    Assessment/Plan:     BISI on CKD stage 3b  - baseline Cr 1.7-2.0; followed outpatient by myself  - Cr 3.3 on arrival --> 3.5 today; worsening. Due to rhabdomyolysis  - continue aggressive IVF with lasix PRN for goal UOP ~200cc/hr  - daily labs. Strict I/Os    Rhabdomyolysis  - CPK > 40,000 today  - continue aggressive IV hydration until CPK < 5000  - daily CPK levels  - strict I/Os    Metabolic acidosis  - bicarb 19; no need for intervention at this time  - agree with LR > NS      Thank you for your consult. I will follow-up with patient. Please contact us if you  have any additional questions.    Dariela Terrazas MD  Nephrology  [facility]

## 2025-02-14 NOTE — NURSING
Received report from RICO Howe. Patient arrived to floor via stretcher. Patient AAOx4. Vital signs stable.

## 2025-02-14 NOTE — SUBJECTIVE & OBJECTIVE
Past Medical History:   Diagnosis Date    Chronic kidney disease     Depression     Hematuria     had neg work up with Dr. Stafford    Hypercholesterolemia 03/01/2013    Nephrolithiasis 7/15/2024    Schizophrenia     Skin cancer of scalp     Syncope 04/06/2023    UTI (urinary tract infection), uncomplicated 11/11/2024       Past Surgical History:   Procedure Laterality Date    APPLICATION, GRAFT N/A 07/07/2023    Procedure: APPLICSTAPLER RELOAD TITANIUM 30MMATION, GRAFT, ANTERIOR SCALP;  Surgeon: Mario Montano MD;  Location: [facility] OR;  Service: Plastics;  Laterality: N/A;  application acellular human dermal allograft anterior scalp 2x4cm    EXCISION OF CARCINOMA N/A 07/07/2023    Procedure: EXCISION, CARCINOMA, ANTERIOR SCALP;  Surgeon: Mario Montano MD;  Location: [facility] OR;  Service: Plastics;  Laterality: N/A;  anterior scalp- melanoma----NEED ORDERS OFFICE NOTIFIED    EXCISION-WIDE LOCAL N/A 05/08/2023    Procedure: EXCISION-WIDE LOCAL;  Surgeon: Rhys Lehman MD;  Location: [facility] OR;  Service: General;  Laterality: N/A;  RN PREOP 5/1/23---    HERNIA REPAIR      INCISION AND DRAINAGE, LOWER EXTREMITY Right 06/09/2023    Procedure: INCISION AND DRAINAGE, LOWER EXTREMITY- ANKLE;  Surgeon: Abby Lemus MD;  Location: [facility] OR;  Service: Orthopedics;  Laterality: Right;  Wound vac placement (medium)  RN PREOP 6/8/2023    PLACEMENT OF ACELLULAR HUMAN DERMAL ALLOGRAFT Right 07/07/2023    Procedure: APPLICATION, ACELLULAR HUMAN DERMAL ALLOGRAFT, FOOT;  Surgeon: Mario Montano MD;  Location: [facility] OR;  Service: Plastics;  Laterality: Right;  dermal graft to right dorsal foot wound 6x6 cm---PHONE PREOP DONE-7/6/23    RECONSTRUCTION USING FLAP N/A 05/08/2023    Procedure: RECONSTRUCTION USING FLAP WITH APPLICATION OF DERMAL GRAFT;  Surgeon: Mario Montano MD;  Location: [facility] OR;  Service: Plastics;  Laterality: N/A;  10 X 10 DERMAL GRAFT  NOTIFIED BOBBY IN [facility] ON 5/4/2023 @ 10:48AM. PATIENT WILL BE  INJECTED ON AM OF SURGERY PER DR. NEHA ZAVALA  PT NEEDS TO BE IN [employer] FOR 7AM ON AM OF SURGERY-    SKIN CANCER EXCISION         Review of patient's allergies indicates:  No Known Allergies    Current Facility-Administered Medications on File Prior to Encounter   Medication    0.9%  NaCl infusion    mupirocin 2 % ointment     Current Outpatient Medications on File Prior to Encounter   Medication Sig    ARIPiprazole (ABILIFY) 20 MG Tab Take 1 tablet (20 mg total) by mouth once daily.    loratadine (CLARITIN) 10 mg tablet Take 10 mg by mouth once daily.    multivitamin capsule Take 1 capsule by mouth once daily.     Family History       Problem Relation (Age of Onset)    Alzheimer's disease Mother    Heart disease Father    Hypertension Sister    Pulmonary embolism Father          Tobacco Use    Smoking status: Never     Passive exposure: Never    Smokeless tobacco: Never   Substance and Sexual Activity    Alcohol use: No    Drug use: No    Sexual activity: Not Currently     Review of Systems   Respiratory:  Positive for cough.    Cardiovascular: Negative.    Gastrointestinal: Negative.    Genitourinary: Negative.    Musculoskeletal: Negative.    Neurological:  Positive for syncope.        Fall       Objective:     Vital Signs (Most Recent):  Temp: 98.1 °F (36.7 °C) (02/13/25 2133)  Pulse: 67 (02/14/25 0200)  Resp: 13 (02/14/25 0200)  BP: 104/60 (02/14/25 0200)  SpO2: 97 % (02/14/25 0200) Vital Signs (24h Range):  Temp:  [98.1 °F (36.7 °C)] 98.1 °F (36.7 °C)  Pulse:  [] 67  Resp:  [13-21] 13  SpO2:  [95 %-98 %] 97 %  BP: ()/(57-64) 104/60     Weight: 68 kg (150 lb)  Body mass index is 21.52 kg/m².     Physical Exam  Vitals and nursing note reviewed.   Constitutional:       General: He is not in acute distress.     Appearance: He is not ill-appearing.   HENT:      Mouth/Throat:      Mouth: Mucous membranes are moist.   Cardiovascular:      Rate and Rhythm: Normal rate.   Pulmonary:      Effort:  Pulmonary effort is normal.   Abdominal:      General: Abdomen is flat.   Skin:     General: Skin is warm.      Comments: Bruising noted to face/nose, bilateral knees, bilateral elbows and upper abdomen.    Neurological:      General: No focal deficit present.      Mental Status: He is alert.                    Significant Labs: All pertinent labs within the past 24 hours have been reviewed.    Significant Imaging: I have reviewed all pertinent imaging results/findings within the past 24 hours.

## 2025-02-14 NOTE — H&P
[facility] Medicine  History & Physical    Patient Name: Antonio Torres Jr.  MRN: [id number]  Patient Class: IP- Inpatient  Admission Date: 2/13/2025  Attending Physician: Emigdio Warner MD   Primary Care Provider: Fabio Lennon MD         Patient information was obtained from patient and ER records.     Subjective:     Principal Problem:Non-traumatic rhabdomyolysis    Chief Complaint:   Chief Complaint   Patient presents with    Fall     Reports falling from standing position and hitting face on dresser. Denies blood thinners. +LOC        HPI: This is an 80-year-old male with a past medical history of schizophrenia, chronic blepharitis, hyperlipidemia, CKD 3, BPH, who presents with a fall.    Patient presents for evaluation after a fall and a sycopal episode that occurred prior to presentation. He reports losing consciousness and falling face forward on the floor sustaining an injury to his head, elbows, abdomen and knees. He was unable to stand up and was on the floor for about 1.5 hours. Patient reports having a coughing episode a day prior but denies sick contacts. He denied chest pain.     In the ED, the patient was tachycardic (120s > 60s), but otherwise hemodynamically stable.  Labs were remarkable for leukocytosis (25.3), elevated CPK (23,406), elevated creatinine (3.3-baseline around 1.9), elevated troponin (0.525-chronically elevated), elevated lactic acid (4.8 > 2.1), elevated procalcitonin (2.53), elevated LFTs (T bili: 1.1, AST: 267, ALT: 73), hypophosphatemia (1.7), positive COVID-19.  CT head, maxillofacial, cervical spine showed a nondisplaced right nasal bone fracture with no soft tissue swelling, and paranasal sinus disease.  CT chest, abdomen and pelvis showed possible soft tissue contusion at the level of the xiphoid process, a 2-3 mm left lower lobe pulmonary nodule and nonspecific urinary bladder wall thickening.  Right elbow x-ray demonstrated an elbow effusion  with an olecranon spur.  Patient was given 2 L of LR, vancomycin, Zosyn, Tylenol 1 g p.o..  He was admitted for further management.    Past Medical History:   Diagnosis Date    Chronic kidney disease     Depression     Hematuria     had neg work up with Dr. Stafford    Hypercholesterolemia 03/01/2013    Nephrolithiasis 7/15/2024    Schizophrenia     Skin cancer of scalp     Syncope 04/06/2023    UTI (urinary tract infection), uncomplicated 11/11/2024       Past Surgical History:   Procedure Laterality Date    APPLICATION, GRAFT N/A 07/07/2023    Procedure: APPLICSTAPLER RELOAD TITANIUM 30MMATION, GRAFT, ANTERIOR SCALP;  Surgeon: Mario Montano MD;  Location: [facility] OR;  Service: Plastics;  Laterality: N/A;  application acellular human dermal allograft anterior scalp 2x4cm    EXCISION OF CARCINOMA N/A 07/07/2023    Procedure: EXCISION, CARCINOMA, ANTERIOR SCALP;  Surgeon: Mario Montano MD;  Location: [facility] OR;  Service: Plastics;  Laterality: N/A;  anterior scalp- melanoma----NEED ORDERS OFFICE NOTIFIED    EXCISION-WIDE LOCAL N/A 05/08/2023    Procedure: EXCISION-WIDE LOCAL;  Surgeon: Rhys Lehman MD;  Location: [facility] OR;  Service: General;  Laterality: N/A;  RN PREOP 5/1/23---    HERNIA REPAIR      INCISION AND DRAINAGE, LOWER EXTREMITY Right 06/09/2023    Procedure: INCISION AND DRAINAGE, LOWER EXTREMITY- ANKLE;  Surgeon: Abby Lemus MD;  Location: [facility] OR;  Service: Orthopedics;  Laterality: Right;  Wound vac placement (medium)  RN PREOP 6/8/2023    PLACEMENT OF ACELLULAR HUMAN DERMAL ALLOGRAFT Right 07/07/2023    Procedure: APPLICATION, ACELLULAR HUMAN DERMAL ALLOGRAFT, FOOT;  Surgeon: Mario Montano MD;  Location: [facility] OR;  Service: Plastics;  Laterality: Right;  dermal graft to right dorsal foot wound 6x6 cm---PHONE PREOP DONE-7/6/23    RECONSTRUCTION USING FLAP N/A 05/08/2023    Procedure: RECONSTRUCTION USING FLAP WITH APPLICATION OF DERMAL GRAFT;  Surgeon: Mario Montano MD;  Location: [facility] OR;   Service: Plastics;  Laterality: N/A;  10 X 10 DERMAL GRAFT  NOTIFIED BOBBY IN [employer] ON 5/4/2023 @ 10:48AM. PATIENT WILL BE INJECTED ON AM OF SURGERY PER DR. NEHA ZAVALA  PT NEEDS TO BE IN [employer] FOR 7AM ON AM OF SURGERY-    SKIN CANCER EXCISION         Review of patient's allergies indicates:  No Known Allergies    Current Facility-Administered Medications on File Prior to Encounter   Medication    0.9%  NaCl infusion    mupirocin 2 % ointment     Current Outpatient Medications on File Prior to Encounter   Medication Sig    ARIPiprazole (ABILIFY) 20 MG Tab Take 1 tablet (20 mg total) by mouth once daily.    loratadine (CLARITIN) 10 mg tablet Take 10 mg by mouth once daily.    multivitamin capsule Take 1 capsule by mouth once daily.     Family History       Problem Relation (Age of Onset)    Alzheimer's disease Mother    Heart disease Father    Hypertension Sister    Pulmonary embolism Father          Tobacco Use    Smoking status: Never     Passive exposure: Never    Smokeless tobacco: Never   Substance and Sexual Activity    Alcohol use: No    Drug use: No    Sexual activity: Not Currently     Review of Systems   Respiratory:  Positive for cough.    Cardiovascular: Negative.    Gastrointestinal: Negative.    Genitourinary: Negative.    Musculoskeletal: Negative.    Neurological:  Positive for syncope.        Fall       Objective:     Vital Signs (Most Recent):  Temp: 98.1 °F (36.7 °C) (02/13/25 2133)  Pulse: 67 (02/14/25 0200)  Resp: 13 (02/14/25 0200)  BP: 104/60 (02/14/25 0200)  SpO2: 97 % (02/14/25 0200) Vital Signs (24h Range):  Temp:  [98.1 °F (36.7 °C)] 98.1 °F (36.7 °C)  Pulse:  [] 67  Resp:  [13-21] 13  SpO2:  [95 %-98 %] 97 %  BP: ()/(57-64) 104/60     Weight: 68 kg (150 lb)  Body mass index is 21.52 kg/m².     Physical Exam  Vitals and nursing note reviewed.   Constitutional:       General: He is not in acute distress.     Appearance: He is not ill-appearing.   HENT:       Mouth/Throat:      Mouth: Mucous membranes are moist.   Cardiovascular:      Rate and Rhythm: Normal rate.   Pulmonary:      Effort: Pulmonary effort is normal.   Abdominal:      General: Abdomen is flat.   Skin:     General: Skin is warm.      Comments: Bruising noted to face/nose, bilateral knees, bilateral elbows and upper abdomen.    Neurological:      General: No focal deficit present.      Mental Status: He is alert.                    Significant Labs: All pertinent labs within the past 24 hours have been reviewed.    Significant Imaging: I have reviewed all pertinent imaging results/findings within the past 24 hours.  Assessment/Plan:     * Non-traumatic rhabdomyolysis  Elevated CPK to 96325 in the setting of a prolonged fall       Aggressive hydration   Monitor CK    Elevated troponin  Recent Labs   Lab 02/13/25 2208   TROPONINI 0.525*       Chronically elevated  No chest pain    Continue to monitor     Leukocytosis  Likely reactive   Imaging unremarkable for infection   Hold antibiotics for now       COVID-19  No hypoxia, prophylactic remdesivir     BISI (acute kidney injury)  BISI is likely due to pre-renal azotemia due to intravascular volume depletion. Baseline creatinine is  1.9 . Most recent creatinine and eGFR are listed below.  Recent Labs     02/13/25 2208   CREATININE 3.3*   EGFRNORACEVR 18*      Plan  - BISI is stable  - Avoid nephrotoxins and renally dose meds for GFR listed above  - Monitor urine output, serial BMP, and adjust therapy as needed  - IVF    Elevated LFTs  Likely in the setting of rhabdomyolysis   Continue to monitor       Schizophrenia  Continue Abilify         VTE Risk Mitigation (From admission, onward)           Ordered     heparin (porcine) injection 5,000 Units  Every 8 hours         02/14/25 0222     IP VTE HIGH RISK PATIENT  Once         02/14/25 0222     Place sequential compression device  Until discontinued         02/14/25 0222                       Asael Rose  MD  Department of Hospital Medicine  [facility] [place] - Emergency Dept

## 2025-02-14 NOTE — NURSING
[facility] [place]  Nurses Note -- 4 Eyes      2/14/2025       Skin assessed on: Admit      [x] No Pressure Injuries Present    [x]Prevention Measures Documented    [] Yes LDA  for Pressure Injury Previously documented     [] Yes New Pressure Injury Discovered   [] LDA for New Pressure Injury Added      Attending RN:  Jasmin Villasenor LPN     Second RN:  MELANIE Guevara

## 2025-02-14 NOTE — HPI
Mr. Torres is an 79 yo male with schizophrenia, BPH, CKD, and h/o melanoma who presented to the ED overnight s/p fall. HR 120s on arrival. He was also found to be COVID+. CPK 23k. He was started on IVF. Nephrology consulted for BISI on CKD. Prior records obtained and reviewed. He is followed by myself outpatient; last visit was 11/11/24. His baseline Cr is 1.7-2.0; his CKD is of unknown etiology. His Cr was 3.3 on arrival --> 3.5 this morning. CPK now > 40k. Abnormal LFTs. He reports to be doing okay this afternoon. Complaining of soreness. No SOB or leg swelling.

## 2025-02-14 NOTE — HPI
This is an 80-year-old male with a past medical history of schizophrenia, chronic blepharitis, hyperlipidemia, CKD 3, BPH, who presents with a fall.    Patient presents for evaluation after a fall and a sycopal episode that occurred prior to presentation. He reports losing consciousness and falling face forward on the floor sustaining an injury to his head, elbows, abdomen and knees. He was unable to stand up and was on the floor for about 1.5 hours. Patient reports having a coughing episode a day prior but denies sick contacts. He denied chest pain.     In the ED, the patient was tachycardic (120s > 60s), but otherwise hemodynamically stable.  Labs were remarkable for leukocytosis (25.3), elevated CPK (23,406), elevated creatinine (3.3-baseline around 1.9), elevated troponin (0.525-chronically elevated), elevated lactic acid (4.8 > 2.1), elevated procalcitonin (2.53), elevated LFTs (T bili: 1.1, AST: 267, ALT: 73), hypophosphatemia (1.7), positive COVID-19.  CT head, maxillofacial, cervical spine showed a nondisplaced right nasal bone fracture with no soft tissue swelling, and paranasal sinus disease.  CT chest, abdomen and pelvis showed possible soft tissue contusion at the level of the xiphoid process, a 2-3 mm left lower lobe pulmonary nodule and nonspecific urinary bladder wall thickening.  Right elbow x-ray demonstrated an elbow effusion with an olecranon spur.  Patient was given 2 L of LR, vancomycin, Zosyn, Tylenol 1 g p.o..  He was admitted for further management.

## 2025-02-14 NOTE — PROGRESS NOTES
Pharmacist Renal Dose Adjustment Note    Antonio Torres Jr. is a 80 y.o. male being treated with the medication Zosyn 4.5gm    Patient Data:    Vital Signs (Most Recent):  Temp: 98.1 °F (36.7 °C) (02/13/25 2133)  Pulse: (S) 91 (02/13/25 2215)  Resp: 20 (02/13/25 2215)  BP: (S) 111/64 (02/13/25 2215)  SpO2: (S) 97 % (02/13/25 2215) Vital Signs (72h Range):  Temp:  [98.1 °F (36.7 °C)]   Pulse:  []   Resp:  [18-21]   BP: ()/(57-64)   SpO2:  [95 %-97 %]      Recent Labs   Lab 02/13/25 2208   CREATININE 3.3*     Serum creatinine: 3.3 mg/dL (H) 02/13/25 2208  Estimated creatinine clearance: 16.6 mL/min (A)    Medication:Zosyn 4.5gm ordered q8h.  CrCl is 16.6.  As per Renal Dose Adjustment per Pharmacy Protocol, Zosyn will be renally dosed at 4.5gm q12h x 24 hours as ordered.    Pharmacist's Name: Derek Simon  Pharmacist's Extension: [phone]

## 2025-02-14 NOTE — ASSESSMENT & PLAN NOTE
BISI is likely due to pre-renal azotemia due to intravascular volume depletion. Baseline creatinine is  1.9 . Most recent creatinine and eGFR are listed below.  Recent Labs     02/13/25  2208   CREATININE 3.3*   EGFRNORACEVR 18*      Plan  - BISI is stable  - Avoid nephrotoxins and renally dose meds for GFR listed above  - Monitor urine output, serial BMP, and adjust therapy as needed  - IVF

## 2025-02-14 NOTE — PT/OT/SLP EVAL
Physical Therapy Evaluation     Patient Name:  Antonio Torres Jr.   MRN:  [id number]    Recommendations:     Discharge Recommendations: Moderate Intensity Therapy  Discharge Equipment Recommendations: to be determined by next level of care   Barriers to discharge: Decreased caregiver support    Assessment:     Antonio Torres Jr. is a 80 y.o. male admitted with a medical diagnosis of Non-traumatic rhabdomyolysis. .  At this time, patient is functioning at their prior level of function and does not require further acute PT services.     Recent Surgery: * No surgery found *      Plan:     During this hospitalization, patient does not require further acute PT services.  Please re-consult if situation changes.      Subjective     Chief Complaint: Pt states that he fell and he's bruised but other than that he is doing ok  Patient/Family Comments/goals: SNF  Pain/Comfort:  Pain Rating 1: 0/10    Patients cultural, spiritual, Voodoo conflicts given the current situation: no    Living Environment:  Lives alone in mother in law suite behind his sisters home  Prior to admission, patients level of function was modified independent to supervision.  Equipment used at home: none.  DME owned (not currently used): none.  Upon discharge, patient will have assistance from sister.    Objective:     Communicated with nursing prior to session.  Patient found HOB elevated with peripheral IV, pulse ox (continuous), blood pressure cuff, telemetry upon PT entry to room.    General Precautions: Standard, fall    Orthopedic Precautions:    Braces:    Respiratory Status: Room air    Exams:  Cognitive Exam:  Patient is oriented to Person and Place  Gross Motor Coordination:  WFL  Postural Exam:  Patient presented with the following abnormalities:    -       Rounded shoulders  -       Posterior pelvic tilt  Sensation:    -       Intact  RLE ROM: WFL  RLE Strength: 3/5  LLE ROM: WFL  LLE Strength: grossly 3+/5    Functional Mobility:  Bed  Mobility:     Rolling Left:  supervision  Rolling Right: supervision  Scooting: supervision  Supine to Sit: minimum assistance  Sit to Supine: minimum assistance  Transfers:     Sit to Stand:  minimum assistance with no AD  Gait: 20 ft, no AD, HHA with moderate assist for support due to weakness noted in R+ LE making It difficult to advance R+ LE and to bear full weight during stance  Balance: Static Sit Good, Static Stand Fair     AM-PAC 6 CLICK MOBILITY  Total Score:17       Treatment and Education:  Eval only    AM-PAC 6 CLICK MOBILITY  Total Score:17     Patient left HOB elevated with all lines intact, call button in reach, nursing notified, and nurse and transport present.    GOALS:   Multidisciplinary Problems       Physical Therapy Goals          Problem: Physical Therapy    Goal Priority Disciplines Outcome Interventions   Physical Therapy Goal     PT, PT/OT Progressing    Description: Goals to be met by: 3/28/25     Patient will increase functional independence with mobility by performin. Bed to chair transfer with Contact Guard Assistance using Rolling Walker  2. Gait  x 150 feet with Minimal Assistance using Rolling Walker.                          DME Justifications:  No DME recommended requiring DME justifications    History:     Past Medical History:   Diagnosis Date    Chronic kidney disease     Depression     Hematuria     had neg work up with Dr. Stafford    Hypercholesterolemia 2013    Nephrolithiasis 7/15/2024    Schizophrenia     Skin cancer of scalp     Syncope 2023    UTI (urinary tract infection), uncomplicated 2024       Past Surgical History:   Procedure Laterality Date    APPLICATION, GRAFT N/A 2023    Procedure: APPLICSTAPLER RELOAD TITANIUM 30MMATION, GRAFT, ANTERIOR SCALP;  Surgeon: Mario Montano MD;  Location: [facility] OR;  Service: Plastics;  Laterality: N/A;  application acellular human dermal allograft anterior scalp 2x4cm    EXCISION OF CARCINOMA N/A  07/07/2023    Procedure: EXCISION, CARCINOMA, ANTERIOR SCALP;  Surgeon: Mario Montano MD;  Location: [facility] OR;  Service: Plastics;  Laterality: N/A;  anterior scalp- melanoma----NEED ORDERS OFFICE NOTIFIED    EXCISION-WIDE LOCAL N/A 05/08/2023    Procedure: EXCISION-WIDE LOCAL;  Surgeon: Rhys Soolmon MD;  Location: [facility] OR;  Service: General;  Laterality: N/A;  RN PREOP 5/1/23---    HERNIA REPAIR      INCISION AND DRAINAGE, LOWER EXTREMITY Right 06/09/2023    Procedure: INCISION AND DRAINAGE, LOWER EXTREMITY- ANKLE;  Surgeon: Abby Lemus MD;  Location: [facility] OR;  Service: Orthopedics;  Laterality: Right;  Wound vac placement (medium)  RN PREOP 6/8/2023    PLACEMENT OF ACELLULAR HUMAN DERMAL ALLOGRAFT Right 07/07/2023    Procedure: APPLICATION, ACELLULAR HUMAN DERMAL ALLOGRAFT, FOOT;  Surgeon: Mario Montano MD;  Location: [facility] OR;  Service: Plastics;  Laterality: Right;  dermal graft to right dorsal foot wound 6x6 cm---PHONE PREOP DONE-7/6/23    RECONSTRUCTION USING FLAP N/A 05/08/2023    Procedure: RECONSTRUCTION USING FLAP WITH APPLICATION OF DERMAL GRAFT;  Surgeon: Mario Montano MD;  Location: [facility] OR;  Service: Plastics;  Laterality: N/A;  10 X 10 DERMAL GRAFT  NOTIFIED BOBBY IN [employer] ON 5/4/2023 @ 10:48AM. PATIENT WILL BE INJECTED ON AM OF SURGERY PER DR. SOLOMON -RENE  PT NEEDS TO BE IN [employer] FOR 7AM ON AM OF SURGERY-LO    SKIN CANCER EXCISION         Time Tracking:     PT Received On:    PT Start Time: 1345     PT Stop Time: 1405  PT Total Time (min): 20 min     Billable Minutes: Evaluation 1      02/14/2025

## 2025-02-14 NOTE — ED PROVIDER NOTES
Encounter Date: 2/13/2025       History     Chief Complaint   Patient presents with    Fall     Reports falling from standing position and hitting face on dresser. Denies blood thinners. +LOC     HPI    81 yo M with a hx of CKD, hypercholesterolemia, syncope, UTI, chronic blepharitis presents to ED for evaluation after a fall.  Patient notes he was standing when he suddenly got lightheaded passed out.  Patient woke up on the ground and was down on the ground for an hour and half because he could not get himself up.  Right now he is complaining of pain to bilateral elbows but otherwise has no specific complaint.  Notes pain is currently a 3/10.  Notes he has had a recent cough and some upper respiratory symptoms over the past several days.  Denies any fevers, chest pain, abdominal pain, headache, nausea, vomiting at this time.  Notes he had a similar episode of syncope 2 years ago and workup at that time was unremarkable.  Per the patient he believes they attributed it to vasovagal syncope.    Review of patient's allergies indicates:  No Known Allergies  Past Medical History:   Diagnosis Date    Chronic kidney disease     Depression     Hematuria     had neg work up with Dr. Stafford    Hypercholesterolemia 03/01/2013    Nephrolithiasis 7/15/2024    Schizophrenia     Skin cancer of scalp     Syncope 04/06/2023    UTI (urinary tract infection), uncomplicated 11/11/2024     Past Surgical History:   Procedure Laterality Date    APPLICATION, GRAFT N/A 07/07/2023    Procedure: APPLICSTAPLER RELOAD TITANIUM 30MMATION, GRAFT, ANTERIOR SCALP;  Surgeon: Mario Montano MD;  Location: [facility] OR;  Service: Plastics;  Laterality: N/A;  application acellular human dermal allograft anterior scalp 2x4cm    EXCISION OF CARCINOMA N/A 07/07/2023    Procedure: EXCISION, CARCINOMA, ANTERIOR SCALP;  Surgeon: Mario Montano MD;  Location: [facility] OR;  Service: Plastics;  Laterality: N/A;  anterior scalp- melanoma----NEED ORDERS OFFICE  NOTIFIED    EXCISION-WIDE LOCAL N/A 05/08/2023    Procedure: EXCISION-WIDE LOCAL;  Surgeon: Rhys Lehman MD;  Location: [facility] OR;  Service: General;  Laterality: N/A;  RN PREOP 5/1/23---    HERNIA REPAIR      INCISION AND DRAINAGE, LOWER EXTREMITY Right 06/09/2023    Procedure: INCISION AND DRAINAGE, LOWER EXTREMITY- ANKLE;  Surgeon: Abby Lemus MD;  Location: [facility] OR;  Service: Orthopedics;  Laterality: Right;  Wound vac placement (medium)  RN PREOP 6/8/2023    PLACEMENT OF ACELLULAR HUMAN DERMAL ALLOGRAFT Right 07/07/2023    Procedure: APPLICATION, ACELLULAR HUMAN DERMAL ALLOGRAFT, FOOT;  Surgeon: Mario Montano MD;  Location: [facility] OR;  Service: Plastics;  Laterality: Right;  dermal graft to right dorsal foot wound 6x6 cm---PHONE PREOP DONE-7/6/23    RECONSTRUCTION USING FLAP N/A 05/08/2023    Procedure: RECONSTRUCTION USING FLAP WITH APPLICATION OF DERMAL GRAFT;  Surgeon: Mario Montano MD;  Location: [facility] OR;  Service: Plastics;  Laterality: N/A;  10 X 10 DERMAL GRAFT  NOTIFIED [facility] IN [facility] ON 5/4/2023 @ 10:48AM. PATIENT WILL BE INJECTED ON AM OF SURGERY PER DR. NEHA ZAVALA  PT NEEDS TO BE IN [facility] FOR 7AM ON AM OF SURGERY-LO    SKIN CANCER EXCISION       Family History   Problem Relation Name Age of Onset    Alzheimer's disease Mother      Heart disease Father Antonio Torres     Pulmonary embolism Father Antonio Torres     Hypertension Sister Caitlyn Manzo      Social History     Tobacco Use    Smoking status: Never     Passive exposure: Never    Smokeless tobacco: Never   Substance Use Topics    Alcohol use: No    Drug use: No     Review of Systems   Constitutional:  Negative for fever.   HENT:  Negative for sore throat.    Respiratory:  Negative for shortness of breath.    Cardiovascular:  Negative for chest pain.   Gastrointestinal:  Negative for nausea.   Genitourinary:  Negative for dysuria.   Musculoskeletal:  Negative for back pain.        (+) right elbow pain   Skin:   Negative for rash.   Neurological:  Positive for syncope. Negative for weakness.   Hematological:  Does not bruise/bleed easily.       Physical Exam     Initial Vitals [02/13/25 2133]   BP Pulse Resp Temp SpO2   93/60 (!) 120 18 98.1 °F (36.7 °C) 95 %      MAP       --         Physical Exam    Constitutional: Vital signs are normal. He appears well-developed and well-nourished.  Non-toxic appearance. He does not have a sickly appearance. He does not appear ill.   HENT:   Head: Normocephalic. Mouth/Throat: Mucous membranes are normal.   He has small abrasion to the bridge of his nose, upper lip swelling; no lacerations to the top or bottom lip   Eyes: EOM are normal.   Neck: Neck supple.   Normal range of motion.   Full passive range of motion without pain.     Cardiovascular:  Normal rate and regular rhythm.           Pulmonary/Chest: He has no wheezes. He has no rhonchi. He has no rales.   Abdominal: Abdomen is soft. Bowel sounds are normal. There is no abdominal tenderness.   Patient does have bruising across the left upper quadrant and epigastrium with a superficial abrasion in the epigastrium There is no rebound and no guarding.   Musculoskeletal:      Cervical back: Full passive range of motion without pain, normal range of motion and neck supple. No bony tenderness.      Thoracic back: No bony tenderness.      Lumbar back: No bony tenderness.     Neurological: He is alert. He has normal strength. No sensory deficit.   Skin: Skin is warm and dry. No rash noted.   Psychiatric: He has a normal mood and affect.         ED Course   Procedures  Labs Reviewed   CBC W/ AUTO DIFFERENTIAL - Abnormal       Result Value    WBC 25.36 (*)     RBC 5.36      Hemoglobin 17.0      Hematocrit 51.6      MCV 96      MCH 31.7 (*)     MCHC 32.9      RDW 13.9      Platelets 205      MPV 10.0      Immature Granulocytes 0.9 (*)     Gran # (ANC) 22.9 (*)     Immature Grans (Abs) 0.24 (*)     Lymph # 0.6 (*)     Mono # 1.6 (*)     Eos #  0.0      Baso # 0.07      nRBC 0      Gran % 90.3 (*)     Lymph % 2.2 (*)     Mono % 6.3      Eosinophil % 0.0      Basophil % 0.3      Differential Method Automated     COMPREHENSIVE METABOLIC PANEL - Abnormal    Sodium 140      Potassium 4.7      Chloride 105      CO2 17 (*)     Glucose 157 (*)     BUN 42 (*)     Creatinine 3.3 (*)     Calcium 9.9      Total Protein 7.9      Albumin 4.0      Total Bilirubin 1.1 (*)     Alkaline Phosphatase 86       (*)     ALT 73 (*)     eGFR 18 (*)     Anion Gap 18 (*)    TROPONIN I - Abnormal    Troponin I 0.525 (*)    LACTIC ACID, PLASMA - Abnormal    Lactate (Lactic Acid) 4.8 (*)     Narrative:     Lactic Acid critical result(s) called and verbal readback obtained   from Estefanía Benton @10:33Pm; 2/13/2025 by ERMA 02/13/2025 22:33   CK - Abnormal    CPK 90722 (*)    PHOSPHORUS - Abnormal    Phosphorus 1.7 (*)     Narrative:     Collection has been rescheduled by [facility] at 02/13/2025 22:50 Reason:   Patient unavailable   PROCALCITONIN - Abnormal    Procalcitonin 2.53 (*)     Narrative:     Collection has been rescheduled by [facility] at 02/13/2025 22:50 Reason:   Patient unavailable   SARS-COV-2 RDRP GENE - Abnormal    POC Rapid COVID Positive (*)      Acceptable Yes     POCT GLUCOSE - Abnormal    POCT Glucose 163 (*)    CULTURE, BLOOD    Narrative:     Collection has been rescheduled by [facility] at 02/13/2025 22:50 Reason:   Patient unavailable  Collection has been rescheduled by [facility] at 02/13/2025 22:50 Reason:   Patient unavailable   CULTURE, BLOOD    Narrative:     Collection has been rescheduled by [facility] at 02/13/2025 22:50 Reason:   Patient unavailable  Collection has been rescheduled by [facility] at 02/13/2025 22:50 Reason:   Patient unavailable   B-TYPE NATRIURETIC PEPTIDE    BNP 35     MAGNESIUM    Magnesium 2.5     URINALYSIS, REFLEX TO URINE CULTURE   POCT INFLUENZA A/B MOLECULAR    POC Molecular Influenza A Ag Negative      POC Molecular Influenza B Ag Negative        Acceptable Yes     POCT GLUCOSE MONITORING CONTINUOUS          Imaging Results               CT Chest Abdomen Pelvis Without Contrast (XPD) (Final result)  Result time 02/14/25 00:14:22      Final result by Smitha Flynn MD (02/14/25 00:14:22)                   Impression:      Infiltration in the anterior subcutaneous fat at the level of the xiphoid process.  Findings may represent soft tissue contusion.  Otherwise, no evidence of acute trauma on CT of the chest abdomen and pelvis with contrast.  Recommend clinical correlation to    2-3 mm left lower lobe pulmonary nodular density.  For a solid nodule <6 mm, Fleischner Society 2017 guidelines recommend no routine follow up for a low risk patient, or follow-up with non-contrast chest CT at 12 months in a high risk patient.    Moderate hiatal hernia.    Large of inguinal hernia containing the a loop of colon and fat.    Patulous rectum containing large fecal material.  Question fecal impaction.    Prostatomegaly.    Nonspecific mild circumferential urinary bladder wall thickening.  Findings may be due to obstructive uropathy, acute cystitis, lesions are and less likely neoplasia or adjacent inflammatory condition.    This report was flagged in Epic as abnormal.      Electronically signed by: Smitha Flynn  Date:    02/14/2025  Time:    00:14               Narrative:    EXAMINATION:  CT CHEST ABDOMEN PELVIS WITHOUT    CLINICAL HISTORY:  Fall with bruising across the abdomen.    TECHNIQUE:  5 mm unenhanced axial images were obtained from the lung apices through the greater trochanters.    COMPARISON:  09/05/2024    FINDINGS:  In the chest, there is no contusion or pneumothorax..  There is no pleural or pericardial effusion.  There is infiltration in the subcutaneous fat overlying the xiphoid process anteriorly.  There are remote right posterior 10th, 11th, and 12th rib fractures.  The aorta is intact. The heart size is within normal limits.  There is mild bibasilar atelectasis.  A 2-3 mm nodular density is seen in the left lower lobe (series 4 axial image 364).  Calcified granulomas are seen in the right lower lobe.  There is a moderate hiatal hernia.    In the abdomen, there is infiltration in the anterior subcutaneous fat at the level of the xiphoid process.  The liver, spleen, pancreas, kidneys, adrenal glands, and gallbladder are atraumatic. There is no pneumoperitoneum or free fluid detected.  Calcified granulomas are seen in the spleen.  Small left renal cortical cysts are present.  A small fat containing umbilical hernia is present.  There are additional midline ventral small fat containing hernias.  There is small low-attenuation adrenal lesions.  The possibility adenomas should be considered.    In the pelvis, a large left inguinal hernia is present containing a loop of colon and fat.  There is a small right fat containing inguinal hernia.  The rectum is patulous and distended with fecal material.  There is no free fluid.  Mild circumferential urinary bladder wall thickening is present.  The prostate gland is enlarged.  Consider correlation with PSA.  The appendix is not inflamed.  Multilevel degenerative changes are present, which is severe in the lumbar spine.  There is mild S-type scoliosis.                                       CT Head Without Contrast (Final result)  Result time 02/13/25 23:24:00      Final result by Piyush Florez MD (02/13/25 23:24:00)                   Impression:      Negative CT of the brain for hemorrhage mass infarction or fracture.    Nondisplaced right nasal bone fracture with no soft tissue swelling.  Correlate for pain.    Paranasal sinus disease mainly in the ethmoid and maxillary sinuses right greater than left.    Negative CT of the face for additional fracture or soft tissue swelling.    Negative cervical spine for fracture malalignment or hematoma.      Electronically signed by: Piyush  Vikki  Date:    02/13/2025  Time:    23:24               Narrative:    EXAMINATION:  CT HEAD WITHOUT CONTRAST; CT MAXILLOFACIAL WITHOUT CONTRAST; CT CERVICAL SPINE WITHOUT CONTRAST    CLINICAL HISTORY:  fall;    TECHNIQUE:  Low dose axial images were obtained through the head, maxillofacial region and cervical spine.  Coronal and sagittal reformations were also performed. Contrast was not administered.    COMPARISON:  CT brain, 04/07/2023.    FINDINGS:  BRAIN:    Brain parenchyma appears normal in attenuation stable since prior exam with mild involutional and chronic small vessel changes.  There is no new loss of gray-white matter junction differentiation, mass or pathologic fluid collection.    MAXILLOFACIAL REGION:    Bilateral maxillary sinus mucosal thickening right greater than left with scattered ethmoid air cell disease.  Sphenoid and frontal sinuses are spared.  The orbits and orbital contents appear unremarkable.  Nondisplaced nasal bone fracture on the right is present with no soft tissue swelling may be nonacute.  The remaining facial bones appear intact bilateral mandibular alveolar ridge delia are noted.    CERVICAL SPINE:    Cervical spondylosis mainly at C5-6 and C6-7.  No fracture or malalignment or subluxation.  No soft tissue swelling.  Lung apices clear.  Visceral spaces of the neck unremarkable.  Cervical cranial and cervicothoracic junction maintained.                                       CT Maxillofacial Without Contrast (Final result)  Result time 02/13/25 23:24:00      Final result by Piyush Florez MD (02/13/25 23:24:00)                   Impression:      Negative CT of the brain for hemorrhage mass infarction or fracture.    Nondisplaced right nasal bone fracture with no soft tissue swelling.  Correlate for pain.    Paranasal sinus disease mainly in the ethmoid and maxillary sinuses right greater than left.    Negative CT of the face for additional fracture or soft tissue  swelling.    Negative cervical spine for fracture malalignment or hematoma.      Electronically signed by: Piyush Florez  Date:    02/13/2025  Time:    23:24               Narrative:    EXAMINATION:  CT HEAD WITHOUT CONTRAST; CT MAXILLOFACIAL WITHOUT CONTRAST; CT CERVICAL SPINE WITHOUT CONTRAST    CLINICAL HISTORY:  fall;    TECHNIQUE:  Low dose axial images were obtained through the head, maxillofacial region and cervical spine.  Coronal and sagittal reformations were also performed. Contrast was not administered.    COMPARISON:  CT brain, 04/07/2023.    FINDINGS:  BRAIN:    Brain parenchyma appears normal in attenuation stable since prior exam with mild involutional and chronic small vessel changes.  There is no new loss of gray-white matter junction differentiation, mass or pathologic fluid collection.    MAXILLOFACIAL REGION:    Bilateral maxillary sinus mucosal thickening right greater than left with scattered ethmoid air cell disease.  Sphenoid and frontal sinuses are spared.  The orbits and orbital contents appear unremarkable.  Nondisplaced nasal bone fracture on the right is present with no soft tissue swelling may be nonacute.  The remaining facial bones appear intact bilateral mandibular alveolar ridge dleia are noted.    CERVICAL SPINE:    Cervical spondylosis mainly at C5-6 and C6-7.  No fracture or malalignment or subluxation.  No soft tissue swelling.  Lung apices clear.  Visceral spaces of the neck unremarkable.  Cervical cranial and cervicothoracic junction maintained.                                       CT Cervical Spine Without Contrast (Final result)  Result time 02/13/25 23:24:00      Final result by Piyush Florez MD (02/13/25 23:24:00)                   Impression:      Negative CT of the brain for hemorrhage mass infarction or fracture.    Nondisplaced right nasal bone fracture with no soft tissue swelling.  Correlate for pain.    Paranasal sinus disease mainly in the ethmoid and  maxillary sinuses right greater than left.    Negative CT of the face for additional fracture or soft tissue swelling.    Negative cervical spine for fracture malalignment or hematoma.      Electronically signed by: Piyush Florez  Date:    02/13/2025  Time:    23:24               Narrative:    EXAMINATION:  CT HEAD WITHOUT CONTRAST; CT MAXILLOFACIAL WITHOUT CONTRAST; CT CERVICAL SPINE WITHOUT CONTRAST    CLINICAL HISTORY:  fall;    TECHNIQUE:  Low dose axial images were obtained through the head, maxillofacial region and cervical spine.  Coronal and sagittal reformations were also performed. Contrast was not administered.    COMPARISON:  CT brain, 04/07/2023.    FINDINGS:  BRAIN:    Brain parenchyma appears normal in attenuation stable since prior exam with mild involutional and chronic small vessel changes.  There is no new loss of gray-white matter junction differentiation, mass or pathologic fluid collection.    MAXILLOFACIAL REGION:    Bilateral maxillary sinus mucosal thickening right greater than left with scattered ethmoid air cell disease.  Sphenoid and frontal sinuses are spared.  The orbits and orbital contents appear unremarkable.  Nondisplaced nasal bone fracture on the right is present with no soft tissue swelling may be nonacute.  The remaining facial bones appear intact bilateral mandibular alveolar ridge delia are noted.    CERVICAL SPINE:    Cervical spondylosis mainly at C5-6 and C6-7.  No fracture or malalignment or subluxation.  No soft tissue swelling.  Lung apices clear.  Visceral spaces of the neck unremarkable.  Cervical cranial and cervicothoracic junction maintained.                                       X-Ray Elbow Complete Right (Final result)  Result time 02/13/25 22:41:20      Final result by Smitha Flynn MD (02/13/25 22:41:20)                   Impression:      As above described.      Electronically signed by: Smitha Flynn  Date:    02/13/2025  Time:    22:41                Narrative:    EXAMINATION:  THREE VIEWS OF THE BOTH ELBOWS    CLINICAL HISTORY:  Pain.    TECHNIQUE:  AP, oblique, and lateral view of the bilateral elbows    COMPARISON:  None.    FINDINGS:  An IV is seen.  Three views of the right elbow demonstrate no definite acute fracture or dislocation.  An elbow effusion is present.  There is a olecranon spur.  Treating as a presumed fracture.    Three views of the left elbow demonstrate no acute fracture or dislocation.  There is a effusion.                                       X-Ray Elbow Complete Left (Final result)  Result time 02/13/25 22:41:20      Final result by Smitha Flynn MD (02/13/25 22:41:20)                   Impression:      As above described.      Electronically signed by: Smitha Flynn  Date:    02/13/2025  Time:    22:41               Narrative:    EXAMINATION:  THREE VIEWS OF THE BOTH ELBOWS    CLINICAL HISTORY:  Pain.    TECHNIQUE:  AP, oblique, and lateral view of the bilateral elbows    COMPARISON:  None.    FINDINGS:  An IV is seen.  Three views of the right elbow demonstrate no definite acute fracture or dislocation.  An elbow effusion is present.  There is a olecranon spur.  Treating as a presumed fracture.    Three views of the left elbow demonstrate no acute fracture or dislocation.  There is a effusion.                                       Medications   Tdap vaccine injection 0.5 mL (has no administration in time range)   piperacillin-tazobactam (ZOSYN) 4.5 g in D5W 100 mL IVPB (MB+) (0 g Intravenous Stopped 2/14/25 0002)   vancomycin 1,250 mg in 0.9% NaCl 250 mL IVPB (admixture device) (1,250 mg Intravenous New Bag 2/14/25 0023)   acetaminophen tablet 1,000 mg (1,000 mg Oral Given 2/13/25 2212)   lactated ringers bolus 1,000 mL (0 mLs Intravenous Stopped 2/13/25 2320)   lactated ringers bolus 1,000 mL (1,000 mLs Intravenous New Bag 2/13/25 2304)     Medical Decision Making  Amount and/or Complexity of Data Reviewed  Labs:  ordered.  Radiology: ordered.    Risk  OTC drugs.  Prescription drug management.  Decision regarding hospitalization.    /57, , 19, SpO2 96%, T 98.1°   Patient is awake alert and oriented  EKG shows sinus rhythm with a rate of 98, NM interval 130, QRS D of 82, ; no ST or T-wave abnormalities concerning for acute ischemia  Bedside pocus limited, unable to visualize IVC; I suspect he is dehydrated  Initiated 1 L of fluids   Updated tetanus and Given Tylenol   [place] of [address] , lactate of 4.5, WBC of 25, troponin 0.525 (appears to be improved from prior), creatinine 3.3, baseline 1.7  Blood cultures collected and patient is started on vanc and Zosyn  Possibly sepsis however I feel this is more likely rhabdomyolysis/dehydration  Echo 04/07/2023 showed an EF of 55%, normal left ventricle with normal diastolic function; normal right-sided size and function  Added an additional 1 L to complete a 30 mL/kg fluid resuscitation  CT head shows a nondisplaced right nasal bone fracture; otherwise no injury in the head, face, C-spine  CT c/a/p she has a contusion of the abdominal wall but no other acute intra-abdominal processes; questionable fecal impaction however patient notes he has been having regular bowel movements  Incidental pulmonary nodules noted, we will need follow up  X-ray of the right elbow does show potential spur that radiology presumes maybe an acute fracture; on exam I do not feel the patient has not acute fracture of his olecranon  He is COVID positive  On reassessment HR improved to 78, BP to 109/70, he is resting comfortably  Admitted to Hospital Medicine for further management of rhabdo, dehydration, sepsis r/o    Please put in 35 minutes of critical care due to patient having a high risk of circulatory failure, hypotension, rhabdo.   Separate from teaching and exclusive of procedure and ekg time  Includes:  Time at bedside  Time reviewing test results  Time discussing case with staff  Time  documenting the medical record  Time spent with family members  Time spent with consults  Management                                          Clinical Impression:  Final diagnoses:  [R00.0] Tachycardia  [M25.529] Elbow pain  [M62.82] Non-traumatic rhabdomyolysis (Primary)  [N17.9] BISI (acute kidney injury)          ED Disposition Condition    Admit                 Luis Fernando Dinero MD  02/14/25 0102       Luis Fernando Dinero MD  02/28/25 0131

## 2025-02-14 NOTE — PLAN OF CARE
Discharge Recommendations: Moderate intensity   Problem: Physical Therapy  Goal: Physical Therapy Goal  Description: Goals to be met by: 3/28/25     Patient will increase functional independence with mobility by performin. Bed to chair transfer with Contact Guard Assistance using Rolling Walker  2. Gait  x 150 feet with Minimal Assistance using Rolling Walker.     Outcome: Progressing

## 2025-02-15 DIAGNOSIS — F20.9 SCHIZOPHRENIA, UNSPECIFIED TYPE: ICD-10-CM

## 2025-02-15 LAB
ALBUMIN SERPL BCP-MCNC: 2.6 G/DL (ref 3.5–5.2)
ALP SERPL-CCNC: 64 U/L (ref 40–150)
ALT SERPL W/O P-5'-P-CCNC: 257 U/L (ref 10–44)
ANION GAP SERPL CALC-SCNC: 13 MMOL/L (ref 8–16)
AST SERPL-CCNC: 875 U/L (ref 10–40)
BASOPHILS # BLD AUTO: 0.06 K/UL (ref 0–0.2)
BASOPHILS NFR BLD: 0.5 % (ref 0–1.9)
BILIRUB SERPL-MCNC: 0.4 MG/DL (ref 0.1–1)
BUN SERPL-MCNC: 67 MG/DL (ref 8–23)
CALCIUM SERPL-MCNC: 8.6 MG/DL (ref 8.7–10.5)
CHLORIDE SERPL-SCNC: 109 MMOL/L (ref 95–110)
CK SERPL-CCNC: ABNORMAL U/L (ref 20–200)
CO2 SERPL-SCNC: 17 MMOL/L (ref 23–29)
CREAT SERPL-MCNC: 5.8 MG/DL (ref 0.5–1.4)
DIFFERENTIAL METHOD BLD: ABNORMAL
EOSINOPHIL # BLD AUTO: 0.1 K/UL (ref 0–0.5)
EOSINOPHIL NFR BLD: 1 % (ref 0–8)
ERYTHROCYTE [DISTWIDTH] IN BLOOD BY AUTOMATED COUNT: 14.5 % (ref 11.5–14.5)
EST. GFR  (NO RACE VARIABLE): 9 ML/MIN/1.73 M^2
GLUCOSE SERPL-MCNC: 76 MG/DL (ref 70–110)
HCT VFR BLD AUTO: 41.2 % (ref 40–54)
HGB BLD-MCNC: 13.2 G/DL (ref 14–18)
IMM GRANULOCYTES # BLD AUTO: 0.05 K/UL (ref 0–0.04)
IMM GRANULOCYTES NFR BLD AUTO: 0.4 % (ref 0–0.5)
LYMPHOCYTES # BLD AUTO: 0.8 K/UL (ref 1–4.8)
LYMPHOCYTES NFR BLD: 6.3 % (ref 18–48)
MCH RBC QN AUTO: 31.1 PG (ref 27–31)
MCHC RBC AUTO-ENTMCNC: 32 G/DL (ref 32–36)
MCV RBC AUTO: 97 FL (ref 82–98)
MONOCYTES # BLD AUTO: 1 K/UL (ref 0.3–1)
MONOCYTES NFR BLD: 7.7 % (ref 4–15)
NEUTROPHILS # BLD AUTO: 10.4 K/UL (ref 1.8–7.7)
NEUTROPHILS NFR BLD: 84.1 % (ref 38–73)
NRBC BLD-RTO: 0 /100 WBC
PHOSPHATE SERPL-MCNC: 4.5 MG/DL (ref 2.7–4.5)
PLATELET # BLD AUTO: 170 K/UL (ref 150–450)
PMV BLD AUTO: 10.8 FL (ref 9.2–12.9)
POTASSIUM SERPL-SCNC: 5 MMOL/L (ref 3.5–5.1)
PROT SERPL-MCNC: 5.3 G/DL (ref 6–8.4)
RBC # BLD AUTO: 4.24 M/UL (ref 4.6–6.2)
SODIUM SERPL-SCNC: 139 MMOL/L (ref 136–145)
WBC # BLD AUTO: 12.3 K/UL (ref 3.9–12.7)

## 2025-02-15 PROCEDURE — 80053 COMPREHEN METABOLIC PANEL: CPT | Performed by: INTERNAL MEDICINE

## 2025-02-15 PROCEDURE — 63600175 PHARM REV CODE 636 W HCPCS: Performed by: INTERNAL MEDICINE

## 2025-02-15 PROCEDURE — 27000207 HC ISOLATION

## 2025-02-15 PROCEDURE — 97166 OT EVAL MOD COMPLEX 45 MIN: CPT

## 2025-02-15 PROCEDURE — 63600175 PHARM REV CODE 636 W HCPCS: Performed by: STUDENT IN AN ORGANIZED HEALTH CARE EDUCATION/TRAINING PROGRAM

## 2025-02-15 PROCEDURE — 84100 ASSAY OF PHOSPHORUS: CPT | Performed by: INTERNAL MEDICINE

## 2025-02-15 PROCEDURE — 97535 SELF CARE MNGMENT TRAINING: CPT

## 2025-02-15 PROCEDURE — 21400001 HC TELEMETRY ROOM

## 2025-02-15 PROCEDURE — 94799 UNLISTED PULMONARY SVC/PX: CPT

## 2025-02-15 PROCEDURE — 99900035 HC TECH TIME PER 15 MIN (STAT)

## 2025-02-15 PROCEDURE — 82550 ASSAY OF CK (CPK): CPT | Performed by: INTERNAL MEDICINE

## 2025-02-15 PROCEDURE — 36415 COLL VENOUS BLD VENIPUNCTURE: CPT | Performed by: INTERNAL MEDICINE

## 2025-02-15 PROCEDURE — 94761 N-INVAS EAR/PLS OXIMETRY MLT: CPT

## 2025-02-15 PROCEDURE — 94640 AIRWAY INHALATION TREATMENT: CPT

## 2025-02-15 PROCEDURE — 97530 THERAPEUTIC ACTIVITIES: CPT

## 2025-02-15 PROCEDURE — 25000242 PHARM REV CODE 250 ALT 637 W/ HCPCS: Performed by: STUDENT IN AN ORGANIZED HEALTH CARE EDUCATION/TRAINING PROGRAM

## 2025-02-15 PROCEDURE — 25000003 PHARM REV CODE 250: Performed by: STUDENT IN AN ORGANIZED HEALTH CARE EDUCATION/TRAINING PROGRAM

## 2025-02-15 PROCEDURE — 85025 COMPLETE CBC W/AUTO DIFF WBC: CPT | Performed by: INTERNAL MEDICINE

## 2025-02-15 RX ORDER — SODIUM CHLORIDE, SODIUM LACTATE, POTASSIUM CHLORIDE, CALCIUM CHLORIDE 600; 310; 30; 20 MG/100ML; MG/100ML; MG/100ML; MG/100ML
INJECTION, SOLUTION INTRAVENOUS CONTINUOUS
Status: DISCONTINUED | OUTPATIENT
Start: 2025-02-15 | End: 2025-02-19

## 2025-02-15 RX ORDER — IPRATROPIUM BROMIDE AND ALBUTEROL SULFATE 2.5; .5 MG/3ML; MG/3ML
3 SOLUTION RESPIRATORY (INHALATION) EVERY 4 HOURS PRN
Status: DISCONTINUED | OUTPATIENT
Start: 2025-02-15 | End: 2025-02-27 | Stop reason: HOSPADM

## 2025-02-15 RX ORDER — FUROSEMIDE 10 MG/ML
40 INJECTION INTRAMUSCULAR; INTRAVENOUS ONCE
Status: COMPLETED | OUTPATIENT
Start: 2025-02-15 | End: 2025-02-15

## 2025-02-15 RX ORDER — CETIRIZINE HYDROCHLORIDE 5 MG/1
5 TABLET ORAL
Status: DISCONTINUED | OUTPATIENT
Start: 2025-02-17 | End: 2025-02-24

## 2025-02-15 RX ADMIN — REMDESIVIR 100 MG: 100 INJECTION, POWDER, LYOPHILIZED, FOR SOLUTION INTRAVENOUS at 10:02

## 2025-02-15 RX ADMIN — SODIUM CHLORIDE, POTASSIUM CHLORIDE, SODIUM LACTATE AND CALCIUM CHLORIDE: 600; 310; 30; 20 INJECTION, SOLUTION INTRAVENOUS at 12:02

## 2025-02-15 RX ADMIN — SODIUM CHLORIDE, POTASSIUM CHLORIDE, SODIUM LACTATE AND CALCIUM CHLORIDE: 600; 310; 30; 20 INJECTION, SOLUTION INTRAVENOUS at 10:02

## 2025-02-15 RX ADMIN — HEPARIN SODIUM 5000 UNITS: 5000 INJECTION INTRAVENOUS; SUBCUTANEOUS at 01:02

## 2025-02-15 RX ADMIN — CETIRIZINE HYDROCHLORIDE 10 MG: 10 TABLET, FILM COATED ORAL at 10:02

## 2025-02-15 RX ADMIN — SODIUM CHLORIDE, POTASSIUM CHLORIDE, SODIUM LACTATE AND CALCIUM CHLORIDE: 600; 310; 30; 20 INJECTION, SOLUTION INTRAVENOUS at 05:02

## 2025-02-15 RX ADMIN — FUROSEMIDE 40 MG: 10 INJECTION, SOLUTION INTRAMUSCULAR; INTRAVENOUS at 01:02

## 2025-02-15 RX ADMIN — ARIPIPRAZOLE 20 MG: 10 TABLET ORAL at 10:02

## 2025-02-15 RX ADMIN — HEPARIN SODIUM 5000 UNITS: 5000 INJECTION INTRAVENOUS; SUBCUTANEOUS at 05:02

## 2025-02-15 RX ADMIN — HEPARIN SODIUM 5000 UNITS: 5000 INJECTION INTRAVENOUS; SUBCUTANEOUS at 10:02

## 2025-02-15 RX ADMIN — IPRATROPIUM BROMIDE AND ALBUTEROL SULFATE 3 ML: 2.5; .5 SOLUTION RESPIRATORY (INHALATION) at 05:02

## 2025-02-15 RX ADMIN — THERA TABS 1 TABLET: TAB at 10:02

## 2025-02-15 NOTE — NURSING
[facility] [place]  Nurses Note -- 4 Eyes      2/15/2025       Skin assessed on: Q Shift      [x] No Pressure Injuries Present    [x]Prevention Measures Documented    [] Yes LDA  for Pressure Injury Previously documented     [] Yes New Pressure Injury Discovered   [] LDA for New Pressure Injury Added      Attending RN:  Jasmin Villasenor LPN     Second RN:  RICO Hayward

## 2025-02-15 NOTE — PROGRESS NOTES
Chart reviewed.  No events overnight. Only 100cc of UOP documented.  CPK remains > 40,000. Cr worse; LFTs worse.  Increasing LR to 250cc/hr.  Lasix 40mg IV x1.  Goal -300cc/hr.   Strict I/Os.   Okay to repeat lasix dose PRN for edema/SOB.       Thank you for allowing me to participate in the care of this patient. Please call with any questions.     Dariela Terrazas MD  [facility]  [phone]

## 2025-02-15 NOTE — PLAN OF CARE
Problem: Occupational Therapy  Goal: Occupational Therapy Goal  Description: Goals to be met by: 2/22/25     Patient will increase functional independence with ADLs by performing:    UE Dressing with [place].  LE Dressing with Modified [place].  Grooming while standing at sink with Modified [place].  Toileting from toilet with Modified [place] for hygiene and clothing management.   Sitting in chair x60  minutes with Supervision.  Toilet transfer to toilet with Supervision.  Upper extremity exercise program x10-15  reps per handout, with supervision for SOB.    2/15/2025 1646 by Katie Castellano, OT  Outcome: Progressing   Patient recommended for moderate intensity level therapy and DME TBD. Occupational therapy to see 4x/week.

## 2025-02-15 NOTE — HOSPITAL COURSE
Mr. Torres is a 81 yo M admitted after a fall and found to have acute on chronic kidney injury and rhabdomyolysis. CK >20,000 on admit and rising to >40,000. Renal function worsening. On IVF. Also found to be COVID positive. Started on remdesivir. Nephrology consulted. CK improving, though Cr still rising. Almanza is placed for accurate I&D monitoring. Patient went into rapid Afib.  Moved to ICU and started on Amio drip.  Quickly converted back to NSR.  Creat continued to increase and Nephrology recommending HD.  Trialysis catheter placed and patient started on HD.  Amio switched to PO and patient started on Eliquis.  Patient went back into rapid afib/aflutter. Started on Cardizem drip.  Amiodarone stopped because of elevated LFT's and patient started on B blocker.  Seems will be ESRD,nephrology is on case.

## 2025-02-15 NOTE — NURSING
Report received from the off going nurse.  Pt noted lying down in bed. Noted mouth to upper lip swollen and bruises or discoloration noted to the forearm and to the face.  Pt stated that he had fallen at home and that was the reason he came to the ED. Pt admits that he was down on the floor for 1.5 hours.  Pt able to RITTER. Pt uses the urinal.  Ivf of LR infusing at 200cc/hr in the upper rt arm.  No evidence of infiltration or problem noted . Saline lock noted with small amount of old dried blood drainage noted on op site.  Pt is kept on contact isolations. Will continue to monitor.

## 2025-02-15 NOTE — SUBJECTIVE & OBJECTIVE
Interval History: no acute issues, he reports some urination but not much. Still with bruising to face, upper body. Denies any issues breathing.    Review of Systems  Objective:     Vital Signs (Most Recent):  Temp: 98.3 °F (36.8 °C) (02/15/25 0815)  Pulse: 83 (02/15/25 0815)  Resp: 18 (02/15/25 0815)  BP: 123/70 (02/15/25 0815)  SpO2: 99 % (02/15/25 0815) Vital Signs (24h Range):  Temp:  [97.8 °F (36.6 °C)-98.6 °F (37 °C)] 98.3 °F (36.8 °C)  Pulse:  [59-83] 83  Resp:  [14-28] 18  SpO2:  [95 %-100 %] 99 %  BP: ()/(50-70) 123/70     Weight: 68.9 kg (151 lb 14.4 oz)  Body mass index is 21.79 kg/m².    Intake/Output Summary (Last 24 hours) at 2/15/2025 1125  Last data filed at 2/15/2025 0933  Gross per 24 hour   Intake 240 ml   Output 100 ml   Net 140 ml         Physical Exam  Vitals and nursing note reviewed.   Constitutional:       General: He is awake. He is not in acute distress.     Appearance: Normal appearance. He is well-developed.   HENT:      Head: Normocephalic and atraumatic.      Nose: Nose normal.      Mouth/Throat:      Mouth: Mucous membranes are moist.   Eyes:      Extraocular Movements: Extraocular movements intact.      Conjunctiva/sclera: Conjunctivae normal.   Cardiovascular:      Rate and Rhythm: Normal rate and regular rhythm.   Pulmonary:      Effort: Pulmonary effort is normal.      Breath sounds: Normal breath sounds.   Abdominal:      General: There is no distension.      Palpations: Abdomen is soft.   Musculoskeletal:         General: No tenderness or signs of injury.      Right lower leg: No edema.      Left lower leg: No edema.   Skin:     General: Skin is warm and dry.      Findings: Bruising present. No erythema or rash.   Neurological:      General: No focal deficit present.      Mental Status: He is alert. Mental status is at baseline.   Psychiatric:         Mood and Affect: Mood normal.         Behavior: Behavior normal.             Significant Labs: All pertinent labs within  the past 24 hours have been reviewed.    Significant Imaging: I have reviewed all pertinent imaging results/findings within the past 24 hours.

## 2025-02-15 NOTE — PT/OT/SLP EVAL
Occupational Therapy Evaluation     Name: Antonio Torres Jr.  MRN: [id number]  Admitting Diagnosis: Non-traumatic rhabdomyolysis  Recent Surgery: * No surgery found *      Recommendations:     Discharge Recommendations: Moderate Intensity Therapy  Level of Assistance Recommended: 24 hours light assistance and 24 hours supervision  Discharge Equipment Recommendations: to be determined by next level of care  Barriers to discharge: Other (Comment) (not at PLOF with multiple fractures and high fall risk)    Assessment:     Antonio Torres Jr. is a 80 y.o. male with a medical diagnosis of Non-traumatic rhabdomyolysis. He presents with performance deficits affecting function including weakness, impaired endurance, impaired self care skills, impaired functional mobility, impaired balance, gait instability, decreased safety awareness, impaired cardiopulmonary response to activity. Patient is at a safety risk due to unsteadiness with gait and previous falls resulting in nasal fracture. He is SOB with exertion, but stayed in upper 90s throughout session.     Rehab Prognosis: Good; patient would benefit from acute OT services to address these deficits and reach maximum level of function.    Plan:     Patient to be seen 4 x/week to address the above listed problems via self-care/home management, therapeutic activities, therapeutic exercises  Plan of Care Expires: 03/01/25  Plan of Care Reviewed with: patient    Subjective     Chief Complaint: none   Patient Comments/Goals: patient said his sister provides meals, but he also makes sandwiches and cereal for himself   Pain/Comfort:  Pain Rating 1: 0/10    Patients cultural, spiritual, Anabaptism conflicts given the current situation: no    Social History:  Living Environment: Patient lives alone in  MIL suite behind sister's home  with  shower with no chair   Prior Level of Function: Prior to admission, patient was modified independent; he said he does home HEP using 6# weights  with both arms   Roles and Routines: Patient was not driving and not working and sister providing transportation and meals prior to admission.  Equipment Used at Home: cane, straight, walker, standard  DME owned (not currently used): none  Assistance Upon Discharge: family    Objective:     Communicated with RN, Corinne,  prior to session. Patient found HOB elevated with peripheral IV, telemetry upon OT entry to room.    General Precautions: Standard, fall, droplet, airborne, contact   Orthopedic Precautions: N/A   Braces: N/A    Respiratory Status: Room air    Occupational Performance    Gait belt applied - Yes    Bed Mobility:   Rolling/Turning to Right with stand by assistance  Scooting to HOB in supine: stand by assistance  Scooting anteriorly to EOB to have both feet planted on floor: stand by assistance  Supine to sit from right side of bed with stand by assistance  Sit to Supine with stand by assistance on right  side of bed    Functional Mobility/Transfers:  Sit <> Stand Transfer with supervision with rolling walker  Bed <> Chair Transfer using Step Transfer technique with supervision with rolling walker  Functional Mobility: Patient walked ~20' from bed to sink and then returned to bed with RW with supervision.     Activities of Daily Living:  Grooming: supervision to brush teeth, wash face, and wash arm with blood while standing at sink   Upper Body Dressing: set up assistance to change gowns   Lower Body Dressing: stand by assistance to don socks     Cognitive/Visual Perceptual:  Cognitive/Psychosocial Skills:    -     Oriented to: Person, Place, Time, Situation  -     Follows Commands/attention: Follows multistep  commands  -     Communication: clear/fluent  -     Memory: No Deficits noted  -     Safety awareness/insight to disability: impaired  -     Mood/Affect/Coping skills/emotional control: Appropriate to situation  Visual/Perceptual:    -     Intact    Physical Exam:  Balance:    -     Sitting:  modified independence  -     Standing: supervision  Postural examination/scapula alignment:    -       Rounded shoulders  -       Forward head  Skin integrity: Visible skin intact  Edema:  None noted  Sensation:    -       Intact  Motor Planning: Intact  Dominant hand: Right  Upper Extremity Range of Motion:     -       Right Upper Extremity: WNL  -       Left Upper Extremity: WNL  Upper Extremity Strength: -       Right Upper Extremity: WNL  -       Left Upper Extremity: WNL   Strength:    -       Right Upper Extremity: WNL  -       Left Upper Extremity: WNL  Fine Motor Coordination:    -       Intact  Gross motor coordination:   WFL    AMPA 6 Click ADL:  AMPAC Total Score: 23    Treatment & Education:  Patient educated on role of OT, POC, and goals for therapy  Patient educated on importance of OOB activities with staff member assistance and sitting OOB majority of the day  Occupational therapy educated patient re: possible need for shower chair due to falls and weakness.       Patient clear to ambulate to/from bathroom with RN/PCT, assist x1  .    Patient left HOB elevated with all lines intact, call button in reach, and RN notified.    GOALS:   Multidisciplinary Problems       Occupational Therapy Goals          Problem: Occupational Therapy    Goal Priority Disciplines Outcome Interventions   Occupational Therapy Goal     OT, PT/OT Progressing    Description: Goals to be met by: 2/22/25     Patient will increase functional independence with ADLs by performing:    UE Dressing with [place].  LE Dressing with Modified [place].  Grooming while standing at sink with Modified [place].  Toileting from toilet with Modified [place] for hygiene and clothing management.   Sitting in chair x60  minutes with Supervision.  Toilet transfer to toilet with Supervision.  Upper extremity exercise program x10-15  reps per handout, with supervision for SOB.                         DME Justifications:  No  DME recommended requiring DME justifications    History:     Past Medical History:   Diagnosis Date    Chronic kidney disease     Depression     Hematuria     had neg work up with Dr. Stafford    Hypercholesterolemia 03/01/2013    Nephrolithiasis 7/15/2024    Schizophrenia     Skin cancer of scalp     Syncope 04/06/2023    UTI (urinary tract infection), uncomplicated 11/11/2024         Past Surgical History:   Procedure Laterality Date    APPLICATION, GRAFT N/A 07/07/2023    Procedure: APPLICSTAPLER RELOAD TITANIUM 30MMATION, GRAFT, ANTERIOR SCALP;  Surgeon: Mario Montano MD;  Location: [facility] OR;  Service: Plastics;  Laterality: N/A;  application acellular human dermal allograft anterior scalp 2x4cm    EXCISION OF CARCINOMA N/A 07/07/2023    Procedure: EXCISION, CARCINOMA, ANTERIOR SCALP;  Surgeon: Mario Montano MD;  Location: [facility] OR;  Service: Plastics;  Laterality: N/A;  anterior scalp- melanoma----NEED ORDERS OFFICE NOTIFIED    EXCISION-WIDE LOCAL N/A 05/08/2023    Procedure: EXCISION-WIDE LOCAL;  Surgeon: Rhys Lehman MD;  Location: [facility] OR;  Service: General;  Laterality: N/A;  RN PREOP 5/1/23---    HERNIA REPAIR      INCISION AND DRAINAGE, LOWER EXTREMITY Right 06/09/2023    Procedure: INCISION AND DRAINAGE, LOWER EXTREMITY- ANKLE;  Surgeon: Abby Lemus MD;  Location: [facility] OR;  Service: Orthopedics;  Laterality: Right;  Wound vac placement (medium)  RN PREOP 6/8/2023    PLACEMENT OF ACELLULAR HUMAN DERMAL ALLOGRAFT Right 07/07/2023    Procedure: APPLICATION, ACELLULAR HUMAN DERMAL ALLOGRAFT, FOOT;  Surgeon: Mario Montano MD;  Location: [facility] OR;  Service: Plastics;  Laterality: Right;  dermal graft to right dorsal foot wound 6x6 cm---PHONE PREOP DONE-7/6/23    RECONSTRUCTION USING FLAP N/A 05/08/2023    Procedure: RECONSTRUCTION USING FLAP WITH APPLICATION OF DERMAL GRAFT;  Surgeon: Mario Montano MD;  Location: [facility] OR;  Service: Plastics;  Laterality: N/A;  10 X 10 DERMAL GRAFT  NOTIFIED  BOBBY IN NUCLEAR MED ON 5/4/2023 @ 10:48AM. PATIENT WILL BE INJECTED ON AM OF SURGERY PER DR. SOLOMON -R[facility]  PT NEEDS TO BE IN [employer] MED FOR 7AM ON AM OF SURGERY-RENE    SKIN CANCER EXCISION         Time Tracking:     OT Date of Treatment: 02/15/25  OT Start Time: 1500  OT Stop Time: 1545  OT Total Time (min): 45 min    Billable Minutes: Evaluation 15 , Self Care/Home Management 15 , and Therapeutic Activity 15     2/15/2025

## 2025-02-15 NOTE — NURSING
LR @ 250mL/hr continuous fluids started. PT worked with patient. Patient tolerated well. OT worked with patient. Patient tolerated well. Breathing treatments every 4 hours ordered PRN. Chest x-ray ordered. Patient AAOx4. Vital signs stable. Patient did not complain of any pain throughout shift. Patient not showing signs of distress. Bed in lowest position, wheels locked, call bell in reach, side rails up x2.

## 2025-02-15 NOTE — ASSESSMENT & PLAN NOTE
Elevated CPK to 60616 in the setting of a prolonged fall   - CK trending to >40k  - with renal failure -- Nephrolgoy consulted  - continue with aggressive hydration  - no evidence of compartment syndrome

## 2025-02-15 NOTE — TELEPHONE ENCOUNTER
No care due was identified.  [facility] Embedded Care Due Messages. Reference number: [id number].   2/15/2025 8:01:08 AM CST   [facility]

## 2025-02-15 NOTE — ASSESSMENT & PLAN NOTE
BISI is likely due to acute tubular necrosis caused by rhabdomyolysis . Baseline creatinine is  1.9 . Most recent creatinine and eGFR are listed below.  Recent Labs     02/13/25  2208 02/14/25  0435 02/15/25  0451   CREATININE 3.3* 3.5* 5.8*   EGFRNORACEVR 18* 17* 9*        Plan  - BISI is worsening. Will continue current treatment  - Avoid nephrotoxins and renally dose meds for GFR listed above  - Monitor urine output, serial BMP, and adjust therapy as needed  - IVF  Appreciate Nephrology

## 2025-02-15 NOTE — ASSESSMENT & PLAN NOTE
Recent Labs   Lab 02/14/25  0435   TROPONINI 0.837*         Chronically elevated  No chest pain    Continue to monitor

## 2025-02-15 NOTE — PROGRESS NOTES
[facility] Medicine  Progress Note    Patient Name: Antonio Torres Jr.  MRN: [id number]  Patient Class: IP- Inpatient   Admission Date: 2/13/2025  Length of Stay: 1 days  Attending Physician: Emigdio Warner MD  Primary Care Provider: Fabio Lennon MD        Subjective     Principal Problem:Non-traumatic rhabdomyolysis        HPI:  This is an 80-year-old male with a past medical history of schizophrenia, chronic blepharitis, hyperlipidemia, CKD 3, BPH, who presents with a fall.    Patient presents for evaluation after a fall and a sycopal episode that occurred prior to presentation. He reports losing consciousness and falling face forward on the floor sustaining an injury to his head, elbows, abdomen and knees. He was unable to stand up and was on the floor for about 1.5 hours. Patient reports having a coughing episode a day prior but denies sick contacts. He denied chest pain.     In the ED, the patient was tachycardic (120s > 60s), but otherwise hemodynamically stable.  Labs were remarkable for leukocytosis (25.3), elevated CPK (23,406), elevated creatinine (3.3-baseline around 1.9), elevated troponin (0.525-chronically elevated), elevated lactic acid (4.8 > 2.1), elevated procalcitonin (2.53), elevated LFTs (T bili: 1.1, AST: 267, ALT: 73), hypophosphatemia (1.7), positive COVID-19.  CT head, maxillofacial, cervical spine showed a nondisplaced right nasal bone fracture with no soft tissue swelling, and paranasal sinus disease.  CT chest, abdomen and pelvis showed possible soft tissue contusion at the level of the xiphoid process, a 2-3 mm left lower lobe pulmonary nodule and nonspecific urinary bladder wall thickening.  Right elbow x-ray demonstrated an elbow effusion with an olecranon spur.  Patient was given 2 L of LR, vancomycin, Zosyn, Tylenol 1 g p.o..  He was admitted for further management.    Overview/Hospital Course:  Mr. Torres is a 79 yo M admitted after a fall and found to have  acute on chronic kidney injury and rhabdomyolysis. CK >20,000 on admit and rising to >40,000. Renal function worsening. On IVF. Also found to be COVID positive. Started on remdesivir. Nephrology consulted.    Interval History: no acute issues, he reports some urination but not much. Still with bruising to face, upper body. Denies any issues breathing.    Review of Systems  Objective:     Vital Signs (Most Recent):  Temp: 98.3 °F (36.8 °C) (02/15/25 0815)  Pulse: 83 (02/15/25 0815)  Resp: 18 (02/15/25 0815)  BP: 123/70 (02/15/25 0815)  SpO2: 99 % (02/15/25 0815) Vital Signs (24h Range):  Temp:  [97.8 °F (36.6 °C)-98.6 °F (37 °C)] 98.3 °F (36.8 °C)  Pulse:  [59-83] 83  Resp:  [14-28] 18  SpO2:  [95 %-100 %] 99 %  BP: ()/(50-70) 123/70     Weight: 68.9 kg (151 lb 14.4 oz)  Body mass index is 21.79 kg/m².    Intake/Output Summary (Last 24 hours) at 2/15/2025 1125  Last data filed at 2/15/2025 0933  Gross per 24 hour   Intake 240 ml   Output 100 ml   Net 140 ml         Physical Exam  Vitals and nursing note reviewed.   Constitutional:       General: He is awake. He is not in acute distress.     Appearance: Normal appearance. He is well-developed.   HENT:      Head: Normocephalic and atraumatic.      Nose: Nose normal.      Mouth/Throat:      Mouth: Mucous membranes are moist.   Eyes:      Extraocular Movements: Extraocular movements intact.      Conjunctiva/sclera: Conjunctivae normal.   Cardiovascular:      Rate and Rhythm: Normal rate and regular rhythm.   Pulmonary:      Effort: Pulmonary effort is normal.      Breath sounds: Normal breath sounds.   Abdominal:      General: There is no distension.      Palpations: Abdomen is soft.   Musculoskeletal:         General: No tenderness or signs of injury.      Right lower leg: No edema.      Left lower leg: No edema.   Skin:     General: Skin is warm and dry.      Findings: Bruising present. No erythema or rash.   Neurological:      General: No focal deficit present.       Mental Status: He is alert. Mental status is at baseline.   Psychiatric:         Mood and Affect: Mood normal.         Behavior: Behavior normal.             Significant Labs: All pertinent labs within the past 24 hours have been reviewed.    Significant Imaging: I have reviewed all pertinent imaging results/findings within the past 24 hours.    Assessment and Plan     * Non-traumatic rhabdomyolysis  Elevated CPK to 63953 in the setting of a prolonged fall   - CK trending to >40k  - with renal failure -- Nephrolgoy consulted  - continue with aggressive hydration  - no evidence of compartment syndrome    Elevated troponin  Recent Labs   Lab 02/14/25  0435   TROPONINI 0.837*         Chronically elevated  No chest pain    Continue to monitor     Leukocytosis  Likely reactive   Imaging unremarkable for infection   Hold antibiotics for now   - now resolved    COVID-19  No hypoxia, prophylactic remdesivir     BISI (acute kidney injury)  BISI is likely due to acute tubular necrosis caused by rhabdomyolysis . Baseline creatinine is  1.9 . Most recent creatinine and eGFR are listed below.  Recent Labs     02/13/25  2208 02/14/25  0435 02/15/25  0451   CREATININE 3.3* 3.5* 5.8*   EGFRNORACEVR 18* 17* 9*        Plan  - BISI is worsening. Will continue current treatment  - Avoid nephrotoxins and renally dose meds for GFR listed above  - Monitor urine output, serial BMP, and adjust therapy as needed  - IVF  Appreciate Nephrology    Elevated LFTs  Likely in the setting of rhabdomyolysis   Continue to monitor       Schizophrenia  Continue Abilify         VTE Risk Mitigation (From admission, onward)           Ordered     heparin (porcine) injection 5,000 Units  Every 8 hours         02/14/25 0222     IP VTE HIGH RISK PATIENT  Once         02/14/25 0222     Place sequential compression device  Until discontinued         02/14/25 0222                    Discharge Planning   JULIANA:      Code Status: Full Code   Medical Readiness for  Discharge Date:                    Please place Justification for DME        Emigdio Warner MD  Department of Hospital Medicine   [facility]

## 2025-02-15 NOTE — NURSING
[facility]  Nurses Note -- 4 Eyes      2/15/2025       Skin assessed on: Q Shift      [x] No Pressure Injuries Present    []Prevention Measures Documented    [] Yes LDA  for Pressure Injury Previously documented     [] Yes New Pressure Injury Discovered   [] LDA for New Pressure Injury Added      Attending RN:  Yesy Reinoso RN     Second RN:  LILIA Castellanos

## 2025-02-15 NOTE — PLAN OF CARE
Case Management Assessment     PCP: Dr. Lennon   Pharmacy: [facility]    Patient Arrived From: Home  Existing Help at Home: Lives alone in garage apt. Behind his Sisters house     Barriers to Discharge: Mental Illness; Mobility     Discharge Plan:    A. SNF >New NH placement    B. Nursing Home Placement     I provided the patient a choice of post acute providers and offered a list of CMS rated snf Nursing Home.   Patient/family chose the following as their preferred providers:  [facility]   [facility]. [facility]  [facility]. [facility]    Pt lives alone in garage apt. Behind his sisters house. His sister, Caitlyn is the primary caregiver who expressed desire for half-way care upon discharge. HME:cane, walker yet pt. Refuses to utilize HME to prevent falls per sister.    02/15/25 1214   Discharge Assessment   Assessment Type Discharge Planning Assessment   Confirmed/corrected address, phone number and insurance Yes   Confirmed Demographics Correct on Facesheet   Source of Information family   Reason For Admission Rhabdomyolysis   People in Home alone   Facility Arrived From: Home   Do you expect to return to your current living situation? Yes   Do you have help at home or someone to help you manage your care at home? Yes   Who are your caregiver(s) and their phone number(s)? Sister Caitlyn [phone]   Prior to hospitilization cognitive status: Alert/Oriented   Current cognitive status: Alert/Oriented   Walking or Climbing Stairs Difficulty yes   Walking or Climbing Stairs ambulation difficulty, assistance 1 person   Mobility Management Cane, Walker- sister reports pt refuses to use HME   Dressing/Bathing Difficulty no   Home Layout Able to live on 1st floor   Equipment Currently Used at Home cane, straight;walker, standard   Readmission within 30 days? No   Patient currently being followed by outpatient case management? No   Do you currently have service(s) that help you manage your care at home? No   Do you take prescription  medications? Yes   Do you have prescription coverage? Yes   Do you have any problems affording any of your prescribed medications? No   Is the patient taking medications as prescribed? yes   Who is going to help you get home at discharge? Sister- Expressed goal for residential care upon discharge   How do you get to doctors appointments? family or friend will provide   Are you on dialysis? No   Do you take coumadin? No   Discharge Plan A Skilled Nursing Facility;New Nursing Home placement - residential care facility  ([facility])   Discharge Plan B New Nursing Home placement - residential care facility   DME Needed Upon Discharge  other (see comments)  (TBD)   Discharge Plan discussed with: Caregiver   Name(s) and Number(s) Sister Caitlyn [phone]   Transition of Care Barriers Mobility;Mental illness

## 2025-02-15 NOTE — PROGRESS NOTES
Pharmacist Renal Dose Adjustment Note    Antonio Torres Jr. is a 80 y.o. male being treated with the medication cetirizine    Patient Data:    Vital Signs (Most Recent):  Temp: 97.4 °F (36.3 °C) (02/15/25 1140)  Pulse: 85 (02/15/25 1140)  Resp: 18 (02/15/25 1140)  BP: 125/68 (02/15/25 1140)  SpO2: 98 % (02/15/25 1140) Vital Signs (72h Range):  Temp:  [97.4 °F (36.3 °C)-98.6 °F (37 °C)]   Pulse:  []   Resp:  [13-34]   BP: ()/(50-70)   SpO2:  [94 %-100 %]      Recent Labs   Lab 02/13/25 2208 02/14/25  0435 02/15/25  0451   CREATININE 3.3* 3.5* 5.8*     Serum creatinine: 5.8 mg/dL (H) 02/15/25 0451  Estimated creatinine clearance: 9.6 mL/min (A)    Medication:cetirizine dose: 10 mg  frequency daily will be changed to medication:cetirizine dose:5 mg frequency:q48h    Pharmacist's Name: Arnulfo Castellano  Pharmacist's Extension: [phone]

## 2025-02-16 LAB
ALBUMIN SERPL BCP-MCNC: 2.3 G/DL (ref 3.5–5.2)
ALP SERPL-CCNC: 51 U/L (ref 40–150)
ALT SERPL W/O P-5'-P-CCNC: 244 U/L (ref 10–44)
ANION GAP SERPL CALC-SCNC: 15 MMOL/L (ref 8–16)
AST SERPL-CCNC: 580 U/L (ref 10–40)
BACTERIA UR CULT: ABNORMAL
BILIRUB SERPL-MCNC: 0.3 MG/DL (ref 0.1–1)
BUN SERPL-MCNC: 78 MG/DL (ref 8–23)
CALCIUM SERPL-MCNC: 8.1 MG/DL (ref 8.7–10.5)
CHLORIDE SERPL-SCNC: 110 MMOL/L (ref 95–110)
CK SERPL-CCNC: ABNORMAL U/L (ref 20–200)
CO2 SERPL-SCNC: 15 MMOL/L (ref 23–29)
CREAT SERPL-MCNC: 7.8 MG/DL (ref 0.5–1.4)
EST. GFR  (NO RACE VARIABLE): 6 ML/MIN/1.73 M^2
GLUCOSE SERPL-MCNC: 86 MG/DL (ref 70–110)
POTASSIUM SERPL-SCNC: 4.5 MMOL/L (ref 3.5–5.1)
PROT SERPL-MCNC: 4.8 G/DL (ref 6–8.4)
SODIUM SERPL-SCNC: 140 MMOL/L (ref 136–145)

## 2025-02-16 PROCEDURE — 94640 AIRWAY INHALATION TREATMENT: CPT

## 2025-02-16 PROCEDURE — 82550 ASSAY OF CK (CPK): CPT | Performed by: STUDENT IN AN ORGANIZED HEALTH CARE EDUCATION/TRAINING PROGRAM

## 2025-02-16 PROCEDURE — 21400001 HC TELEMETRY ROOM

## 2025-02-16 PROCEDURE — 63600175 PHARM REV CODE 636 W HCPCS: Performed by: STUDENT IN AN ORGANIZED HEALTH CARE EDUCATION/TRAINING PROGRAM

## 2025-02-16 PROCEDURE — 25000003 PHARM REV CODE 250: Performed by: STUDENT IN AN ORGANIZED HEALTH CARE EDUCATION/TRAINING PROGRAM

## 2025-02-16 PROCEDURE — 63600175 PHARM REV CODE 636 W HCPCS: Performed by: INTERNAL MEDICINE

## 2025-02-16 PROCEDURE — 99232 SBSQ HOSP IP/OBS MODERATE 35: CPT | Mod: ,,, | Performed by: PHYSICIAN ASSISTANT

## 2025-02-16 PROCEDURE — 97110 THERAPEUTIC EXERCISES: CPT | Mod: CQ

## 2025-02-16 PROCEDURE — 63600175 PHARM REV CODE 636 W HCPCS: Mod: JZ,TB | Performed by: STUDENT IN AN ORGANIZED HEALTH CARE EDUCATION/TRAINING PROGRAM

## 2025-02-16 PROCEDURE — 94761 N-INVAS EAR/PLS OXIMETRY MLT: CPT

## 2025-02-16 PROCEDURE — 97530 THERAPEUTIC ACTIVITIES: CPT | Mod: CQ

## 2025-02-16 PROCEDURE — 27000207 HC ISOLATION

## 2025-02-16 PROCEDURE — 80053 COMPREHEN METABOLIC PANEL: CPT | Performed by: STUDENT IN AN ORGANIZED HEALTH CARE EDUCATION/TRAINING PROGRAM

## 2025-02-16 PROCEDURE — 25000242 PHARM REV CODE 250 ALT 637 W/ HCPCS: Performed by: STUDENT IN AN ORGANIZED HEALTH CARE EDUCATION/TRAINING PROGRAM

## 2025-02-16 PROCEDURE — 36415 COLL VENOUS BLD VENIPUNCTURE: CPT | Performed by: STUDENT IN AN ORGANIZED HEALTH CARE EDUCATION/TRAINING PROGRAM

## 2025-02-16 PROCEDURE — 51798 US URINE CAPACITY MEASURE: CPT

## 2025-02-16 RX ORDER — FUROSEMIDE 10 MG/ML
40 INJECTION INTRAMUSCULAR; INTRAVENOUS ONCE
Status: COMPLETED | OUTPATIENT
Start: 2025-02-16 | End: 2025-02-16

## 2025-02-16 RX ORDER — CEFTRIAXONE 2 G/1
2 INJECTION, POWDER, FOR SOLUTION INTRAMUSCULAR; INTRAVENOUS
Status: DISCONTINUED | OUTPATIENT
Start: 2025-02-16 | End: 2025-02-22

## 2025-02-16 RX ADMIN — REMDESIVIR 100 MG: 100 INJECTION, POWDER, LYOPHILIZED, FOR SOLUTION INTRAVENOUS at 09:02

## 2025-02-16 RX ADMIN — HEPARIN SODIUM 5000 UNITS: 5000 INJECTION INTRAVENOUS; SUBCUTANEOUS at 06:02

## 2025-02-16 RX ADMIN — IPRATROPIUM BROMIDE AND ALBUTEROL SULFATE 3 ML: 2.5; .5 SOLUTION RESPIRATORY (INHALATION) at 10:02

## 2025-02-16 RX ADMIN — FUROSEMIDE 40 MG: 10 INJECTION, SOLUTION INTRAMUSCULAR; INTRAVENOUS at 01:02

## 2025-02-16 RX ADMIN — SODIUM CHLORIDE, POTASSIUM CHLORIDE, SODIUM LACTATE AND CALCIUM CHLORIDE: 600; 310; 30; 20 INJECTION, SOLUTION INTRAVENOUS at 02:02

## 2025-02-16 RX ADMIN — CEFTRIAXONE 2 G: 2 INJECTION, POWDER, FOR SOLUTION INTRAMUSCULAR; INTRAVENOUS at 03:02

## 2025-02-16 RX ADMIN — HEPARIN SODIUM 5000 UNITS: 5000 INJECTION INTRAVENOUS; SUBCUTANEOUS at 10:02

## 2025-02-16 RX ADMIN — HEPARIN SODIUM 5000 UNITS: 5000 INJECTION INTRAVENOUS; SUBCUTANEOUS at 01:02

## 2025-02-16 RX ADMIN — ARIPIPRAZOLE 20 MG: 10 TABLET ORAL at 09:02

## 2025-02-16 RX ADMIN — THERA TABS 1 TABLET: TAB at 09:02

## 2025-02-16 RX ADMIN — SODIUM CHLORIDE, POTASSIUM CHLORIDE, SODIUM LACTATE AND CALCIUM CHLORIDE: 600; 310; 30; 20 INJECTION, SOLUTION INTRAVENOUS at 06:02

## 2025-02-16 NOTE — NURSING
Pt AAO x 4, cooperative, and pleasant during shift. Pt was free from falls/injury, and able to make needs known during shift. VSS on RA. Pt did have episode of SOB on ambulation to restroom. IV fluids cont per dr's orders. Pt had no c/o pain during shift. Airborne/contact/droplet isolation precautions maintained. Telemetry monitoring continued with reports of SR and ST. No acute distress noted.  Bed locked and in lowest position. Bedside table and call light in reach. Care ongoing.

## 2025-02-16 NOTE — PT/OT/SLP PROGRESS
Physical Therapy Treatment    Patient Name:  Antonio Torres Jr.   MRN:  [id number]    Recommendations:     Discharge Recommendations: Moderate Intensity Therapy  Discharge Equipment Recommendations: to be determined by next level of care  Barriers to discharge: None    Assessment:     Antonio Torres Jr. is a 80 y.o. male admitted with a medical diagnosis of Non-traumatic rhabdomyolysis.  He presents with the following impairments/functional limitations: weakness, impaired endurance, impaired self care skills, impaired functional mobility, gait instability, decreased lower extremity function, decreased upper extremity function, decreased coordination, impaired cognition, pain, impaired balance, decreased safety awareness, decreased ROM .    Rehab Prognosis: Good; patient would benefit from acute skilled PT services to address these deficits and reach maximum level of function.    Recent Surgery: * No surgery found *      Plan:     During this hospitalization, patient to be seen  (3-5x/wk) to address the identified rehab impairments via gait training, therapeutic activities, therapeutic exercises, neuromuscular re-education and progress toward the following goals:    Plan of Care Expires:       Subjective     Chief Complaint: weakness, fatigue and SOB    Patient/Family Comments/goals: pt is very pleasant and agreeable to therapy   Pain/Comfort:  Pain Rating 1: 0/10  Pain Rating Post-Intervention 1: 0/10      Objective:     Communicated with nurse  prior to session.  Patient found HOB elevated with telemetry, peripheral IV upon PT entry to room.     General Precautions: Standard, fall, droplet, airborne  Orthopedic Precautions: N/A  Braces: N/A  Respiratory Status: Room air   Pt seems SOB with activities however SpO2 maintain from 95%-98% on RA, HR stable   Functional Mobility:  Bed Mobility:     Rolling Right: contact guard assistance  Scooting: minimum assistance  Supine to Sit: minimum assistance  Transfers:    V/T cues for safety technique and walker management .   Sit to Stand: x 3 trials from bed and 1 trial from chair contact guard assistance and minimum assistance with rolling walker  Bed to Chair: minimum assistance with  hand-held assist  using  Step Transfer  Gait:  pt ambulated 10 steps and ~ 8 ft with RW, MIN A . Pt required seated rest breaks 2* to fatigue. Noted with decreased howie,decreased step length, decreased weight shifting ability, no LOB . VC's for safety technique and walker management.   Balance: good in sitting , fair in standing.       AM-PAC 6 CLICK MOBILITY  Turning over in bed (including adjusting bedclothes, sheets and blankets)?: 3  Sitting down on and standing up from a chair with arms (e.g., wheelchair, bedside commode, etc.): 3  Moving from lying on back to sitting on the side of the bed?: 3  Moving to and from a bed to a chair (including a wheelchair)?: 3  Need to walk in hospital room?: 3  Climbing 3-5 steps with a railing?: 2  Basic Mobility Total Score: 17       Treatment & Education:  Lower Extremity Exercises.    Patient educated on: Purpose and Benefits of Therapeutic Exercise(s).    Patient verbalized acceptance/understanding of instruction(s), expectation(s), and limitation(s) (for safety).  Patient performed: 2 sets of 10 reps (each) of B LE Ther Ex: AP, LAQ, Hip abd/add, Hip flexion while sitting up on EOB.       Patient required  verbal cues/tactile cues to ensure correct sequence, to maintain proper form, and to allow for self-correction.  Pt reported no complaints or problems with exercise activity.     Patient required increase Reaction Time to initiate movements and a Sitting Rest Break between set(s) to recover.      Patient left up in reclined chair , BLE elevated, heels offloading , tray table set up with all lines intact, call button in reach, and nurse notified..    GOALS:   Multidisciplinary Problems       Physical Therapy Goals          Problem: Physical Therapy     Goal Priority Disciplines Outcome Interventions   Physical Therapy Goal     PT, PT/OT Progressing    Description: Goals to be met by: 3/28/25     Patient will increase functional independence with mobility by performin. Bed to chair transfer with Contact Guard Assistance using Rolling Walker  2. Gait  x 150 feet with Minimal Assistance using Rolling Walker.                          DME Justifications:      Time Tracking:     PT Received On: 25  PT Start Time: 1502     PT Stop Time: 1527  PT Total Time (min): 25 min     Billable Minutes: Therapeutic Activity 14 and Therapeutic Exercise 11    Treatment Type: Treatment  PT/PTA: PTA     Number of PTA visits since last PT visit: 2025

## 2025-02-16 NOTE — ASSESSMENT & PLAN NOTE
BISI is likely due to acute tubular necrosis caused by rhabdomyolysis . Baseline creatinine is  1.9 . Most recent creatinine and eGFR are listed below.  Recent Labs     02/14/25  0435 02/15/25  0451 02/16/25  0542   CREATININE 3.5* 5.8* 7.8*   EGFRNORACEVR 17* 9* 6*        Plan  - BISI is worsening. Will continue current treatment  - Avoid nephrotoxins and renally dose meds for GFR listed above  - Monitor urine output, serial BMP, and adjust therapy as needed  - IVF  - CR rising but no significant acidosis or electrolyte abnormality  Appreciate Nephrology

## 2025-02-16 NOTE — SUBJECTIVE & OBJECTIVE
Interval History: UOP 450ml, + 4x. NAEON. Good appetite, no complaints    Review of patient's allergies indicates:  No Known Allergies  Current Facility-Administered Medications   Medication Frequency    acetaminophen tablet 650 mg Q8H PRN    acetaminophen tablet 650 mg Q4H PRN    albuterol-ipratropium 2.5 mg-0.5 mg/3 mL nebulizer solution 3 mL Q4H PRN    aluminum-magnesium hydroxide-simethicone 200-200-20 mg/5 mL suspension 30 mL QID PRN    ARIPiprazole tablet 20 mg Daily    bisacodyL suppository 10 mg Daily PRN    cefTRIAXone injection 2 g Q24H    [START ON 2/17/2025] cetirizine tablet 5 mg Q48H    glucagon (human recombinant) injection 1 mg PRN    glucose chewable tablet 16 g PRN    glucose chewable tablet 24 g PRN    heparin (porcine) injection 5,000 Units Q8H    lactated ringers infusion Continuous    magnesium oxide tablet 800 mg PRN    magnesium oxide tablet 800 mg PRN    melatonin tablet 6 mg Nightly PRN    multivitamin tablet Daily    naloxone 0.4 mg/mL injection 0.02 mg PRN    ondansetron injection 4 mg Q8H PRN    potassium, sodium phosphates 280-160-250 mg packet 2 packet PRN    potassium, sodium phosphates 280-160-250 mg packet 2 packet PRN    potassium, sodium phosphates 280-160-250 mg packet 2 packet PRN    senna-docusate 8.6-50 mg per tablet 1 tablet Daily PRN    simethicone chewable tablet 80 mg QID PRN    sodium chloride 0.9% flush 10 mL Q12H PRN    Tdap vaccine injection 0.5 mL vaccine x 1 dose     Facility-Administered Medications Ordered in Other Encounters   Medication Frequency    0.9%  NaCl infusion Continuous    mupirocin 2 % ointment On Call Procedure       Objective:     Vital Signs (Most Recent):  Temp: 97.4 °F (36.3 °C) (02/16/25 1159)  Pulse: 70 (02/16/25 1455)  Resp: 19 (02/16/25 1159)  BP: (!) 149/62 (02/16/25 1159)  SpO2: 98 % (02/16/25 1159) Vital Signs (24h Range):  Temp:  [97.4 °F (36.3 °C)-97.9 °F (36.6 °C)] 97.4 °F (36.3 °C)  Pulse:  [] 70  Resp:  [18-27] 19  SpO2:  [96  %-100 %] 98 %  BP: (112-149)/(62-75) 149/62     Weight: 68.9 kg (151 lb 14.4 oz) (02/14/25 1515)  Body mass index is 21.79 kg/m².  Body surface area is 1.84 meters squared.    I/O last 3 completed shifts:  In: 240 [P.O.:240]  Out: 450 [Urine:450]     Physical Exam  Vitals and nursing note reviewed.   HENT:      Head: Normocephalic.      Mouth/Throat:      Mouth: Mucous membranes are moist.   Eyes:      Extraocular Movements: Extraocular movements intact.      Conjunctiva/sclera: Conjunctivae normal.      Pupils: Pupils are equal, round, and reactive to light.   Cardiovascular:      Rate and Rhythm: Normal rate.      Pulses: Normal pulses.   Pulmonary:      Effort: Pulmonary effort is normal.      Breath sounds: Normal breath sounds.   Abdominal:      Palpations: Abdomen is soft.   Musculoskeletal:      Cervical back: Normal range of motion.      Right lower leg: No edema.      Left lower leg: No edema.   Skin:     General: Skin is warm.   Neurological:      General: No focal deficit present.      Mental Status: He is alert. Mental status is at baseline.   Psychiatric:         Mood and Affect: Mood normal.          Significant Labs:  CMP:   Recent Labs   Lab 02/16/25  0542   GLU 86   CALCIUM 8.1*   ALBUMIN 2.3*   PROT 4.8*      K 4.5   CO2 15*      BUN 78*   CREATININE 7.8*   ALKPHOS 51   *   *   BILITOT 0.3     All labs within the past 24 hours have been reviewed.     Significant Imaging:  Labs: Reviewed

## 2025-02-16 NOTE — PROGRESS NOTES
[facility] S[facility]  Nephrology  Progress Note    Patient Name: Antonio Torres Jr.  MRN: [id number]  Admission Date: 2/13/2025  Hospital Length of Stay: 2 days  Attending Provider: Emigdio Warner MD   Primary Care Physician: Fabio Lennon MD  Principal Problem:Non-traumatic rhabdomyolysis    Subjective:     HPI: Mr. Torres is an 79 yo male with schizophrenia, BPH, CKD, and h/o melanoma who presented to the ED overnight s/p fall. HR 120s on arrival. He was also found to be COVID+. CPK 23k. He was started on IVF. Nephrology consulted for BISI on CKD. Prior records obtained and reviewed. He is followed by myself outpatient; last visit was 11/11/24. His baseline Cr is 1.7-2.0; his CKD is of unknown etiology. His Cr was 3.3 on arrival --> 3.5 this morning. CPK now > 40k. Abnormal LFTs. He reports to be doing okay this afternoon. Complaining of soreness. No SOB or leg swelling.    Interval History: UOP 450ml, + 4x. NAEON. Good appetite, no complaints    Review of patient's allergies indicates:  No Known Allergies  Current Facility-Administered Medications   Medication Frequency    acetaminophen tablet 650 mg Q8H PRN    acetaminophen tablet 650 mg Q4H PRN    albuterol-ipratropium 2.5 mg-0.5 mg/3 mL nebulizer solution 3 mL Q4H PRN    aluminum-magnesium hydroxide-simethicone 200-200-20 mg/5 mL suspension 30 mL QID PRN    ARIPiprazole tablet 20 mg Daily    bisacodyL suppository 10 mg Daily PRN    cefTRIAXone injection 2 g Q24H    [START ON 2/17/2025] cetirizine tablet 5 mg Q48H    glucagon (human recombinant) injection 1 mg PRN    glucose chewable tablet 16 g PRN    glucose chewable tablet 24 g PRN    heparin (porcine) injection 5,000 Units Q8H    lactated ringers infusion Continuous    magnesium oxide tablet 800 mg PRN    magnesium oxide tablet 800 mg PRN    melatonin tablet 6 mg Nightly PRN    multivitamin tablet Daily    naloxone 0.4 mg/mL injection 0.02 mg PRN    ondansetron injection 4 mg Q8H PRN    potassium, sodium  phosphates 280-160-250 mg packet 2 packet PRN    potassium, sodium phosphates 280-160-250 mg packet 2 packet PRN    potassium, sodium phosphates 280-160-250 mg packet 2 packet PRN    senna-docusate 8.6-50 mg per tablet 1 tablet Daily PRN    simethicone chewable tablet 80 mg QID PRN    sodium chloride 0.9% flush 10 mL Q12H PRN    Tdap vaccine injection 0.5 mL vaccine x 1 dose     Facility-Administered Medications Ordered in Other Encounters   Medication Frequency    0.9%  NaCl infusion Continuous    mupirocin 2 % ointment On Call Procedure       Objective:     Vital Signs (Most Recent):  Temp: 97.4 °F (36.3 °C) (02/16/25 1159)  Pulse: 70 (02/16/25 1455)  Resp: 19 (02/16/25 1159)  BP: (!) 149/62 (02/16/25 1159)  SpO2: 98 % (02/16/25 1159) Vital Signs (24h Range):  Temp:  [97.4 °F (36.3 °C)-97.9 °F (36.6 °C)] 97.4 °F (36.3 °C)  Pulse:  [] 70  Resp:  [18-27] 19  SpO2:  [96 %-100 %] 98 %  BP: (112-149)/(62-75) 149/62     Weight: 68.9 kg (151 lb 14.4 oz) (02/14/25 1515)  Body mass index is 21.79 kg/m².  Body surface area is 1.84 meters squared.    I/O last 3 completed shifts:  In: 240 [P.O.:240]  Out: 450 [Urine:450]     Physical Exam  Vitals and nursing note reviewed.   HENT:      Head: Normocephalic.      Mouth/Throat:      Mouth: Mucous membranes are moist.   Eyes:      Extraocular Movements: Extraocular movements intact.      Conjunctiva/sclera: Conjunctivae normal.      Pupils: Pupils are equal, round, and reactive to light.   Cardiovascular:      Rate and Rhythm: Normal rate.      Pulses: Normal pulses.   Pulmonary:      Effort: Pulmonary effort is normal.      Breath sounds: Normal breath sounds.   Abdominal:      Palpations: Abdomen is soft.   Musculoskeletal:      Cervical back: Normal range of motion.      Right lower leg: No edema.      Left lower leg: No edema.   Skin:     General: Skin is warm.   Neurological:      General: No focal deficit present.      Mental Status: He is alert. Mental status is at  baseline.   Psychiatric:         Mood and Affect: Mood normal.          Significant Labs:  CMP:   Recent Labs   Lab 02/16/25  0542   GLU 86   CALCIUM 8.1*   ALBUMIN 2.3*   PROT 4.8*      K 4.5   CO2 15*      BUN 78*   CREATININE 7.8*   ALKPHOS 51   *   *   BILITOT 0.3     All labs within the past 24 hours have been reviewed.     Significant Imaging:  Labs: Reviewed    Assessment/Plan:     BISI on CKD stage 3b  - baseline Cr 1.7-2.0; followed outpatient by myself  - Cr 3.3 --> 3.5 --> 5.8 --> 7.8 today; worsening. Due to rhabdomyolysis  - CPK >10380 --> 27522 today, improving  - decreased IVF to LR 150ml/hr  - lasix 40mg IV daily  - daily labs. Strict I/Os     Rhabdomyolysis  - CPK > 40,000  --> 92626 today  - continue aggressive IV hydration until CPK < 5000  - daily CPK levels  - strict I/Os     Metabolic acidosis  - bicarb 15; no need for intervention at this time  - agree with LR > NS         Thank you for your consult. I will follow-up with patient. Please contact us if you have any additional questions.    TANNA Turpin  Nephrology  [facility] - Med Surg

## 2025-02-16 NOTE — NURSING
[facility], [facility] [place]  Nurses Note -- 4 Eyes      2/15/2025       Skin assessed on: Q Shift      [x] No Pressure Injuries Present    []Prevention Measures Documented    [] Yes LDA  for Pressure Injury Previously documented     [] Yes New Pressure Injury Discovered   [] LDA for New Pressure Injury Added      Attending RN:  Stephanie Barthelemy, RN     Second RN:  Jasmin Villasenor LPN

## 2025-02-16 NOTE — NURSING
[facility], [facility]  Nurses Note -- 4 Eyes      2/16/2025       Skin assessed on: Q Shift      [x] No Pressure Injuries Present    [x]Prevention Measures Documented    [] Yes LDA  for Pressure Injury Previously documented     [] Yes New Pressure Injury Discovered   [] LDA for New Pressure Injury Added      Attending RN:  Jasmin Villasenor LPN     Second RN:  RICO Alvarado

## 2025-02-16 NOTE — PROGRESS NOTES
[facility] Medicine  Progress Note    Patient Name: Antonio Torres Jr.  MRN: [id number]  Patient Class: IP- Inpatient   Admission Date: 2/13/2025  Length of Stay: 2 days  Attending Physician: Emigdio Warner MD  Primary Care Provider: Fabio Lennon MD        Subjective     Principal Problem:Non-traumatic rhabdomyolysis        HPI:  This is an 80-year-old male with a past medical history of schizophrenia, chronic blepharitis, hyperlipidemia, CKD 3, BPH, who presents with a fall.    Patient presents for evaluation after a fall and a sycopal episode that occurred prior to presentation. He reports losing consciousness and falling face forward on the floor sustaining an injury to his head, elbows, abdomen and knees. He was unable to stand up and was on the floor for about 1.5 hours. Patient reports having a coughing episode a day prior but denies sick contacts. He denied chest pain.     In the ED, the patient was tachycardic (120s > 60s), but otherwise hemodynamically stable.  Labs were remarkable for leukocytosis (25.3), elevated CPK (23,406), elevated creatinine (3.3-baseline around 1.9), elevated troponin (0.525-chronically elevated), elevated lactic acid (4.8 > 2.1), elevated procalcitonin (2.53), elevated LFTs (T bili: 1.1, AST: 267, ALT: 73), hypophosphatemia (1.7), positive COVID-19.  CT head, maxillofacial, cervical spine showed a nondisplaced right nasal bone fracture with no soft tissue swelling, and paranasal sinus disease.  CT chest, abdomen and pelvis showed possible soft tissue contusion at the level of the xiphoid process, a 2-3 mm left lower lobe pulmonary nodule and nonspecific urinary bladder wall thickening.  Right elbow x-ray demonstrated an elbow effusion with an olecranon spur.  Patient was given 2 L of LR, vancomycin, Zosyn, Tylenol 1 g p.o..  He was admitted for further management.    Overview/Hospital Course:  Mr. Torres is a 79 yo M admitted after a fall and found to have  acute on chronic kidney injury and rhabdomyolysis. CK >20,000 on admit and rising to >40,000. Renal function worsening. On IVF. Also found to be COVID positive. Started on remdesivir. Nephrology consulted.    Interval History: denies any pain, ok on room air. Making some urine, but not a lot. CK trending down but Cr trending up.    Review of Systems  Objective:     Vital Signs (Most Recent):  Temp: 97.4 °F (36.3 °C) (02/16/25 1159)  Pulse: 86 (02/16/25 1159)  Resp: 19 (02/16/25 1159)  BP: (!) 149/62 (02/16/25 1159)  SpO2: 98 % (02/16/25 1159) Vital Signs (24h Range):  Temp:  [97.4 °F (36.3 °C)-97.9 °F (36.6 °C)] 97.4 °F (36.3 °C)  Pulse:  [] 86  Resp:  [18-27] 19  SpO2:  [96 %-100 %] 98 %  BP: (112-149)/(62-75) 149/62     Weight: 68.9 kg (151 lb 14.4 oz)  Body mass index is 21.79 kg/m².    Intake/Output Summary (Last 24 hours) at 2/16/2025 1319  Last data filed at 2/16/2025 1008  Gross per 24 hour   Intake 240 ml   Output 450 ml   Net -210 ml         Physical Exam  Vitals and nursing note reviewed.   Constitutional:       General: He is awake. He is not in acute distress.     Appearance: Normal appearance. He is well-developed.   HENT:      Head: Normocephalic and atraumatic.      Nose: Nose normal.      Mouth/Throat:      Mouth: Mucous membranes are moist.   Eyes:      Extraocular Movements: Extraocular movements intact.      Conjunctiva/sclera: Conjunctivae normal.   Cardiovascular:      Rate and Rhythm: Normal rate and regular rhythm.   Pulmonary:      Effort: Pulmonary effort is normal.      Breath sounds: Normal breath sounds.   Abdominal:      General: There is no distension.      Palpations: Abdomen is soft.   Musculoskeletal:         General: No tenderness or signs of injury.      Right lower leg: No edema.      Left lower leg: No edema.   Skin:     General: Skin is warm and dry.      Findings: Bruising present. No erythema or rash.   Neurological:      General: No focal deficit present.      Mental  Status: He is alert. Mental status is at baseline.   Psychiatric:         Mood and Affect: Mood normal.         Behavior: Behavior normal.             Significant Labs: All pertinent labs within the past 24 hours have been reviewed.    Significant Imaging: I have reviewed all pertinent imaging results/findings within the past 24 hours.    Assessment and Plan     * Non-traumatic rhabdomyolysis  Elevated CPK to 32012 in the setting of a prolonged fall   - CK trending to >40k  - with renal failure -- Nephrolgoy consulted  - continue with aggressive hydration  - no evidence of compartment syndrome    Elevated troponin  Recent Labs   Lab 02/14/25  0435   TROPONINI 0.837*         Chronically elevated  No chest pain    Continue to monitor     Leukocytosis  Likely reactive   Imaging unremarkable for infection   Hold antibiotics for now   - now resolved    COVID-19  No hypoxia, prophylactic remdesivir     BISI (acute kidney injury)  BISI is likely due to acute tubular necrosis caused by rhabdomyolysis . Baseline creatinine is  1.9 . Most recent creatinine and eGFR are listed below.  Recent Labs     02/14/25  0435 02/15/25  0451 02/16/25  0542   CREATININE 3.5* 5.8* 7.8*   EGFRNORACEVR 17* 9* 6*        Plan  - BISI is worsening. Will continue current treatment  - Avoid nephrotoxins and renally dose meds for GFR listed above  - Monitor urine output, serial BMP, and adjust therapy as needed  - IVF  - CR rising but no significant acidosis or electrolyte abnormality  Appreciate Nephrology    Elevated LFTs  Likely in the setting of rhabdomyolysis   Continue to monitor       Schizophrenia  Continue Abilify         VTE Risk Mitigation (From admission, onward)           Ordered     heparin (porcine) injection 5,000 Units  Every 8 hours         02/14/25 0222     IP VTE HIGH RISK PATIENT  Once         02/14/25 0222     Place sequential compression device  Until discontinued         02/14/25 0222                    Discharge Planning    JULIANA:      Code Status: Full Code   Medical Readiness for Discharge Date:   Discharge Plan A: Skilled Nursing Facility, New Nursing Home placement - [facility] facility ([facility])                Please place Justification for DME        Emigdio Warner MD  Department of Hospital Medicine   [facility] - Med Surg

## 2025-02-16 NOTE — PLAN OF CARE
Problem: Adult Inpatient Plan of Care  Goal: Absence of Hospital-Acquired Illness or Injury  Outcome: Progressing  Intervention: Identify and Manage Fall Risk  Flowsheets (Taken 2/16/2025 0441)  Safety Promotion/Fall Prevention:   assistive device/personal item within reach   bed alarm set   commode/urinal/bedpan at bedside   lighting adjusted   medications reviewed   nonskid shoes/socks when out of bed   room near unit station   side rails raised x 2  Intervention: Prevent Infection  Flowsheets (Taken 2/16/2025 0441)  Infection Prevention:   cohorting utilized   environmental surveillance performed   hand hygiene promoted   rest/sleep promoted   single patient room provided  Goal: Optimal Comfort and Wellbeing  Outcome: Progressing  Intervention: Provide Person-Centered Care  Flowsheets (Taken 2/16/2025 0441)  Trust Relationship/Rapport:   care explained   emotional support provided   empathic listening provided   questions answered   questions encouraged   reassurance provided   thoughts/feelings acknowledged     Problem: Infection  Goal: Absence of Infection Signs and Symptoms  Outcome: Progressing  Intervention: Prevent or Manage Infection  Flowsheets (Taken 2/16/2025 0441)  Infection Management: aseptic technique maintained  Isolation Precautions:   airborne   contact   droplet   precautions maintained

## 2025-02-16 NOTE — SUBJECTIVE & OBJECTIVE
Interval History: denies any pain, ok on room air. Making some urine, but not a lot. CK trending down but Cr trending up.    Review of Systems  Objective:     Vital Signs (Most Recent):  Temp: 97.4 °F (36.3 °C) (02/16/25 1159)  Pulse: 86 (02/16/25 1159)  Resp: 19 (02/16/25 1159)  BP: (!) 149/62 (02/16/25 1159)  SpO2: 98 % (02/16/25 1159) Vital Signs (24h Range):  Temp:  [97.4 °F (36.3 °C)-97.9 °F (36.6 °C)] 97.4 °F (36.3 °C)  Pulse:  [] 86  Resp:  [18-27] 19  SpO2:  [96 %-100 %] 98 %  BP: (112-149)/(62-75) 149/62     Weight: 68.9 kg (151 lb 14.4 oz)  Body mass index is 21.79 kg/m².    Intake/Output Summary (Last 24 hours) at 2/16/2025 1319  Last data filed at 2/16/2025 1008  Gross per 24 hour   Intake 240 ml   Output 450 ml   Net -210 ml         Physical Exam  Vitals and nursing note reviewed.   Constitutional:       General: He is awake. He is not in acute distress.     Appearance: Normal appearance. He is well-developed.   HENT:      Head: Normocephalic and atraumatic.      Nose: Nose normal.      Mouth/Throat:      Mouth: Mucous membranes are moist.   Eyes:      Extraocular Movements: Extraocular movements intact.      Conjunctiva/sclera: Conjunctivae normal.   Cardiovascular:      Rate and Rhythm: Normal rate and regular rhythm.   Pulmonary:      Effort: Pulmonary effort is normal.      Breath sounds: Normal breath sounds.   Abdominal:      General: There is no distension.      Palpations: Abdomen is soft.   Musculoskeletal:         General: No tenderness or signs of injury.      Right lower leg: No edema.      Left lower leg: No edema.   Skin:     General: Skin is warm and dry.      Findings: Bruising present. No erythema or rash.   Neurological:      General: No focal deficit present.      Mental Status: He is alert. Mental status is at baseline.   Psychiatric:         Mood and Affect: Mood normal.         Behavior: Behavior normal.             Significant Labs: All pertinent labs within the past 24  hours have been reviewed.    Significant Imaging: I have reviewed all pertinent imaging results/findings within the past 24 hours.

## 2025-02-17 LAB
ALBUMIN SERPL BCP-MCNC: 2.2 G/DL (ref 3.5–5.2)
ALP SERPL-CCNC: 47 U/L (ref 40–150)
ALT SERPL W/O P-5'-P-CCNC: 233 U/L (ref 10–44)
ANION GAP SERPL CALC-SCNC: 15 MMOL/L (ref 8–16)
AST SERPL-CCNC: 410 U/L (ref 10–40)
BASOPHILS # BLD AUTO: 0.03 K/UL (ref 0–0.2)
BASOPHILS NFR BLD: 0.3 % (ref 0–1.9)
BILIRUB SERPL-MCNC: 0.3 MG/DL (ref 0.1–1)
BNP SERPL-MCNC: 545 PG/ML (ref 0–99)
BUN SERPL-MCNC: 87 MG/DL (ref 8–23)
CALCIUM SERPL-MCNC: 7.9 MG/DL (ref 8.7–10.5)
CHLORIDE SERPL-SCNC: 110 MMOL/L (ref 95–110)
CK SERPL-CCNC: ABNORMAL U/L (ref 20–200)
CO2 SERPL-SCNC: 14 MMOL/L (ref 23–29)
CREAT SERPL-MCNC: 9.7 MG/DL (ref 0.5–1.4)
DIFFERENTIAL METHOD BLD: ABNORMAL
EOSINOPHIL # BLD AUTO: 0.2 K/UL (ref 0–0.5)
EOSINOPHIL NFR BLD: 2.6 % (ref 0–8)
ERYTHROCYTE [DISTWIDTH] IN BLOOD BY AUTOMATED COUNT: 14.3 % (ref 11.5–14.5)
EST. GFR  (NO RACE VARIABLE): 5 ML/MIN/1.73 M^2
GLUCOSE SERPL-MCNC: 85 MG/DL (ref 70–110)
HCT VFR BLD AUTO: 36 % (ref 40–54)
HGB BLD-MCNC: 11.8 G/DL (ref 14–18)
IMM GRANULOCYTES # BLD AUTO: 0.03 K/UL (ref 0–0.04)
IMM GRANULOCYTES NFR BLD AUTO: 0.3 % (ref 0–0.5)
LYMPHOCYTES # BLD AUTO: 0.6 K/UL (ref 1–4.8)
LYMPHOCYTES NFR BLD: 6.7 % (ref 18–48)
MCH RBC QN AUTO: 31 PG (ref 27–31)
MCHC RBC AUTO-ENTMCNC: 32.8 G/DL (ref 32–36)
MCV RBC AUTO: 95 FL (ref 82–98)
MONOCYTES # BLD AUTO: 0.8 K/UL (ref 0.3–1)
MONOCYTES NFR BLD: 9.2 % (ref 4–15)
NEUTROPHILS # BLD AUTO: 7.1 K/UL (ref 1.8–7.7)
NEUTROPHILS NFR BLD: 80.9 % (ref 38–73)
NRBC BLD-RTO: 0 /100 WBC
PHOSPHATE SERPL-MCNC: 5.9 MG/DL (ref 2.7–4.5)
PLATELET # BLD AUTO: 157 K/UL (ref 150–450)
PMV BLD AUTO: 10.5 FL (ref 9.2–12.9)
POTASSIUM SERPL-SCNC: 4.3 MMOL/L (ref 3.5–5.1)
PROT SERPL-MCNC: 4.7 G/DL (ref 6–8.4)
RBC # BLD AUTO: 3.81 M/UL (ref 4.6–6.2)
SODIUM SERPL-SCNC: 139 MMOL/L (ref 136–145)
WBC # BLD AUTO: 8.8 K/UL (ref 3.9–12.7)

## 2025-02-17 PROCEDURE — 97110 THERAPEUTIC EXERCISES: CPT | Mod: CQ

## 2025-02-17 PROCEDURE — 97530 THERAPEUTIC ACTIVITIES: CPT | Mod: CQ

## 2025-02-17 PROCEDURE — 80053 COMPREHEN METABOLIC PANEL: CPT | Performed by: STUDENT IN AN ORGANIZED HEALTH CARE EDUCATION/TRAINING PROGRAM

## 2025-02-17 PROCEDURE — 25000242 PHARM REV CODE 250 ALT 637 W/ HCPCS: Performed by: STUDENT IN AN ORGANIZED HEALTH CARE EDUCATION/TRAINING PROGRAM

## 2025-02-17 PROCEDURE — 97116 GAIT TRAINING THERAPY: CPT | Mod: CQ

## 2025-02-17 PROCEDURE — A4217 STERILE WATER/SALINE, 500 ML: HCPCS | Performed by: STUDENT IN AN ORGANIZED HEALTH CARE EDUCATION/TRAINING PROGRAM

## 2025-02-17 PROCEDURE — 63600175 PHARM REV CODE 636 W HCPCS: Performed by: INTERNAL MEDICINE

## 2025-02-17 PROCEDURE — 25000003 PHARM REV CODE 250: Performed by: STUDENT IN AN ORGANIZED HEALTH CARE EDUCATION/TRAINING PROGRAM

## 2025-02-17 PROCEDURE — 85025 COMPLETE CBC W/AUTO DIFF WBC: CPT | Performed by: INTERNAL MEDICINE

## 2025-02-17 PROCEDURE — 82550 ASSAY OF CK (CPK): CPT | Performed by: STUDENT IN AN ORGANIZED HEALTH CARE EDUCATION/TRAINING PROGRAM

## 2025-02-17 PROCEDURE — 21400001 HC TELEMETRY ROOM

## 2025-02-17 PROCEDURE — 97530 THERAPEUTIC ACTIVITIES: CPT

## 2025-02-17 PROCEDURE — 63600175 PHARM REV CODE 636 W HCPCS: Performed by: STUDENT IN AN ORGANIZED HEALTH CARE EDUCATION/TRAINING PROGRAM

## 2025-02-17 PROCEDURE — 84100 ASSAY OF PHOSPHORUS: CPT | Performed by: INTERNAL MEDICINE

## 2025-02-17 PROCEDURE — 36415 COLL VENOUS BLD VENIPUNCTURE: CPT | Performed by: STUDENT IN AN ORGANIZED HEALTH CARE EDUCATION/TRAINING PROGRAM

## 2025-02-17 PROCEDURE — 83880 ASSAY OF NATRIURETIC PEPTIDE: CPT | Performed by: STUDENT IN AN ORGANIZED HEALTH CARE EDUCATION/TRAINING PROGRAM

## 2025-02-17 PROCEDURE — 94640 AIRWAY INHALATION TREATMENT: CPT

## 2025-02-17 PROCEDURE — 99232 SBSQ HOSP IP/OBS MODERATE 35: CPT | Mod: ,,, | Performed by: STUDENT IN AN ORGANIZED HEALTH CARE EDUCATION/TRAINING PROGRAM

## 2025-02-17 PROCEDURE — 94761 N-INVAS EAR/PLS OXIMETRY MLT: CPT

## 2025-02-17 PROCEDURE — 99900035 HC TECH TIME PER 15 MIN (STAT)

## 2025-02-17 PROCEDURE — 27000207 HC ISOLATION

## 2025-02-17 RX ORDER — ARIPIPRAZOLE 20 MG/1
20 TABLET ORAL
Qty: 90 TABLET | Refills: 2 | Status: SHIPPED | OUTPATIENT
Start: 2025-02-17

## 2025-02-17 RX ADMIN — CEFTRIAXONE 2 G: 2 INJECTION, POWDER, FOR SOLUTION INTRAMUSCULAR; INTRAVENOUS at 02:02

## 2025-02-17 RX ADMIN — HEPARIN SODIUM 5000 UNITS: 5000 INJECTION INTRAVENOUS; SUBCUTANEOUS at 05:02

## 2025-02-17 RX ADMIN — IPRATROPIUM BROMIDE AND ALBUTEROL SULFATE 3 ML: 2.5; .5 SOLUTION RESPIRATORY (INHALATION) at 07:02

## 2025-02-17 RX ADMIN — HEPARIN SODIUM 5000 UNITS: 5000 INJECTION INTRAVENOUS; SUBCUTANEOUS at 02:02

## 2025-02-17 RX ADMIN — SODIUM CHLORIDE, POTASSIUM CHLORIDE, SODIUM LACTATE AND CALCIUM CHLORIDE: 600; 310; 30; 20 INJECTION, SOLUTION INTRAVENOUS at 12:02

## 2025-02-17 RX ADMIN — SODIUM BICARBONATE: 84 INJECTION, SOLUTION INTRAVENOUS at 10:02

## 2025-02-17 RX ADMIN — ARIPIPRAZOLE 20 MG: 10 TABLET ORAL at 08:02

## 2025-02-17 RX ADMIN — SODIUM CHLORIDE, POTASSIUM CHLORIDE, SODIUM LACTATE AND CALCIUM CHLORIDE: 600; 310; 30; 20 INJECTION, SOLUTION INTRAVENOUS at 08:02

## 2025-02-17 RX ADMIN — THERA TABS 1 TABLET: TAB at 08:02

## 2025-02-17 RX ADMIN — HEPARIN SODIUM 5000 UNITS: 5000 INJECTION INTRAVENOUS; SUBCUTANEOUS at 10:02

## 2025-02-17 RX ADMIN — CETIRIZINE HYDROCHLORIDE 5 MG: 5 TABLET ORAL at 08:02

## 2025-02-17 NOTE — SUBJECTIVE & OBJECTIVE
Interval History: bruising/swelling improved. Not making much urine. Discussed with Nephrology, mercedes to be placed for accurate I/Os.    Review of Systems  Objective:     Vital Signs (Most Recent):  Temp: 98.1 °F (36.7 °C) (02/17/25 1139)  Pulse: 78 (02/17/25 1427)  Resp: 20 (02/17/25 1139)  BP: 124/70 (02/17/25 1139)  SpO2: 97 % (02/17/25 1139) Vital Signs (24h Range):  Temp:  [96.7 °F (35.9 °C)-98.7 °F (37.1 °C)] 98.1 °F (36.7 °C)  Pulse:  [] 78  Resp:  [16-20] 20  SpO2:  [94 %-98 %] 97 %  BP: (114-154)/(66-82) 124/70     Weight: 68.9 kg (151 lb 14.4 oz)  Body mass index is 21.79 kg/m².    Intake/Output Summary (Last 24 hours) at 2/17/2025 1507  Last data filed at 2/17/2025 1315  Gross per 24 hour   Intake 720 ml   Output 100 ml   Net 620 ml         Physical Exam  Vitals and nursing note reviewed.   Constitutional:       General: He is awake. He is not in acute distress.     Appearance: Normal appearance. He is well-developed.   HENT:      Head: Normocephalic and atraumatic.      Nose: Nose normal.      Mouth/Throat:      Mouth: Mucous membranes are moist.   Eyes:      Extraocular Movements: Extraocular movements intact.      Conjunctiva/sclera: Conjunctivae normal.   Cardiovascular:      Rate and Rhythm: Normal rate and regular rhythm.   Pulmonary:      Effort: Pulmonary effort is normal.      Breath sounds: Normal breath sounds.   Abdominal:      General: There is no distension.      Palpations: Abdomen is soft.   Musculoskeletal:         General: No tenderness or signs of injury.      Right lower leg: No edema.      Left lower leg: No edema.   Skin:     General: Skin is warm and dry.      Findings: Bruising present. No erythema or rash.   Neurological:      General: No focal deficit present.      Mental Status: He is alert. Mental status is at baseline.   Psychiatric:         Mood and Affect: Mood normal.         Behavior: Behavior normal.             Significant Labs: All pertinent labs within the past  24 hours have been reviewed.    Significant Imaging: I have reviewed all pertinent imaging results/findings within the past 24 hours.

## 2025-02-17 NOTE — PROGRESS NOTES
[facility] Medicine  Progress Note    Patient Name: Antonio Torres Jr.  MRN: [id number]  Patient Class: IP- Inpatient   Admission Date: 2/13/2025  Length of Stay: 3 days  Attending Physician: Emigdio Warner MD  Primary Care Provider: Fabio Lennon MD        Subjective     Principal Problem:Non-traumatic rhabdomyolysis        HPI:  This is an 80-year-old male with a past medical history of schizophrenia, chronic blepharitis, hyperlipidemia, CKD 3, BPH, who presents with a fall.    Patient presents for evaluation after a fall and a sycopal episode that occurred prior to presentation. He reports losing consciousness and falling face forward on the floor sustaining an injury to his head, elbows, abdomen and knees. He was unable to stand up and was on the floor for about 1.5 hours. Patient reports having a coughing episode a day prior but denies sick contacts. He denied chest pain.     In the ED, the patient was tachycardic (120s > 60s), but otherwise hemodynamically stable.  Labs were remarkable for leukocytosis (25.3), elevated CPK (23,406), elevated creatinine (3.3-baseline around 1.9), elevated troponin (0.525-chronically elevated), elevated lactic acid (4.8 > 2.1), elevated procalcitonin (2.53), elevated LFTs (T bili: 1.1, AST: 267, ALT: 73), hypophosphatemia (1.7), positive COVID-19.  CT head, maxillofacial, cervical spine showed a nondisplaced right nasal bone fracture with no soft tissue swelling, and paranasal sinus disease.  CT chest, abdomen and pelvis showed possible soft tissue contusion at the level of the xiphoid process, a 2-3 mm left lower lobe pulmonary nodule and nonspecific urinary bladder wall thickening.  Right elbow x-ray demonstrated an elbow effusion with an olecranon spur.  Patient was given 2 L of LR, vancomycin, Zosyn, Tylenol 1 g p.o..  He was admitted for further management.    Overview/Hospital Course:  Mr. Torres is a 81 yo M admitted after a fall and found to have  acute on chronic kidney injury and rhabdomyolysis. CK >20,000 on admit and rising to >40,000. Renal function worsening. On IVF. Also found to be COVID positive. Started on remdesivir. Nephrology consulted. CK improving, though Cr still rising.     Interval History: bruising/swelling improved. Not making much urine. Discussed with Nephrology, long to be placed for accurate I/Os.    Review of Systems  Objective:     Vital Signs (Most Recent):  Temp: 98.1 °F (36.7 °C) (02/17/25 1139)  Pulse: 78 (02/17/25 1427)  Resp: 20 (02/17/25 1139)  BP: 124/70 (02/17/25 1139)  SpO2: 97 % (02/17/25 1139) Vital Signs (24h Range):  Temp:  [96.7 °F (35.9 °C)-98.7 °F (37.1 °C)] 98.1 °F (36.7 °C)  Pulse:  [] 78  Resp:  [16-20] 20  SpO2:  [94 %-98 %] 97 %  BP: (114-154)/(66-82) 124/70     Weight: 68.9 kg (151 lb 14.4 oz)  Body mass index is 21.79 kg/m².    Intake/Output Summary (Last 24 hours) at 2/17/2025 1507  Last data filed at 2/17/2025 1315  Gross per 24 hour   Intake 720 ml   Output 100 ml   Net 620 ml         Physical Exam  Vitals and nursing note reviewed.   Constitutional:       General: He is awake. He is not in acute distress.     Appearance: Normal appearance. He is well-developed.   HENT:      Head: Normocephalic and atraumatic.      Nose: Nose normal.      Mouth/Throat:      Mouth: Mucous membranes are moist.   Eyes:      Extraocular Movements: Extraocular movements intact.      Conjunctiva/sclera: Conjunctivae normal.   Cardiovascular:      Rate and Rhythm: Normal rate and regular rhythm.   Pulmonary:      Effort: Pulmonary effort is normal.      Breath sounds: Normal breath sounds.   Abdominal:      General: There is no distension.      Palpations: Abdomen is soft.   Musculoskeletal:         General: No tenderness or signs of injury.      Right lower leg: No edema.      Left lower leg: No edema.   Skin:     General: Skin is warm and dry.      Findings: Bruising present. No erythema or rash.   Neurological:       General: No focal deficit present.      Mental Status: He is alert. Mental status is at baseline.   Psychiatric:         Mood and Affect: Mood normal.         Behavior: Behavior normal.             Significant Labs: All pertinent labs within the past 24 hours have been reviewed.    Significant Imaging: I have reviewed all pertinent imaging results/findings within the past 24 hours.    Assessment and Plan     * Non-traumatic rhabdomyolysis  Elevated CPK to 23302 in the setting of a prolonged fall   - CK trending to >40k  - with renal failure -- Nephrolgoy consulted  - continue with aggressive hydration  - no evidence of compartment syndrome  - CK improving    Elevated troponin  Recent Labs   Lab 02/14/25  0435   TROPONINI 0.837*         Chronically elevated  No chest pain    Continue to monitor     Leukocytosis  Likely reactive   Imaging unremarkable for infection   Hold antibiotics for now   - now resolved    COVID-19  No hypoxia, prophylactic remdesivir     BISI (acute kidney injury)  BISI is likely due to acute tubular necrosis caused by rhabdomyolysis . Baseline creatinine is  1.9 . Most recent creatinine and eGFR are listed below.  Recent Labs     02/15/25  0451 02/16/25  0542 02/17/25  0552   CREATININE 5.8* 7.8* 9.7*   EGFRNORACEVR 9* 6* 5*        Plan  - BISI is worsening. Will continue current treatment  - Avoid nephrotoxins and renally dose meds for GFR listed above  - Monitor urine output, serial BMP, and adjust therapy as needed  - IVF  - CR rising but no significant acidosis or electrolyte abnormality  Appreciate Nephrology    Elevated LFTs  Likely in the setting of rhabdomyolysis   Continue to monitor   - slowly improving      Schizophrenia  Continue Abilify         VTE Risk Mitigation (From admission, onward)           Ordered     heparin (porcine) injection 5,000 Units  Every 8 hours         02/14/25 0222     IP VTE HIGH RISK PATIENT  Once         02/14/25 0222     Place sequential compression device   Until discontinued         02/14/25 0222                    Discharge Planning   JULIANA:      Code Status: Full Code   Medical Readiness for Discharge Date:   Discharge Plan A: Skilled Nursing Facility, New Nursing Home placement - retirement care facility ([facility])                Please place Justification for DME        Emigdio Warner MD  Department of Hospital Medicine   [facility] Surg     Detail Level: Simple Comment: Patient reports overall improvement with imiquimod. Will plan to continue treatment as discussed. Will follow up with patient in 3/4 weeks. Render Risk Assessment In Note?: no

## 2025-02-17 NOTE — PT/OT/SLP PROGRESS
Occupational Therapy   Treatment    Name: Antonio Torres Jr.  MRN: [id number]  Admitting Diagnosis:  Non-traumatic rhabdomyolysis       Recommendations:     Discharge Recommendations: Moderate Intensity Therapy  Discharge Equipment Recommendations:  to be determined by next level of care  Barriers to discharge:   (not at PLOF with multiple fractures and high fall risk)    Assessment:     Antonio Torres Jr. is a 80 y.o. male with a medical diagnosis of Non-traumatic rhabdomyolysis.  He presents with deconditioning, limited endurance overall; reporting worries of moving with newly placed long catheter but agreeable to OOB activity with reassurance and education. Performance deficits affecting function are weakness, impaired endurance, impaired self care skills, gait instability, impaired functional mobility, impaired balance, decreased ROM, decreased lower extremity function, decreased upper extremity function, decreased safety awareness, decreased coordination, edema, impaired cardiopulmonary response to activity.     Rehab Prognosis:  Good; patient would benefit from acute skilled OT services to address these deficits and reach maximum level of function.       Plan:     Patient to be seen 4 x/week to address the above listed problems via self-care/home management, therapeutic activities, therapeutic exercises  Plan of Care Expires: 03/01/25  Plan of Care Reviewed with: patient    Subjective     Chief Complaint: Pt reports discomfort at site of long insertion  Patient/Family Comments/goals: To return to PLOF  Pain/Comfort:  Pain Rating 1:  (minor discomfort wtih new long insertion)  Pain Addressed 1: Cessation of Activity, Distraction  Pain Rating Post-Intervention 1: 0/10    Objective:     Communicated with: nsg prior to session.  Patient found HOB elevated with telemetry, peripheral IV upon OT entry to room.    General Precautions: Standard, fall, airborne, contact, droplet    Orthopedic  Precautions:N/A  Braces: N/A  Respiratory Status: Room air     Occupational Performance:     Bed Mobility:    Patient completed Rolling/Turning to Right with stand by assistance  Patient completed Scooting/Bridging with stand by assistance  Patient completed Supine to Sit with stand by assistance  Patient completed Sit to Supine with supervision     Functional Mobility/Transfers:  Patient completed Sit <> Stand Transfer with contact guard assistance  with  rolling walker   Functional Mobility: Pt with fair to fair- dynamic seated and standing balance. SOB with bed mobility and OOB activity, increased rest breaks required to ensure pt comfort and safety     Activities of Daily Living:  Upper Body Dressing: minimum assistance to adjust gown as robe supine in bed      [facility] 6 Click ADL: 22    Treatment & Education:  Pt educated on role of OT and POC.   Pt performing skills as listed above.     Patient left HOB elevated with all lines intact, call button in reach, bed alarm on, and nsg notified    GOALS:   Multidisciplinary Problems       Occupational Therapy Goals          Problem: Occupational Therapy    Goal Priority Disciplines Outcome Interventions   Occupational Therapy Goal     OT, PT/OT Progressing    Description: Goals to be met by: 2/22/25     Patient will increase functional independence with ADLs by performing:    UE Dressing with [place].  LE Dressing with Modified [place].  Grooming while standing at sink with Modified [place].  Toileting from toilet with Modified [place] for hygiene and clothing management.   Sitting in chair x60  minutes with Supervision.  Toilet transfer to toilet with Supervision.  Upper extremity exercise program x10-15  reps per handout, with supervision for SOB.                           Time Tracking:     OT Date of Treatment: 02/17/25  OT Start Time: 1504  OT Stop Time: 1520  OT Total Time (min): 16 min    Billable Minutes:Therapeutic Activity 16    OT/JOANN: OT      Number of JOANN visits since last OT visit: 0    2/17/2025

## 2025-02-17 NOTE — NURSING
16 Azeri Almanza inserted. Bladder scanned prior to insertion and pt was found to have 0mls in bladder. Pt tolerated well.

## 2025-02-17 NOTE — NURSING
Patient wheezing. Requested PRN breathing treatment. When reassessed, moderate relief was obtained. PT worked with patient. Patient tolerated well. Patient AAOx4. Vital signs stable. Patient did not complain of any pain throughout shift. Patient not showing signs of distress. Bed in lowest position, wheels locked, call bell in reach, side rails up x2.

## 2025-02-17 NOTE — ASSESSMENT & PLAN NOTE
Metabolic acidosis  Baseline creatinine 1.7-2.0  On admission creatinine 3.3    BISI due to Rhabdomyolysis, initial CK > 40k  Improving on IVF    Plan/Recommendation  Continue IVF and add 1 liter of isotonic bicarb for metabolic acidosis  Place long for accurate intake and output in setting of worsening creatinine.   -Keep MAP > 65  -Keep hemoglobin > 7  -Strict ins and outs  -Avoid nephrotoxic agents if possible and renally dose medications  -Avoid drastic hemodynamic changes if possible

## 2025-02-17 NOTE — PROGRESS NOTES
[facility] Surg  Nephrology  Progress Note    Patient Name: Antonio Torres Jr.  MRN: [id number]  Admission Date: 2/13/2025  Hospital Length of Stay: 3 days  Attending Provider: Emigdio Warner MD   Primary Care Physician: Fabio Lennon MD  Principal Problem:Non-traumatic rhabdomyolysis    Subjective:     HPI: Mr. Torres is an 81 yo male with schizophrenia, BPH, CKD, and h/o melanoma who presented to the ED overnight s/p fall. HR 120s on arrival. He was also found to be COVID+. CPK 23k. He was started on IVF. Nephrology consulted for BISI on CKD. Prior records obtained and reviewed. He is followed by myself outpatient; last visit was 11/11/24. His baseline Cr is 1.7-2.0; his CKD is of unknown etiology. His Cr was 3.3 on arrival --> 3.5 this morning. CPK now > 40k. Abnormal LFTs. He reports to be doing okay this afternoon. Complaining of soreness. No SOB or leg swelling.    Interval History: no recorded UOP overnight, sCr 9.7 and no acute dialysis needs    Review of patient's allergies indicates:  No Known Allergies  Current Facility-Administered Medications   Medication Frequency    acetaminophen tablet 650 mg Q8H PRN    acetaminophen tablet 650 mg Q4H PRN    albuterol-ipratropium 2.5 mg-0.5 mg/3 mL nebulizer solution 3 mL Q4H PRN    aluminum-magnesium hydroxide-simethicone 200-200-20 mg/5 mL suspension 30 mL QID PRN    ARIPiprazole tablet 20 mg Daily    bisacodyL suppository 10 mg Daily PRN    cefTRIAXone injection 2 g Q24H    cetirizine tablet 5 mg Q48H    glucagon (human recombinant) injection 1 mg PRN    glucose chewable tablet 16 g PRN    glucose chewable tablet 24 g PRN    heparin (porcine) injection 5,000 Units Q8H    lactated ringers infusion Continuous    magnesium oxide tablet 800 mg PRN    magnesium oxide tablet 800 mg PRN    melatonin tablet 6 mg Nightly PRN    multivitamin tablet Daily    naloxone 0.4 mg/mL injection 0.02 mg PRN    ondansetron injection 4 mg Q8H PRN    potassium, sodium phosphates  280-160-250 mg packet 2 packet PRN    potassium, sodium phosphates 280-160-250 mg packet 2 packet PRN    potassium, sodium phosphates 280-160-250 mg packet 2 packet PRN    senna-docusate 8.6-50 mg per tablet 1 tablet Daily PRN    simethicone chewable tablet 80 mg QID PRN    sodium bicarbonate 150 mEq in sterile water 1,000 mL infusion Continuous    sodium chloride 0.9% flush 10 mL Q12H PRN    Tdap vaccine injection 0.5 mL vaccine x 1 dose     Facility-Administered Medications Ordered in Other Encounters   Medication Frequency    0.9%  NaCl infusion Continuous    mupirocin 2 % ointment On Call Procedure       Objective:     Vital Signs (Most Recent):  Temp: 98.1 °F (36.7 °C) (02/17/25 1139)  Pulse: 78 (02/17/25 1427)  Resp: 20 (02/17/25 1139)  BP: 124/70 (02/17/25 1139)  SpO2: 97 % (02/17/25 1139) Vital Signs (24h Range):  Temp:  [96.7 °F (35.9 °C)-98.7 °F (37.1 °C)] 98.1 °F (36.7 °C)  Pulse:  [] 78  Resp:  [16-20] 20  SpO2:  [94 %-98 %] 97 %  BP: (114-154)/(66-82) 124/70     Weight: 68.9 kg (151 lb 14.4 oz) (02/14/25 1515)  Body mass index is 21.79 kg/m².  Body surface area is 1.84 meters squared.    I/O last 3 completed shifts:  In: 360 [P.O.:360]  Out: 450 [Urine:450]     Physical Exam  Constitutional:       General: He is not in acute distress.     Appearance: He is well-developed. He is not ill-appearing or toxic-appearing.   HENT:      Head: Normocephalic and atraumatic.   Eyes:      Pupils: Pupils are equal, round, and reactive to light.   Cardiovascular:      Rate and Rhythm: Normal rate and regular rhythm.      Heart sounds: Normal heart sounds.   Pulmonary:      Effort: Pulmonary effort is normal.      Breath sounds: Normal breath sounds.   Abdominal:      General: Bowel sounds are normal.      Palpations: Abdomen is soft.      Tenderness: There is no abdominal tenderness.   Musculoskeletal:         General: Normal range of motion.      Cervical back: Normal range of motion and neck supple.   Skin:      Capillary Refill: Capillary refill takes less than 2 seconds.   Neurological:      Mental Status: He is alert and oriented to person, place, and time.          Significant Labs:  All labs within the past 24 hours have been reviewed.     Significant Imaging:  Labs: Reviewed  Assessment/Plan:     Renal/  BISI (acute kidney injury)  Metabolic acidosis  Baseline creatinine 1.7-2.0  On admission creatinine 3.3    BISI due to Rhabdomyolysis, initial CK > 40k  Improving on IVF    Plan/Recommendation  Continue IVF and add 1 liter of isotonic bicarb for metabolic acidosis  Place long for accurate intake and output in setting of worsening creatinine.   -Keep MAP > 65  -Keep hemoglobin > 7  -Strict ins and outs  -Avoid nephrotoxic agents if possible and renally dose medications  -Avoid drastic hemodynamic changes if possible      Orthopedic  * Non-traumatic rhabdomyolysis  See BISI        Thank you for your consult. I will follow-up with patient. Please contact us if you have any additional questions.    Ezio Smith MD  Nephrology  [facility] - Med Surg

## 2025-02-17 NOTE — PLAN OF CARE
Problem: Adult Inpatient Plan of Care  Goal: Absence of Hospital-Acquired Illness or Injury  Outcome: Progressing  Intervention: Prevent Infection  Flowsheets (Taken 2/17/2025 0635)  Infection Prevention:   cohorting utilized   environmental surveillance performed   hand hygiene promoted   rest/sleep promoted   single patient room provided  Goal: Optimal Comfort and Wellbeing  Outcome: Progressing  Intervention: Provide Person-Centered Care  Flowsheets (Taken 2/17/2025 0635)  Trust Relationship/Rapport:   empathic listening provided   choices provided   thoughts/feelings acknowledged

## 2025-02-17 NOTE — SUBJECTIVE & OBJECTIVE
Interval History: no recorded UOP overnight, sCr 9.7 and no acute dialysis needs    Review of patient's allergies indicates:  No Known Allergies  Current Facility-Administered Medications   Medication Frequency    acetaminophen tablet 650 mg Q8H PRN    acetaminophen tablet 650 mg Q4H PRN    albuterol-ipratropium 2.5 mg-0.5 mg/3 mL nebulizer solution 3 mL Q4H PRN    aluminum-magnesium hydroxide-simethicone 200-200-20 mg/5 mL suspension 30 mL QID PRN    ARIPiprazole tablet 20 mg Daily    bisacodyL suppository 10 mg Daily PRN    cefTRIAXone injection 2 g Q24H    cetirizine tablet 5 mg Q48H    glucagon (human recombinant) injection 1 mg PRN    glucose chewable tablet 16 g PRN    glucose chewable tablet 24 g PRN    heparin (porcine) injection 5,000 Units Q8H    lactated ringers infusion Continuous    magnesium oxide tablet 800 mg PRN    magnesium oxide tablet 800 mg PRN    melatonin tablet 6 mg Nightly PRN    multivitamin tablet Daily    naloxone 0.4 mg/mL injection 0.02 mg PRN    ondansetron injection 4 mg Q8H PRN    potassium, sodium phosphates 280-160-250 mg packet 2 packet PRN    potassium, sodium phosphates 280-160-250 mg packet 2 packet PRN    potassium, sodium phosphates 280-160-250 mg packet 2 packet PRN    senna-docusate 8.6-50 mg per tablet 1 tablet Daily PRN    simethicone chewable tablet 80 mg QID PRN    sodium bicarbonate 150 mEq in sterile water 1,000 mL infusion Continuous    sodium chloride 0.9% flush 10 mL Q12H PRN    Tdap vaccine injection 0.5 mL vaccine x 1 dose     Facility-Administered Medications Ordered in Other Encounters   Medication Frequency    0.9%  NaCl infusion Continuous    mupirocin 2 % ointment On Call Procedure       Objective:     Vital Signs (Most Recent):  Temp: 98.1 °F (36.7 °C) (02/17/25 1139)  Pulse: 78 (02/17/25 1427)  Resp: 20 (02/17/25 1139)  BP: 124/70 (02/17/25 1139)  SpO2: 97 % (02/17/25 1139) Vital Signs (24h Range):  Temp:  [96.7 °F (35.9 °C)-98.7 °F (37.1 °C)] 98.1 °F  (36.7 °C)  Pulse:  [] 78  Resp:  [16-20] 20  SpO2:  [94 %-98 %] 97 %  BP: (114-154)/(66-82) 124/70     Weight: 68.9 kg (151 lb 14.4 oz) (02/14/25 1515)  Body mass index is 21.79 kg/m².  Body surface area is 1.84 meters squared.    I/O last 3 completed shifts:  In: 360 [P.O.:360]  Out: 450 [Urine:450]     Physical Exam  Constitutional:       General: He is not in acute distress.     Appearance: He is well-developed. He is not ill-appearing or toxic-appearing.   HENT:      Head: Normocephalic and atraumatic.   Eyes:      Pupils: Pupils are equal, round, and reactive to light.   Cardiovascular:      Rate and Rhythm: Normal rate and regular rhythm.      Heart sounds: Normal heart sounds.   Pulmonary:      Effort: Pulmonary effort is normal.      Breath sounds: Normal breath sounds.   Abdominal:      General: Bowel sounds are normal.      Palpations: Abdomen is soft.      Tenderness: There is no abdominal tenderness.   Musculoskeletal:         General: Normal range of motion.      Cervical back: Normal range of motion and neck supple.   Skin:     Capillary Refill: Capillary refill takes less than 2 seconds.   Neurological:      Mental Status: He is alert and oriented to person, place, and time.          Significant Labs:  All labs within the past 24 hours have been reviewed.     Significant Imaging:  Labs: Reviewed

## 2025-02-17 NOTE — ASSESSMENT & PLAN NOTE
BISI is likely due to acute tubular necrosis caused by rhabdomyolysis . Baseline creatinine is  1.9 . Most recent creatinine and eGFR are listed below.  Recent Labs     02/15/25  0451 02/16/25  0542 02/17/25  0552   CREATININE 5.8* 7.8* 9.7*   EGFRNORACEVR 9* 6* 5*        Plan  - BIIS is worsening. Will continue current treatment  - Avoid nephrotoxins and renally dose meds for GFR listed above  - Monitor urine output, serial BMP, and adjust therapy as needed  - IVF  - CR rising but no significant acidosis or electrolyte abnormality  Appreciate Nephrology

## 2025-02-17 NOTE — NURSING
Report received from night nurse RICO Alvarado. Visualized patient and assessed patient's overall condition and appearance. No acute distress noted.Plan of care ongoing.    [facility], [facility]  Nurses Note -- 4 Eyes      2/17/2025       Skin assessed on: Q Shift      [x] No Pressure Injuries Present    [x]Prevention Measures Documented    [] Yes LDA  for Pressure Injury Previously documented     [] Yes New Pressure Injury Discovered   [] LDA for New Pressure Injury Added      Attending RN:  Monse Boss LPN     Second RN:  Stephanie Barthelemy,RN

## 2025-02-17 NOTE — PT/OT/SLP PROGRESS
Physical Therapy Treatment    Patient Name:  Antonio Torres Jr.   MRN:  [id number]    Recommendations:     Discharge Recommendations: Moderate Intensity Therapy  Discharge Equipment Recommendations: to be determined by next level of care  Barriers to discharge: None    Assessment:     Antonio Torres Jr. is a 80 y.o. male admitted with a medical diagnosis of Non-traumatic rhabdomyolysis.  He presents with the following impairments/functional limitations: weakness, impaired endurance, impaired functional mobility, impaired self care skills, gait instability, decreased lower extremity function, decreased upper extremity function, impaired balance, decreased coordination, decreased safety awareness, impaired coordination, decreased ROM, impaired cardiopulmonary response to activity .    Rehab Prognosis: Good; patient would benefit from acute skilled PT services to address these deficits and reach maximum level of function.    Recent Surgery: * No surgery found *      Plan:     During this hospitalization, patient to be seen  (3-5x/wk) to address the identified rehab impairments via gait training, therapeutic activities, therapeutic exercises, neuromuscular re-education and progress toward the following goals:    Plan of Care Expires:       Subjective     Chief Complaint: weakness   Patient/Family Comments/goals: pt is pleasant and agreeable to therapy   Pain/Comfort:  Pain Rating 1: 0/10  Pain Rating Post-Intervention 1: 0/10      Objective:     Communicated with nurse  prior to session.  Patient found HOB elevated with bed alarm, peripheral IV upon PT entry to room.     General Precautions: Standard, fall, airborne, contact, droplet  Orthopedic Precautions: N/A  Braces: N/A  Respiratory Status: Room air   Pt seems SOB with activities however SpO2 maintain from 95%-98% on RA, HR stable   Functional Mobility:  Bed Mobility:     Scooting: stand by assistance  Supine to Sit: stand by assistance  Transfers:   V/T cues  for safety technique and walker management .   Sit to Stand: x 4 trials from bed and 2 trials from chair contact guard assistance and minimum assistance with rolling walker   Bed to Chair: contact guard assistance with  hand-held assist  using  Step Transfer  Gait:  pt ambulated 20 ft x 2 trials and 8 ft  with RW, CGA . Pt required seated rest breaks 2* to fatigue. Noted with decreased howie,decreased step length, decreased weight shifting ability, no LOB . VC's for safety technique and walker management.   Balance: good in sitting , fair in standing.       AM-PAC 6 CLICK MOBILITY  Turning over in bed (including adjusting bedclothes, sheets and blankets)?: 4  Sitting down on and standing up from a chair with arms (e.g., wheelchair, bedside commode, etc.): 3  Moving from lying on back to sitting on the side of the bed?: 3  Moving to and from a bed to a chair (including a wheelchair)?: 3  Need to walk in hospital room?: 3  Climbing 3-5 steps with a railing?: 2  Basic Mobility Total Score: 18       Treatment & Education:  Pt tolerated static standing with RW, CGA for balance while getting total A for diaper changed ( pt soiled with urine and BM)  Changed soiled gown in sitting with min A.   Lower Extremity Exercises.    Patient educated on: Purpose and Benefits of Therapeutic Exercise(s).    Patient verbalized acceptance/understanding of instruction(s), expectation(s), and limitation(s) (for safety).  Patient performed: 2 sets of 10 reps (each) of B LE Ther Ex: AP, LAQ, Hip abd/add, Hip flexion while sitting up on EOB.       Patient required  verbal cues/tactile cues to ensure correct sequence, to maintain proper form, and to allow for self-correction.    Patient required increase Reaction Time to initiate movements and a Sitting Rest Break between set(s) to recover.    Patient left up in chair on air cushion, BLE elevated, heels offloading  with all lines intact, call button in reach, and nurse notified..    GOALS:    Multidisciplinary Problems       Physical Therapy Goals          Problem: Physical Therapy    Goal Priority Disciplines Outcome Interventions   Physical Therapy Goal     PT, PT/OT Progressing    Description: Goals to be met by: 3/28/25     Patient will increase functional independence with mobility by performin. Bed to chair transfer with Contact Guard Assistance using Rolling Walker  2. Gait  x 150 feet with Minimal Assistance using Rolling Walker.                          DME Justifications:      Time Tracking:     PT Received On: 25  PT Start Time: 933     PT Stop Time: 1014  PT Total Time (min): 41 min     Billable Minutes: Gait Training 14, Therapeutic Activity 15, and Therapeutic Exercise 12    Treatment Type: Treatment  PT/PTA: PTA     Number of PTA visits since last PT visit: 2     2025

## 2025-02-17 NOTE — ASSESSMENT & PLAN NOTE
Elevated CPK to 84048 in the setting of a prolonged fall   - CK trending to >40k  - with renal failure -- Nephrolgoy consulted  - continue with aggressive hydration  - no evidence of compartment syndrome  - CK improving

## 2025-02-17 NOTE — NURSING
[facility], [facility] [place]  Nurses Note -- 4 Eyes      2/16/2025       Skin assessed on: Q Shift      [x] No Pressure Injuries Present    [x]Prevention Measures Documented    [] Yes LDA  for Pressure Injury Previously documented     [] Yes New Pressure Injury Discovered   [] LDA for New Pressure Injury Added      Attending RN:  Stephanie Barthelemy, RN     Second RN:  Jasmin Villasenor LPN

## 2025-02-17 NOTE — PLAN OF CARE
Pt remained free of falls during current shift.  Pt appeared to be in no distress and did not receive any prn pain medications. IV fluids was given per MD order. Plan of care and fall precautions reviewed with pt and verbalized understanding. Bed locked, lowered, SR up x2 and call light placed within reach.          Problem: Adult Inpatient Plan of Care  Goal: Plan of Care Review  Outcome: Progressing     Problem: Infection  Goal: Absence of Infection Signs and Symptoms  Outcome: Progressing     Problem: Acute Kidney Injury/Impairment  Goal: Fluid and Electrolyte Balance  Outcome: Progressing

## 2025-02-18 PROBLEM — N18.30 ACUTE RENAL FAILURE SUPERIMPOSED ON STAGE 3 CHRONIC KIDNEY DISEASE: Status: ACTIVE | Noted: 2025-02-14

## 2025-02-18 LAB
ALBUMIN SERPL BCP-MCNC: 2 G/DL (ref 3.5–5.2)
ALP SERPL-CCNC: 48 U/L (ref 40–150)
ALT SERPL W/O P-5'-P-CCNC: 214 U/L (ref 10–44)
ANION GAP SERPL CALC-SCNC: 18 MMOL/L (ref 8–16)
AST SERPL-CCNC: 293 U/L (ref 10–40)
BACTERIA BLD CULT: NORMAL
BACTERIA BLD CULT: NORMAL
BASOPHILS # BLD AUTO: 0.03 K/UL (ref 0–0.2)
BASOPHILS NFR BLD: 0.4 % (ref 0–1.9)
BILIRUB SERPL-MCNC: 0.2 MG/DL (ref 0.1–1)
BUN SERPL-MCNC: 92 MG/DL (ref 8–23)
CALCIUM SERPL-MCNC: 7.6 MG/DL (ref 8.7–10.5)
CHLORIDE SERPL-SCNC: 109 MMOL/L (ref 95–110)
CK SERPL-CCNC: ABNORMAL U/L (ref 20–200)
CO2 SERPL-SCNC: 15 MMOL/L (ref 23–29)
CREAT SERPL-MCNC: 10.3 MG/DL (ref 0.5–1.4)
DIFFERENTIAL METHOD BLD: ABNORMAL
EOSINOPHIL # BLD AUTO: 0.2 K/UL (ref 0–0.5)
EOSINOPHIL NFR BLD: 2.4 % (ref 0–8)
ERYTHROCYTE [DISTWIDTH] IN BLOOD BY AUTOMATED COUNT: 14.4 % (ref 11.5–14.5)
EST. GFR  (NO RACE VARIABLE): 5 ML/MIN/1.73 M^2
GLUCOSE SERPL-MCNC: 79 MG/DL (ref 70–110)
HCT VFR BLD AUTO: 35 % (ref 40–54)
HGB BLD-MCNC: 11.4 G/DL (ref 14–18)
IMM GRANULOCYTES # BLD AUTO: 0.04 K/UL (ref 0–0.04)
IMM GRANULOCYTES NFR BLD AUTO: 0.5 % (ref 0–0.5)
LYMPHOCYTES # BLD AUTO: 0.6 K/UL (ref 1–4.8)
LYMPHOCYTES NFR BLD: 6.7 % (ref 18–48)
MCH RBC QN AUTO: 30.6 PG (ref 27–31)
MCHC RBC AUTO-ENTMCNC: 32.6 G/DL (ref 32–36)
MCV RBC AUTO: 94 FL (ref 82–98)
MONOCYTES # BLD AUTO: 0.7 K/UL (ref 0.3–1)
MONOCYTES NFR BLD: 8.7 % (ref 4–15)
NEUTROPHILS # BLD AUTO: 6.7 K/UL (ref 1.8–7.7)
NEUTROPHILS NFR BLD: 81.3 % (ref 38–73)
NRBC BLD-RTO: 0 /100 WBC
OHS QRS DURATION: 82 MS
OHS QTC CALCULATION: 436 MS
PLATELET # BLD AUTO: 168 K/UL (ref 150–450)
PMV BLD AUTO: 10.7 FL (ref 9.2–12.9)
POCT GLUCOSE: 87 MG/DL (ref 70–110)
POTASSIUM SERPL-SCNC: 4.5 MMOL/L (ref 3.5–5.1)
PROT SERPL-MCNC: 4.5 G/DL (ref 6–8.4)
RBC # BLD AUTO: 3.72 M/UL (ref 4.6–6.2)
SODIUM SERPL-SCNC: 142 MMOL/L (ref 136–145)
WBC # BLD AUTO: 8.23 K/UL (ref 3.9–12.7)

## 2025-02-18 PROCEDURE — 94640 AIRWAY INHALATION TREATMENT: CPT

## 2025-02-18 PROCEDURE — 63600175 PHARM REV CODE 636 W HCPCS: Performed by: INTERNAL MEDICINE

## 2025-02-18 PROCEDURE — 97116 GAIT TRAINING THERAPY: CPT | Mod: CQ

## 2025-02-18 PROCEDURE — 99232 SBSQ HOSP IP/OBS MODERATE 35: CPT | Mod: ,,, | Performed by: STUDENT IN AN ORGANIZED HEALTH CARE EDUCATION/TRAINING PROGRAM

## 2025-02-18 PROCEDURE — 99900035 HC TECH TIME PER 15 MIN (STAT)

## 2025-02-18 PROCEDURE — 63600175 PHARM REV CODE 636 W HCPCS: Performed by: STUDENT IN AN ORGANIZED HEALTH CARE EDUCATION/TRAINING PROGRAM

## 2025-02-18 PROCEDURE — 25000003 PHARM REV CODE 250: Performed by: STUDENT IN AN ORGANIZED HEALTH CARE EDUCATION/TRAINING PROGRAM

## 2025-02-18 PROCEDURE — 94799 UNLISTED PULMONARY SVC/PX: CPT

## 2025-02-18 PROCEDURE — 94761 N-INVAS EAR/PLS OXIMETRY MLT: CPT

## 2025-02-18 PROCEDURE — 80053 COMPREHEN METABOLIC PANEL: CPT | Performed by: STUDENT IN AN ORGANIZED HEALTH CARE EDUCATION/TRAINING PROGRAM

## 2025-02-18 PROCEDURE — 25000242 PHARM REV CODE 250 ALT 637 W/ HCPCS: Performed by: STUDENT IN AN ORGANIZED HEALTH CARE EDUCATION/TRAINING PROGRAM

## 2025-02-18 PROCEDURE — 27000207 HC ISOLATION

## 2025-02-18 PROCEDURE — 97110 THERAPEUTIC EXERCISES: CPT | Mod: CQ

## 2025-02-18 PROCEDURE — 21400001 HC TELEMETRY ROOM

## 2025-02-18 PROCEDURE — 97535 SELF CARE MNGMENT TRAINING: CPT

## 2025-02-18 PROCEDURE — 94760 N-INVAS EAR/PLS OXIMETRY 1: CPT

## 2025-02-18 PROCEDURE — 82550 ASSAY OF CK (CPK): CPT | Performed by: STUDENT IN AN ORGANIZED HEALTH CARE EDUCATION/TRAINING PROGRAM

## 2025-02-18 PROCEDURE — 85025 COMPLETE CBC W/AUTO DIFF WBC: CPT | Performed by: STUDENT IN AN ORGANIZED HEALTH CARE EDUCATION/TRAINING PROGRAM

## 2025-02-18 PROCEDURE — 36415 COLL VENOUS BLD VENIPUNCTURE: CPT | Performed by: STUDENT IN AN ORGANIZED HEALTH CARE EDUCATION/TRAINING PROGRAM

## 2025-02-18 PROCEDURE — 97530 THERAPEUTIC ACTIVITIES: CPT

## 2025-02-18 PROCEDURE — 27000221 HC OXYGEN, UP TO 24 HOURS

## 2025-02-18 RX ADMIN — HEPARIN SODIUM 5000 UNITS: 5000 INJECTION INTRAVENOUS; SUBCUTANEOUS at 06:02

## 2025-02-18 RX ADMIN — THERA TABS 1 TABLET: TAB at 09:02

## 2025-02-18 RX ADMIN — HEPARIN SODIUM 5000 UNITS: 5000 INJECTION INTRAVENOUS; SUBCUTANEOUS at 02:02

## 2025-02-18 RX ADMIN — IPRATROPIUM BROMIDE AND ALBUTEROL SULFATE 3 ML: 2.5; .5 SOLUTION RESPIRATORY (INHALATION) at 08:02

## 2025-02-18 RX ADMIN — IPRATROPIUM BROMIDE AND ALBUTEROL SULFATE 3 ML: 2.5; .5 SOLUTION RESPIRATORY (INHALATION) at 07:02

## 2025-02-18 RX ADMIN — ARIPIPRAZOLE 20 MG: 10 TABLET ORAL at 09:02

## 2025-02-18 RX ADMIN — CEFTRIAXONE 2 G: 2 INJECTION, POWDER, FOR SOLUTION INTRAMUSCULAR; INTRAVENOUS at 02:02

## 2025-02-18 RX ADMIN — IPRATROPIUM BROMIDE AND ALBUTEROL SULFATE 3 ML: 2.5; .5 SOLUTION RESPIRATORY (INHALATION) at 01:02

## 2025-02-18 RX ADMIN — SODIUM CHLORIDE, POTASSIUM CHLORIDE, SODIUM LACTATE AND CALCIUM CHLORIDE: 600; 310; 30; 20 INJECTION, SOLUTION INTRAVENOUS at 07:02

## 2025-02-18 RX ADMIN — SODIUM CHLORIDE, POTASSIUM CHLORIDE, SODIUM LACTATE AND CALCIUM CHLORIDE: 600; 310; 30; 20 INJECTION, SOLUTION INTRAVENOUS at 08:02

## 2025-02-18 RX ADMIN — FUROSEMIDE 200 MG: 10 INJECTION, SOLUTION INTRAMUSCULAR; INTRAVENOUS at 05:02

## 2025-02-18 RX ADMIN — HEPARIN SODIUM 5000 UNITS: 5000 INJECTION INTRAVENOUS; SUBCUTANEOUS at 10:02

## 2025-02-18 RX ADMIN — IPRATROPIUM BROMIDE AND ALBUTEROL SULFATE 3 ML: 2.5; .5 SOLUTION RESPIRATORY (INHALATION) at 05:02

## 2025-02-18 NOTE — NURSING
[facility], [facility]  Nurses Note -- 4 Eyes      2/18/2025       Skin assessed on: Q Shift      [x] No Pressure Injuries Present    [x]Prevention Measures Documented    [] Yes LDA  for Pressure Injury Previously documented     [] Yes New Pressure Injury Discovered   [] LDA for New Pressure Injury Added      Attending RN:  Tacos Tolentino LPN     Second RN:  Monse GÓMEZ

## 2025-02-18 NOTE — PT/OT/SLP PROGRESS
Occupational Therapy   Treatment    Name: Antonio Torres Jr.  MRN: [id number]  Admitting Diagnosis:  Non-traumatic rhabdomyolysis       Recommendations:     Discharge Recommendations: Moderate Intensity Therapy  Discharge Equipment Recommendations:  to be determined by next level of care  Barriers to discharge:   (not at PLOF with multiple fractures and high fall risk)    Assessment:     Antonio Torres Jr. is a 80 y.o. male with a medical diagnosis of Non-traumatic rhabdomyolysis.  He presents with decondtioning, reporting increased fatigue and SOB from baseline, now on 1L O2. Performance deficits affecting function are weakness, impaired endurance, impaired sensation, impaired self care skills, impaired functional mobility, gait instability, impaired balance, decreased coordination, decreased upper extremity function, decreased lower extremity function, decreased safety awareness, impaired coordination, impaired skin, edema, impaired cardiopulmonary response to activity.     Rehab Prognosis:  Good; patient would benefit from acute skilled OT services to address these deficits and reach maximum level of function.       Plan:     Patient to be seen 4 x/week to address the above listed problems via self-care/home management, therapeutic activities, therapeutic exercises  Plan of Care Expires: 03/01/25  Plan of Care Reviewed with: patient    Subjective     Chief Complaint: Pt reports fatigue and mild SOB but agreeable to therapeutic activity  Patient/Family Comments/goals: To return to PLOF, to feel better  Pain/Comfort:  Pain Rating 1: 0/10    Objective:     Communicated with: nsg prior to session.  Patient found HOB elevated with telemetry, oxygen, peripheral IV upon OT entry to room.    General Precautions: Standard, airborne, contact, fall, droplet, respiratory    Orthopedic Precautions:N/A  Braces: N/A  Respiratory Status: Nasal cannula, flow 1 L/min     Occupational Performance:     Bed Mobility:    Patient  completed Rolling/Turning to Right with stand by assistance  Patient completed Scooting/Bridging with stand by assistance  Patient completed Supine to Sit with stand by assistance  Patient completed Sit to Supine with stand by assistance     Functional Mobility/Transfers:  Patient completed Sit <> Stand Transfer with contact guard assistance and minimum assistance  with  rolling walker   Functional Mobility: Pt with fair dynamic seated and standing balance. Pt ambulated ~6 ft in room, reporting some lightheadedness/dizziness but able to return to bed where he reported improvement of symptoms    Activities of Daily Living:  Upper Body Dressing: minimum assistance to don gown as robe seated EOB  Lower Body Dressing: moderate assistance to don/doff B socks seated EOB      [facility] 6 Click ADL: 21    Treatment & Education:  Pt educated on role of OT and POC.   Pt performing skills as listed above.     Patient left HOB elevated with all lines intact, call button in reach, bed alarm on, and nsg notified    GOALS:   Multidisciplinary Problems       Occupational Therapy Goals          Problem: Occupational Therapy    Goal Priority Disciplines Outcome Interventions   Occupational Therapy Goal     OT, PT/OT Progressing    Description: Goals to be met by: 2/22/25     Patient will increase functional independence with ADLs by performing:    UE Dressing with [place].  LE Dressing with Modified [place].  Grooming while standing at sink with Modified [place].  Toileting from toilet with Modified [place] for hygiene and clothing management.   Sitting in chair x60  minutes with Supervision.  Toilet transfer to toilet with Supervision.  Upper extremity exercise program x10-15  reps per handout, with supervision for SOB.                           Time Tracking:     OT Date of Treatment: 02/18/25  OT Start Time: 1530  OT Stop Time: 1549  OT Total Time (min): 19 min    Billable Minutes:Self Care/Home Management  14  Therapeutic Activity 15    OT/JOANN: OT     Number of JOANN visits since last OT visit: 0    2/18/2025

## 2025-02-18 NOTE — SUBJECTIVE & OBJECTIVE
Interval History: sCr continuing to worsen  cc with mercedes present    Review of patient's allergies indicates:  No Known Allergies  Current Facility-Administered Medications   Medication Frequency    acetaminophen tablet 650 mg Q8H PRN    acetaminophen tablet 650 mg Q4H PRN    albuterol-ipratropium 2.5 mg-0.5 mg/3 mL nebulizer solution 3 mL Q4H PRN    aluminum-magnesium hydroxide-simethicone 200-200-20 mg/5 mL suspension 30 mL QID PRN    ARIPiprazole tablet 20 mg Daily    bisacodyL suppository 10 mg Daily PRN    cefTRIAXone injection 2 g Q24H    cetirizine tablet 5 mg Q48H    furosemide (Lasix) 200 mg in 0.9% NaCl 100 mL IVPB Once    glucagon (human recombinant) injection 1 mg PRN    glucose chewable tablet 16 g PRN    glucose chewable tablet 24 g PRN    heparin (porcine) injection 5,000 Units Q8H    lactated ringers infusion Continuous    magnesium oxide tablet 800 mg PRN    magnesium oxide tablet 800 mg PRN    melatonin tablet 6 mg Nightly PRN    multivitamin tablet Daily    naloxone 0.4 mg/mL injection 0.02 mg PRN    ondansetron injection 4 mg Q8H PRN    potassium, sodium phosphates 280-160-250 mg packet 2 packet PRN    potassium, sodium phosphates 280-160-250 mg packet 2 packet PRN    potassium, sodium phosphates 280-160-250 mg packet 2 packet PRN    senna-docusate 8.6-50 mg per tablet 1 tablet Daily PRN    simethicone chewable tablet 80 mg QID PRN    sodium chloride 0.9% flush 10 mL Q12H PRN    Tdap vaccine injection 0.5 mL vaccine x 1 dose     Facility-Administered Medications Ordered in Other Encounters   Medication Frequency    0.9%  NaCl infusion Continuous    mupirocin 2 % ointment On Call Procedure       Objective:     Vital Signs (Most Recent):  Temp: 98.3 °F (36.8 °C) (02/18/25 1509)  Pulse: 101 (02/18/25 1509)  Resp: (!) 22 (02/18/25 1509)  BP: 137/74 (02/18/25 1509)  SpO2: (!) 94 % (02/18/25 1509) Vital Signs (24h Range):  Temp:  [98 °F (36.7 °C)-98.8 °F (37.1 °C)] 98.3 °F (36.8 °C)  Pulse:   [] 101  Resp:  [19-22] 22  SpO2:  [93 %-97 %] 94 %  BP: (118-138)/(63-86) 137/74     Weight: 68.9 kg (151 lb 14.4 oz) (02/14/25 1515)  Body mass index is 21.79 kg/m².  Body surface area is 1.84 meters squared.    I/O last 3 completed shifts:  In: 480 [P.O.:480]  Out: 250 [Urine:250]     Physical Exam  Constitutional:       General: He is not in acute distress.     Appearance: He is well-developed. He is not ill-appearing or toxic-appearing.   HENT:      Head: Normocephalic and atraumatic.   Eyes:      Pupils: Pupils are equal, round, and reactive to light.   Cardiovascular:      Rate and Rhythm: Normal rate and regular rhythm.      Heart sounds: Normal heart sounds.   Pulmonary:      Effort: Pulmonary effort is normal.      Breath sounds: Rhonchi present.   Abdominal:      General: Bowel sounds are normal.      Palpations: Abdomen is soft.      Tenderness: There is no abdominal tenderness.   Musculoskeletal:         General: Normal range of motion.      Cervical back: Normal range of motion and neck supple.   Skin:     Capillary Refill: Capillary refill takes less than 2 seconds.   Neurological:      Mental Status: He is alert and oriented to person, place, and time.          Significant Labs:  All labs within the past 24 hours have been reviewed.     Significant Imaging:  Labs: Reviewed

## 2025-02-18 NOTE — NURSING
[facility], [facility] [place]  Nurses Note -- 4 Eyes      2/18/2025       Skin assessed on: Q Shift      [x] No Pressure Injuries Present    []Prevention Measures Documented    [] Yes LDA  for Pressure Injury Previously documented     [] Yes New Pressure Injury Discovered   [] LDA for New Pressure Injury Added      Attending RN:  Luis Gan LPN     Second RN:  LILIA Balderrama

## 2025-02-18 NOTE — ASSESSMENT & PLAN NOTE
Creatine stable for now. BMP reviewed- noted Estimated Creatinine Clearance: 5.4 mL/min (A) (based on SCr of 10.3 mg/dL (H)). according to latest data. Based on current GFR, CKD stage is stage 3 - GFR 30-59.  Monitor UOP and serial BMP and adjust therapy as needed. Renally dose meds. Avoid nephrotoxic medications and procedures.

## 2025-02-18 NOTE — NURSING
Bladder scanned performed ans 0 mls was found in the bladder. Charge nurser Katy assisted and new long placed. Virtual MD Dr. Mcdonald notified.

## 2025-02-18 NOTE — NURSING
Bedside Report given to night nurse LILIA Balderrama. Walking rounds completed. Visualized and assessed patient NAD noted. Safety precautions maintained and call light within reach.     Chart check completed.

## 2025-02-18 NOTE — PROGRESS NOTES
[facility] Medicine  Progress Note    Patient Name: Antonio Torres Jr.  MRN: [id number]  Patient Class: IP- Inpatient   Admission Date: 2/13/2025  Length of Stay: 4 days  Attending Physician: Eloise Maier, *  Primary Care Provider: Fabio Lennon MD        Subjective     Principal Problem:Non-traumatic rhabdomyolysis        HPI:  This is an 80-year-old male with a past medical history of schizophrenia, chronic blepharitis, hyperlipidemia, CKD 3, BPH, who presents with a fall.    Patient presents for evaluation after a fall and a sycopal episode that occurred prior to presentation. He reports losing consciousness and falling face forward on the floor sustaining an injury to his head, elbows, abdomen and knees. He was unable to stand up and was on the floor for about 1.5 hours. Patient reports having a coughing episode a day prior but denies sick contacts. He denied chest pain.     In the ED, the patient was tachycardic (120s > 60s), but otherwise hemodynamically stable.  Labs were remarkable for leukocytosis (25.3), elevated CPK (23,406), elevated creatinine (3.3-baseline around 1.9), elevated troponin (0.525-chronically elevated), elevated lactic acid (4.8 > 2.1), elevated procalcitonin (2.53), elevated LFTs (T bili: 1.1, AST: 267, ALT: 73), hypophosphatemia (1.7), positive COVID-19.  CT head, maxillofacial, cervical spine showed a nondisplaced right nasal bone fracture with no soft tissue swelling, and paranasal sinus disease.  CT chest, abdomen and pelvis showed possible soft tissue contusion at the level of the xiphoid process, a 2-3 mm left lower lobe pulmonary nodule and nonspecific urinary bladder wall thickening.  Right elbow x-ray demonstrated an elbow effusion with an olecranon spur.  Patient was given 2 L of LR, vancomycin, Zosyn, Tylenol 1 g p.o..  He was admitted for further management.    Overview/Hospital Course:  Mr. Torres is a 79 yo M admitted after a fall and found to  have acute on chronic kidney injury and rhabdomyolysis. CK >20,000 on admit and rising to >40,000. Renal function worsening. On IVF. Also found to be COVID positive. Started on remdesivir. Nephrology consulted. CK improving, though Cr still rising. Almanza is placed for accurate I&D monitoring.    Interval History: bruising/swelling improved. Not making much urine. Discussed with Nephrology,    Almanza is placed for accurate I&D monitoring.    Review of Systems  Objective:     Vital Signs (Most Recent):  Temp: 98.8 °F (37.1 °C) (02/18/25 0740)  Pulse: 82 (02/18/25 1046)  Resp: 20 (02/18/25 0740)  BP: 130/66 (02/18/25 0740)  SpO2: 96 % (02/18/25 0740) Vital Signs (24h Range):  Temp:  [98 °F (36.7 °C)-98.8 °F (37.1 °C)] 98.8 °F (37.1 °C)  Pulse:  [] 82  Resp:  [19-21] 20  SpO2:  [93 %-97 %] 96 %  BP: (118-138)/(63-86) 130/66     Weight: 68.9 kg (151 lb 14.4 oz)  Body mass index is 21.79 kg/m².    Intake/Output Summary (Last 24 hours) at 2/18/2025 1117  Last data filed at 2/18/2025 0834  Gross per 24 hour   Intake 480 ml   Output 250 ml   Net 230 ml         Physical Exam  Vitals and nursing note reviewed.   Constitutional:       General: He is awake. He is not in acute distress.     Appearance: Normal appearance. He is well-developed.   HENT:      Head: Normocephalic and atraumatic.      Nose: Nose normal.      Mouth/Throat:      Mouth: Mucous membranes are moist.   Eyes:      Extraocular Movements: Extraocular movements intact.      Conjunctiva/sclera: Conjunctivae normal.   Cardiovascular:      Rate and Rhythm: Normal rate and regular rhythm.   Pulmonary:      Effort: Pulmonary effort is normal.      Breath sounds: Normal breath sounds.   Abdominal:      General: There is no distension.      Palpations: Abdomen is soft.   Musculoskeletal:         General: No tenderness or signs of injury.      Right lower leg: No edema.      Left lower leg: No edema.   Skin:     General: Skin is warm and dry.      Findings:  Bruising present. No erythema or rash.   Neurological:      General: No focal deficit present.      Mental Status: He is alert. Mental status is at baseline.   Psychiatric:         Mood and Affect: Mood normal.         Behavior: Behavior normal.             Significant Labs: All pertinent labs within the past 24 hours have been reviewed.    Significant Imaging: I have reviewed all pertinent imaging results/findings within the past 24 hours.    Assessment and Plan     * Non-traumatic rhabdomyolysis  Elevated CPK to 37695 in the setting of a prolonged fall   - CK trending to >40k  - with renal failure -- Nephrolgoy consulted  - continue with aggressive hydration  - no evidence of compartment syndrome  - CK improving    Elevated troponin  Recent Labs   Lab 02/14/25  0435   TROPONINI 0.837*         Chronically elevated  No chest pain    Continue to monitor     Leukocytosis  Likely reactive   Imaging unremarkable for infection   Hold antibiotics for now   - now resolved    COVID-19  No hypoxia, prophylactic remdesivir     Acute renal failure superimposed on stage 3 chronic kidney disease  BISI is likely due to acute tubular necrosis caused by rhabdomyolysis . Baseline creatinine is  1.9 . Most recent creatinine and eGFR are listed below.  Recent Labs     02/16/25  0542 02/17/25  0552 02/18/25  0516   CREATININE 7.8* 9.7* 10.3*   EGFRNORACEVR 6* 5* 5*        Plan  - BISI is worsening. Will continue current treatment  - Avoid nephrotoxins and renally dose meds for GFR listed above  - Monitor urine output, serial BMP, and adjust therapy as needed  - IVF  - CR rising but no significant acidosis or electrolyte abnormality  Appreciate Nephrology.   Almanza is placed for accurate I&D monitoring.    Elevated LFTs  Likely in the setting of rhabdomyolysis   Continue to monitor   - slowly improving      Stage 3b chronic kidney disease  Creatine stable for now. BMP reviewed- noted Estimated Creatinine Clearance: 5.4 mL/min (A) (based on  SCr of 10.3 mg/dL (H)). according to latest data. Based on current GFR, CKD stage is stage 3 - GFR 30-59.  Monitor UOP and serial BMP and adjust therapy as needed. Renally dose meds. Avoid nephrotoxic medications and procedures.    Schizophrenia  Continue Abilify         VTE Risk Mitigation (From admission, onward)           Ordered     heparin (porcine) injection 5,000 Units  Every 8 hours         02/14/25 0222     IP VTE HIGH RISK PATIENT  Once         02/14/25 0222     Place sequential compression device  Until discontinued         02/14/25 0222                    Discharge Planning   JULIANA:      Code Status: Full Code   Medical Readiness for Discharge Date:   Discharge Plan A: Skilled Nursing Facility, New Nursing Home placement - [facility] facility ([facility])                Please place Justification for DME        Eloise Maier MD  Department of Hospital Medicine   [facility] - Med Surg

## 2025-02-18 NOTE — ASSESSMENT & PLAN NOTE
BISI is likely due to acute tubular necrosis caused by rhabdomyolysis . Baseline creatinine is  1.9 . Most recent creatinine and eGFR are listed below.  Recent Labs     02/16/25  0542 02/17/25  0552 02/18/25  0516   CREATININE 7.8* 9.7* 10.3*   EGFRNORACEVR 6* 5* 5*        Plan  - BISI is worsening. Will continue current treatment  - Avoid nephrotoxins and renally dose meds for GFR listed above  - Monitor urine output, serial BMP, and adjust therapy as needed  - IVF  - CR rising but no significant acidosis or electrolyte abnormality  Appreciate Nephrology.   Almanza is placed for accurate I&D monitoring.

## 2025-02-18 NOTE — PT/OT/SLP PROGRESS
Physical Therapy Treatment    Patient Name:  Antonio Torres Jr.   MRN:  [id number]    Recommendations:     Discharge Recommendations: Moderate Intensity Therapy  Discharge Equipment Recommendations: to be determined by next level of care  Barriers to discharge: None    Assessment:     Antonio Torres Jr. is a 80 y.o. male admitted with a medical diagnosis of Non-traumatic rhabdomyolysis.  He presents with the following impairments/functional limitations: impaired self care skills, weakness, impaired sensation, impaired endurance, impaired functional mobility, gait instability, impaired balance, decreased coordination, decreased upper extremity function, decreased lower extremity function, decreased safety awareness, pain, decreased ROM, impaired coordination, edema, impaired skin, impaired cardiopulmonary response to activity .    Rehab Prognosis: Good; patient would benefit from acute skilled PT services to address these deficits and reach maximum level of function.    Recent Surgery: * No surgery found *      Plan:     During this hospitalization, patient to be seen  (3-5x/wk) to address the identified rehab impairments via gait training, therapeutic activities, therapeutic exercises, neuromuscular re-education and progress toward the following goals:    Plan of Care Expires:       Subjective     Chief Complaint: stiffness and fatigue    Patient/Family Comments/goals: pt seems fatigue/sleepy however very pleasant and agreeable to therapy   Pain/Comfort:  Pain Rating 1: 0/10  Pain Rating Post-Intervention 1: 0/10      Objective:     Communicated with nurse prior to session.  Patient found HOB elevated with feet against foot board telemetry, peripheral IV, bed alarm, oxygen upon PT entry to room.     General Precautions: Standard, fall, airborne, contact, respiratory, droplet  Orthopedic Precautions: N/A  Braces: N/A  Respiratory Status: Nasal cannula, flow 1 L/min   SpO2:  93%- 96% on RA, HR stable   Functional  Mobility:  Bed Mobility:     Rolling Right: contact guard assistance  Scooting: minimum assistance  Supine to Sit: minimum assistance  Transfers:   V/T cues for safety technique and walker management .   Sit to Stand: x 3 trials from bed and 1 trial from chair contact guard assistance and minimum assistance with rolling walker  Bed to Chair: MIN A  with  hand-held assist  using  Step Transfer  Gait:  pt ambulated 6 ft with no AD , MIN A  and 12 ft with RW, CGA . Pt required seated rest breaks 2* to fatigue. Noted with decreased howie,decreased step length, decreased weight shifting ability, no LOB . VC's for safety technique and walker management.   Balance: good in sitting , fair in standing.       AM-PAC 6 CLICK MOBILITY  Turning over in bed (including adjusting bedclothes, sheets and blankets)?: 4  Sitting down on and standing up from a chair with arms (e.g., wheelchair, bedside commode, etc.): 3  Moving from lying on back to sitting on the side of the bed?: 3  Moving to and from a bed to a chair (including a wheelchair)?: 3  Need to walk in hospital room?: 3  Climbing 3-5 steps with a railing?: 2  Basic Mobility Total Score: 18       Treatment & Education:  Pt performed seated BLE 10 reps x 2 trials:   AP, LAQ, HS,  Hip abd/add with pillow , Hip flexion. V/T's cues for technique and sequence. Pt tolerated well.   Performed standing BLE x 10 reps x 2 : hip flexion with RW, CGA .   Educated pt on safety awareness with all OOB mobility , transfer and gait training.     Patient left up in chair with heels offloading  all lines intact, call button in reach, and nurse  notified..    GOALS:   Multidisciplinary Problems       Physical Therapy Goals          Problem: Physical Therapy    Goal Priority Disciplines Outcome Interventions   Physical Therapy Goal     PT, PT/OT Progressing    Description: Goals to be met by: 3/28/25     Patient will increase functional independence with mobility by performin. Bed to  chair transfer with Contact Guard Assistance using Rolling Walker  2. Gait  x 150 feet with Minimal Assistance using Rolling Walker.                          DME Justifications:  No DME recommended requiring DME justifications    Time Tracking:     PT Received On: 02/18/25  PT Start Time: 1111     PT Stop Time: 1140  PT Total Time (min): 29 min     Billable Minutes: Gait Training 12 and Therapeutic Exercise 17    Treatment Type: Treatment  PT/PTA: PTA     Number of PTA visits since last PT visit: 4     02/18/2025

## 2025-02-18 NOTE — PROGRESS NOTES
[facility]  Nephrology  Progress Note    Patient Name: Antonio Torres Jr.  MRN: [id number]  Admission Date: 2/13/2025  Hospital Length of Stay: 4 days  Attending Provider: Eloise Maier, *   Primary Care Physician: Fabio Lennon MD  Principal Problem:Non-traumatic rhabdomyolysis    Subjective:     HPI: Mr. Torres is an 81 yo male with schizophrenia, BPH, CKD, and h/o melanoma who presented to the ED overnight s/p fall. HR 120s on arrival. He was also found to be COVID+. CPK 23k. He was started on IVF. Nephrology consulted for BISI on CKD. Prior records obtained and reviewed. He is followed by myself outpatient; last visit was 11/11/24. His baseline Cr is 1.7-2.0; his CKD is of unknown etiology. His Cr was 3.3 on arrival --> 3.5 this morning. CPK now > 40k. Abnormal LFTs. He reports to be doing okay this afternoon. Complaining of soreness. No SOB or leg swelling.    Interval History: sCr continuing to worsen  cc with long present    Review of patient's allergies indicates:  No Known Allergies  Current Facility-Administered Medications   Medication Frequency    acetaminophen tablet 650 mg Q8H PRN    acetaminophen tablet 650 mg Q4H PRN    albuterol-ipratropium 2.5 mg-0.5 mg/3 mL nebulizer solution 3 mL Q4H PRN    aluminum-magnesium hydroxide-simethicone 200-200-20 mg/5 mL suspension 30 mL QID PRN    ARIPiprazole tablet 20 mg Daily    bisacodyL suppository 10 mg Daily PRN    cefTRIAXone injection 2 g Q24H    cetirizine tablet 5 mg Q48H    furosemide (Lasix) 200 mg in 0.9% NaCl 100 mL IVPB Once    glucagon (human recombinant) injection 1 mg PRN    glucose chewable tablet 16 g PRN    glucose chewable tablet 24 g PRN    heparin (porcine) injection 5,000 Units Q8H    lactated ringers infusion Continuous    magnesium oxide tablet 800 mg PRN    magnesium oxide tablet 800 mg PRN    melatonin tablet 6 mg Nightly PRN    multivitamin tablet Daily    naloxone 0.4 mg/mL injection 0.02 mg PRN    ondansetron  injection 4 mg Q8H PRN    potassium, sodium phosphates 280-160-250 mg packet 2 packet PRN    potassium, sodium phosphates 280-160-250 mg packet 2 packet PRN    potassium, sodium phosphates 280-160-250 mg packet 2 packet PRN    senna-docusate 8.6-50 mg per tablet 1 tablet Daily PRN    simethicone chewable tablet 80 mg QID PRN    sodium chloride 0.9% flush 10 mL Q12H PRN    Tdap vaccine injection 0.5 mL vaccine x 1 dose     Facility-Administered Medications Ordered in Other Encounters   Medication Frequency    0.9%  NaCl infusion Continuous    mupirocin 2 % ointment On Call Procedure       Objective:     Vital Signs (Most Recent):  Temp: 98.3 °F (36.8 °C) (02/18/25 1509)  Pulse: 101 (02/18/25 1509)  Resp: (!) 22 (02/18/25 1509)  BP: 137/74 (02/18/25 1509)  SpO2: (!) 94 % (02/18/25 1509) Vital Signs (24h Range):  Temp:  [98 °F (36.7 °C)-98.8 °F (37.1 °C)] 98.3 °F (36.8 °C)  Pulse:  [] 101  Resp:  [19-22] 22  SpO2:  [93 %-97 %] 94 %  BP: (118-138)/(63-86) 137/74     Weight: 68.9 kg (151 lb 14.4 oz) (02/14/25 1515)  Body mass index is 21.79 kg/m².  Body surface area is 1.84 meters squared.    I/O last 3 completed shifts:  In: 480 [P.O.:480]  Out: 250 [Urine:250]     Physical Exam  Constitutional:       General: He is not in acute distress.     Appearance: He is well-developed. He is not ill-appearing or toxic-appearing.   HENT:      Head: Normocephalic and atraumatic.   Eyes:      Pupils: Pupils are equal, round, and reactive to light.   Cardiovascular:      Rate and Rhythm: Normal rate and regular rhythm.      Heart sounds: Normal heart sounds.   Pulmonary:      Effort: Pulmonary effort is normal.      Breath sounds: Rhonchi present.   Abdominal:      General: Bowel sounds are normal.      Palpations: Abdomen is soft.      Tenderness: There is no abdominal tenderness.   Musculoskeletal:         General: Normal range of motion.      Cervical back: Normal range of motion and neck supple.   Skin:     Capillary  Refill: Capillary refill takes less than 2 seconds.   Neurological:      Mental Status: He is alert and oriented to person, place, and time.          Significant Labs:  All labs within the past 24 hours have been reviewed.     Significant Imaging:  Labs: Reviewed  Assessment/Plan:     Renal/  Acute renal failure superimposed on stage 3 chronic kidney disease  Metabolic acidosis  Baseline creatinine 1.7-2.0  On admission creatinine 3.3    BISI due to Rhabdomyolysis, initial CK > 40k  Worsening though remains without RRT needs    Consent for dialysis obtained 2/18/2025    Plan/Recommendation  Trial lasix 200  Called sister and discussed the possibility of needing dialysis  -Keep MAP > 65  -Keep hemoglobin > 7  -Strict ins and outs  -Avoid nephrotoxic agents if possible and renally dose medications  -Avoid drastic hemodynamic changes if possible      Orthopedic  * Non-traumatic rhabdomyolysis  See BISI        Thank you for your consult. I will follow-up with patient. Please contact us if you have any additional questions.    Ezio Smith MD  Nephrology  [facility] [place] [facility] [place]

## 2025-02-18 NOTE — ASSESSMENT & PLAN NOTE
Metabolic acidosis  Baseline creatinine 1.7-2.0  On admission creatinine 3.3    BISI due to Rhabdomyolysis, initial CK > 40k  Worsening though remains without RRT needs    Consent for dialysis obtained 2/18/2025    Plan/Recommendation  Trial lasix 200  Called sister and discussed the possibility of needing dialysis  -Keep MAP > 65  -Keep hemoglobin > 7  -Strict ins and outs  -Avoid nephrotoxic agents if possible and renally dose medications  -Avoid drastic hemodynamic changes if possible

## 2025-02-18 NOTE — SUBJECTIVE & OBJECTIVE
Interval History: bruising/swelling improved. Not making much urine. Discussed with Nephrology,    Almanza is placed for accurate I&D monitoring.    Review of Systems  Objective:     Vital Signs (Most Recent):  Temp: 98.8 °F (37.1 °C) (02/18/25 0740)  Pulse: 82 (02/18/25 1046)  Resp: 20 (02/18/25 0740)  BP: 130/66 (02/18/25 0740)  SpO2: 96 % (02/18/25 0740) Vital Signs (24h Range):  Temp:  [98 °F (36.7 °C)-98.8 °F (37.1 °C)] 98.8 °F (37.1 °C)  Pulse:  [] 82  Resp:  [19-21] 20  SpO2:  [93 %-97 %] 96 %  BP: (118-138)/(63-86) 130/66     Weight: 68.9 kg (151 lb 14.4 oz)  Body mass index is 21.79 kg/m².    Intake/Output Summary (Last 24 hours) at 2/18/2025 1117  Last data filed at 2/18/2025 0834  Gross per 24 hour   Intake 480 ml   Output 250 ml   Net 230 ml         Physical Exam  Vitals and nursing note reviewed.   Constitutional:       General: He is awake. He is not in acute distress.     Appearance: Normal appearance. He is well-developed.   HENT:      Head: Normocephalic and atraumatic.      Nose: Nose normal.      Mouth/Throat:      Mouth: Mucous membranes are moist.   Eyes:      Extraocular Movements: Extraocular movements intact.      Conjunctiva/sclera: Conjunctivae normal.   Cardiovascular:      Rate and Rhythm: Normal rate and regular rhythm.   Pulmonary:      Effort: Pulmonary effort is normal.      Breath sounds: Normal breath sounds.   Abdominal:      General: There is no distension.      Palpations: Abdomen is soft.   Musculoskeletal:         General: No tenderness or signs of injury.      Right lower leg: No edema.      Left lower leg: No edema.   Skin:     General: Skin is warm and dry.      Findings: Bruising present. No erythema or rash.   Neurological:      General: No focal deficit present.      Mental Status: He is alert. Mental status is at baseline.   Psychiatric:         Mood and Affect: Mood normal.         Behavior: Behavior normal.             Significant Labs: All pertinent labs within  the past 24 hours have been reviewed.    Significant Imaging: I have reviewed all pertinent imaging results/findings within the past 24 hours.

## 2025-02-19 PROBLEM — D72.829 LEUKOCYTOSIS: Status: RESOLVED | Noted: 2025-02-14 | Resolved: 2025-02-19

## 2025-02-19 PROBLEM — Z71.89 ACP (ADVANCE CARE PLANNING): Status: ACTIVE | Noted: 2025-02-19

## 2025-02-19 PROBLEM — I48.91 NEW ONSET A-FIB: Status: ACTIVE | Noted: 2025-02-19

## 2025-02-19 LAB
ALBUMIN SERPL BCP-MCNC: 2.2 G/DL (ref 3.5–5.2)
ALLENS TEST: ABNORMAL
ALP SERPL-CCNC: 50 U/L (ref 40–150)
ALT SERPL W/O P-5'-P-CCNC: 216 U/L (ref 10–44)
ANION GAP SERPL CALC-SCNC: 18 MMOL/L (ref 8–16)
ANION GAP SERPL CALC-SCNC: 18 MMOL/L (ref 8–16)
APTT PPP: 30.3 SEC (ref 21–32)
APTT PPP: 39.9 SEC (ref 21–32)
APTT PPP: 44.7 SEC (ref 21–32)
APTT PPP: 45.2 SEC (ref 21–32)
AST SERPL-CCNC: 220 U/L (ref 10–40)
BASOPHILS # BLD AUTO: 0.04 K/UL (ref 0–0.2)
BASOPHILS NFR BLD: 0.5 % (ref 0–1.9)
BILIRUB SERPL-MCNC: 0.2 MG/DL (ref 0.1–1)
BUN SERPL-MCNC: 96 MG/DL (ref 8–23)
BUN SERPL-MCNC: 96 MG/DL (ref 8–23)
CALCIUM SERPL-MCNC: 7.9 MG/DL (ref 8.7–10.5)
CALCIUM SERPL-MCNC: 7.9 MG/DL (ref 8.7–10.5)
CHLORIDE SERPL-SCNC: 108 MMOL/L (ref 95–110)
CHLORIDE SERPL-SCNC: 108 MMOL/L (ref 95–110)
CK SERPL-CCNC: 7872 U/L (ref 20–200)
CO2 SERPL-SCNC: 15 MMOL/L (ref 23–29)
CO2 SERPL-SCNC: 15 MMOL/L (ref 23–29)
CREAT SERPL-MCNC: 11.3 MG/DL (ref 0.5–1.4)
CREAT SERPL-MCNC: 11.4 MG/DL (ref 0.5–1.4)
DIFFERENTIAL METHOD BLD: ABNORMAL
EOSINOPHIL # BLD AUTO: 0.2 K/UL (ref 0–0.5)
EOSINOPHIL NFR BLD: 2.4 % (ref 0–8)
ERYTHROCYTE [DISTWIDTH] IN BLOOD BY AUTOMATED COUNT: 14.5 % (ref 11.5–14.5)
EST. GFR  (NO RACE VARIABLE): 4 ML/MIN/1.73 M^2
EST. GFR  (NO RACE VARIABLE): 4 ML/MIN/1.73 M^2
GLUCOSE SERPL-MCNC: 106 MG/DL (ref 70–110)
GLUCOSE SERPL-MCNC: 86 MG/DL (ref 70–110)
HCO3 UR-SCNC: 16.2 MMOL/L (ref 24–28)
HCT VFR BLD AUTO: 36.2 % (ref 40–54)
HGB BLD-MCNC: 12.2 G/DL (ref 14–18)
IMM GRANULOCYTES # BLD AUTO: 0.02 K/UL (ref 0–0.04)
IMM GRANULOCYTES NFR BLD AUTO: 0.2 % (ref 0–0.5)
INR PPP: 1.1 (ref 0.8–1.2)
LYMPHOCYTES # BLD AUTO: 0.6 K/UL (ref 1–4.8)
LYMPHOCYTES NFR BLD: 8 % (ref 18–48)
MAGNESIUM SERPL-MCNC: 1.8 MG/DL (ref 1.6–2.6)
MCH RBC QN AUTO: 31.6 PG (ref 27–31)
MCHC RBC AUTO-ENTMCNC: 33.7 G/DL (ref 32–36)
MCV RBC AUTO: 94 FL (ref 82–98)
MONOCYTES # BLD AUTO: 0.8 K/UL (ref 0.3–1)
MONOCYTES NFR BLD: 9.6 % (ref 4–15)
NEUTROPHILS # BLD AUTO: 6.4 K/UL (ref 1.8–7.7)
NEUTROPHILS NFR BLD: 79.3 % (ref 38–73)
NRBC BLD-RTO: 0 /100 WBC
PCO2 BLDA: 32.2 MMHG (ref 35–45)
PH SMN: 7.31 [PH] (ref 7.35–7.45)
PLATELET # BLD AUTO: 182 K/UL (ref 150–450)
PMV BLD AUTO: 10.1 FL (ref 9.2–12.9)
PO2 BLDA: 41 MMHG (ref 40–60)
POC BE: -9 MMOL/L
POC SATURATED O2: 72 % (ref 95–100)
POC TCO2: 17 MMOL/L (ref 24–29)
POTASSIUM SERPL-SCNC: 4.3 MMOL/L (ref 3.5–5.1)
POTASSIUM SERPL-SCNC: 4.6 MMOL/L (ref 3.5–5.1)
PROT SERPL-MCNC: 5 G/DL (ref 6–8.4)
PROTHROMBIN TIME: 11.9 SEC (ref 9–12.5)
RBC # BLD AUTO: 3.86 M/UL (ref 4.6–6.2)
SAMPLE: ABNORMAL
SITE: ABNORMAL
SODIUM SERPL-SCNC: 141 MMOL/L (ref 136–145)
SODIUM SERPL-SCNC: 141 MMOL/L (ref 136–145)
TROPONIN I SERPL DL<=0.01 NG/ML-MCNC: 0.07 NG/ML (ref 0–0.03)
TROPONIN I SERPL DL<=0.01 NG/ML-MCNC: 0.08 NG/ML (ref 0–0.03)
WBC # BLD AUTO: 8.01 K/UL (ref 3.9–12.7)

## 2025-02-19 PROCEDURE — 82550 ASSAY OF CK (CPK): CPT | Performed by: STUDENT IN AN ORGANIZED HEALTH CARE EDUCATION/TRAINING PROGRAM

## 2025-02-19 PROCEDURE — 36415 COLL VENOUS BLD VENIPUNCTURE: CPT | Performed by: INTERNAL MEDICINE

## 2025-02-19 PROCEDURE — 84484 ASSAY OF TROPONIN QUANT: CPT | Performed by: INTERNAL MEDICINE

## 2025-02-19 PROCEDURE — 93005 ELECTROCARDIOGRAM TRACING: CPT

## 2025-02-19 PROCEDURE — 94761 N-INVAS EAR/PLS OXIMETRY MLT: CPT | Mod: XB

## 2025-02-19 PROCEDURE — 85025 COMPLETE CBC W/AUTO DIFF WBC: CPT | Performed by: INTERNAL MEDICINE

## 2025-02-19 PROCEDURE — 99900035 HC TECH TIME PER 15 MIN (STAT)

## 2025-02-19 PROCEDURE — 63600175 PHARM REV CODE 636 W HCPCS: Performed by: INTERNAL MEDICINE

## 2025-02-19 PROCEDURE — 25000003 PHARM REV CODE 250: Performed by: STUDENT IN AN ORGANIZED HEALTH CARE EDUCATION/TRAINING PROGRAM

## 2025-02-19 PROCEDURE — 85730 THROMBOPLASTIN TIME PARTIAL: CPT | Mod: 91 | Performed by: HOSPITALIST

## 2025-02-19 PROCEDURE — 20000000 HC ICU ROOM

## 2025-02-19 PROCEDURE — 93010 ELECTROCARDIOGRAM REPORT: CPT | Mod: ,,, | Performed by: INTERNAL MEDICINE

## 2025-02-19 PROCEDURE — 85610 PROTHROMBIN TIME: CPT | Performed by: INTERNAL MEDICINE

## 2025-02-19 PROCEDURE — 80053 COMPREHEN METABOLIC PANEL: CPT | Performed by: STUDENT IN AN ORGANIZED HEALTH CARE EDUCATION/TRAINING PROGRAM

## 2025-02-19 PROCEDURE — 36800 INSERTION OF CANNULA: CPT | Mod: ,,, | Performed by: NURSE PRACTITIONER

## 2025-02-19 PROCEDURE — 82803 BLOOD GASES ANY COMBINATION: CPT

## 2025-02-19 PROCEDURE — 02HV33Z INSERTION OF INFUSION DEVICE INTO SUPERIOR VENA CAVA, PERCUTANEOUS APPROACH: ICD-10-PCS | Performed by: HOSPITALIST

## 2025-02-19 PROCEDURE — 94640 AIRWAY INHALATION TREATMENT: CPT

## 2025-02-19 PROCEDURE — 36415 COLL VENOUS BLD VENIPUNCTURE: CPT | Mod: XB | Performed by: STUDENT IN AN ORGANIZED HEALTH CARE EDUCATION/TRAINING PROGRAM

## 2025-02-19 PROCEDURE — 36415 COLL VENOUS BLD VENIPUNCTURE: CPT | Mod: XB | Performed by: HOSPITALIST

## 2025-02-19 PROCEDURE — 85730 THROMBOPLASTIN TIME PARTIAL: CPT | Performed by: INTERNAL MEDICINE

## 2025-02-19 PROCEDURE — 99291 CRITICAL CARE FIRST HOUR: CPT | Mod: ,,, | Performed by: INTERNAL MEDICINE

## 2025-02-19 PROCEDURE — 25000242 PHARM REV CODE 250 ALT 637 W/ HCPCS: Performed by: STUDENT IN AN ORGANIZED HEALTH CARE EDUCATION/TRAINING PROGRAM

## 2025-02-19 PROCEDURE — 25000003 PHARM REV CODE 250

## 2025-02-19 PROCEDURE — 80048 BASIC METABOLIC PNL TOTAL CA: CPT | Performed by: INTERNAL MEDICINE

## 2025-02-19 PROCEDURE — 99223 1ST HOSP IP/OBS HIGH 75: CPT | Mod: ,,, | Performed by: REGISTERED NURSE

## 2025-02-19 PROCEDURE — 94799 UNLISTED PULMONARY SVC/PX: CPT

## 2025-02-19 PROCEDURE — 27000207 HC ISOLATION

## 2025-02-19 PROCEDURE — 27000923 HC TRIALYSIS CATHETER, ANY SIZE

## 2025-02-19 PROCEDURE — 27000221 HC OXYGEN, UP TO 24 HOURS

## 2025-02-19 PROCEDURE — 99291 CRITICAL CARE FIRST HOUR: CPT | Mod: ,,, | Performed by: STUDENT IN AN ORGANIZED HEALTH CARE EDUCATION/TRAINING PROGRAM

## 2025-02-19 PROCEDURE — 83735 ASSAY OF MAGNESIUM: CPT | Performed by: INTERNAL MEDICINE

## 2025-02-19 PROCEDURE — 99497 ADVNCD CARE PLAN 30 MIN: CPT | Mod: 25,,, | Performed by: REGISTERED NURSE

## 2025-02-19 PROCEDURE — 25000003 PHARM REV CODE 250: Performed by: INTERNAL MEDICINE

## 2025-02-19 PROCEDURE — 63600175 PHARM REV CODE 636 W HCPCS: Performed by: STUDENT IN AN ORGANIZED HEALTH CARE EDUCATION/TRAINING PROGRAM

## 2025-02-19 RX ORDER — ACETAMINOPHEN 325 MG/1
650 TABLET ORAL EVERY 6 HOURS PRN
Status: DISCONTINUED | OUTPATIENT
Start: 2025-02-19 | End: 2025-02-22

## 2025-02-19 RX ORDER — SODIUM CHLORIDE 9 MG/ML
INJECTION, SOLUTION INTRAVENOUS ONCE
Status: CANCELLED | OUTPATIENT
Start: 2025-02-19 | End: 2025-02-19

## 2025-02-19 RX ORDER — MAGNESIUM SULFATE 1 G/100ML
1 INJECTION INTRAVENOUS ONCE
Status: COMPLETED | OUTPATIENT
Start: 2025-02-19 | End: 2025-02-19

## 2025-02-19 RX ORDER — METOPROLOL TARTRATE 1 MG/ML
INJECTION, SOLUTION INTRAVENOUS
Status: COMPLETED
Start: 2025-02-19 | End: 2025-02-19

## 2025-02-19 RX ORDER — METOPROLOL TARTRATE 1 MG/ML
5 INJECTION, SOLUTION INTRAVENOUS ONCE
Status: COMPLETED | OUTPATIENT
Start: 2025-02-19 | End: 2025-02-19

## 2025-02-19 RX ORDER — HEPARIN SODIUM,PORCINE/D5W 25000/250
0-40 INTRAVENOUS SOLUTION INTRAVENOUS CONTINUOUS
Status: DISCONTINUED | OUTPATIENT
Start: 2025-02-19 | End: 2025-02-20

## 2025-02-19 RX ORDER — METOPROLOL TARTRATE 1 MG/ML
5 INJECTION, SOLUTION INTRAVENOUS EVERY 4 HOURS PRN
Status: DISCONTINUED | OUTPATIENT
Start: 2025-02-19 | End: 2025-02-27 | Stop reason: HOSPADM

## 2025-02-19 RX ORDER — FENTANYL CITRATE 50 UG/ML
50 INJECTION, SOLUTION INTRAMUSCULAR; INTRAVENOUS
Refills: 0 | Status: DISCONTINUED | OUTPATIENT
Start: 2025-02-19 | End: 2025-02-22

## 2025-02-19 RX ORDER — MIDAZOLAM HYDROCHLORIDE 2 MG/2ML
1 INJECTION, SOLUTION INTRAMUSCULAR; INTRAVENOUS
Status: DISCONTINUED | OUTPATIENT
Start: 2025-02-19 | End: 2025-02-22

## 2025-02-19 RX ADMIN — METOPROLOL TARTRATE 5 MG: 1 INJECTION, SOLUTION INTRAVENOUS at 03:02

## 2025-02-19 RX ADMIN — IPRATROPIUM BROMIDE AND ALBUTEROL SULFATE 3 ML: 2.5; .5 SOLUTION RESPIRATORY (INHALATION) at 12:02

## 2025-02-19 RX ADMIN — MIDAZOLAM HYDROCHLORIDE 1 MG: 1 INJECTION, SOLUTION INTRAMUSCULAR; INTRAVENOUS at 03:02

## 2025-02-19 RX ADMIN — AMIODARONE HYDROCHLORIDE 1 MG/MIN: 1.8 INJECTION, SOLUTION INTRAVENOUS at 01:02

## 2025-02-19 RX ADMIN — SODIUM CHLORIDE, POTASSIUM CHLORIDE, SODIUM LACTATE AND CALCIUM CHLORIDE: 600; 310; 30; 20 INJECTION, SOLUTION INTRAVENOUS at 01:02

## 2025-02-19 RX ADMIN — CEFTRIAXONE 2 G: 2 INJECTION, POWDER, FOR SOLUTION INTRAMUSCULAR; INTRAVENOUS at 03:02

## 2025-02-19 RX ADMIN — FENTANYL CITRATE 50 MCG: 50 INJECTION, SOLUTION INTRAMUSCULAR; INTRAVENOUS at 03:02

## 2025-02-19 RX ADMIN — THERA TABS 1 TABLET: TAB at 08:02

## 2025-02-19 RX ADMIN — IPRATROPIUM BROMIDE AND ALBUTEROL SULFATE 3 ML: 2.5; .5 SOLUTION RESPIRATORY (INHALATION) at 01:02

## 2025-02-19 RX ADMIN — CETIRIZINE HYDROCHLORIDE 5 MG: 5 TABLET ORAL at 08:02

## 2025-02-19 RX ADMIN — METOROPROLOL TARTRATE 5 MG: 5 INJECTION, SOLUTION INTRAVENOUS at 12:02

## 2025-02-19 RX ADMIN — METOPROLOL TARTRATE 5 MG: 1 INJECTION, SOLUTION INTRAVENOUS at 12:02

## 2025-02-19 RX ADMIN — ARIPIPRAZOLE 20 MG: 10 TABLET ORAL at 08:02

## 2025-02-19 RX ADMIN — HEPARIN SODIUM 12 UNITS/KG/HR: 10000 INJECTION, SOLUTION INTRAVENOUS at 01:02

## 2025-02-19 RX ADMIN — AMIODARONE HYDROCHLORIDE 0.5 MG/MIN: 1.8 INJECTION, SOLUTION INTRAVENOUS at 06:02

## 2025-02-19 RX ADMIN — AMIODARONE HYDROCHLORIDE 150 MG: 1.5 INJECTION, SOLUTION INTRAVENOUS at 01:02

## 2025-02-19 RX ADMIN — MAGNESIUM SULFATE 1 G: 1 INJECTION INTRAVENOUS at 03:02

## 2025-02-19 RX ADMIN — AMIODARONE HYDROCHLORIDE 0.5 MG/MIN: 1.8 INJECTION, SOLUTION INTRAVENOUS at 07:02

## 2025-02-19 NOTE — CONSULTS
[place] - Intensive Care  Cardiology  Consult Note    Patient Name: Antonio Torres Jr.  MRN: [id number]  Admission Date: 2/13/2025  Hospital Length of Stay: 5 days  Code Status: Full Code   Attending Provider: Hernan Unger MD   Consulting Provider: Tomas Calix MD  Primary Care Physician: Fabio Lennon MD  Principal Problem:Non-traumatic rhabdomyolysis    Patient information was obtained from ER records.     Inpatient consult to Cardiology  Consult performed by: Tomas Calix MD  Consult ordered by: Chanda Uriarte MD        Subjective:     Chief Complaint:  A-fib RVR           HPI:  This is an 80-year-old male with a past medical history of schizophrenia, chronic blepharitis, hyperlipidemia, CKD 3, BPH, who presents with a fall.     Patient presents for evaluation after a fall and a sycopal episode that occurred prior to presentation. He reports losing consciousness and falling face forward on the floor sustaining an injury to his head, elbows, abdomen and knees. He was unable to stand up and was on the floor for about 1.5 hours. Patient reports having a coughing episode a day prior but denies sick contacts. He denied chest pain.      In the ED, the patient was tachycardic (120s > 60s), but otherwise hemodynamically stable.  Labs were remarkable for leukocytosis (25.3), elevated CPK (23,406), elevated creatinine (3.3-baseline around 1.9), elevated troponin (0.525-chronically elevated), elevated lactic acid (4.8 > 2.1), elevated procalcitonin (2.53), elevated LFTs (T bili: 1.1, AST: 267, ALT: 73), hypophosphatemia (1.7), positive COVID-19.  CT head, maxillofacial, cervical spine showed a nondisplaced right nasal bone fracture with no soft tissue swelling, and paranasal sinus disease.  CT chest, abdomen and pelvis showed possible soft tissue contusion at the level of the xiphoid process, a 2-3 mm left lower lobe pulmonary nodule and nonspecific urinary bladder wall thickening.  Right elbow x-ray  demonstrated an elbow effusion with an olecranon spur.  Patient was given 2 L of LR, vancomycin, Zosyn, Tylenol 1 g p.o..  He was admitted for further management.     Overview/Hospital Course:  Mr. Torres is a 79 yo M admitted after a fall and found to have acute on chronic kidney injury and rhabdomyolysis. CK >20,000 on admit and rising to >40,000. Renal function worsening. On IVF. Also found to be COVID positive. Started on remdesivir. Nephrology consulted. CK improving, though Cr still rising. Almanza is placed for accurate I&D monitoring.     Interval History: bruising/swelling improved. Not making much urine. Discussed with Nephrology,    Almanza is placed for accurate I&D monitoring.    Developed A-fib RVR overnight - placed on IV amiodarone - back in NSR now  EKG A-fib 164 NSSTT changes    Troponin 0.07    Echo 2/14/25    Left Ventricle: The left ventricle is normal in size. There is normal systolic function with a visually estimated ejection fraction of 55 - 60%.    Right Ventricle: Normal right ventricular cavity size. Systolic function is normal.    Tricuspid Valve: There is mild to moderate regurgitation.    Aorta: Aortic root is mildly dilated measuring 4.03 cm.    Pulmonary Artery: The estimated pulmonary artery systolic pressure is 22 mmHg.    IVC/SVC: Normal venous pressure at 3 mmHg.     Stress test 4/10/23    Normal myocardial perfusion scan. There is no evidence of myocardial ischemia or infarction.    There is a  moderate to severe intensity fixed perfusion abnormality in the inferior wall of the left ventricle secondary to diaphragm attenuation.    The gated perfusion images showed an ejection fraction of 69% post stress.    There is normal wall motion post stress.    LV cavity size is  and normal at stress.    The ECG portion of the study is negative for ischemia.    The patient reported no chest pain during the stress test.    There were no arrhythmias during stress.       Past Medical  History:   Diagnosis Date    Chronic kidney disease     Depression     Hematuria     had neg work up with Dr. Stafford    Hypercholesterolemia 03/01/2013    Nephrolithiasis 7/15/2024    Schizophrenia     Skin cancer of scalp     Syncope 04/06/2023    UTI (urinary tract infection), uncomplicated 11/11/2024       Past Surgical History:   Procedure Laterality Date    APPLICATION, GRAFT N/A 07/07/2023    Procedure: APPLICSTAPLER RELOAD TITANIUM 30MMATION, GRAFT, ANTERIOR SCALP;  Surgeon: Mario Montano MD;  Location: [facility] OR;  Service: Plastics;  Laterality: N/A;  application acellular human dermal allograft anterior scalp 2x4cm    EXCISION OF CARCINOMA N/A 07/07/2023    Procedure: EXCISION, CARCINOMA, ANTERIOR SCALP;  Surgeon: Mario Montano MD;  Location: [facility] OR;  Service: Plastics;  Laterality: N/A;  anterior scalp- melanoma----NEED ORDERS OFFICE NOTIFIED    EXCISION-WIDE LOCAL N/A 05/08/2023    Procedure: EXCISION-WIDE LOCAL;  Surgeon: Rhys Lehman MD;  Location: [facility] OR;  Service: General;  Laterality: N/A;  RN PREOP 5/1/23---    HERNIA REPAIR      INCISION AND DRAINAGE, LOWER EXTREMITY Right 06/09/2023    Procedure: INCISION AND DRAINAGE, LOWER EXTREMITY- ANKLE;  Surgeon: Abby Lemus MD;  Location: [facility] OR;  Service: Orthopedics;  Laterality: Right;  Wound vac placement (medium)  RN PREOP 6/8/2023    PLACEMENT OF ACELLULAR HUMAN DERMAL ALLOGRAFT Right 07/07/2023    Procedure: APPLICATION, ACELLULAR HUMAN DERMAL ALLOGRAFT, FOOT;  Surgeon: Mario Montano MD;  Location: [facility] OR;  Service: Plastics;  Laterality: Right;  dermal graft to right dorsal foot wound 6x6 cm---PHONE PREOP DONE-7/6/23    RECONSTRUCTION USING FLAP N/A 05/08/2023    Procedure: RECONSTRUCTION USING FLAP WITH APPLICATION OF DERMAL GRAFT;  Surgeon: Mario Montano MD;  Location: [facility] OR;  Service: Plastics;  Laterality: N/A;  10 X 10 DERMAL GRAFT  NOTIFIED BOBBY IN [employer] ON 5/4/2023 @ 10:48AM. PATIENT WILL BE INJECTED ON AM OF  SURGERY PER DR. NEHA ZAVALA  PT NEEDS TO BE IN [facility] FOR 7AM ON AM OF SURGERY-LO    SKIN CANCER EXCISION         Review of patient's allergies indicates:  No Known Allergies    Current Facility-Administered Medications on File Prior to Encounter   Medication    0.9%  NaCl infusion    mupirocin 2 % ointment     Current Outpatient Medications on File Prior to Encounter   Medication Sig    loratadine (CLARITIN) 10 mg tablet Take 10 mg by mouth once daily.    multivitamin capsule Take 1 capsule by mouth once daily.     Family History       Problem Relation (Age of Onset)    Alzheimer's disease Mother    Heart disease Father    Hypertension Sister    Pulmonary embolism Father          Tobacco Use    Smoking status: Never     Passive exposure: Never    Smokeless tobacco: Never   Substance and Sexual Activity    Alcohol use: No    Drug use: No    Sexual activity: Not Currently     Review of Systems   Unable to perform ROS: Other     Objective:     Vital Signs (Most Recent):  Temp: 97.8 °F (36.6 °C) (02/19/25 0720)  Pulse: 70 (02/19/25 0840)  Resp: (!) 26 (02/19/25 0840)  BP: 124/78 (02/19/25 0730)  SpO2: (!) 94 % (02/19/25 0840) Vital Signs (24h Range):  Temp:  [97.7 °F (36.5 °C)-98.6 °F (37 °C)] 97.8 °F (36.6 °C)  Pulse:  [] 70  Resp:  [16-33] 26  SpO2:  [89 %-97 %] 94 %  BP: ()/(51-93) 124/78     Weight: 68.9 kg (151 lb 14.4 oz)  Body mass index is 21.79 kg/m².    SpO2: (!) 94 %         Intake/Output Summary (Last 24 hours) at 2/19/2025 0928  Last data filed at 2/19/2025 0600  Gross per 24 hour   Intake 1846.89 ml   Output 570 ml   Net 1276.89 ml       Lines/Drains/Airways       Drain  Duration                  Urethral Catheter 02/17/25 1324 16 Fr. 1 day              Peripheral Intravenous Line  Duration                  Peripheral IV - Single Lumen 02/17/25 0501 22 G Posterior;Left Hand 2 days         Peripheral IV - Single Lumen 02/19/25 0045 20 G Anterior;Left Upper Arm <1 day         Peripheral IV  - Single Lumen 02/19/25 0045 20 G Distal;Right;Anterior Wrist <1 day                     Physical Exam   Deferred COVID  Significant Labs: All pertinent lab results from the last 24 hours have been reviewed.    Significant Imaging: Echocardiogram: 2D echo with color flow doppler: No results found for this or any previous visit.  Assessment and Plan:     * Non-traumatic rhabdomyolysis  CK improving. Continue hydration    New onset a-fib  Developed A-fib RVR overnight. Back in NSR on IV amiodarone. Ok to continue IV amiodarone full load then switch to 200 po bid tomorrow. On heparin - consider switching to DOAC prior to d/c. Echo with normal EF    Antiarrhythmics  amiodarone 360 mg/200 mL (1.8 mg/mL) infusion, Continuous, Intravenous  metoprolol injection 5 mg, Every 4 hours PRN, Intravenous    Anticoagulants  heparin 25,000 units in dextrose 5% 250 mL (100 units/mL) infusion LOW INTENSITY nomogram - [facility], Continuous, Intravenous  heparin 25,000 units in dextrose 5% (100 units/ml) IV bolus from bag LOW INTENSITY nomogram - [facility], As needed (PRN), Intravenous  heparin 25,000 units in dextrose 5% (100 units/ml) IV bolus from bag LOW INTENSITY nomogram - [facility], As needed (PRN), Intravenous            COVID-19  Per primary    Schizophrenia  Per primary        VTE Risk Mitigation (From admission, onward)           Ordered     heparin 25,000 units in dextrose 5% (100 units/ml) IV bolus from bag LOW INTENSITY nomogram - [facility]  As needed (PRN)        Question:  Heparin Infusion Adjustment (DO NOT MODIFY ANSWER)  Answer:  [facility]\Images\Pharmacy\HeparinInfusions\heparin LOW INTENSITY nomogram for [facility] [id number]    02/19/25 0059     heparin 25,000 units in dextrose 5% (100 units/ml) IV bolus from bag LOW INTENSITY nomogram - [facility]  As needed (PRN)        Question:  Heparin Infusion Adjustment (DO NOT MODIFY ANSWER)  Answer:  \\[facility]\Images\Pharmacy\HeparinInfusions\heparin LOW INTENSITY nomogram for [facility] [id number]     02/19/25 0059     heparin 25,000 units in dextrose 5% 250 mL (100 units/mL) infusion LOW INTENSITY nomogram - [facility]  Continuous        Question:  Begin at (units/kg/hr)  Answer:  12    02/19/25 0059     IP VTE HIGH RISK PATIENT  Once         02/14/25 0222     Place sequential compression device  Until discontinued         02/14/25 0222                  35 minutes spent with patient in ICU    Thank you for your consult. I will follow-up with patient. Please contact us if you have any additional questions.    Tomas Calix MD  Cardiology   [facility] - [place] - Intensive Care

## 2025-02-19 NOTE — PLAN OF CARE
142 received; PASRR & 142 uploaded to media. [facility]

## 2025-02-19 NOTE — NURSING
Left detailed message on patient sister voicemail (Caitlyn Martinez) informing her patient was transferred to ICU room 273.

## 2025-02-19 NOTE — PLAN OF CARE
Problem: Adult Inpatient Plan of Care  Goal: Plan of Care Review  Outcome: Progressing  Goal: Patient-Specific Goal (Individualized)  Outcome: Progressing  Goal: Absence of Hospital-Acquired Illness or Injury  Outcome: Progressing  Goal: Optimal Comfort and Wellbeing  Outcome: Progressing  Goal: Readiness for Transition of Care  Outcome: Progressing     Problem: Infection  Goal: Absence of Infection Signs and Symptoms  Outcome: Progressing     Problem: Acute Kidney Injury/Impairment  Goal: Fluid and Electrolyte Balance  Outcome: Progressing  Goal: Improved Oral Intake  Outcome: Progressing  Goal: Effective Renal Function  Outcome: Progressing     Problem: Fall Injury Risk  Goal: Absence of Fall and Fall-Related Injury  Outcome: Progressing     Problem: Coping Ineffective  Goal: Effective Coping  Outcome: Progressing     Problem: Hemodialysis  Goal: Safe, Effective Therapy Delivery  Outcome: Progressing  Goal: Effective Tissue Perfusion  Outcome: Progressing  Goal: Absence of Infection Signs and Symptoms  Outcome: Progressing

## 2025-02-19 NOTE — NURSING
[facility]  Nurses Note -- 4 Eyes      2/19/2025       Skin assessed on: Q Shift      [x] No Pressure Injuries Present    [x]Prevention Measures Documented    [] Yes LDA  for Pressure Injury Previously documented     [] Yes New Pressure Injury Discovered   [] LDA for New Pressure Injury Added      Attending RN:  Marguerite Scott, RN     Second RN:  Roger

## 2025-02-19 NOTE — PLAN OF CARE
Changes in medical condition or discharge plan:  Patient transferred to ICU for AFIB and placed on an amiodarone drip.  Cardiology consulted.  DC plan on hold at this time.     Does patient need new DME? N/a    Follow up appts needed: tbd    Medically stable: No    Estimated Discharge Date: unknown at this time       02/19/25 0844   Discharge Reassessment   Assessment Type Discharge Planning Reassessment   Did the patient's condition or plan change since previous assessment? Yes   Communicated JULIANA with patient/caregiver Date not available/Unable to determine   Discharge Plan A Skilled Nursing Facility   Discharge Plan B   (tbd)   Transition of Care Barriers None   Why the patient remains in the hospital Requires continued medical care

## 2025-02-19 NOTE — PT/OT/SLP PROGRESS
Occupational Therapy      Patient Name:  Antonio Torres Jr.   MRN:  [id number]    Patient not seen today secondary to Nurse/ LORNA hold due to A-fib and on amiodarone. Will follow-up tomorrow.    2/19/2025

## 2025-02-19 NOTE — ASSESSMENT & PLAN NOTE
2025 - Consult   - consult received; interval chart reviewed in detail; discussed pt during ICU MDT rounds   - met with patient at bedside, along with Dr. Smith; Dr. Smith discussed plan for HD with pt; I remained in the room with pt following for introduction to palliative medicine team and role in current care and admission and GOC/ACP discussion   - pt confirms sister is preferred MPOA; she is also legal surrogate decision maker as pt is not , does not have children, and both parents are ; pt wishes to be included in care discussions but defaults decision making to sister (see schizophrenia); this seems very appropriate as pt seems to have capacity for simple aspect of care, but not for complex aspects of care such as HD, code status, etc or overall care/coordination of care on his own behalf outside of the hospital   - also joined Dr. Smith with call to pt's sister, Caitlyn   - learned more about pt outside of current admission; he has been having decreased quality of life over the last few years; she outfitted her garage into a living space for pt but has good insight that pt's care exceeds her abilities at this time   - Caitlyn was very tearful in today's discussions as this is all very hard for her to process, especially from a distance   - GOC/ACP discussion; focus of GOC are for pt's quality of life and symptom management   - she wishes for optimization with time limited trial of HD; would not wish for prolonged or indefinite HD   - reviewed code status and differences between full code and DNR; shared that I did discuss recommendation for DNR with pt and that he seemed a little saddened that his health was at the state to need to discuss this but expressed understand that it would be best and a quality of life less than his current if CPR was needed, would not be good; sister Caitlyn agreeable to DNR   - DNR order placed in chart  - reviewed long term wishes for [facility] with [facility]; reviewed  philosophy of care of hospice   - Sister agreeable to NH with hospice following optimization, with preference for [facility]; updated DM/SW of these wishes   - if pt's condition were to decline Caitlyn would wish to discuss transition to comfort focused care   - emotional support provided   - Allowed time for questions/concerns; all addressed; expressed availability of myself/palliative team for additional questions/concerns

## 2025-02-19 NOTE — ASSESSMENT & PLAN NOTE
- chronic condition noted; now with acute BISI requiring HD   - was present during Dr. Smith's discussions with pt and sister (by phone) today; all in agreement for HD   - sister/MPOA expressed wishes for time limited trial of HD, she is agreeable to assess if HD can be temporary to improve pts current condition, but would not wish for pt to continue HD indefinitely

## 2025-02-19 NOTE — SUBJECTIVE & OBJECTIVE
Past Medical History:   Diagnosis Date    Chronic kidney disease     Depression     Hematuria     had neg work up with Dr. Stafford    Hypercholesterolemia 03/01/2013    Nephrolithiasis 7/15/2024    Schizophrenia     Skin cancer of scalp     Syncope 04/06/2023    UTI (urinary tract infection), uncomplicated 11/11/2024       Past Surgical History:   Procedure Laterality Date    APPLICATION, GRAFT N/A 07/07/2023    Procedure: APPLICSTAPLER RELOAD TITANIUM 30MMATION, GRAFT, ANTERIOR SCALP;  Surgeon: Mairo Montano MD;  Location: [facility] OR;  Service: Plastics;  Laterality: N/A;  application acellular human dermal allograft anterior scalp 2x4cm    EXCISION OF CARCINOMA N/A 07/07/2023    Procedure: EXCISION, CARCINOMA, ANTERIOR SCALP;  Surgeon: Mario Montano MD;  Location: [facility] OR;  Service: Plastics;  Laterality: N/A;  anterior scalp- melanoma----NEED ORDERS OFFICE NOTIFIED    EXCISION-WIDE LOCAL N/A 05/08/2023    Procedure: EXCISION-WIDE LOCAL;  Surgeon: Rhys Lehman MD;  Location: [facility] OR;  Service: General;  Laterality: N/A;  RN PREOP 5/1/23---    HERNIA REPAIR      INCISION AND DRAINAGE, LOWER EXTREMITY Right 06/09/2023    Procedure: INCISION AND DRAINAGE, LOWER EXTREMITY- ANKLE;  Surgeon: Abby Lemus MD;  Location: [facility] OR;  Service: Orthopedics;  Laterality: Right;  Wound vac placement (medium)  RN PREOP 6/8/2023    PLACEMENT OF ACELLULAR HUMAN DERMAL ALLOGRAFT Right 07/07/2023    Procedure: APPLICATION, ACELLULAR HUMAN DERMAL ALLOGRAFT, FOOT;  Surgeon: Mario Montano MD;  Location: [facility] OR;  Service: Plastics;  Laterality: Right;  dermal graft to right dorsal foot wound 6x6 cm---PHONE PREOP DONE-7/6/23    RECONSTRUCTION USING FLAP N/A 05/08/2023    Procedure: RECONSTRUCTION USING FLAP WITH APPLICATION OF DERMAL GRAFT;  Surgeon: Mario Montano MD;  Location: [facility] OR;  Service: Plastics;  Laterality: N/A;  10 X 10 DERMAL GRAFT  NOTIFIED BOBBY IN [facility] ON 5/4/2023 @ 10:48AM. PATIENT WILL BE  INJECTED ON AM OF SURGERY PER DR. NEHA ZAVALA  PT NEEDS TO BE IN [facility] FOR 7AM ON AM OF SURGERY-LO    SKIN CANCER EXCISION         Review of patient's allergies indicates:  No Known Allergies    Current Facility-Administered Medications on File Prior to Encounter   Medication    0.9%  NaCl infusion    mupirocin 2 % ointment     Current Outpatient Medications on File Prior to Encounter   Medication Sig    loratadine (CLARITIN) 10 mg tablet Take 10 mg by mouth once daily.    multivitamin capsule Take 1 capsule by mouth once daily.     Family History       Problem Relation (Age of Onset)    Alzheimer's disease Mother    Heart disease Father    Hypertension Sister    Pulmonary embolism Father          Tobacco Use    Smoking status: Never     Passive exposure: Never    Smokeless tobacco: Never   Substance and Sexual Activity    Alcohol use: No    Drug use: No    Sexual activity: Not Currently     Review of Systems   Unable to perform ROS: Other     Objective:     Vital Signs (Most Recent):  Temp: 97.8 °F (36.6 °C) (02/19/25 0720)  Pulse: 70 (02/19/25 0840)  Resp: (!) 26 (02/19/25 0840)  BP: 124/78 (02/19/25 0730)  SpO2: (!) 94 % (02/19/25 0840) Vital Signs (24h Range):  Temp:  [97.7 °F (36.5 °C)-98.6 °F (37 °C)] 97.8 °F (36.6 °C)  Pulse:  [] 70  Resp:  [16-33] 26  SpO2:  [89 %-97 %] 94 %  BP: ()/(51-93) 124/78     Weight: 68.9 kg (151 lb 14.4 oz)  Body mass index is 21.79 kg/m².    SpO2: (!) 94 %         Intake/Output Summary (Last 24 hours) at 2/19/2025 0928  Last data filed at 2/19/2025 0600  Gross per 24 hour   Intake 1846.89 ml   Output 570 ml   Net 1276.89 ml       Lines/Drains/Airways       Drain  Duration                  Urethral Catheter 02/17/25 1324 16 Fr. 1 day              Peripheral Intravenous Line  Duration                  Peripheral IV - Single Lumen 02/17/25 0501 22 G Posterior;Left Hand 2 days         Peripheral IV - Single Lumen 02/19/25 0045 20 G Anterior;Left Upper Arm <1 day          Peripheral IV - Single Lumen 02/19/25 0045 20 G Distal;Right;Anterior Wrist <1 day                     Physical Exam   Deferred COVID  Significant Labs: All pertinent lab results from the last 24 hours have been reviewed.    Significant Imaging: Echocardiogram: 2D echo with color flow doppler: No results found for this or any previous visit.

## 2025-02-19 NOTE — ASSESSMENT & PLAN NOTE
Metabolic acidosis  Baseline creatinine 1.7-2.0  On admission creatinine 3.3    BISI due to Rhabdomyolysis, initial CK > 40k  Worsening though remains without RRT needs    Consent for dialysis obtained 2/18/2025    Plan/Recommendation  Discussed with ICU - please place trialysis line and will start on HD  -Keep MAP > 65  -Keep hemoglobin > 7  -Strict ins and outs  -Avoid nephrotoxic agents if possible and renally dose medications  -Avoid drastic hemodynamic changes if possible

## 2025-02-19 NOTE — PROCEDURES
"Antonio Torres Jr. is a 80 y.o. male patient.    Temp: 97.8 °F (36.6 °C) (02/19/25 1515)  Pulse: 77 (02/19/25 1530)  Resp: 14 (02/19/25 1557)  BP: (!) 140/71 (02/19/25 1530)  SpO2: 98 % (02/19/25 1530)  Weight: 68.9 kg (151 lb 14.4 oz) (02/14/25 1515)  Height: 5' 10" (177.8 cm) (02/14/25 1515)       Central Line    Date/Time: 2/19/2025 5:14 PM    Performed by: Bianca Guthrie NP  Authorized by: Bianca Guthrie NP    Location procedure was performed:  [facility] ICU  Pre-operative diagnosis:  Renal failure  Post-operative diagnosis:  Renal failure  Consent Done ?:  Yes  Time out complete?: Verified correct patient, procedure, equipment, staff, and site/side    Indications:  Hemodialysis  Anesthesia:  Local infiltration  Local anesthetic:  Lidocaine 1% without epinephrine  Anesthetic total (ml):  4  Preparation:  Skin prepped with ChloraPrep  Skin prep agent dried: Skin prep agent completely dried prior to procedure    Sterile barriers: All five maximal sterile barriers used - gloves, gown, cap, mask and large sterile sheet    Hand hygiene: Hand hygiene performed immediately prior to central venous catheter insertion    Location:  Right internal jugular  Catheter type:  Trialysis  Catheter size:  13 Fr  Inserted Catheter Length (cm):  15  Ultrasound guidance: Yes    Vessel Caliber:  Large   patent  Comprressibility:  Normal  Needle advanced into vessel with real time ultrasound guidance.    Guidewire confirmed in vessel.    Steril sheath on probe.    Sterile gel used.  Manometry: Yes    Number of attempts:  1  Securement:  Line sutured, chlorhexidine patch, sterile dressing applied and blood return through all ports  Complications: No    Estimated blood loss (mL):  4  Guidewire: guidewire removed intact, verified with nurse    XRay:  Placement verified by x-ray, no pneumothorax on x-ray, tip termination and successful placement  Adverse Events:  NoneTermination Site: superior vena cava    Bianca Guthrie NP  Critical Care " Medicine   2/19/2025

## 2025-02-19 NOTE — CONSULTS
Consult received for trialysis catheter placement. Consent obtained from Mr. Antonio Torres. 15cm trialysis placed to right internal jugular vein. Placement confirmed with CXR. Pulmonary will sign off at this time. Please call with any future questions.     Bianca Guthrie, NP  Critical Care Medicine

## 2025-02-19 NOTE — NURSING
RAPID RESPONSE NURSE PROACTIVE ROUNDING NOTE       Time of Visit: 0020     Admit Date: 2025  LOS: 5  Code Status: Full Code   Date of Visit: 2025  : 1945  Age: 80 y.o.  Sex: male  Race: White  Bed: W273/W273 A:   MRN: [id number]  Was the patient discharged from an ICU this admission? No   Was the patient discharged from a PACU within last 24 hours? No   Did the patient receive conscious sedation/general anesthesia in last 24 hours? No   Was the patient in the ED within the past 24 hours? No   Was the patient on NIPPV within the past 24 hours? No   Attending Physician: Hernan Unger MD  Primary Service: Hospitalist   Time spent at the bedside: 30 - 45 min    SITUATION    Notified by  MD secure chat  Reason for alert:  A-fib RVR    Diagnosis: Non-traumatic rhabdomyolysis   has a past medical history of Chronic kidney disease, Depression, Hematuria, Hypercholesterolemia, Nephrolithiasis, Schizophrenia, Skin cancer of scalp, Syncope, and UTI (urinary tract infection), uncomplicated.    Last Vitals:  Temp: 97.7 °F (36.5 °C) ( 0100)  Pulse: 124 ( 0300)  Resp: 19 ( 0300)  BP: 111/62 ( 0300)  SpO2: 95 % ( 0300)    24 Hour Vitals Range:  Temp:  [97.7 °F (36.5 °C)-98.8 °F (37.1 °C)]   Pulse:  []   Resp:  [18-27]   BP: (103-138)/(61-74)   SpO2:  [93 %-97 %]     Clinical Issues: Circulatory    ASSESSMENT/INTERVENTIONS    Patient hr 170's on EKG monitor. BP stable, tachypneic    RECOMMENDATIONS  One time dose of lopressor administered at bedside with minimal improvements    Discussed plan of care with bedside RNMarycarmen    PROVIDER ESCALATION    Physician escalation: Yes    Orders received and case discussed with Dr. Uriarte .    Disposition:Tx in ICU bed 273    FOLLOW UP    Call back the Rapid Response NurseMicky at [phone] for additional questions or concerns.

## 2025-02-19 NOTE — ASSESSMENT & PLAN NOTE
- noted; continued management per HM   - pt's sister and EL also had covid from shared exposure; has limited sister's ability to visit in person so far but she is readily availble by phone and plans to visit 2/20 as she is no longer symptomatic and past recommended isolation period

## 2025-02-19 NOTE — ASSESSMENT & PLAN NOTE
Elevated CPK to 20538 in the setting of a prolonged fall   - CK trending to >40k  - with renal failure -- Nephrolgoy consulted  - continue with aggressive hydration  - no evidence of compartment syndrome  - CK continues to improve, but Creat continues to increase and possibly starting dialysis today.

## 2025-02-19 NOTE — NURSING
[facility] [place]  Nurses Note -- 4 Eyes      2/19/2025       Skin assessed on: Q Shift      [x] No Pressure Injuries Present    [x]Prevention Measures Documented    [] Yes LDA  for Pressure Injury Previously documented     [] Yes New Pressure Injury Discovered   [] LDA for New Pressure Injury Added      Attending RN:  Roger Torres RN     Second RN:  RICO Coon

## 2025-02-19 NOTE — NURSING
Received report care assumed. Patient lying in bed resting, NAD noted. Safety precautions maintained.    [facility] [place]  Nurses Note -- 4 Eyes      2/18/2025       Skin assessed on: Q Shift      [x] No Pressure Injuries Present    [x]Prevention Measures Documented    [] Yes LDA  for Pressure Injury Previously documented     [] Yes New Pressure Injury Discovered   [] LDA for New Pressure Injury Added      Attending RN:  Marycarmen Blum LPN     Second RN:  Luis Gan LPN

## 2025-02-19 NOTE — ASSESSMENT & PLAN NOTE
Patient has paroxysmal (<7 days) atrial fibrillation. Patient is currently in sinus rhythm. [id number] Score: 2. The patients heart rate in the last 24 hours is as follows:  Pulse  Min: 67  Max: 156     Antiarrhythmics  amiodarone 360 mg/200 mL (1.8 mg/mL) infusion, Continuous, Intravenous  metoprolol injection 5 mg, Every 4 hours PRN, Intravenous    Anticoagulants  heparin 25,000 units in dextrose 5% 250 mL (100 units/mL) infusion LOW INTENSITY nomogram - [facility], Continuous, Intravenous  heparin 25,000 units in dextrose 5% (100 units/ml) IV bolus from bag LOW INTENSITY nomogram - [facility], As needed (PRN), Intravenous  heparin 25,000 units in dextrose 5% (100 units/ml) IV bolus from bag LOW INTENSITY nomogram - [facility], As needed (PRN), Intravenous    Plan  - Replete lytes with a goal of K>4, Mg >2  - Patient is anticoagulated, see medications listed above.  - Patient's afib is currently  resolved and back in NSR  - patient moved to ICU overnight and placed on Amio and heparin drips.  Converted to NSR.  Cardiology consulted.

## 2025-02-19 NOTE — PLAN OF CARE
Changes in medical condition or discharge plan:    Per Dr. Smith/nephrology, pt may need dialysis. CM working on SNF to group home, pending acceptance. Pt's sister Caitlyn stated she no longer wants her brother to go to [place], so decision is between [facility] and [facility]. CM will follow up with facilities on tomorrow.    Does patient need new DME? TBD by next level of care    Follow up appts needed: nephrology    Medically stable: no    Estimated Discharge Date: unknown      02/18/25 1821   Discharge Reassessment   Assessment Type Discharge Planning Reassessment   Did the patient's condition or plan change since previous assessment? No   Discharge Plan discussed with: Caregiver   Name(s) and Number(s) Caitlyn (sister) [phone]   Communicated JULIANA with patient/caregiver Date not available/Unable to determine   Discharge Plan A Skilled Nursing Facility   Discharge Plan B New Nursing Home placement - group home care facility   DME Needed Upon Discharge  none   Transition of Care Barriers Mental illness;Mobility   Why the patient remains in the hospital Requires continued medical care   Post-Acute Status   Discharge Delays None known at this time

## 2025-02-19 NOTE — SUBJECTIVE & OBJECTIVE
Past Medical History:   Diagnosis Date    Chronic kidney disease     Depression     Hematuria     had neg work up with Dr. Stafford    Hypercholesterolemia 03/01/2013    Nephrolithiasis 7/15/2024    Schizophrenia     Skin cancer of scalp     Syncope 04/06/2023    UTI (urinary tract infection), uncomplicated 11/11/2024       Past Surgical History:   Procedure Laterality Date    APPLICATION, GRAFT N/A 07/07/2023    Procedure: APPLICSTAPLER RELOAD TITANIUM 30MMATION, GRAFT, ANTERIOR SCALP;  Surgeon: Mario Montano MD;  Location: [facility] OR;  Service: Plastics;  Laterality: N/A;  application acellular human dermal allograft anterior scalp 2x4cm    EXCISION OF CARCINOMA N/A 07/07/2023    Procedure: EXCISION, CARCINOMA, ANTERIOR SCALP;  Surgeon: Mario Montano MD;  Location: [facility] OR;  Service: Plastics;  Laterality: N/A;  anterior scalp- melanoma----NEED ORDERS OFFICE NOTIFIED    EXCISION-WIDE LOCAL N/A 05/08/2023    Procedure: EXCISION-WIDE LOCAL;  Surgeon: Rhys Lehman MD;  Location: [facility] OR;  Service: General;  Laterality: N/A;  RN PREOP 5/1/23---    HERNIA REPAIR      INCISION AND DRAINAGE, LOWER EXTREMITY Right 06/09/2023    Procedure: INCISION AND DRAINAGE, LOWER EXTREMITY- ANKLE;  Surgeon: Abby Lemus MD;  Location: [facility] OR;  Service: Orthopedics;  Laterality: Right;  Wound vac placement (medium)  RN PREOP 6/8/2023    PLACEMENT OF ACELLULAR HUMAN DERMAL ALLOGRAFT Right 07/07/2023    Procedure: APPLICATION, ACELLULAR HUMAN DERMAL ALLOGRAFT, FOOT;  Surgeon: Mario Montano MD;  Location: [facility] OR;  Service: Plastics;  Laterality: Right;  dermal graft to right dorsal foot wound 6x6 cm---PHONE PREOP DONE-7/6/23    RECONSTRUCTION USING FLAP N/A 05/08/2023    Procedure: RECONSTRUCTION USING FLAP WITH APPLICATION OF DERMAL GRAFT;  Surgeon: Mario Montano MD;  Location: [facility] OR;  Service: Plastics;  Laterality: N/A;  10 X 10 DERMAL GRAFT  NOTIFIED BOBBY IN NUCLEAR [facility] ON 5/4/2023 @ 10:48AM. PATIENT WILL BE  INJECTED ON AM OF SURGERY PER DR. SOLOMON -RENE  PT NEEDS TO BE IN NECLEAR MED FOR 7AM ON AM OF SURGERY-    SKIN CANCER EXCISION         Review of patient's allergies indicates:  No Known Allergies    Medications:  Continuous Infusions:   amiodarone in dextrose 5%  0.5 mg/min Intravenous Continuous 16.7 mL/hr at 02/19/25 0650 0.5 mg/min at 02/19/25 0650    heparin (porcine) in D5W  0-40 Units/kg/hr Intravenous Continuous 8.3 mL/hr at 02/19/25 0600 12 Units/kg/hr at 02/19/25 0600     Scheduled Meds:   ARIPiprazole  20 mg Oral Daily    cefTRIAXone (Rocephin) IV (PEDS and ADULTS)  2 g Intravenous Q24H    cetirizine  5 mg Oral Q48H    multivitamin  1 tablet Oral Daily     PRN Meds:  Current Facility-Administered Medications:     acetaminophen, 650 mg, Oral, Q6H PRN    albuterol-ipratropium, 3 mL, Nebulization, Q4H PRN    aluminum-magnesium hydroxide-simethicone, 30 mL, Oral, QID PRN    bisacodyL, 10 mg, Rectal, Daily PRN    glucagon (human recombinant), 1 mg, Intramuscular, PRN    glucose, 16 g, Oral, PRN    glucose, 24 g, Oral, PRN    heparin (PORCINE), 60 Units/kg, Intravenous, PRN    heparin (PORCINE), 30 Units/kg, Intravenous, PRN    melatonin, 6 mg, Oral, Nightly PRN    metoprolol, 5 mg, Intravenous, Q4H PRN    naloxone, 0.02 mg, Intravenous, PRN    ondansetron, 4 mg, Intravenous, Q8H PRN    senna-docusate 8.6-50 mg, 1 tablet, Oral, Daily PRN    simethicone, 1 tablet, Oral, QID PRN    sodium chloride 0.9%, 10 mL, Intravenous, Q12H PRN    DIPH,PERTUSS(ACELL),TET VACCINE (ADULT)(BOOSTRIX,ADACEL), 0.5 mL, Intramuscular, vaccine x 1 dose    Family History       Problem Relation (Age of Onset)    Alzheimer's disease Mother    Heart disease Father    Hypertension Sister    Pulmonary embolism Father          Tobacco Use    Smoking status: Never     Passive exposure: Never    Smokeless tobacco: Never   Substance and Sexual Activity    Alcohol use: No    Drug use: No    Sexual activity: Not Currently       Review of Systems    Constitutional:  Positive for activity change and fatigue.   Respiratory:  Positive for shortness of breath.    Musculoskeletal:  Positive for gait problem.   Neurological:  Positive for weakness.     Objective:     Vital Signs (Most Recent):  Temp: 97.9 °F (36.6 °C) (02/19/25 1115)  Pulse: 68 (02/19/25 1248)  Resp: 15 (02/19/25 1248)  BP: (!) 131/57 (02/19/25 1248)  SpO2: (!) 93 % (02/19/25 1248) Vital Signs (24h Range):  Temp:  [97.7 °F (36.5 °C)-98.6 °F (37 °C)] 97.9 °F (36.6 °C)  Pulse:  [] 68  Resp:  [15-35] 15  SpO2:  [87 %-99 %] 93 %  BP: ()/(51-93) 131/57     Weight: 68.9 kg (151 lb 14.4 oz)  Body mass index is 21.79 kg/m².       Physical Exam  Vitals and nursing note reviewed.   Constitutional:       General: He is awake. He is not in acute distress.     Appearance: He is ill-appearing.   HENT:      Head: Normocephalic.   Pulmonary:      Effort: No respiratory distress.      Comments: Tachypnea   Skin:     Comments: Multiple areas of bruising and skin abrasions/tares to face and upper ext.     Neurological:      Mental Status: He is alert and oriented to person, place, and time.   Psychiatric:         Behavior: Behavior is cooperative.      Comments: Difficulty with complex aspects of discussions/decision making           Advance Care Planning   Advance Directives:   Living Will: No    LaPOST: No (needs lapost prior to discharge; potential plan for hospice at discharge, can be completed with intake if remains discharge plan)    Do Not Resuscitate Status: Yes    Medical Power of : No (pt's sister Caitlyn is legal surrogate decision maker and pt's preferred MPOA)      Decision Making:  Family answered questions and Patient answered questions  Goals of Care: The patient and family endorses that what is most important right now is to focus on avoiding the hospital, remaining as independent as possible, symptom/pain control, and quality of life, even if it means sacrificing a little  time    Accordingly, we have decided that the best plan to meet the patient's goals includes continuing with treatment for optimization with consideration of hospice at NH upon discharge.          Significant Labs: All pertinent labs within the past 24 hours have been reviewed.  CBC:   Recent Labs   Lab 02/19/25 0045   WBC 8.01   HGB 12.2*   HCT 36.2*   MCV 94        BMP:  Recent Labs   Lab 02/19/25  0045 02/19/25  0410   GLU 86 106    141   K 4.3 4.6    108   CO2 15* 15*   BUN 96* 96*   CREATININE 11.3* 11.4*   CALCIUM 7.9* 7.9*   MG 1.8  --      LFT:  Lab Results   Component Value Date     (H) 02/19/2025    ALKPHOS 50 02/19/2025    BILITOT 0.2 02/19/2025     Albumin:   Albumin   Date Value Ref Range Status   02/19/2025 2.2 (L) 3.5 - 5.2 g/dL Final     Protein:   Total Protein   Date Value Ref Range Status   02/19/2025 5.0 (L) 6.0 - 8.4 g/dL Final     Lactic acid:   Lab Results   Component Value Date    LACTATE 2.1 02/14/2025    LACTATE 4.8 (HH) 02/13/2025       Significant Imaging: I have reviewed all pertinent imaging results/findings within the past 24 hours.

## 2025-02-19 NOTE — ASSESSMENT & PLAN NOTE
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 [employer] in the 60's and was "very brilliant"; during that time pt suffered "nervous breakdown" per sister; pt went through extensive psychiatric care including ECT   - pt has since struggled with his mental disorders which have greatly affected his overall quality of life, especially more recently (see ACP)   - pt is currently alert, cooperative, and can participate in basic discussions but does verbalize his recognition of his mental disabilities and that his sister guiding complex medical decisions is best; he does wish to be included on the discussions and made aware of ongoing plan of care, but wishes to default to his sister for complex decision making   - he also has good insight that his care needs exceed his sisters abilities at this time and is agreeable to exploring other options for skilled nursing living and medical care   "

## 2025-02-19 NOTE — ASSESSMENT & PLAN NOTE
Creatine stable for now. BMP reviewed- noted Estimated Creatinine Clearance: 4.9 mL/min (A) (based on SCr of 11.4 mg/dL (H)). according to latest data. Based on current GFR, CKD stage is stage 3 - GFR 30-59.  Monitor UOP and serial BMP and adjust therapy as needed. Renally dose meds. Avoid nephrotoxic medications and procedures.  With BISI as above.

## 2025-02-19 NOTE — PLAN OF CARE
Problem: Adult Inpatient Plan of Care  Goal: Plan of Care Review  Outcome: Progressing  Goal: Patient-Specific Goal (Individualized)  Outcome: Progressing     Problem: Infection  Goal: Absence of Infection Signs and Symptoms  Outcome: Progressing     Problem: Acute Kidney Injury/Impairment  Goal: Fluid and Electrolyte Balance  Outcome: Progressing

## 2025-02-19 NOTE — SUBJECTIVE & OBJECTIVE
Interval History: stepped up to ICU overnight for Afib with RVR.     Review of patient's allergies indicates:  No Known Allergies  Current Facility-Administered Medications   Medication Frequency    acetaminophen tablet 650 mg Q6H PRN    albuterol-ipratropium 2.5 mg-0.5 mg/3 mL nebulizer solution 3 mL Q4H PRN    aluminum-magnesium hydroxide-simethicone 200-200-20 mg/5 mL suspension 30 mL QID PRN    amiodarone 360 mg/200 mL (1.8 mg/mL) infusion Continuous    ARIPiprazole tablet 20 mg Daily    bisacodyL suppository 10 mg Daily PRN    cefTRIAXone injection 2 g Q24H    cetirizine tablet 5 mg Q48H    glucagon (human recombinant) injection 1 mg PRN    glucose chewable tablet 16 g PRN    glucose chewable tablet 24 g PRN    heparin 25,000 units in dextrose 5% (100 units/ml) IV bolus from bag LOW INTENSITY nomogram - [facility] PRN    heparin 25,000 units in dextrose 5% (100 units/ml) IV bolus from bag LOW INTENSITY nomogram - [facility] PRN    heparin 25,000 units in dextrose 5% 250 mL (100 units/mL) infusion LOW INTENSITY nomogram - [facility] Continuous    melatonin tablet 6 mg Nightly PRN    metoprolol injection 5 mg Q4H PRN    multivitamin tablet Daily    naloxone 0.4 mg/mL injection 0.02 mg PRN    ondansetron injection 4 mg Q8H PRN    senna-docusate 8.6-50 mg per tablet 1 tablet Daily PRN    simethicone chewable tablet 80 mg QID PRN    sodium chloride 0.9% flush 10 mL Q12H PRN    Tdap vaccine injection 0.5 mL vaccine x 1 dose     Facility-Administered Medications Ordered in Other Encounters   Medication Frequency    0.9%  NaCl infusion Continuous    mupirocin 2 % ointment On Call Procedure       Objective:     Vital Signs (Most Recent):  Temp: 97.9 °F (36.6 °C) (02/19/25 1115)  Pulse: 81 (02/19/25 1115)  Resp: (!) 35 (02/19/25 1115)  BP: 128/61 (02/19/25 1115)  SpO2: (!) 93 % (02/19/25 1115) Vital Signs (24h Range):  Temp:  [97.7 °F (36.5 °C)-98.6 °F (37 °C)] 97.9 °F (36.6 °C)  Pulse:  [] 81  Resp:  [16-35] 35  SpO2:  [89 %-99 %] 93  %  BP: ()/(51-93) 128/61     Weight: 68.9 kg (151 lb 14.4 oz) (02/14/25 1515)  Body mass index is 21.79 kg/m².  Body surface area is 1.84 meters squared.    I/O last 3 completed shifts:  In: 2086.9 [P.O.:780; I.V.:1196.5; IV Piggyback:110.4]  Out: 720 [Urine:720]     Physical Exam  Constitutional:       General: He is not in acute distress.     Appearance: He is well-developed. He is not ill-appearing or toxic-appearing.   HENT:      Head: Normocephalic and atraumatic.   Eyes:      Pupils: Pupils are equal, round, and reactive to light.   Cardiovascular:      Rate and Rhythm: Normal rate and regular rhythm.      Heart sounds: Normal heart sounds.   Pulmonary:      Effort: Respiratory distress present.      Breath sounds: Rhonchi present.      Comments: Increased work of breathing  Abdominal:      General: Bowel sounds are normal.      Palpations: Abdomen is soft.      Tenderness: There is no abdominal tenderness.   Musculoskeletal:         General: Normal range of motion.      Cervical back: Normal range of motion and neck supple.   Skin:     Capillary Refill: Capillary refill takes less than 2 seconds.   Neurological:      Mental Status: He is alert and oriented to person, place, and time.          Significant Labs:  All labs within the past 24 hours have been reviewed.     Significant Imaging:  Labs: Reviewed

## 2025-02-19 NOTE — EICU
EICU BRIEF ADMIT NOTE:    Reason for ICU admission:  At fibrillation with rapid ventricular    Please refer to admission H&P for details.     Comfortably lying in the bed.  Not in distress.    Chart reviewed: yes  Recent MD notes reviewed: Yes  Labs results reviewed: yes  Radiology: Chest images reviewed. Reports for others reviewed.  Telemetry tracing: yes  Evaluation via interactive audio and video telecommunications: yes comfortable and not in distress.  Communicated issues/orders/plan with: bedside ICU RN    Best Practices Review:  Stress ulcer prophylaxis: not indicated  DVT prophylaxis: Systemic AC in effect.         Ventilator review:  Intubated : No      Assessment & Plan:     Impression:  Atrial fibrillation with rapid ventricular rate  Fall  COVID-19 infection  Acute kidney injury with history of chronic kidney disease  Rhabdomyolysis  Anion gap metabolic acidosis  Urinary tract infection with E coli  Elevated troponin      Plan:  Primary team has started amiodarone drip.  I will add metoprolol on p.r.n. basis to control heart rate less than 120.  Echo will ordered.  Patient has been started on heparin drip by the primary team.  Follow APTT/anti XA level and for any signs of bleeding.  Continue IV fluid resuscitation.  Nephrology is following.  Continue to monitor renal parameters and electrolytes.  Strict intake output record will be maintained.  Continue ceftriaxone.  Continue rest of supportive care.      Thank you for allowing the EICU to participate in your patient's care. Please feel free to call us as needed.     I have encourage bedside RN to call me with the results of labs/imaging if ordered.         Lola Ojeda MD  [facility]  [phone]

## 2025-02-19 NOTE — HPI
HPI:  This is an 80-year-old male with a past medical history of schizophrenia, chronic blepharitis, hyperlipidemia, CKD 3, BPH, who presents with a fall.     Patient presents for evaluation after a fall and a sycopal episode that occurred prior to presentation. He reports losing consciousness and falling face forward on the floor sustaining an injury to his head, elbows, abdomen and knees. He was unable to stand up and was on the floor for about 1.5 hours. Patient reports having a coughing episode a day prior but denies sick contacts. He denied chest pain.      In the ED, the patient was tachycardic (120s > 60s), but otherwise hemodynamically stable.  Labs were remarkable for leukocytosis (25.3), elevated CPK (23,406), elevated creatinine (3.3-baseline around 1.9), elevated troponin (0.525-chronically elevated), elevated lactic acid (4.8 > 2.1), elevated procalcitonin (2.53), elevated LFTs (T bili: 1.1, AST: 267, ALT: 73), hypophosphatemia (1.7), positive COVID-19.  CT head, maxillofacial, cervical spine showed a nondisplaced right nasal bone fracture with no soft tissue swelling, and paranasal sinus disease.  CT chest, abdomen and pelvis showed possible soft tissue contusion at the level of the xiphoid process, a 2-3 mm left lower lobe pulmonary nodule and nonspecific urinary bladder wall thickening.  Right elbow x-ray demonstrated an elbow effusion with an olecranon spur.  Patient was given 2 L of LR, vancomycin, Zosyn, Tylenol 1 g p.o..  He was admitted for further management.     Overview/Hospital Course:  Mr. Torres is a 79 yo M admitted after a fall and found to have acute on chronic kidney injury and rhabdomyolysis. CK >20,000 on admit and rising to >40,000. Renal function worsening. On IVF. Also found to be COVID positive. Started on remdesivir. Nephrology consulted. CK improving, though Cr still rising. Almanza is placed for accurate I&D monitoring.     Interval History: bruising/swelling improved. Not  making much urine. Discussed with Nephrology,    Almanza is placed for accurate I&D monitoring.    Developed A-fib RVR overnight - placed on IV amiodarone - back in NSR now  EKG A-fib 164 NSSTT changes    Troponin 0.07    Echo 2/14/25    Left Ventricle: The left ventricle is normal in size. There is normal systolic function with a visually estimated ejection fraction of 55 - 60%.    Right Ventricle: Normal right ventricular cavity size. Systolic function is normal.    Tricuspid Valve: There is mild to moderate regurgitation.    Aorta: Aortic root is mildly dilated measuring 4.03 cm.    Pulmonary Artery: The estimated pulmonary artery systolic pressure is 22 mmHg.    IVC/SVC: Normal venous pressure at 3 mmHg.     Stress test 4/10/23    Normal myocardial perfusion scan. There is no evidence of myocardial ischemia or infarction.    There is a  moderate to severe intensity fixed perfusion abnormality in the inferior wall of the left ventricle secondary to diaphragm attenuation.    The gated perfusion images showed an ejection fraction of 69% post stress.    There is normal wall motion post stress.    LV cavity size is  and normal at stress.    The ECG portion of the study is negative for ischemia.    The patient reported no chest pain during the stress test.    There were no arrhythmias during stress.

## 2025-02-19 NOTE — PROGRESS NOTES
HOSPITALIST ON CALL NOTE:    Contacted by the RN regarding new onset afib with RVR. Pt. Admited for Rhabdo, Covid +(tx with remdesivir) and BISI.  No h/o afib that I can see.   Pt. Asymptomatic. Reviewed EKG and STAT labs being sent.   Attempted IV lopressor 5mg x1 with no much change in HR. Will transfer to the ICU for amiodarone drip. Change to heparin drip. Consult Cardiology.   Results for orders placed during the hospital encounter of 02/13/25    Echo    Interpretation Summary    Left Ventricle: The left ventricle is normal in size. There is normal systolic function with a visually estimated ejection fraction of 55 - 60%.    Right Ventricle: Normal right ventricular cavity size. Systolic function is normal.    Tricuspid Valve: There is mild to moderate regurgitation.    Aorta: Aortic root is mildly dilated measuring 4.03 cm.    Pulmonary Artery: The estimated pulmonary artery systolic pressure is 22 mmHg.    IVC/SVC: Normal venous pressure at 3 mmHg.

## 2025-02-19 NOTE — PROGRESS NOTES
[facility]  Progress Note    Patient Name: Antonio Torres Jr.  MRN: [id number]  Patient Class: IP- Inpatient   Admission Date: 2/13/2025  Length of Stay: 5 days  Attending Physician: Hernan Unger MD  Primary Care Provider: Fabio Lennon MD        Subjective     Principal Problem:Non-traumatic rhabdomyolysis        HPI:  This is an 80-year-old male with a past medical history of schizophrenia, chronic blepharitis, hyperlipidemia, CKD 3, BPH, who presents with a fall.    Patient presents for evaluation after a fall and a sycopal episode that occurred prior to presentation. He reports losing consciousness and falling face forward on the floor sustaining an injury to his head, elbows, abdomen and knees. He was unable to stand up and was on the floor for about 1.5 hours. Patient reports having a coughing episode a day prior but denies sick contacts. He denied chest pain.     In the ED, the patient was tachycardic (120s > 60s), but otherwise hemodynamically stable.  Labs were remarkable for leukocytosis (25.3), elevated CPK (23,406), elevated creatinine (3.3-baseline around 1.9), elevated troponin (0.525-chronically elevated), elevated lactic acid (4.8 > 2.1), elevated procalcitonin (2.53), elevated LFTs (T bili: 1.1, AST: 267, ALT: 73), hypophosphatemia (1.7), positive COVID-19.  CT head, maxillofacial, cervical spine showed a nondisplaced right nasal bone fracture with no soft tissue swelling, and paranasal sinus disease.  CT chest, abdomen and pelvis showed possible soft tissue contusion at the level of the xiphoid process, a 2-3 mm left lower lobe pulmonary nodule and nonspecific urinary bladder wall thickening.  Right elbow x-ray demonstrated an elbow effusion with an olecranon spur.  Patient was given 2 L of LR, vancomycin, Zosyn, Tylenol 1 g p.o..  He was admitted for further management.    Overview/Hospital Course:  Mr. Torres is a 79 yo M admitted after a fall and found to  have acute on chronic kidney injury and rhabdomyolysis. CK >20,000 on admit and rising to >40,000. Renal function worsening. On IVF. Also found to be COVID positive. Started on remdesivir. Nephrology consulted. CK improving, though Cr still rising. Almanza is placed for accurate I&D monitoring. Patient went into rapid Afib.  Moved to ICU and started on Amio drip.    Interval History: moved to ICU for AFib and started on Amio drip.    Review of Systems   HENT:  Negative for ear discharge and ear pain.    Eyes:  Negative for discharge and itching.   Endocrine: Negative for cold intolerance and heat intolerance.   Neurological:  Negative for seizures and syncope.     Objective:     Vital Signs (Most Recent):  Temp: 97.9 °F (36.6 °C) (02/19/25 1115)  Pulse: 68 (02/19/25 1248)  Resp: 15 (02/19/25 1248)  BP: (!) 131/57 (02/19/25 1248)  SpO2: (!) 93 % (02/19/25 1248) Vital Signs (24h Range):  Temp:  [97.7 °F (36.5 °C)-98.6 °F (37 °C)] 97.9 °F (36.6 °C)  Pulse:  [] 68  Resp:  [15-35] 15  SpO2:  [87 %-99 %] 93 %  BP: ()/(51-93) 131/57     Weight: 68.9 kg (151 lb 14.4 oz)  Body mass index is 21.79 kg/m².    Intake/Output Summary (Last 24 hours) at 2/19/2025 1410  Last data filed at 2/19/2025 1010  Gross per 24 hour   Intake 1306.89 ml   Output 770 ml   Net 536.89 ml         Physical Exam  Constitutional:       General: He is not in acute distress.     Appearance: He is well-developed. He is not ill-appearing or toxic-appearing.   HENT:      Head: Normocephalic and atraumatic.   Eyes:      Pupils: Pupils are equal, round, and reactive to light.   Cardiovascular:      Rate and Rhythm: Normal rate and regular rhythm.      Heart sounds: Normal heart sounds.   Pulmonary:      Effort: Pulmonary effort is normal.      Breath sounds: Rhonchi present.   Abdominal:      General: Bowel sounds are normal.      Palpations: Abdomen is soft.      Tenderness: There is no abdominal tenderness.   Musculoskeletal:         General:  Normal range of motion.      Cervical back: Normal range of motion and neck supple.   Skin:     Capillary Refill: Capillary refill takes less than 2 seconds.   Neurological:      Mental Status: He is alert and oriented to person, place, and time.             Significant Labs: All pertinent labs within the past 24 hours have been reviewed.  BMP:   Recent Labs   Lab 02/19/25  0045 02/19/25  0410   GLU 86 106    141   K 4.3 4.6    108   CO2 15* 15*   BUN 96* 96*   CREATININE 11.3* 11.4*   CALCIUM 7.9* 7.9*   MG 1.8  --      CBC:   Recent Labs   Lab 02/18/25  0516 02/19/25  0045   WBC 8.23 8.01   HGB 11.4* 12.2*   HCT 35.0* 36.2*    182       Significant Imaging: I have reviewed all pertinent imaging results/findings within the past 24 hours.    Assessment and Plan     * Non-traumatic rhabdomyolysis  Elevated CPK to 52983 in the setting of a prolonged fall   - CK trending to >40k  - with renal failure -- Nephrolgoy consulted  - continue with aggressive hydration  - no evidence of compartment syndrome  - CK continues to improve, but Creat continues to increase and possibly starting dialysis today.    Acute renal failure superimposed on stage 3 chronic kidney disease  BISI is likely due to acute tubular necrosis caused by rhabdomyolysis . Baseline creatinine is  1.9 . Most recent creatinine and eGFR are listed below.  Recent Labs     02/18/25  0516 02/19/25  0045 02/19/25  0410   CREATININE 10.3* 11.3* 11.4*   EGFRNORACEVR 5* 4* 4*        Plan  - BISI is worsening. Will continue current treatment  - Avoid nephrotoxins and renally dose meds for GFR listed above  - Monitor urine output, serial BMP, and adjust therapy as needed  - IVF  - CR rising but no significant acidosis or electrolyte abnormality  Appreciate Nephrology.  Nephrology discussing starting HD today.    New onset a-fib  Patient has paroxysmal (<7 days) atrial fibrillation. Patient is currently in sinus rhythm. [id number] Score: 2. The  patients heart rate in the last 24 hours is as follows:  Pulse  Min: 67  Max: 156     Antiarrhythmics  amiodarone 360 mg/200 mL (1.8 mg/mL) infusion, Continuous, Intravenous  metoprolol injection 5 mg, Every 4 hours PRN, Intravenous    Anticoagulants  heparin 25,000 units in dextrose 5% 250 mL (100 units/mL) infusion LOW INTENSITY nomogram - [facility], Continuous, Intravenous  heparin 25,000 units in dextrose 5% (100 units/ml) IV bolus from bag LOW INTENSITY nomogram - [facility], As needed (PRN), Intravenous  heparin 25,000 units in dextrose 5% (100 units/ml) IV bolus from bag LOW INTENSITY nomogram - [facility], As needed (PRN), Intravenous    Plan  - Replete lytes with a goal of K>4, Mg >2  - Patient is anticoagulated, see medications listed above.  - Patient's afib is currently  resolved and back in NSR  - patient moved to ICU overnight and placed on Amio and heparin drips.  Converted to NSR.  Cardiology consulted.        Elevated troponin  Recent Labs   Lab 02/14/25  0435   TROPONINI 0.837*         Chronically elevated  No chest pain    Continue to monitor     COVID-19  No hypoxia, prophylactic remdesivir     Elevated LFTs  Likely in the setting of rhabdomyolysis   Continue to monitor   - slowly improving      Stage 3b chronic kidney disease  Creatine stable for now. BMP reviewed- noted Estimated Creatinine Clearance: 4.9 mL/min (A) (based on SCr of 11.4 mg/dL (H)). according to latest data. Based on current GFR, CKD stage is stage 3 - GFR 30-59.  Monitor UOP and serial BMP and adjust therapy as needed. Renally dose meds. Avoid nephrotoxic medications and procedures.  With BISI as above.    Schizophrenia  Continue Abilify         VTE Risk Mitigation (From admission, onward)           Ordered     heparin 25,000 units in dextrose 5% (100 units/ml) IV bolus from bag LOW INTENSITY nomogram - [facility]  As needed (PRN)        Question:  Heparin Infusion Adjustment (DO NOT MODIFY ANSWER)  Answer:   [email or web address]\epic\Images\Pharmacy\HeparinInfusions\heparin LOW INTENSITY nomogram for [facility] [id number]    02/19/25 0059     heparin 25,000 units in dextrose 5% (100 units/ml) IV bolus from bag LOW INTENSITY nomogram - [facility]  As needed (PRN)        Question:  Heparin Infusion Adjustment (DO NOT MODIFY ANSWER)  Answer:  [email or web address]\epic\Images\Pharmacy\HeparinInfusions\heparin LOW INTENSITY nomogram for [facility] [id number].pdf    02/19/25 0059     heparin 25,000 units in dextrose 5% 250 mL (100 units/mL) infusion LOW INTENSITY nomogram - [facility]  Continuous        Question:  Begin at (units/kg/hr)  Answer:  12    02/19/25 0059     IP VTE HIGH RISK PATIENT  Once         02/14/25 0222     Place sequential compression device  Until discontinued         02/14/25 0222                    Discharge Planning   JULIANA:      Code Status: DNR   Medical Readiness for Discharge Date:   Discharge Plan A: Skilled Nursing Facility   Discharge Delays: None known at this time        Critical care time spent on the evaluation and treatment of severe organ dysfunction, review of pertinent labs and imaging studies, discussions with consulting providers and discussions with patient/family: 30 minutes.    Please place Justification for DME        Hernan Unger MD  Department of Hospital Medicine   [facility] - Intensive Care

## 2025-02-19 NOTE — ASSESSMENT & PLAN NOTE
BISI is likely due to acute tubular necrosis caused by rhabdomyolysis . Baseline creatinine is  1.9 . Most recent creatinine and eGFR are listed below.  Recent Labs     02/18/25  0516 02/19/25  0045 02/19/25  0410   CREATININE 10.3* 11.3* 11.4*   EGFRNORACEVR 5* 4* 4*        Plan  - BISI is worsening. Will continue current treatment  - Avoid nephrotoxins and renally dose meds for GFR listed above  - Monitor urine output, serial BMP, and adjust therapy as needed  - IVF  - CR rising but no significant acidosis or electrolyte abnormality  Appreciate Nephrology.  Nephrology discussing starting HD today.

## 2025-02-19 NOTE — SUBJECTIVE & OBJECTIVE
Interval History: moved to ICU for AFib and started on Amio drip.    Review of Systems   HENT:  Negative for ear discharge and ear pain.    Eyes:  Negative for discharge and itching.   Endocrine: Negative for cold intolerance and heat intolerance.   Neurological:  Negative for seizures and syncope.     Objective:     Vital Signs (Most Recent):  Temp: 97.9 °F (36.6 °C) (02/19/25 1115)  Pulse: 68 (02/19/25 1248)  Resp: 15 (02/19/25 1248)  BP: (!) 131/57 (02/19/25 1248)  SpO2: (!) 93 % (02/19/25 1248) Vital Signs (24h Range):  Temp:  [97.7 °F (36.5 °C)-98.6 °F (37 °C)] 97.9 °F (36.6 °C)  Pulse:  [] 68  Resp:  [15-35] 15  SpO2:  [87 %-99 %] 93 %  BP: ()/(51-93) 131/57     Weight: 68.9 kg (151 lb 14.4 oz)  Body mass index is 21.79 kg/m².    Intake/Output Summary (Last 24 hours) at 2/19/2025 1410  Last data filed at 2/19/2025 1010  Gross per 24 hour   Intake 1306.89 ml   Output 770 ml   Net 536.89 ml         Physical Exam  Constitutional:       General: He is not in acute distress.     Appearance: He is well-developed. He is not ill-appearing or toxic-appearing.   HENT:      Head: Normocephalic and atraumatic.   Eyes:      Pupils: Pupils are equal, round, and reactive to light.   Cardiovascular:      Rate and Rhythm: Normal rate and regular rhythm.      Heart sounds: Normal heart sounds.   Pulmonary:      Effort: Pulmonary effort is normal.      Breath sounds: Rhonchi present.   Abdominal:      General: Bowel sounds are normal.      Palpations: Abdomen is soft.      Tenderness: There is no abdominal tenderness.   Musculoskeletal:         General: Normal range of motion.      Cervical back: Normal range of motion and neck supple.   Skin:     Capillary Refill: Capillary refill takes less than 2 seconds.   Neurological:      Mental Status: He is alert and oriented to person, place, and time.             Significant Labs: All pertinent labs within the past 24 hours have been reviewed.  BMP:   Recent Labs   Lab  02/19/25  0045 02/19/25  0410   GLU 86 106    141   K 4.3 4.6    108   CO2 15* 15*   BUN 96* 96*   CREATININE 11.3* 11.4*   CALCIUM 7.9* 7.9*   MG 1.8  --      CBC:   Recent Labs   Lab 02/18/25  0516 02/19/25  0045   WBC 8.23 8.01   HGB 11.4* 12.2*   HCT 35.0* 36.2*    182       Significant Imaging: I have reviewed all pertinent imaging results/findings within the past 24 hours.

## 2025-02-19 NOTE — PROGRESS NOTES
[facility] - Intensive Care  Nephrology  Progress Note    Patient Name: Antonio Torres Jr.  MRN: [id number]  Admission Date: 2/13/2025  Hospital Length of Stay: 5 days  Attending Provider: Hernan Unger MD   Primary Care Physician: Fabio Lennon MD  Principal Problem:Non-traumatic rhabdomyolysis    Subjective:     HPI: Mr. Torres is an 81 yo male with schizophrenia, BPH, CKD, and h/o melanoma who presented to the ED overnight s/p fall. HR 120s on arrival. He was also found to be COVID+. CPK 23k. He was started on IVF. Nephrology consulted for BISI on CKD. Prior records obtained and reviewed. He is followed by myself outpatient; last visit was 11/11/24. His baseline Cr is 1.7-2.0; his CKD is of unknown etiology. His Cr was 3.3 on arrival --> 3.5 this morning. CPK now > 40k. Abnormal LFTs. He reports to be doing okay this afternoon. Complaining of soreness. No SOB or leg swelling.    Interval History: stepped up to ICU overnight for Afib with RVR.     Review of patient's allergies indicates:  No Known Allergies  Current Facility-Administered Medications   Medication Frequency    acetaminophen tablet 650 mg Q6H PRN    albuterol-ipratropium 2.5 mg-0.5 mg/3 mL nebulizer solution 3 mL Q4H PRN    aluminum-magnesium hydroxide-simethicone 200-200-20 mg/5 mL suspension 30 mL QID PRN    amiodarone 360 mg/200 mL (1.8 mg/mL) infusion Continuous    ARIPiprazole tablet 20 mg Daily    bisacodyL suppository 10 mg Daily PRN    cefTRIAXone injection 2 g Q24H    cetirizine tablet 5 mg Q48H    glucagon (human recombinant) injection 1 mg PRN    glucose chewable tablet 16 g PRN    glucose chewable tablet 24 g PRN    heparin 25,000 units in dextrose 5% (100 units/ml) IV bolus from bag LOW INTENSITY nomogram - [facility] PRN    heparin 25,000 units in dextrose 5% (100 units/ml) IV bolus from bag LOW INTENSITY nomogram - [facility] PRN    heparin 25,000 units in dextrose 5% 250 mL (100 units/mL) infusion LOW INTENSITY nomogram - [facility] Continuous     melatonin tablet 6 mg Nightly PRN    metoprolol injection 5 mg Q4H PRN    multivitamin tablet Daily    naloxone 0.4 mg/mL injection 0.02 mg PRN    ondansetron injection 4 mg Q8H PRN    senna-docusate 8.6-50 mg per tablet 1 tablet Daily PRN    simethicone chewable tablet 80 mg QID PRN    sodium chloride 0.9% flush 10 mL Q12H PRN    Tdap vaccine injection 0.5 mL vaccine x 1 dose     Facility-Administered Medications Ordered in Other Encounters   Medication Frequency    0.9%  NaCl infusion Continuous    mupirocin 2 % ointment On Call Procedure       Objective:     Vital Signs (Most Recent):  Temp: 97.9 °F (36.6 °C) (02/19/25 1115)  Pulse: 81 (02/19/25 1115)  Resp: (!) 35 (02/19/25 1115)  BP: 128/61 (02/19/25 1115)  SpO2: (!) 93 % (02/19/25 1115) Vital Signs (24h Range):  Temp:  [97.7 °F (36.5 °C)-98.6 °F (37 °C)] 97.9 °F (36.6 °C)  Pulse:  [] 81  Resp:  [16-35] 35  SpO2:  [89 %-99 %] 93 %  BP: ()/(51-93) 128/61     Weight: 68.9 kg (151 lb 14.4 oz) (02/14/25 1515)  Body mass index is 21.79 kg/m².  Body surface area is 1.84 meters squared.    I/O last 3 completed shifts:  In: 2086.9 [P.O.:780; I.V.:1196.5; IV Piggyback:110.4]  Out: 720 [Urine:720]     Physical Exam  Constitutional:       General: He is not in acute distress.     Appearance: He is well-developed. He is not ill-appearing or toxic-appearing.   HENT:      Head: Normocephalic and atraumatic.   Eyes:      Pupils: Pupils are equal, round, and reactive to light.   Cardiovascular:      Rate and Rhythm: Normal rate and regular rhythm.      Heart sounds: Normal heart sounds.   Pulmonary:      Effort: Respiratory distress present.      Breath sounds: Rhonchi present.      Comments: Increased work of breathing  Abdominal:      General: Bowel sounds are normal.      Palpations: Abdomen is soft.      Tenderness: There is no abdominal tenderness.   Musculoskeletal:         General: Normal range of motion.      Cervical back: Normal range of motion and neck  supple.   Skin:     Capillary Refill: Capillary refill takes less than 2 seconds.   Neurological:      Mental Status: He is alert and oriented to person, place, and time.          Significant Labs:  All labs within the past 24 hours have been reviewed.     Significant Imaging:  Labs: Reviewed  Assessment/Plan:     Renal/  Acute renal failure superimposed on stage 3 chronic kidney disease  Metabolic acidosis  Baseline creatinine 1.7-2.0  On admission creatinine 3.3    BISI due to Rhabdomyolysis, initial CK > 40k  Worsening though remains without RRT needs    Consent for dialysis obtained 2/18/2025    Plan/Recommendation  Discussed with ICU - please place trialysis line and will start on HD  -Keep MAP > 65  -Keep hemoglobin > 7  -Strict ins and outs  -Avoid nephrotoxic agents if possible and renally dose medications  -Avoid drastic hemodynamic changes if possible      Stage 3b chronic kidney disease  See BISI    Orthopedic  * Non-traumatic rhabdomyolysis  See BISI    Goal of Care  Called daugher and have placed consult for palliative care to assist.     Critical care time spent on the evaluation and treatment of severe organ dysfunction, review of pertinent labs and imaging studies, discussions with consulting providers and discussions with patient/family: 30 minutes.      Thank you for your consult. I will follow-up with patient. Please contact us if you have any additional questions.    Ezio Smith MD  Nephrology  [facility] - Intensive Care

## 2025-02-19 NOTE — ASSESSMENT & PLAN NOTE
Developed A-fib RVR overnight. Back in NSR on IV amiodarone. Ok to continue IV amiodarone full load then switch to 200 po bid tomorrow. On heparin - consider switching to DOAC prior to d/c. Echo with normal EF    Antiarrhythmics  amiodarone 360 mg/200 mL (1.8 mg/mL) infusion, Continuous, Intravenous  metoprolol injection 5 mg, Every 4 hours PRN, Intravenous    Anticoagulants  heparin 25,000 units in dextrose 5% 250 mL (100 units/mL) infusion LOW INTENSITY nomogram - [facility], Continuous, Intravenous  heparin 25,000 units in dextrose 5% (100 units/ml) IV bolus from bag LOW INTENSITY nomogram - [facility], As needed (PRN), Intravenous  heparin 25,000 units in dextrose 5% (100 units/ml) IV bolus from bag LOW INTENSITY nomogram - [facility], As needed (PRN), Intravenous

## 2025-02-19 NOTE — HPI
"From H&P: "This is an 80-year-old male with a past medical history of schizophrenia, chronic blepharitis, hyperlipidemia, CKD 3, BPH, who presents with a fall.     Patient presents for evaluation after a fall and a sycopal episode that occurred prior to presentation. He reports losing consciousness and falling face forward on the floor sustaining an injury to his head, elbows, abdomen and knees. He was unable to stand up and was on the floor for about 1.5 hours. Patient reports having a coughing episode a day prior but denies sick contacts. He denied chest pain.      In the ED, the patient was tachycardic (120s > 60s), but otherwise hemodynamically stable.  Labs were remarkable for leukocytosis (25.3), elevated CPK (23,406), elevated creatinine (3.3-baseline around 1.9), elevated troponin (0.525-chronically elevated), elevated lactic acid (4.8 > 2.1), elevated procalcitonin (2.53), elevated LFTs (T bili: 1.1, AST: 267, ALT: 73), hypophosphatemia (1.7), positive COVID-19.  CT head, maxillofacial, cervical spine showed a nondisplaced right nasal bone fracture with no soft tissue swelling, and paranasal sinus disease.  CT chest, abdomen and pelvis showed possible soft tissue contusion at the level of the xiphoid process, a 2-3 mm left lower lobe pulmonary nodule and nonspecific urinary bladder wall thickening.  Right elbow x-ray demonstrated an elbow effusion with an olecranon spur.  Patient was given 2 L of LR, vancomycin, Zosyn, Tylenol 1 g p.o..  He was admitted for further management."     Palliative medicine consulted for goals of care discussion and advance care planning; for details of visit, see advance care planning section of plan.     "

## 2025-02-20 LAB
ALBUMIN SERPL BCP-MCNC: 2.5 G/DL (ref 3.5–5.2)
ALP SERPL-CCNC: 57 U/L (ref 40–150)
ALT SERPL W/O P-5'-P-CCNC: 216 U/L (ref 10–44)
ANION GAP SERPL CALC-SCNC: 21 MMOL/L (ref 8–16)
APTT PPP: 33.8 SEC (ref 21–32)
APTT PPP: 35.6 SEC (ref 21–32)
AST SERPL-CCNC: 148 U/L (ref 10–40)
BILIRUB SERPL-MCNC: 0.3 MG/DL (ref 0.1–1)
BUN SERPL-MCNC: 101 MG/DL (ref 8–23)
CALCIUM SERPL-MCNC: 8.5 MG/DL (ref 8.7–10.5)
CHLORIDE SERPL-SCNC: 106 MMOL/L (ref 95–110)
CK SERPL-CCNC: 4525 U/L (ref 20–200)
CO2 SERPL-SCNC: 14 MMOL/L (ref 23–29)
CREAT SERPL-MCNC: 12.9 MG/DL (ref 0.5–1.4)
EST. GFR  (NO RACE VARIABLE): 3 ML/MIN/1.73 M^2
GLUCOSE SERPL-MCNC: 86 MG/DL (ref 70–110)
POTASSIUM SERPL-SCNC: 4.8 MMOL/L (ref 3.5–5.1)
PROT SERPL-MCNC: 5.6 G/DL (ref 6–8.4)
SODIUM SERPL-SCNC: 141 MMOL/L (ref 136–145)

## 2025-02-20 PROCEDURE — 12000002 HC ACUTE/MED SURGE SEMI-PRIVATE ROOM

## 2025-02-20 PROCEDURE — 87340 HEPATITIS B SURFACE AG IA: CPT | Performed by: STUDENT IN AN ORGANIZED HEALTH CARE EDUCATION/TRAINING PROGRAM

## 2025-02-20 PROCEDURE — 80100014 HC HEMODIALYSIS 1:1

## 2025-02-20 PROCEDURE — 82550 ASSAY OF CK (CPK): CPT | Performed by: STUDENT IN AN ORGANIZED HEALTH CARE EDUCATION/TRAINING PROGRAM

## 2025-02-20 PROCEDURE — 97535 SELF CARE MNGMENT TRAINING: CPT

## 2025-02-20 PROCEDURE — 94761 N-INVAS EAR/PLS OXIMETRY MLT: CPT

## 2025-02-20 PROCEDURE — 86706 HEP B SURFACE ANTIBODY: CPT | Performed by: STUDENT IN AN ORGANIZED HEALTH CARE EDUCATION/TRAINING PROGRAM

## 2025-02-20 PROCEDURE — 25000242 PHARM REV CODE 250 ALT 637 W/ HCPCS: Performed by: STUDENT IN AN ORGANIZED HEALTH CARE EDUCATION/TRAINING PROGRAM

## 2025-02-20 PROCEDURE — 99232 SBSQ HOSP IP/OBS MODERATE 35: CPT | Mod: ,,, | Performed by: STUDENT IN AN ORGANIZED HEALTH CARE EDUCATION/TRAINING PROGRAM

## 2025-02-20 PROCEDURE — 85730 THROMBOPLASTIN TIME PARTIAL: CPT | Mod: 91 | Performed by: HOSPITALIST

## 2025-02-20 PROCEDURE — 25000003 PHARM REV CODE 250: Performed by: STUDENT IN AN ORGANIZED HEALTH CARE EDUCATION/TRAINING PROGRAM

## 2025-02-20 PROCEDURE — 97530 THERAPEUTIC ACTIVITIES: CPT

## 2025-02-20 PROCEDURE — 25000003 PHARM REV CODE 250: Performed by: HOSPITALIST

## 2025-02-20 PROCEDURE — 63600175 PHARM REV CODE 636 W HCPCS: Performed by: STUDENT IN AN ORGANIZED HEALTH CARE EDUCATION/TRAINING PROGRAM

## 2025-02-20 PROCEDURE — 27000221 HC OXYGEN, UP TO 24 HOURS

## 2025-02-20 PROCEDURE — 94640 AIRWAY INHALATION TREATMENT: CPT

## 2025-02-20 PROCEDURE — 63600175 PHARM REV CODE 636 W HCPCS: Performed by: INTERNAL MEDICINE

## 2025-02-20 PROCEDURE — 27000207 HC ISOLATION

## 2025-02-20 PROCEDURE — 80053 COMPREHEN METABOLIC PANEL: CPT | Performed by: STUDENT IN AN ORGANIZED HEALTH CARE EDUCATION/TRAINING PROGRAM

## 2025-02-20 PROCEDURE — 36415 COLL VENOUS BLD VENIPUNCTURE: CPT | Performed by: HOSPITALIST

## 2025-02-20 RX ORDER — AMIODARONE HYDROCHLORIDE 200 MG/1
200 TABLET ORAL 2 TIMES DAILY
Status: DISCONTINUED | OUTPATIENT
Start: 2025-02-20 | End: 2025-02-20

## 2025-02-20 RX ADMIN — IPRATROPIUM BROMIDE AND ALBUTEROL SULFATE 3 ML: 2.5; .5 SOLUTION RESPIRATORY (INHALATION) at 07:02

## 2025-02-20 RX ADMIN — THERA TABS 1 TABLET: TAB at 09:02

## 2025-02-20 RX ADMIN — AMIODARONE HYDROCHLORIDE 1 MG/MIN: 1.8 INJECTION, SOLUTION INTRAVENOUS at 06:02

## 2025-02-20 RX ADMIN — AMIODARONE HYDROCHLORIDE 200 MG: 200 TABLET ORAL at 10:02

## 2025-02-20 RX ADMIN — IPRATROPIUM BROMIDE AND ALBUTEROL SULFATE 3 ML: 2.5; .5 SOLUTION RESPIRATORY (INHALATION) at 03:02

## 2025-02-20 RX ADMIN — ARIPIPRAZOLE 20 MG: 10 TABLET ORAL at 09:02

## 2025-02-20 RX ADMIN — CEFTRIAXONE 2 G: 2 INJECTION, POWDER, FOR SOLUTION INTRAMUSCULAR; INTRAVENOUS at 03:02

## 2025-02-20 RX ADMIN — APIXABAN 2.5 MG: 2.5 TABLET, FILM COATED ORAL at 11:02

## 2025-02-20 RX ADMIN — APIXABAN 2.5 MG: 2.5 TABLET, FILM COATED ORAL at 07:02

## 2025-02-20 NOTE — NURSING
[facility] - [place]  Nurses Note -- 4 Eyes      2/19/2025       Skin assessed on: Q Shift      [x] No Pressure Injuries Present    [x]Prevention Measures Documented    [] Yes LDA  for Pressure Injury Previously documented     [] Yes New Pressure Injury Discovered   [] LDA for New Pressure Injury Added      Attending RN:  Roger Torres RN     Second RN:  RICO Everett

## 2025-02-20 NOTE — SUBJECTIVE & OBJECTIVE
Interval History: unfortunately, emergent cases yesterday requiring HD and was not able to undergo session of dialysis.     Review of patient's allergies indicates:  No Known Allergies  Current Facility-Administered Medications   Medication Frequency    acetaminophen tablet 650 mg Q6H PRN    albuterol-ipratropium 2.5 mg-0.5 mg/3 mL nebulizer solution 3 mL Q4H PRN    aluminum-magnesium hydroxide-simethicone 200-200-20 mg/5 mL suspension 30 mL QID PRN    amiodarone tablet 200 mg BID    apixaban tablet 2.5 mg BID    ARIPiprazole tablet 20 mg Daily    bisacodyL suppository 10 mg Daily PRN    cefTRIAXone injection 2 g Q24H    cetirizine tablet 5 mg Q48H    fentaNYL 50 mcg/mL injection 50 mcg On Call Procedure    glucagon (human recombinant) injection 1 mg PRN    glucose chewable tablet 16 g PRN    glucose chewable tablet 24 g PRN    melatonin tablet 6 mg Nightly PRN    metoprolol injection 5 mg Q4H PRN    midazolam (PF) (VERSED) 1 mg/mL injection 1 mg On Call Procedure    multivitamin tablet Daily    naloxone 0.4 mg/mL injection 0.02 mg PRN    ondansetron injection 4 mg Q8H PRN    senna-docusate 8.6-50 mg per tablet 1 tablet Daily PRN    simethicone chewable tablet 80 mg QID PRN    sodium chloride 0.9% flush 10 mL Q12H PRN    Tdap vaccine injection 0.5 mL vaccine x 1 dose     Facility-Administered Medications Ordered in Other Encounters   Medication Frequency    0.9%  NaCl infusion Continuous    mupirocin 2 % ointment On Call Procedure       Objective:     Vital Signs (Most Recent):  Temp: 98 °F (36.7 °C) (02/20/25 0800)  Pulse: 85 (02/20/25 1100)  Resp: 20 (02/20/25 0731)  BP: 128/65 (02/20/25 1100)  SpO2: 97 % (02/20/25 1100) Vital Signs (24h Range):  Temp:  [97.7 °F (36.5 °C)-98 °F (36.7 °C)] 98 °F (36.7 °C)  Pulse:  [] 85  Resp:  [11-28] 20  SpO2:  [86 %-100 %] 97 %  BP: (102-161)/(52-92) 128/65     Weight: 68.9 kg (151 lb 14.4 oz) (02/14/25 1515)  Body mass index is 21.79 kg/m².  Body surface area is 1.84  meters squared.    I/O last 3 completed shifts:  In: 1906.9 [I.V.:1796.6; IV Piggyback:110.4]  Out: 1030 [Urine:1030]     Physical Exam  Constitutional:       General: He is not in acute distress.     Appearance: He is well-developed. He is not ill-appearing or toxic-appearing.   HENT:      Head: Normocephalic and atraumatic.   Eyes:      Pupils: Pupils are equal, round, and reactive to light.   Cardiovascular:      Rate and Rhythm: Normal rate and regular rhythm.      Heart sounds: Normal heart sounds.   Pulmonary:      Effort: Respiratory distress present.      Breath sounds: Rhonchi present.      Comments: Increased work of breathing  Abdominal:      General: Bowel sounds are normal.      Palpations: Abdomen is soft.      Tenderness: There is no abdominal tenderness.   Musculoskeletal:         General: Normal range of motion.      Cervical back: Normal range of motion and neck supple.   Skin:     Capillary Refill: Capillary refill takes less than 2 seconds.   Neurological:      Mental Status: He is alert and oriented to person, place, and time.          Significant Labs:  All labs within the past 24 hours have been reviewed.     Significant Imaging:  Labs: Reviewed

## 2025-02-20 NOTE — ASSESSMENT & PLAN NOTE
Metabolic acidosis  Baseline creatinine 1.7-2.0  On admission creatinine 3.3    BISI due to Rhabdomyolysis, initial CK > 40k  Worsening though remains without RRT needs    Consent for dialysis obtained 2/18/2025    Plan/Recommendation  HD today - will see response and follow up - may have recovery in the long term  Appreciate palliative care assistance and will follow up if he wants to be on dialysis long term  -Keep MAP > 65  -Keep hemoglobin > 7  -Strict ins and outs  -Avoid nephrotoxic agents if possible and renally dose medications  -Avoid drastic hemodynamic changes if possible

## 2025-02-20 NOTE — PT/OT/SLP PROGRESS
Occupational Therapy   Treatment    Name: Antonio Torres Jr.  MRN: [id number]  Admitting Diagnosis:  Acute renal failure superimposed on stage 3 chronic kidney disease       Recommendations:     Discharge Recommendations: Moderate Intensity Therapy  Discharge Equipment Recommendations:  to be determined by next level of care  Barriers to discharge:  Other (Comment) (high fall risk, not at PLOF of supervision to mod independent)    Assessment:     Antonio Torres Jr. is a 80 y.o. male with a medical diagnosis of Acute renal failure superimposed on stage 3 chronic kidney disease.  He presents with HOB elevated and BP at 164/77 at rest supine. Performance deficits affecting function are weakness, impaired endurance, impaired self care skills, impaired functional mobility, gait instability, impaired balance, impaired cognition, decreased safety awareness, decreased ROM, impaired skin, edema, impaired cardiopulmonary response to activity. He increased to 166/80 seated EOB and then to 157/77 standing with some lightheadedness noted. He tf to chair from bed while occupational therapy removed soiled linen and then he returned to bed before end of session with some SOB, but no desaturation noted throughout session.     Rehab Prognosis:  Good; patient would benefit from acute skilled OT services to address these deficits and reach maximum level of function.       Plan:     Patient to be seen 4 x/week to address the above listed problems via self-care/home management, therapeutic activities, therapeutic exercises  Plan of Care Expires: 03/01/25  Plan of Care Reviewed with: patient    Subjective     Chief Complaint: none   Patient/Family Comments/goals: patient said his sister visited yesterday   Pain/Comfort:  Pain Rating 1: 0/10    Objective:     Communicated with: RNGianfranco, prior to session.  Patient found HOB elevated with telemetry, oxygen, peripheral IV, blood pressure cuff, pulse ox (continuous) upon OT entry to  room.    General Precautions: Standard, airborne, contact, droplet, fall, respiratory    Orthopedic Precautions:N/A  Braces: N/A  Respiratory Status: Nasal cannula, flow 4  L/min     Occupational Performance:     Bed Mobility:    Patient completed Rolling/Turning to Right with contact guard assistance  Patient completed Scooting/Bridging with contact guard assistance  Patient completed Supine to Sit with contact guard assistance  Patient completed Sit to Supine with contact guard assistance     Functional Mobility/Transfers:  Patient completed Sit <> Stand Transfer with minimum assistance  with  hand-held assist   Patient completed Bed <> Chair Transfer using Step Transfer technique with minimum assistance with hand-held assist  Functional Mobility: Patient took a few steps from bed to chair with CGA with hand held assist and sat in chair for approx. 20 minutes. He returned to bed before end of session with CGA with hand held assist.     Activities of Daily Living:  Grooming: stand by assistance to wash face seated EOB   Upper Body Dressing: minimum assistance to tie gown   Lower Body Dressing: maximal assistance to doff and don larger socks due to B lower extremity edema       [facility] 6 Click ADL: 16    Treatment & Education:  Patient able to do PLB, but he did not have any desaturation throughout session with activity including self care and small ambulation.     Patient left HOB elevated with all lines intact, call button in reach, and RN  notified    GOALS:   Multidisciplinary Problems       Occupational Therapy Goals          Problem: Occupational Therapy    Goal Priority Disciplines Outcome Interventions   Occupational Therapy Goal     OT, PT/OT Progressing    Description: Goals to be met by: 2/22/25     Patient will increase functional independence with ADLs by performing:    UE Dressing with [place].  LE Dressing with Modified [place].  Grooming while standing at sink with Modified  [place].  Toileting from toilet with Modified [place] for hygiene and clothing management.   Sitting in chair x60  minutes with Supervision.  Toilet transfer to toilet with Supervision.  Upper extremity exercise program x10-15  reps per handout, with supervision for SOB.                         DME Justifications:  No DME recommended requiring DME justifications    Time Tracking:     OT Date of Treatment: 02/20/25  OT Start Time: 1526  OT Stop Time: 1604  OT Total Time (min): 38 min    Billable Minutes:Self Care/Home Management 23   Therapeutic Activity 15     OT/JOANN: OT     Number of JOANN visits since last OT visit: 0    2/20/2025

## 2025-02-20 NOTE — ASSESSMENT & PLAN NOTE
BISI is likely due to acute tubular necrosis caused by rhabdomyolysis . Baseline creatinine is  1.9 . Most recent creatinine and eGFR are listed below.  Recent Labs     02/19/25  0045 02/19/25  0410 02/20/25  0442   CREATININE 11.3* 11.4* 12.9*   EGFRNORACEVR 4* 4* 3*        Plan  - BISI is worsening. Will continue current treatment  - Avoid nephrotoxins and renally dose meds for GFR listed above  - Monitor urine output, serial BMP, and adjust therapy as needed  Appreciate Nephrology.  Nephrology recommending HD.  Trialysis catheter placed for patient to start HD.

## 2025-02-20 NOTE — PLAN OF CARE
Patient remain in ICU on 2 LPM nasal cannula, on heparin and amio gtts. Plan of review with patient. Remains free of injury.   Problem: Adult Inpatient Plan of Care  Goal: Plan of Care Review  Outcome: Progressing     Problem: Adult Inpatient Plan of Care  Goal: Absence of Hospital-Acquired Illness or Injury  Outcome: Progressing     Problem: Adult Inpatient Plan of Care  Goal: Optimal Comfort and Wellbeing  Outcome: Progressing     Problem: Fall Injury Risk  Goal: Absence of Fall and Fall-Related Injury  Outcome: Progressing     Problem: Skin Injury Risk Increased  Goal: Skin Health and Integrity  Outcome: Progressing

## 2025-02-20 NOTE — PROVIDER TRANSFER
Transfer Note    79 y/o male admitted after a fall and found to have acute on chronic kidney injury and rhabdomyolysis. CK >20,000 on admit and rising to >40,000. Renal function worsening. On IVF. Also found to be COVID positive. Started on remdesivir. Nephrology consulted. CK improving, though Cr still rising. Almanza is placed for accurate I&D monitoring. Patient went into rapid Afib. Moved to ICU and started on Amio drip. Quickly converted back to NSR. Creat continued to increase and Nephrology recommending HD. Trialysis catheter placed and plans to start HD today.  Transitioned to oral Amio.  Heparin drip changed to Eliquis.  Weak with initial PT/OT recommendations for SNF.  Will now have to determine if HD is going to be temporary or long term prior to disposition.

## 2025-02-20 NOTE — SUBJECTIVE & OBJECTIVE
Interval History: no new complaints.  No off Amio drip.    Review of Systems   HENT:  Negative for ear discharge and ear pain.    Eyes:  Negative for discharge and itching.   Endocrine: Negative for cold intolerance and heat intolerance.   Neurological:  Negative for seizures and syncope.     Objective:     Vital Signs (Most Recent):  Temp: 98 °F (36.7 °C) (02/20/25 0800)  Pulse: 85 (02/20/25 1100)  Resp: 20 (02/20/25 0731)  BP: 128/65 (02/20/25 1100)  SpO2: 97 % (02/20/25 1100) Vital Signs (24h Range):  Temp:  [97.7 °F (36.5 °C)-98 °F (36.7 °C)] 98 °F (36.7 °C)  Pulse:  [] 85  Resp:  [11-28] 20  SpO2:  [89 %-100 %] 97 %  BP: (102-161)/(52-92) 128/65     Weight: 68.9 kg (151 lb 14.4 oz)  Body mass index is 21.79 kg/m².    Intake/Output Summary (Last 24 hours) at 2/20/2025 1436  Last data filed at 2/20/2025 0617  Gross per 24 hour   Intake 600.05 ml   Output 360 ml   Net 240.05 ml         Physical Exam  Constitutional:       General: He is not in acute distress.     Appearance: He is well-developed. He is not ill-appearing or toxic-appearing.   HENT:      Head: Normocephalic and atraumatic.   Eyes:      Pupils: Pupils are equal, round, and reactive to light.   Cardiovascular:      Rate and Rhythm: Normal rate and regular rhythm.      Heart sounds: Normal heart sounds.   Pulmonary:      Effort: Pulmonary effort is normal.      Breath sounds: Rhonchi present.   Abdominal:      General: Bowel sounds are normal.      Palpations: Abdomen is soft.      Tenderness: There is no abdominal tenderness.   Musculoskeletal:         General: Normal range of motion.      Cervical back: Normal range of motion and neck supple.   Skin:     Capillary Refill: Capillary refill takes less than 2 seconds.   Neurological:      Mental Status: He is alert and oriented to person, place, and time.             Significant Labs: All pertinent labs within the past 24 hours have been reviewed.  BMP:   Recent Labs   Lab 02/19/25  0045  02/19/25  0410 02/20/25  0442   GLU 86   < > 86      < > 141   K 4.3   < > 4.8      < > 106   CO2 15*   < > 14*   BUN 96*   < > 101*   CREATININE 11.3*   < > 12.9*   CALCIUM 7.9*   < > 8.5*   MG 1.8  --   --     < > = values in this interval not displayed.     CBC:   Recent Labs   Lab 02/19/25  0045   WBC 8.01   HGB 12.2*   HCT 36.2*          Significant Imaging: I have reviewed all pertinent imaging results/findings within the past 24 hours.

## 2025-02-20 NOTE — PT/OT/SLP PROGRESS
Occupational Therapy      Patient Name:  Antonio Torres Jr.   MRN:  [id number]    Patient not seen today secondary to Nurse/ LORNA hold (PATIENT still on amiodarone and awaiting dialysis today). Will follow-up later if able.    2/20/2025

## 2025-02-20 NOTE — PROGRESS NOTES
[facility] Medicine  Progress Note    Patient Name: Antonio Torres Jr.  MRN: [id number]  Patient Class: IP- Inpatient   Admission Date: 2/13/2025  Length of Stay: 6 days  Attending Physician: Hernan Unger MD  Primary Care Provider: Fabio Lennon MD        Subjective     Principal Problem:Acute renal failure superimposed on stage 3 chronic kidney disease        HPI:  This is an 80-year-old male with a past medical history of schizophrenia, chronic blepharitis, hyperlipidemia, CKD 3, BPH, who presents with a fall.    Patient presents for evaluation after a fall and a sycopal episode that occurred prior to presentation. He reports losing consciousness and falling face forward on the floor sustaining an injury to his head, elbows, abdomen and knees. He was unable to stand up and was on the floor for about 1.5 hours. Patient reports having a coughing episode a day prior but denies sick contacts. He denied chest pain.     In the ED, the patient was tachycardic (120s > 60s), but otherwise hemodynamically stable.  Labs were remarkable for leukocytosis (25.3), elevated CPK (23,406), elevated creatinine (3.3-baseline around 1.9), elevated troponin (0.525-chronically elevated), elevated lactic acid (4.8 > 2.1), elevated procalcitonin (2.53), elevated LFTs (T bili: 1.1, AST: 267, ALT: 73), hypophosphatemia (1.7), positive COVID-19.  CT head, maxillofacial, cervical spine showed a nondisplaced right nasal bone fracture with no soft tissue swelling, and paranasal sinus disease.  CT chest, abdomen and pelvis showed possible soft tissue contusion at the level of the xiphoid process, a 2-3 mm left lower lobe pulmonary nodule and nonspecific urinary bladder wall thickening.  Right elbow x-ray demonstrated an elbow effusion with an olecranon spur.  Patient was given 2 L of LR, vancomycin, Zosyn, Tylenol 1 g p.o..  He was admitted for further management.    Overview/Hospital Course:  Mr. Torres is a 79 yo  M admitted after a fall and found to have acute on chronic kidney injury and rhabdomyolysis. CK >20,000 on admit and rising to >40,000. Renal function worsening. On IVF. Also found to be COVID positive. Started on remdesivir. Nephrology consulted. CK improving, though Cr still rising. Almanza is placed for accurate I&D monitoring. Patient went into rapid Afib.  Moved to ICU and started on Amio drip.  Quickly converted back to NSR.  Creat continued to increase and Nephrology recommending HD.  Trialysis catheter placed.    Interval History: no new complaints.  No off Amio drip.    Review of Systems   HENT:  Negative for ear discharge and ear pain.    Eyes:  Negative for discharge and itching.   Endocrine: Negative for cold intolerance and heat intolerance.   Neurological:  Negative for seizures and syncope.     Objective:     Vital Signs (Most Recent):  Temp: 98 °F (36.7 °C) (02/20/25 0800)  Pulse: 85 (02/20/25 1100)  Resp: 20 (02/20/25 0731)  BP: 128/65 (02/20/25 1100)  SpO2: 97 % (02/20/25 1100) Vital Signs (24h Range):  Temp:  [97.7 °F (36.5 °C)-98 °F (36.7 °C)] 98 °F (36.7 °C)  Pulse:  [] 85  Resp:  [11-28] 20  SpO2:  [89 %-100 %] 97 %  BP: (102-161)/(52-92) 128/65     Weight: 68.9 kg (151 lb 14.4 oz)  Body mass index is 21.79 kg/m².    Intake/Output Summary (Last 24 hours) at 2/20/2025 1436  Last data filed at 2/20/2025 0617  Gross per 24 hour   Intake 600.05 ml   Output 360 ml   Net 240.05 ml         Physical Exam  Constitutional:       General: He is not in acute distress.     Appearance: He is well-developed. He is not ill-appearing or toxic-appearing.   HENT:      Head: Normocephalic and atraumatic.   Eyes:      Pupils: Pupils are equal, round, and reactive to light.   Cardiovascular:      Rate and Rhythm: Normal rate and regular rhythm.      Heart sounds: Normal heart sounds.   Pulmonary:      Effort: Pulmonary effort is normal.      Breath sounds: Rhonchi present.   Abdominal:      General: Bowel sounds  are normal.      Palpations: Abdomen is soft.      Tenderness: There is no abdominal tenderness.   Musculoskeletal:         General: Normal range of motion.      Cervical back: Normal range of motion and neck supple.   Skin:     Capillary Refill: Capillary refill takes less than 2 seconds.   Neurological:      Mental Status: He is alert and oriented to person, place, and time.             Significant Labs: All pertinent labs within the past 24 hours have been reviewed.  BMP:   Recent Labs   Lab 02/19/25 0045 02/19/25 0410 02/20/25 0442   GLU 86   < > 86      < > 141   K 4.3   < > 4.8      < > 106   CO2 15*   < > 14*   BUN 96*   < > 101*   CREATININE 11.3*   < > 12.9*   CALCIUM 7.9*   < > 8.5*   MG 1.8  --   --     < > = values in this interval not displayed.     CBC:   Recent Labs   Lab 02/19/25 0045   WBC 8.01   HGB 12.2*   HCT 36.2*          Significant Imaging: I have reviewed all pertinent imaging results/findings within the past 24 hours.    Assessment and Plan     * Acute renal failure superimposed on stage 3 chronic kidney disease  BISI is likely due to acute tubular necrosis caused by rhabdomyolysis . Baseline creatinine is  1.9 . Most recent creatinine and eGFR are listed below.  Recent Labs     02/19/25 0045 02/19/25 0410 02/20/25 0442   CREATININE 11.3* 11.4* 12.9*   EGFRNORACEVR 4* 4* 3*        Plan  - BISI is worsening. Will continue current treatment  - Avoid nephrotoxins and renally dose meds for GFR listed above  - Monitor urine output, serial BMP, and adjust therapy as needed  Appreciate Nephrology.  Nephrology recommending HD.  Trialysis catheter placed for patient to start HD.    Non-traumatic rhabdomyolysis  Elevated CPK to 68271 in the setting of a prolonged fall   - CK trending to >40k  - with renal failure -- Nephrolgoy consulted  - continue with aggressive hydration  - no evidence of compartment syndrome  - CK continues to improve, but Creat continues to increase and  possibly starting dialysis today.    New onset a-fib  Patient has paroxysmal (<7 days) atrial fibrillation. Patient is currently in sinus rhythm. [id number] Score: 2. The patients heart rate in the last 24 hours is as follows:  Pulse  Min: 67  Max: 107     Antiarrhythmics  metoprolol injection 5 mg, Every 4 hours PRN, Intravenous  amiodarone tablet 200 mg, 2 times daily, Oral    Anticoagulants  apixaban tablet 2.5 mg, 2 times daily, Oral    Plan  - Replete lytes with a goal of K>4, Mg >2  - Patient is anticoagulated, see medications listed above.  - Patient's afib is currently  resolved and back in NSR  - patient moved to ICU overnight and placed on Amio and heparin drips.  Converted to NSR.  Cardiology consulted.  Transition Amio to PO and heparin drip to Eliquis.        Elevated troponin  Recent Labs   Lab 02/14/25  0435   TROPONINI 0.837*         Chronically elevated  No chest pain    Continue to monitor     COVID-19  No hypoxia, prophylactic remdesivir     Elevated LFTs  Likely in the setting of rhabdomyolysis   Continue to monitor   - slowly improving      Stage 3b chronic kidney disease  Creatine stable for now. BMP reviewed- noted Estimated Creatinine Clearance: 4.9 mL/min (A) (based on SCr of 11.4 mg/dL (H)). according to latest data. Based on current GFR, CKD stage is stage 3 - GFR 30-59.  Monitor UOP and serial BMP and adjust therapy as needed. Renally dose meds. Avoid nephrotoxic medications and procedures.  With BISI as above.    Schizophrenia  Continue Abilify         VTE Risk Mitigation (From admission, onward)           Ordered     apixaban tablet 2.5 mg  2 times daily         02/20/25 1017     IP VTE HIGH RISK PATIENT  Once         02/14/25 0222     Place sequential compression device  Until discontinued         02/14/25 0222                    Discharge Planning   JULIANA: 2/26/2025     Code Status: DNR   Medical Readiness for Discharge Date:   Discharge Plan A: Skilled Nursing Facility   Discharge  Delays: None known at this time        Please place Justification for DME        Hernan Unger MD  Department of Hospital Medicine   [facility] - Intensive Care

## 2025-02-20 NOTE — PLAN OF CARE
Changes in medical condition or discharge plan:  Plan remains for SNF to detention.  Patient receiving HD today.  Uncertain at this time if patient will need OP HD.    Follow up appts needed: Patient will need followup appointments with PCP and addtional appointments as ordered by the discharging provider.      Medically stable:  no    Estimated Discharge Date:  2/26    CM spoke with sisterCaitlyn this am who stated she is still in agreement with SNF to detention for DC plan.  Choices discussed.  I provided the patient a choice of post acute providers and offered a list of CMS rated SNF's Patient/family chose the following as their preferred providers:   [facility]  Referral previously sent to [facility]  CM sent referral to [facility].    CM to email Ms Brady list of paperwork needed for detention placement.  (Email:  [email or web address])     02/20/25 1000   Discharge Reassessment   Assessment Type Discharge Planning Reassessment   Did the patient's condition or plan change since previous assessment? Yes   Discharge Plan discussed with:   (In rounds this am with:  Dr Unger, Dr Perla Warner, Pulm / critical care PA, Pharmacy, nurse, CN, Clergy and OT)   Communicated JULIANA with patient/caregiver Yes   Discharge Plan A Skilled Nursing Facility   Discharge Plan B Home Health   DME Needed Upon Discharge    (tbd)   Why the patient remains in the hospital Requires continued medical care

## 2025-02-20 NOTE — PROGRESS NOTES
[facility] - Intensive Care  Nephrology  Progress Note    Patient Name: Antonio Torres Jr.  MRN: [id number]  Admission Date: 2/13/2025  Hospital Length of Stay: 6 days  Attending Provider: Hernan Unger MD   Primary Care Physician: Fabio Lennon MD  Principal Problem:Non-traumatic rhabdomyolysis    Subjective:     HPI: Mr. Torres is an 81 yo male with schizophrenia, BPH, CKD, and h/o melanoma who presented to the ED overnight s/p fall. HR 120s on arrival. He was also found to be COVID+. CPK 23k. He was started on IVF. Nephrology consulted for BISI on CKD. Prior records obtained and reviewed. He is followed by myself outpatient; last visit was 11/11/24. His baseline Cr is 1.7-2.0; his CKD is of unknown etiology. His Cr was 3.3 on arrival --> 3.5 this morning. CPK now > 40k. Abnormal LFTs. He reports to be doing okay this afternoon. Complaining of soreness. No SOB or leg swelling.    Interval History: unfortunately, emergent cases yesterday requiring HD and was not able to undergo session of dialysis.     Review of patient's allergies indicates:  No Known Allergies  Current Facility-Administered Medications   Medication Frequency    acetaminophen tablet 650 mg Q6H PRN    albuterol-ipratropium 2.5 mg-0.5 mg/3 mL nebulizer solution 3 mL Q4H PRN    aluminum-magnesium hydroxide-simethicone 200-200-20 mg/5 mL suspension 30 mL QID PRN    amiodarone tablet 200 mg BID    apixaban tablet 2.5 mg BID    ARIPiprazole tablet 20 mg Daily    bisacodyL suppository 10 mg Daily PRN    cefTRIAXone injection 2 g Q24H    cetirizine tablet 5 mg Q48H    fentaNYL 50 mcg/mL injection 50 mcg On Call Procedure    glucagon (human recombinant) injection 1 mg PRN    glucose chewable tablet 16 g PRN    glucose chewable tablet 24 g PRN    melatonin tablet 6 mg Nightly PRN    metoprolol injection 5 mg Q4H PRN    midazolam (PF) (VERSED) 1 mg/mL injection 1 mg On Call Procedure    multivitamin tablet Daily    naloxone 0.4 mg/mL injection 0.02 mg PRN     ondansetron injection 4 mg Q8H PRN    senna-docusate 8.6-50 mg per tablet 1 tablet Daily PRN    simethicone chewable tablet 80 mg QID PRN    sodium chloride 0.9% flush 10 mL Q12H PRN    Tdap vaccine injection 0.5 mL vaccine x 1 dose     Facility-Administered Medications Ordered in Other Encounters   Medication Frequency    0.9%  NaCl infusion Continuous    mupirocin 2 % ointment On Call Procedure       Objective:     Vital Signs (Most Recent):  Temp: 98 °F (36.7 °C) (02/20/25 0800)  Pulse: 85 (02/20/25 1100)  Resp: 20 (02/20/25 0731)  BP: 128/65 (02/20/25 1100)  SpO2: 97 % (02/20/25 1100) Vital Signs (24h Range):  Temp:  [97.7 °F (36.5 °C)-98 °F (36.7 °C)] 98 °F (36.7 °C)  Pulse:  [] 85  Resp:  [11-28] 20  SpO2:  [86 %-100 %] 97 %  BP: (102-161)/(52-92) 128/65     Weight: 68.9 kg (151 lb 14.4 oz) (02/14/25 1515)  Body mass index is 21.79 kg/m².  Body surface area is 1.84 meters squared.    I/O last 3 completed shifts:  In: 1906.9 [I.V.:1796.6; IV Piggyback:110.4]  Out: 1030 [Urine:1030]     Physical Exam  Constitutional:       General: He is not in acute distress.     Appearance: He is well-developed. He is not ill-appearing or toxic-appearing.   HENT:      Head: Normocephalic and atraumatic.   Eyes:      Pupils: Pupils are equal, round, and reactive to light.   Cardiovascular:      Rate and Rhythm: Normal rate and regular rhythm.      Heart sounds: Normal heart sounds.   Pulmonary:      Effort: Respiratory distress present.      Breath sounds: Rhonchi present.      Comments: Increased work of breathing  Abdominal:      General: Bowel sounds are normal.      Palpations: Abdomen is soft.      Tenderness: There is no abdominal tenderness.   Musculoskeletal:         General: Normal range of motion.      Cervical back: Normal range of motion and neck supple.   Skin:     Capillary Refill: Capillary refill takes less than 2 seconds.   Neurological:      Mental Status: He is alert and oriented to person, place,  and time.          Significant Labs:  All labs within the past 24 hours have been reviewed.     Significant Imaging:  Labs: Reviewed  Assessment/Plan:     Renal/  Acute renal failure superimposed on stage 3 chronic kidney disease  Metabolic acidosis  Baseline creatinine 1.7-2.0  On admission creatinine 3.3    BISI due to Rhabdomyolysis, initial CK > 40k  Worsening though remains without RRT needs    Consent for dialysis obtained 2/18/2025    Plan/Recommendation  HD today - will see response and follow up - may have recovery in the long term  Appreciate palliative care assistance and will follow up if he wants to be on dialysis long term  -Keep MAP > 65  -Keep hemoglobin > 7  -Strict ins and outs  -Avoid nephrotoxic agents if possible and renally dose medications  -Avoid drastic hemodynamic changes if possible      Stage 3b chronic kidney disease  See BISI    Palliative Care  ACP (advance care planning)  See BISI        Thank you for your consult. I will follow-up with patient. Please contact us if you have any additional questions.    Ezio Smith MD  Nephrology  [facility] - [place] - Intensive Care

## 2025-02-20 NOTE — ASSESSMENT & PLAN NOTE
Patient has paroxysmal (<7 days) atrial fibrillation. Patient is currently in sinus rhythm. [id number] Score: 2. The patients heart rate in the last 24 hours is as follows:  Pulse  Min: 67  Max: 107     Antiarrhythmics  metoprolol injection 5 mg, Every 4 hours PRN, Intravenous  amiodarone tablet 200 mg, 2 times daily, Oral    Anticoagulants  apixaban tablet 2.5 mg, 2 times daily, Oral    Plan  - Replete lytes with a goal of K>4, Mg >2  - Patient is anticoagulated, see medications listed above.  - Patient's afib is currently  resolved and back in NSR  - patient moved to ICU overnight and placed on Amio and heparin drips.  Converted to NSR.  Cardiology consulted.  Transition Amio to PO and heparin drip to Eliquis.

## 2025-02-21 LAB
ALBUMIN SERPL BCP-MCNC: 2.3 G/DL (ref 3.5–5.2)
ALP SERPL-CCNC: 52 U/L (ref 40–150)
ALT SERPL W/O P-5'-P-CCNC: 181 U/L (ref 10–44)
ANION GAP SERPL CALC-SCNC: 24 MMOL/L (ref 8–16)
AST SERPL-CCNC: 95 U/L (ref 10–40)
BILIRUB SERPL-MCNC: 0.3 MG/DL (ref 0.1–1)
BUN SERPL-MCNC: 76 MG/DL (ref 8–23)
CALCIUM SERPL-MCNC: 8.3 MG/DL (ref 8.7–10.5)
CHLORIDE SERPL-SCNC: 102 MMOL/L (ref 95–110)
CK SERPL-CCNC: 2535 U/L (ref 20–200)
CO2 SERPL-SCNC: 17 MMOL/L (ref 23–29)
CREAT SERPL-MCNC: 11.1 MG/DL (ref 0.5–1.4)
ERYTHROCYTE [DISTWIDTH] IN BLOOD BY AUTOMATED COUNT: 14.6 % (ref 11.5–14.5)
EST. GFR  (NO RACE VARIABLE): 4 ML/MIN/1.73 M^2
GLUCOSE SERPL-MCNC: 87 MG/DL (ref 70–110)
HBV SURFACE AG SERPL QL IA: NORMAL
HCT VFR BLD AUTO: 35.6 % (ref 40–54)
HGB BLD-MCNC: 11.9 G/DL (ref 14–18)
MAGNESIUM SERPL-MCNC: 1.9 MG/DL (ref 1.6–2.6)
MCH RBC QN AUTO: 31 PG (ref 27–31)
MCHC RBC AUTO-ENTMCNC: 33.4 G/DL (ref 32–36)
MCV RBC AUTO: 93 FL (ref 82–98)
PHOSPHATE SERPL-MCNC: 6.1 MG/DL (ref 2.7–4.5)
PLATELET # BLD AUTO: 224 K/UL (ref 150–450)
PMV BLD AUTO: 10.1 FL (ref 9.2–12.9)
POTASSIUM SERPL-SCNC: 4.3 MMOL/L (ref 3.5–5.1)
PROT SERPL-MCNC: 5.5 G/DL (ref 6–8.4)
RBC # BLD AUTO: 3.84 M/UL (ref 4.6–6.2)
SODIUM SERPL-SCNC: 143 MMOL/L (ref 136–145)
WBC # BLD AUTO: 9.87 K/UL (ref 3.9–12.7)

## 2025-02-21 PROCEDURE — 63600175 PHARM REV CODE 636 W HCPCS: Performed by: STUDENT IN AN ORGANIZED HEALTH CARE EDUCATION/TRAINING PROGRAM

## 2025-02-21 PROCEDURE — 80053 COMPREHEN METABOLIC PANEL: CPT | Performed by: STUDENT IN AN ORGANIZED HEALTH CARE EDUCATION/TRAINING PROGRAM

## 2025-02-21 PROCEDURE — 25000003 PHARM REV CODE 250: Performed by: STUDENT IN AN ORGANIZED HEALTH CARE EDUCATION/TRAINING PROGRAM

## 2025-02-21 PROCEDURE — 12000002 HC ACUTE/MED SURGE SEMI-PRIVATE ROOM

## 2025-02-21 PROCEDURE — 27000207 HC ISOLATION

## 2025-02-21 PROCEDURE — 99900035 HC TECH TIME PER 15 MIN (STAT)

## 2025-02-21 PROCEDURE — 25000003 PHARM REV CODE 250: Performed by: HOSPITALIST

## 2025-02-21 PROCEDURE — 83735 ASSAY OF MAGNESIUM: CPT | Performed by: STUDENT IN AN ORGANIZED HEALTH CARE EDUCATION/TRAINING PROGRAM

## 2025-02-21 PROCEDURE — 25000003 PHARM REV CODE 250: Performed by: INTERNAL MEDICINE

## 2025-02-21 PROCEDURE — 94761 N-INVAS EAR/PLS OXIMETRY MLT: CPT

## 2025-02-21 PROCEDURE — 93010 ELECTROCARDIOGRAM REPORT: CPT | Mod: ,,, | Performed by: INTERNAL MEDICINE

## 2025-02-21 PROCEDURE — 99232 SBSQ HOSP IP/OBS MODERATE 35: CPT | Mod: ,,, | Performed by: STUDENT IN AN ORGANIZED HEALTH CARE EDUCATION/TRAINING PROGRAM

## 2025-02-21 PROCEDURE — 85027 COMPLETE CBC AUTOMATED: CPT | Performed by: STUDENT IN AN ORGANIZED HEALTH CARE EDUCATION/TRAINING PROGRAM

## 2025-02-21 PROCEDURE — 93010 ELECTROCARDIOGRAM REPORT: CPT | Mod: 76,,, | Performed by: INTERNAL MEDICINE

## 2025-02-21 PROCEDURE — 93005 ELECTROCARDIOGRAM TRACING: CPT

## 2025-02-21 PROCEDURE — 82550 ASSAY OF CK (CPK): CPT | Performed by: STUDENT IN AN ORGANIZED HEALTH CARE EDUCATION/TRAINING PROGRAM

## 2025-02-21 PROCEDURE — 63600175 PHARM REV CODE 636 W HCPCS: Performed by: INTERNAL MEDICINE

## 2025-02-21 PROCEDURE — 27000923 HC TRIALYSIS CATHETER, ANY SIZE

## 2025-02-21 PROCEDURE — 99291 CRITICAL CARE FIRST HOUR: CPT | Mod: ,,, | Performed by: INTERNAL MEDICINE

## 2025-02-21 PROCEDURE — 84100 ASSAY OF PHOSPHORUS: CPT | Performed by: STUDENT IN AN ORGANIZED HEALTH CARE EDUCATION/TRAINING PROGRAM

## 2025-02-21 RX ORDER — AMIODARONE HYDROCHLORIDE 200 MG/1
200 TABLET ORAL DAILY
Status: DISCONTINUED | OUTPATIENT
Start: 2025-03-05 | End: 2025-02-21

## 2025-02-21 RX ORDER — AMIODARONE HYDROCHLORIDE 200 MG/1
400 TABLET ORAL 2 TIMES DAILY
Status: DISCONTINUED | OUTPATIENT
Start: 2025-02-21 | End: 2025-02-21

## 2025-02-21 RX ORDER — METOPROLOL TARTRATE 25 MG/1
25 TABLET, FILM COATED ORAL 2 TIMES DAILY
Status: DISCONTINUED | OUTPATIENT
Start: 2025-02-21 | End: 2025-02-22

## 2025-02-21 RX ORDER — DILTIAZEM HCL 1 MG/ML
0-15 INJECTION, SOLUTION INTRAVENOUS CONTINUOUS
Status: DISCONTINUED | OUTPATIENT
Start: 2025-02-21 | End: 2025-02-22

## 2025-02-21 RX ADMIN — METOPROLOL TARTRATE 25 MG: 25 TABLET, FILM COATED ORAL at 09:02

## 2025-02-21 RX ADMIN — AMIODARONE HYDROCHLORIDE 400 MG: 200 TABLET ORAL at 09:02

## 2025-02-21 RX ADMIN — ARIPIPRAZOLE 20 MG: 10 TABLET ORAL at 08:02

## 2025-02-21 RX ADMIN — APIXABAN 2.5 MG: 2.5 TABLET, FILM COATED ORAL at 08:02

## 2025-02-21 RX ADMIN — FUROSEMIDE 200 MG: 10 INJECTION, SOLUTION INTRAMUSCULAR; INTRAVENOUS at 03:02

## 2025-02-21 RX ADMIN — THERA TABS 1 TABLET: TAB at 08:02

## 2025-02-21 RX ADMIN — AMIODARONE HYDROCHLORIDE 1 MG/MIN: 1.8 INJECTION, SOLUTION INTRAVENOUS at 01:02

## 2025-02-21 RX ADMIN — AMIODARONE HYDROCHLORIDE 0.5 MG/MIN: 1.8 INJECTION, SOLUTION INTRAVENOUS at 12:02

## 2025-02-21 RX ADMIN — DILTIAZEM HYDROCHLORIDE 5 MG/HR: 5 INJECTION, SOLUTION INTRAVENOUS at 09:02

## 2025-02-21 RX ADMIN — APIXABAN 2.5 MG: 2.5 TABLET, FILM COATED ORAL at 09:02

## 2025-02-21 RX ADMIN — METOPROLOL TARTRATE 25 MG: 25 TABLET, FILM COATED ORAL at 01:02

## 2025-02-21 RX ADMIN — CEFTRIAXONE 2 G: 2 INJECTION, POWDER, FOR SOLUTION INTRAMUSCULAR; INTRAVENOUS at 03:02

## 2025-02-21 RX ADMIN — CETIRIZINE HYDROCHLORIDE 5 MG: 5 TABLET ORAL at 08:02

## 2025-02-21 RX ADMIN — AMIODARONE HYDROCHLORIDE 1 MG/MIN: 1.8 INJECTION, SOLUTION INTRAVENOUS at 06:02

## 2025-02-21 NOTE — SUBJECTIVE & OBJECTIVE
Interval History: went back into rapid afib yesterday.  Now on cardizem drip.    Review of Systems   HENT:  Negative for ear discharge and ear pain.    Eyes:  Negative for discharge and itching.   Endocrine: Negative for cold intolerance and heat intolerance.   Neurological:  Negative for seizures and syncope.     Objective:     Vital Signs (Most Recent):  Temp: 98.2 °F (36.8 °C) (02/21/25 0340)  Pulse: (!) 130 (02/21/25 0745)  Resp: (!) 22 (02/21/25 0745)  BP: 121/65 (02/21/25 0617)  SpO2: 96 % (02/21/25 0745) Vital Signs (24h Range):  Temp:  [97.5 °F (36.4 °C)-98.3 °F (36.8 °C)] 98.2 °F (36.8 °C)  Pulse:  [] 130  Resp:  [13-32] 22  SpO2:  [93 %-99 %] 96 %  BP: (103-166)/(53-83) 121/65     Weight: 68.9 kg (151 lb 14.4 oz)  Body mass index is 21.79 kg/m².    Intake/Output Summary (Last 24 hours) at 2/21/2025 1055  Last data filed at 2/21/2025 0800  Gross per 24 hour   Intake 667.68 ml   Output 2200 ml   Net -1532.32 ml         Physical Exam  Constitutional:       General: He is not in acute distress.     Appearance: He is well-developed. He is not ill-appearing or toxic-appearing.   HENT:      Head: Normocephalic and atraumatic.   Eyes:      Pupils: Pupils are equal, round, and reactive to light.   Cardiovascular:      Rate and Rhythm: Tachycardia present. Rhythm irregular.      Heart sounds: Normal heart sounds.   Pulmonary:      Effort: Pulmonary effort is normal.      Breath sounds: Rhonchi present.   Abdominal:      General: Bowel sounds are normal.      Palpations: Abdomen is soft.      Tenderness: There is no abdominal tenderness.   Musculoskeletal:         General: Normal range of motion.      Cervical back: Normal range of motion and neck supple.   Skin:     Capillary Refill: Capillary refill takes less than 2 seconds.   Neurological:      Mental Status: He is alert and oriented to person, place, and time.             Significant Labs: All pertinent labs within the past 24 hours have been  reviewed.  BMP:   Recent Labs   Lab 02/21/25 0213   GLU 87      K 4.3      CO2 17*   BUN 76*   CREATININE 11.1*   CALCIUM 8.3*   MG 1.9     CBC:   Recent Labs   Lab 02/21/25 0213   WBC 9.87   HGB 11.9*   HCT 35.6*          Significant Imaging: I have reviewed all pertinent imaging results/findings within the past 24 hours.

## 2025-02-21 NOTE — PLAN OF CARE
Problem: Adult Inpatient Plan of Care  Goal: Plan of Care Review  Outcome: Progressing  Goal: Optimal Comfort and Wellbeing  Outcome: Progressing  Goal: Readiness for Transition of Care  Outcome: Not Progressing     Problem: Infection  Goal: Absence of Infection Signs and Symptoms  Outcome: Progressing     Problem: Acute Kidney Injury/Impairment  Goal: Fluid and Electrolyte Balance  Outcome: Not Progressing  Goal: Improved Oral Intake  Outcome: Not Progressing  Goal: Effective Renal Function  Outcome: Not Progressing     Problem: Fall Injury Risk  Goal: Absence of Fall and Fall-Related Injury  Outcome: Progressing     Problem: Coping Ineffective  Goal: Effective Coping  Outcome: Progressing     Problem: Hemodialysis  Goal: Safe, Effective Therapy Delivery  Outcome: Progressing  Goal: Effective Tissue Perfusion  Outcome: Progressing  Goal: Absence of Infection Signs and Symptoms  Outcome: Progressing     Problem: Skin Injury Risk Increased  Goal: Skin Health and Integrity  Outcome: Not Progressing

## 2025-02-21 NOTE — ASSESSMENT & PLAN NOTE
Recent Labs     02/19/25  0410 02/20/25  0442 02/21/25  0213   CREATININE 11.4* 12.9* 11.1*   EGFRNORACEVR 4* 3* 4*      Plan    - Avoid nephrotoxins and renally dose meds for GFR listed above  - Monitor urine output, serial BMP, and adjust therapy as needed

## 2025-02-21 NOTE — PROGRESS NOTES
[facility] - [place], [place] Intensive Care  Cardiology  Progress Note    Patient Name: Antonio Torres Jr.  MRN: [id number]  Admission Date: 2/13/2025  Hospital Length of Stay: 7 days  Code Status: DNR   Attending Physician: Hernan Unger MD   Primary Care Physician: Fabio Lennon MD  Expected Discharge Date: 2/26/2025  Principal Problem:Acute renal failure superimposed on stage 3 chronic kidney disease    Subjective:       Interval History:  In atrial flutter.  Switch from amiodarone drip to Cardizem drip.  Already ate today.  If continues to be in atrial flutter, on Monday, plan for cardioversion    Review of Systems   Constitutional: Negative.   HENT: Negative.     Eyes: Negative.    Endocrine: Negative.    Hematologic/Lymphatic: Negative.    Skin: Negative.    Musculoskeletal: Negative.    Gastrointestinal: Negative.    Genitourinary: Negative.    Neurological: Negative.    Psychiatric/Behavioral: Negative.     Allergic/Immunologic: Negative.      Objective:     Vital Signs (Most Recent):  Temp: 98.2 °F (36.8 °C) (02/21/25 0340)  Pulse: (!) 130 (02/21/25 0745)  Resp: (!) 22 (02/21/25 0745)  BP: 121/65 (02/21/25 0617)  SpO2: 96 % (02/21/25 0745) Vital Signs (24h Range):  Temp:  [97.5 °F (36.4 °C)-98.3 °F (36.8 °C)] 98.2 °F (36.8 °C)  Pulse:  [] 130  Resp:  [13-32] 22  SpO2:  [93 %-99 %] 96 %  BP: (103-166)/(53-92) 121/65     Weight: 68.9 kg (151 lb 14.4 oz)  Body mass index is 21.79 kg/m².     SpO2: 96 %         Intake/Output Summary (Last 24 hours) at 2/21/2025 0947  Last data filed at 2/21/2025 0800  Gross per 24 hour   Intake 667.68 ml   Output 2200 ml   Net -1532.32 ml       Lines/Drains/Airways       Central Venous Catheter Line  Duration             Trialysis (Dialysis) Catheter 02/19/25 1647 right internal jugular 1 day              Peripheral Intravenous Line  Duration                  Peripheral IV - Single Lumen 02/20/25 0230 20 G Anterior;Distal;Right Forearm 1 day         Peripheral IV - Single Lumen  02/20/25 0235 20 G Anterior;Distal;Right Upper Arm 1 day                       Physical Exam  Vitals reviewed.   Constitutional:       Appearance: He is well-developed.   HENT:      Head: Normocephalic.   Eyes:      Conjunctiva/sclera: Conjunctivae normal.      Pupils: Pupils are equal, round, and reactive to light.   Cardiovascular:      Rate and Rhythm: Regular rhythm. Tachycardia present.      Heart sounds: Normal heart sounds.   Pulmonary:      Effort: Pulmonary effort is normal.      Breath sounds: Normal breath sounds.   Abdominal:      General: Bowel sounds are normal.      Palpations: Abdomen is soft.   Musculoskeletal:      Cervical back: Normal range of motion and neck supple.   Skin:     General: Skin is warm.   Neurological:      Mental Status: He is alert and oriented to person, place, and time.            Significant Labs:     DATA:     Laboratory:  CBC:  Recent Labs   Lab 02/18/25  0516 02/19/25 0045 02/21/25 0213   WBC 8.23 8.01 9.87   Hemoglobin 11.4 L 12.2 L 11.9 L   Hematocrit 35.0 L 36.2 L 35.6 L   Platelets 168 182 224       CHEMISTRIES:  Recent Labs   Lab 02/14/25  0435 02/15/25  0451 02/19/25  0045 02/19/25  0410 02/20/25 0442 02/21/25  0213   Glucose 114 H   < > 86 106 86 87   Sodium 142   < > 141 141 141 143   Potassium 3.8   < > 4.3 4.6 4.8 4.3   BUN 48 H   < > 96 H 96 H 101 H 76 H   Creatinine 3.5 H   < > 11.3 H 11.4 H 12.9 H 11.1 H   Calcium 9.1   < > 7.9 L 7.9 L 8.5 L 8.3 L   Magnesium 2.5  --  1.8  --   --  1.9    < > = values in this interval not displayed.       CARDIAC BIOMARKERS:  Recent Labs   Lab 02/14/25  0435 02/15/25  0451 02/19/25  0045 02/19/25  0410 02/20/25  0442 02/21/25  0213   CPK >47435 H   < >  --  7872 H 4525 H 2535 H   Troponin I 0.837 H  --  0.079 H 0.074 H  --   --     < > = values in this interval not displayed.       COAGS:  Recent Labs   Lab 02/19/25  0045   INR 1.1       LIPIDS/LFTS:  Recent Labs   Lab 09/21/22  0805 04/06/23  2134 04/07/23  0404  04/10/23  0717 02/19/25  0410 02/20/25  0442 02/21/25  0213   Cholesterol 222 H  --  178  --   --   --   --    Triglycerides 124  --  47  --   --   --   --    HDL 54  --  48  --   --   --   --    LDL Cholesterol 143.2  --  120.6  --   --   --   --    Non-HDL Cholesterol 168  --  130  --   --   --   --    AST 24   < >  --    < > 220 H 148 H 95 H   ALT 31   < >  --    < > 216 H 216 H 181 H    < > = values in this interval not displayed.       Hemoglobin A1C   Date Value Ref Range Status   09/21/2022 5.2 4.0 - 5.6 % Final     Comment:     ADA Screening Guidelines:  5.7-6.4%  Consistent with prediabetes  >or=6.5%  Consistent with diabetes    High levels of fetal hemoglobin interfere with the HbA1C  assay. Heterozygous hemoglobin variants (HbS, HgC, etc)do  not significantly interfere with this assay.   However, presence of multiple variants may affect accuracy.     10/24/2019 5.4 4.0 - 5.6 % Final     Comment:     ADA Screening Guidelines:  5.7-6.4%  Consistent with prediabetes  >or=6.5%  Consistent with diabetes  High levels of fetal hemoglobin interfere with the HbA1C  assay. Heterozygous hemoglobin variants (HbS, HgC, etc)do  not significantly interfere with this assay.   However, presence of multiple variants may affect accuracy.     10/24/2017 5.1 4.0 - 5.6 % Final     Comment:     According to ADA guidelines, hemoglobin A1c <7.0% represents  optimal control in non-pregnant diabetic patients. Different  metrics may apply to specific patient populations.   Standards of Medical Care in Diabetes-2016.  For the purpose of screening for the presence of diabetes:  <5.7%     Consistent with the absence of diabetes  5.7-6.4%  Consistent with increasing risk for diabetes   (prediabetes)  >or=6.5%  Consistent with diabetes  Currently, no consensus exists for use of hemoglobin A1c  for diagnosis of diabetes for children.  This Hemoglobin A1c assay has significant interference with fetal   hemoglobin   (HbF). The results are  invalid for patients with abnormal amounts of   HbF,   including those with known Hereditary Persistence   of Fetal Hemoglobin. Heterozygous hemoglobin variants (HbAS, HbAC,   HbAD, HbAE, HbA2) do not significantly interfere with this assay;   however, presence of multiple variants in a sample may impact the %   interference.         TSH  Recent Labs   Lab 04/06/23  2359   TSH 1.398       The ASCVD Risk score (Adonay DK, et al., 2019) failed to calculate for the following reasons:    The 2019 ASCVD risk score is only valid for ages 40 to 79       BNP    Lab Results   Component Value Date/Time     (H) 02/17/2025 05:36 PM    BNP 35 02/13/2025 10:08 PM    BNP 32 04/06/2023 09:34 PM            ECHO    Results for orders placed during the hospital encounter of 02/13/25    Echo    Interpretation Summary    Left Ventricle: The left ventricle is normal in size. There is normal systolic function with a visually estimated ejection fraction of 55 - 60%.    Right Ventricle: Normal right ventricular cavity size. Systolic function is normal.    Tricuspid Valve: There is mild to moderate regurgitation.    Aorta: Aortic root is mildly dilated measuring 4.03 cm.    Pulmonary Artery: The estimated pulmonary artery systolic pressure is 22 mmHg.    IVC/SVC: Normal venous pressure at 3 mmHg.      STRESS TEST    Results for orders placed during the hospital encounter of 04/06/23    Nuclear Stress - Cardiology Interpreted    Interpretation Summary    Normal myocardial perfusion scan. There is no evidence of myocardial ischemia or infarction.    There is a  moderate to severe intensity fixed perfusion abnormality in the inferior wall of the left ventricle secondary to diaphragm attenuation.    The gated perfusion images showed an ejection fraction of 69% post stress.    There is normal wall motion post stress.    LV cavity size is  and normal at stress.    The ECG portion of the study is negative for ischemia.    The patient  reported no chest pain during the stress test.    There were no arrhythmias during stress.        CATH    No results found for this or any previous visit.      Assessment and Plan:     Brief HPI:     * Acute renal failure superimposed on stage 3 chronic kidney disease    Recent Labs     02/19/25  0410 02/20/25  0442 02/21/25  0213   CREATININE 11.4* 12.9* 11.1*   EGFRNORACEVR 4* 3* 4*      Plan    - Avoid nephrotoxins and renally dose meds for GFR listed above  - Monitor urine output, serial BMP, and adjust therapy as needed      New onset a-fib  Back in AFib.  Already ate today.  We will switch to Cardizem drip.  Hold amiodarone because of elevated LFTs.  If continues to be in AFib with RVR, consider JOSE cardioversion             Elevated troponin  Chest pain-free.  Likely type 2    COVID-19  Per primary    Non-traumatic rhabdomyolysis  CK improving. Continue hydration    Elevated LFTs  Hold amiodarone    Stage 3b chronic kidney disease        Schizophrenia  Per primary        VTE Risk Mitigation (From admission, onward)           Ordered     apixaban tablet 2.5 mg  2 times daily         02/20/25 1017     IP VTE HIGH RISK PATIENT  Once         02/14/25 0222     Place sequential compression device  Until discontinued         02/14/25 0222                    Jaya Ness MD  Cardiology  [facility] - [place] - Intensive Care    Critical Care Time:  35 minutes     Critical care was time spent personally by me on the following activities: development of treatment plan with patient or surrogate and bedside caregivers, discussions with consultants, evaluation of patient's response to treatment, examination of patient, ordering and performing treatments and interventions, ordering and review of laboratory studies, ordering and review of radiographic studies, pulse oximetry, re-evaluation of patient's condition. This critical care time did not overlap with that of any other provider or involve time for any procedures.

## 2025-02-21 NOTE — ASSESSMENT & PLAN NOTE
Metabolic acidosis  Baseline creatinine 1.7-2.0  On admission creatinine 3.3    BISI due to Rhabdomyolysis, initial CK > 40k  Worsening though remains without RRT needs    Consent for dialysis obtained 2/18/2025    Plan/Recommendation  Hold HD today - will assess response to lasix 200 x1 dos  Discussed with him today with regards to continuing dialysis, he remains an candidate for outpatient dialysis should he want it.   -Keep MAP > 65  -Keep hemoglobin > 7  -Strict ins and outs  -Avoid nephrotoxic agents if possible and renally dose medications  -Avoid drastic hemodynamic changes if possible

## 2025-02-21 NOTE — PROGRESS NOTES
[facility] Medicine  Progress Note    Patient Name: Antonio Torres Jr.  MRN: [id number]  Patient Class: IP- Inpatient   Admission Date: 2/13/2025  Length of Stay: 7 days  Attending Physician: Hernan Unger MD  Primary Care Provider: Fabio Lennon MD        Subjective     Principal Problem:Acute renal failure superimposed on stage 3 chronic kidney disease        HPI:  This is an 80-year-old male with a past medical history of schizophrenia, chronic blepharitis, hyperlipidemia, CKD 3, BPH, who presents with a fall.    Patient presents for evaluation after a fall and a sycopal episode that occurred prior to presentation. He reports losing consciousness and falling face forward on the floor sustaining an injury to his head, elbows, abdomen and knees. He was unable to stand up and was on the floor for about 1.5 hours. Patient reports having a coughing episode a day prior but denies sick contacts. He denied chest pain.     In the ED, the patient was tachycardic (120s > 60s), but otherwise hemodynamically stable.  Labs were remarkable for leukocytosis (25.3), elevated CPK (23,406), elevated creatinine (3.3-baseline around 1.9), elevated troponin (0.525-chronically elevated), elevated lactic acid (4.8 > 2.1), elevated procalcitonin (2.53), elevated LFTs (T bili: 1.1, AST: 267, ALT: 73), hypophosphatemia (1.7), positive COVID-19.  CT head, maxillofacial, cervical spine showed a nondisplaced right nasal bone fracture with no soft tissue swelling, and paranasal sinus disease.  CT chest, abdomen and pelvis showed possible soft tissue contusion at the level of the xiphoid process, a 2-3 mm left lower lobe pulmonary nodule and nonspecific urinary bladder wall thickening.  Right elbow x-ray demonstrated an elbow effusion with an olecranon spur.  Patient was given 2 L of LR, vancomycin, Zosyn, Tylenol 1 g p.o..  He was admitted for further management.    Overview/Hospital Course:  Mr. Torres is a 81 yo  M admitted after a fall and found to have acute on chronic kidney injury and rhabdomyolysis. CK >20,000 on admit and rising to >40,000. Renal function worsening. On IVF. Also found to be COVID positive. Started on remdesivir. Nephrology consulted. CK improving, though Cr still rising. Almanza is placed for accurate I&D monitoring. Patient went into rapid Afib.  Moved to ICU and started on Amio drip.  Quickly converted back to NSR.  Creat continued to increase and Nephrology recommending HD.  Trialysis catheter placed and patient started on HD.  Amio switched to PO and patient started on Eliquis.    Interval History: went back into rapid afib yesterday.  Now on cardizem drip.    Review of Systems   HENT:  Negative for ear discharge and ear pain.    Eyes:  Negative for discharge and itching.   Endocrine: Negative for cold intolerance and heat intolerance.   Neurological:  Negative for seizures and syncope.     Objective:     Vital Signs (Most Recent):  Temp: 98.2 °F (36.8 °C) (02/21/25 0340)  Pulse: (!) 130 (02/21/25 0745)  Resp: (!) 22 (02/21/25 0745)  BP: 121/65 (02/21/25 0617)  SpO2: 96 % (02/21/25 0745) Vital Signs (24h Range):  Temp:  [97.5 °F (36.4 °C)-98.3 °F (36.8 °C)] 98.2 °F (36.8 °C)  Pulse:  [] 130  Resp:  [13-32] 22  SpO2:  [93 %-99 %] 96 %  BP: (103-166)/(53-83) 121/65     Weight: 68.9 kg (151 lb 14.4 oz)  Body mass index is 21.79 kg/m².    Intake/Output Summary (Last 24 hours) at 2/21/2025 1055  Last data filed at 2/21/2025 0800  Gross per 24 hour   Intake 667.68 ml   Output 2200 ml   Net -1532.32 ml         Physical Exam  Constitutional:       General: He is not in acute distress.     Appearance: He is well-developed. He is not ill-appearing or toxic-appearing.   HENT:      Head: Normocephalic and atraumatic.   Eyes:      Pupils: Pupils are equal, round, and reactive to light.   Cardiovascular:      Rate and Rhythm: Tachycardia present. Rhythm irregular.      Heart sounds: Normal heart sounds.    Pulmonary:      Effort: Pulmonary effort is normal.      Breath sounds: Rhonchi present.   Abdominal:      General: Bowel sounds are normal.      Palpations: Abdomen is soft.      Tenderness: There is no abdominal tenderness.   Musculoskeletal:         General: Normal range of motion.      Cervical back: Normal range of motion and neck supple.   Skin:     Capillary Refill: Capillary refill takes less than 2 seconds.   Neurological:      Mental Status: He is alert and oriented to person, place, and time.             Significant Labs: All pertinent labs within the past 24 hours have been reviewed.  BMP:   Recent Labs   Lab 02/21/25  0213   GLU 87      K 4.3      CO2 17*   BUN 76*   CREATININE 11.1*   CALCIUM 8.3*   MG 1.9     CBC:   Recent Labs   Lab 02/21/25  0213   WBC 9.87   HGB 11.9*   HCT 35.6*          Significant Imaging: I have reviewed all pertinent imaging results/findings within the past 24 hours.    Assessment and Plan     * Acute renal failure superimposed on stage 3 chronic kidney disease  BISI is likely due to acute tubular necrosis caused by rhabdomyolysis . Baseline creatinine is  1.9 . Most recent creatinine and eGFR are listed below.  Recent Labs     02/19/25  0410 02/20/25  0442 02/21/25  0213   CREATININE 11.4* 12.9* 11.1*   EGFRNORACEVR 4* 3* 4*        Plan  - BISI is worsening. Will continue current treatment  - Avoid nephrotoxins and renally dose meds for GFR listed above  - Monitor urine output, serial BMP, and adjust therapy as needed  Appreciate Nephrology.  Nephrology recommending HD.  Trialysis catheter placed and patient started on HD.    Non-traumatic rhabdomyolysis  Elevated CPK to 48857 in the setting of a prolonged fall   - CK trending to >40k  - with renal failure -- Nephrolgoy consulted  - continue with aggressive hydration  - no evidence of compartment syndrome  - CK continues to improve, but Creat continued to increase.  Now on HD.    ACP (advance care  planning)  Advance Care Planning    Date: 02/21/2025  Called patient's sister as there were many questions about patient's treatment plan and disposition.  Patient was recently seen by Palliative Care and discussed possible hospice, but patient has also been started on dialysis.  Discussed medical treatment plan and disposition.  At this time, trying to assess if HD will be temporary or long term.  Patient does not seem to want lifelong dialysis.  If this is the case, then we would be looking into hospice, most likely in a NH.  If HD just temporary or patient does agree to lifelong HD, we would then be looking into SNF.  Patient's sister agrees with this plan.  He remains DNR.    Length of ACP   conversation in minutes: A total of 20 min was spent on advance care planning, goals of care discussion, emotional support, formulating and communicating prognosis and exploring burden/benefit of various approaches of treatment. This discussion occurred on a fully voluntary basis with the verbal consent of the patient and/or family.           New onset a-fib  Patient has paroxysmal (<7 days) atrial fibrillation. Patient is currently in sinus rhythm. [id number] Score: 2. The patients heart rate in the last 24 hours is as follows:  Pulse  Min: 77  Max: 142     Antiarrhythmics  metoprolol injection 5 mg, Every 4 hours PRN, Intravenous  diltiaZEM 125 mg in D5W 125 mL infusion, Continuous, Intravenous    Anticoagulants  apixaban tablet 2.5 mg, 2 times daily, Oral    Plan  - Replete lytes with a goal of K>4, Mg >2  - Patient is anticoagulated, see medications listed above.  - Patient's afib is currently  resolved and back in NSR  - patient moved to ICU overnight and placed on Amio and heparin drips.  Converted to NSR.  Cardiology consulted.  Transitioned Amio to PO and heparin drip to Eliquis.  Converted back to rapid Afib on 2/20.  Now on Cardizem drip.        Elevated troponin  Recent Labs   Lab 02/14/25  0435   TROPONINI 0.837*          Chronically elevated  No chest pain    Continue to monitor     COVID-19  No hypoxia, prophylactic remdesivir   Completed course of remdesivir.    Elevated LFTs  Likely in the setting of rhabdomyolysis   Continue to monitor   - slowly improving      Stage 3b chronic kidney disease  Creatine stable for now. BMP reviewed- noted Estimated Creatinine Clearance: 4.9 mL/min (A) (based on SCr of 11.4 mg/dL (H)). according to latest data. Based on current GFR, CKD stage is stage 3 - GFR 30-59.  Monitor UOP and serial BMP and adjust therapy as needed. Renally dose meds. Avoid nephrotoxic medications and procedures.  With BISI as above.    Schizophrenia  Continue Abilify         VTE Risk Mitigation (From admission, onward)           Ordered     apixaban tablet 2.5 mg  2 times daily         02/20/25 1017     IP VTE HIGH RISK PATIENT  Once         02/14/25 0222     Place sequential compression device  Until discontinued         02/14/25 0222                    Discharge Planning   JULIANA: 2/26/2025     Code Status: DNR   Medical Readiness for Discharge Date:   Discharge Plan A: Skilled Nursing Facility   Discharge Delays: None known at this time                Hernan Unger MD  Department of Hospital Medicine   [facility] - Intensive Care

## 2025-02-21 NOTE — ASSESSMENT & PLAN NOTE
Patient has paroxysmal (<7 days) atrial fibrillation. Patient is currently in sinus rhythm. [id number] Score: 2. The patients heart rate in the last 24 hours is as follows:  Pulse  Min: 77  Max: 142     Antiarrhythmics  metoprolol injection 5 mg, Every 4 hours PRN, Intravenous  diltiaZEM 125 mg in D5W 125 mL infusion, Continuous, Intravenous    Anticoagulants  apixaban tablet 2.5 mg, 2 times daily, Oral    Plan  - Replete lytes with a goal of K>4, Mg >2  - Patient is anticoagulated, see medications listed above.  - Patient's afib is currently  resolved and back in NSR  - patient moved to ICU overnight and placed on Amio and heparin drips.  Converted to NSR.  Cardiology consulted.  Transitioned Amio to PO and heparin drip to Eliquis.  Converted back to rapid Afib on 2/20.  Now on Cardizem drip.

## 2025-02-21 NOTE — NURSING
0140-Pt HR afib rvr 130s-150s sustained while sleeping. Amio 0.5mg/min infusing. Pt with no complaints. Dr. Mata notified and new orders written.

## 2025-02-21 NOTE — ASSESSMENT & PLAN NOTE
[place] CPK to 97402 in the setting of a prolonged fall   - CK trending to >40k  - with renal failure -- Nephrolgoy consulted  - continue with aggressive hydration  - no evidence of compartment syndrome  - CK continues to improve, but Creat continued to increase.  Now on HD.

## 2025-02-21 NOTE — PT/OT/SLP PROGRESS
Physical Therapy      Patient Name:  Antonio Torres Jr.   MRN:  [id number]    Patient not seen today secondary to MD hold (Comment), Nurse/ LORNA hold. Pt on hold following rounds. Will follow-up when medically appropriate.

## 2025-02-21 NOTE — ASSESSMENT & PLAN NOTE
BISI is likely due to acute tubular necrosis caused by rhabdomyolysis . Baseline creatinine is  1.9 . Most recent creatinine and eGFR are listed below.  Recent Labs     02/19/25  0410 02/20/25  0442 02/21/25  0213   CREATININE 11.4* 12.9* 11.1*   EGFRNORACEVR 4* 3* 4*        Plan  - BISI is worsening. Will continue current treatment  - Avoid nephrotoxins and renally dose meds for GFR listed above  - Monitor urine output, serial BMP, and adjust therapy as needed  Appreciate Nephrology.  Nephrology recommending HD.  Trialysis catheter placed and patient started on HD.

## 2025-02-21 NOTE — ASSESSMENT & PLAN NOTE
Back in AFib.  Already ate today.  We will switch to Cardizem drip.  Hold amiodarone because of elevated LFTs.  If continues to be in AFib with RVR, consider JOSE cardioversion

## 2025-02-21 NOTE — NURSING
[facility] [place] 4 [place]      2/21/2025       Skin assessed on: Q Shift      [] No Pressure Injuries Present    []Prevention Measures Documented    [] Yes LDA  for Pressure Injury Previously documented     [] Yes New Pressure Injury Discovered   [] LDA for New Pressure Injury Added      Attending RN:  David Saini RN     Second RN:  nael gonzalez

## 2025-02-21 NOTE — PROGRESS NOTES
[facility] - Intensive Care  Nephrology  Progress Note    Patient Name: Antonio Torres Jr.  MRN: [id number]  Admission Date: 2/13/2025  Hospital Length of Stay: 7 days  Attending Provider: Hernan Unger MD   Primary Care Physician: Fabio Lennon MD  Principal Problem:Acute renal failure superimposed on stage 3 chronic kidney disease    Subjective:     HPI: Mr. Torres is an 81 yo male with schizophrenia, BPH, CKD, and h/o melanoma who presented to the ED overnight s/p fall. HR 120s on arrival. He was also found to be COVID+. CPK 23k. He was started on IVF. Nephrology consulted for BISI on CKD. Prior records obtained and reviewed. He is followed by myself outpatient; last visit was 11/11/24. His baseline Cr is 1.7-2.0; his CKD is of unknown etiology. His Cr was 3.3 on arrival --> 3.5 this morning. CPK now > 40k. Abnormal LFTs. He reports to be doing okay this afternoon. Complaining of soreness. No SOB or leg swelling.    Interval History: tolerated dialysis well yesterday, breathing more comfortably.    Review of patient's allergies indicates:  No Known Allergies  Current Facility-Administered Medications   Medication Frequency    acetaminophen tablet 650 mg Q6H PRN    albuterol-ipratropium 2.5 mg-0.5 mg/3 mL nebulizer solution 3 mL Q4H PRN    aluminum-magnesium hydroxide-simethicone 200-200-20 mg/5 mL suspension 30 mL QID PRN    apixaban tablet 2.5 mg BID    ARIPiprazole tablet 20 mg Daily    bisacodyL suppository 10 mg Daily PRN    cefTRIAXone injection 2 g Q24H    cetirizine tablet 5 mg Q48H    diltiaZEM 125 mg in D5W 125 mL infusion Continuous    fentaNYL 50 mcg/mL injection 50 mcg On Call Procedure    furosemide (Lasix) 200 mg in 0.9% NaCl 100 mL IVPB Once    glucagon (human recombinant) injection 1 mg PRN    glucose chewable tablet 16 g PRN    glucose chewable tablet 24 g PRN    melatonin tablet 6 mg Nightly PRN    metoprolol injection 5 mg Q4H PRN    metoprolol tartrate (LOPRESSOR) tablet 25 mg BID     midazolam (PF) (VERSED) 1 mg/mL injection 1 mg On Call Procedure    multivitamin tablet Daily    naloxone 0.4 mg/mL injection 0.02 mg PRN    ondansetron injection 4 mg Q8H PRN    senna-docusate 8.6-50 mg per tablet 1 tablet Daily PRN    simethicone chewable tablet 80 mg QID PRN    sodium chloride 0.9% flush 10 mL Q12H PRN    Tdap vaccine injection 0.5 mL vaccine x 1 dose     Facility-Administered Medications Ordered in Other Encounters   Medication Frequency    0.9%  NaCl infusion Continuous    mupirocin 2 % ointment On Call Procedure       Objective:     Vital Signs (Most Recent):  Temp: 97.7 °F (36.5 °C) (02/21/25 0800)  Pulse: (!) 134 (02/21/25 1100)  Resp: 13 (02/21/25 1100)  BP: 117/69 (02/21/25 1100)  SpO2: (!) 94 % (02/21/25 1100) Vital Signs (24h Range):  Temp:  [97.5 °F (36.4 °C)-98.3 °F (36.8 °C)] 97.7 °F (36.5 °C)  Pulse:  [] 134  Resp:  [11-33] 13  SpO2:  [89 %-99 %] 94 %  BP: (103-166)/(53-83) 117/69     Weight: 68.9 kg (151 lb 14.4 oz) (02/14/25 1515)  Body mass index is 21.79 kg/m².  Body surface area is 1.84 meters squared.    I/O last 3 completed shifts:  In: 1161.2 [I.V.:911.2; Other:250]  Out: 2335 [Urine:835; Other:1500]     Physical Exam  Constitutional:       General: He is not in acute distress.     Appearance: He is well-developed. He is not ill-appearing or toxic-appearing.   HENT:      Head: Normocephalic and atraumatic.   Eyes:      Pupils: Pupils are equal, round, and reactive to light.   Neck:      Comments: Right IJ CVC  Cardiovascular:      Rate and Rhythm: Normal rate and regular rhythm.      Heart sounds: Normal heart sounds.   Pulmonary:      Effort: No respiratory distress.      Breath sounds: Rhonchi present.   Abdominal:      General: Bowel sounds are normal.      Palpations: Abdomen is soft.      Tenderness: There is no abdominal tenderness.   Musculoskeletal:         General: Normal range of motion.      Cervical back: Normal range of motion and neck supple.   Skin:      Capillary Refill: Capillary refill takes less than 2 seconds.   Neurological:      Mental Status: He is alert and oriented to person, place, and time.          Significant Labs:  All labs within the past 24 hours have been reviewed.     Significant Imaging:  Labs: Reviewed  Assessment/Plan:     Renal/  * Acute renal failure superimposed on stage 3 chronic kidney disease  Metabolic acidosis  Baseline creatinine 1.7-2.0  On admission creatinine 3.3    BISI due to Rhabdomyolysis, initial CK > 40k  Worsening though remains without RRT needs    Consent for dialysis obtained 2/18/2025    Plan/Recommendation  Hold HD today - will assess response to lasix 200 x1 dos  Discussed with him today with regards to continuing dialysis, he remains an candidate for outpatient dialysis should he want it.   -Keep MAP > 65  -Keep hemoglobin > 7  -Strict ins and outs  -Avoid nephrotoxic agents if possible and renally dose medications  -Avoid drastic hemodynamic changes if possible      Stage 3b chronic kidney disease  See BISI    Orthopedic  Non-traumatic rhabdomyolysis  See BISI        Thank you for your consult. I will follow-up with patient. Please contact us if you have any additional questions.    Ezio Smith MD  Nephrology  [facility] - Intensive Care

## 2025-02-21 NOTE — ASSESSMENT & PLAN NOTE
Advance Care Planning     Date: 02/21/2025  Called patient's sister as there were many questions about patient's treatment plan and disposition.  Patient was recently seen by Palliative Care and discussed possible hospice, but patient has also been started on dialysis.  Discussed medical treatment plan and disposition.  At this time, trying to assess if HD will be temporary or long term.  Patient does not seem to want lifelong dialysis.  If this is the case, then we would be looking into hospice, most likely in a NH.  If HD just temporary or patient does agree to lifelong HD, we would then be looking into SNF.  Patient's sister agrees with this plan.  He remains DNR.    Length of ACP   conversation in minutes: A total of 20 min was spent on advance care planning, goals of care discussion, emotional support, formulating and communicating prognosis and exploring burden/benefit of various approaches of treatment. This discussion occurred on a fully voluntary basis with the verbal consent of the patient and/or family.

## 2025-02-21 NOTE — PT/OT/SLP PROGRESS
Occupational Therapy      Patient Name:  Antonio Torres JrAnibal   MRN:  [id number]    Patient not seen today secondary to Nurse/ LORNA hold (patient on cardiezam drip). Order discontinued today.    2/21/2025

## 2025-02-21 NOTE — SUBJECTIVE & OBJECTIVE
Interval History:  In atrial flutter.  Switch from amiodarone drip to Cardizem drip.  Already ate today.  If continues to be in atrial flutter, on Monday, plan for cardioversion    Review of Systems   Constitutional: Negative.   HENT: Negative.     Eyes: Negative.    Endocrine: Negative.    Hematologic/Lymphatic: Negative.    Skin: Negative.    Musculoskeletal: Negative.    Gastrointestinal: Negative.    Genitourinary: Negative.    Neurological: Negative.    Psychiatric/Behavioral: Negative.     Allergic/Immunologic: Negative.      Objective:     Vital Signs (Most Recent):  Temp: 98.2 °F (36.8 °C) (02/21/25 0340)  Pulse: (!) 130 (02/21/25 0745)  Resp: (!) 22 (02/21/25 0745)  BP: 121/65 (02/21/25 0617)  SpO2: 96 % (02/21/25 0745) Vital Signs (24h Range):  Temp:  [97.5 °F (36.4 °C)-98.3 °F (36.8 °C)] 98.2 °F (36.8 °C)  Pulse:  [] 130  Resp:  [13-32] 22  SpO2:  [93 %-99 %] 96 %  BP: (103-166)/(53-92) 121/65     Weight: 68.9 kg (151 lb 14.4 oz)  Body mass index is 21.79 kg/m².     SpO2: 96 %         Intake/Output Summary (Last 24 hours) at 2/21/2025 0947  Last data filed at 2/21/2025 0800  Gross per 24 hour   Intake 667.68 ml   Output 2200 ml   Net -1532.32 ml       Lines/Drains/Airways       Central Venous Catheter Line  Duration             Trialysis (Dialysis) Catheter 02/19/25 1647 right internal jugular 1 day              Peripheral Intravenous Line  Duration                  Peripheral IV - Single Lumen 02/20/25 0230 20 G Anterior;Distal;Right Forearm 1 day         Peripheral IV - Single Lumen 02/20/25 0235 20 G Anterior;Distal;Right Upper Arm 1 day                       Physical Exam  Vitals reviewed.   Constitutional:       Appearance: He is well-developed.   HENT:      Head: Normocephalic.   Eyes:      Conjunctiva/sclera: Conjunctivae normal.      Pupils: Pupils are equal, round, and reactive to light.   Cardiovascular:      Rate and Rhythm: Regular rhythm. Tachycardia present.      Heart sounds: Normal  heart sounds.   Pulmonary:      Effort: Pulmonary effort is normal.      Breath sounds: Normal breath sounds.   Abdominal:      General: Bowel sounds are normal.      Palpations: Abdomen is soft.   Musculoskeletal:      Cervical back: Normal range of motion and neck supple.   Skin:     General: Skin is warm.   Neurological:      Mental Status: He is alert and oriented to person, place, and time.            Significant Labs:     DATA:     Laboratory:  CBC:  Recent Labs   Lab 02/18/25  0516 02/19/25  0045 02/21/25  0213   WBC 8.23 8.01 9.87   Hemoglobin 11.4 L 12.2 L 11.9 L   Hematocrit 35.0 L 36.2 L 35.6 L   Platelets 168 182 224       CHEMISTRIES:  Recent Labs   Lab 02/14/25  0435 02/15/25  0451 02/19/25  0045 02/19/25  0410 02/20/25 0442 02/21/25  0213   Glucose 114 H   < > 86 106 86 87   Sodium 142   < > 141 141 141 143   Potassium 3.8   < > 4.3 4.6 4.8 4.3   BUN 48 H   < > 96 H 96 H 101 H 76 H   Creatinine 3.5 H   < > 11.3 H 11.4 H 12.9 H 11.1 H   Calcium 9.1   < > 7.9 L 7.9 L 8.5 L 8.3 L   Magnesium 2.5  --  1.8  --   --  1.9    < > = values in this interval not displayed.       CARDIAC BIOMARKERS:  Recent Labs   Lab 02/14/25  0435 02/15/25  0451 02/19/25  0045 02/19/25  0410 02/20/25 0442 02/21/25  0213   CPK >97728 H   < >  --  7872 H 4525 H 2535 H   Troponin I 0.837 H  --  0.079 H 0.074 H  --   --     < > = values in this interval not displayed.       COAGS:  Recent Labs   Lab 02/19/25  0045   INR 1.1       LIPIDS/LFTS:  Recent Labs   Lab 09/21/22  0805 04/06/23  2134 04/07/23  0404 04/10/23  0717 02/19/25  0410 02/20/25  0442 02/21/25  0213   Cholesterol 222 H  --  178  --   --   --   --    Triglycerides 124  --  47  --   --   --   --    HDL 54  --  48  --   --   --   --    LDL Cholesterol 143.2  --  120.6  --   --   --   --    Non-HDL Cholesterol 168  --  130  --   --   --   --    AST 24   < >  --    < > 220 H 148 H 95 H   ALT 31   < >  --    < > 216 H 216 H 181 H    < > = values in this interval not  displayed.       Hemoglobin A1C   Date Value Ref Range Status   09/21/2022 5.2 4.0 - 5.6 % Final     Comment:     ADA Screening Guidelines:  5.7-6.4%  Consistent with prediabetes  >or=6.5%  Consistent with diabetes    High levels of fetal hemoglobin interfere with the HbA1C  assay. Heterozygous hemoglobin variants (HbS, HgC, etc)do  not significantly interfere with this assay.   However, presence of multiple variants may affect accuracy.     10/24/2019 5.4 4.0 - 5.6 % Final     Comment:     ADA Screening Guidelines:  5.7-6.4%  Consistent with prediabetes  >or=6.5%  Consistent with diabetes  High levels of fetal hemoglobin interfere with the HbA1C  assay. Heterozygous hemoglobin variants (HbS, HgC, etc)do  not significantly interfere with this assay.   However, presence of multiple variants may affect accuracy.     10/24/2017 5.1 4.0 - 5.6 % Final     Comment:     According to ADA guidelines, hemoglobin A1c <7.0% represents  optimal control in non-pregnant diabetic patients. Different  metrics may apply to specific patient populations.   Standards of Medical Care in Diabetes-2016.  For the purpose of screening for the presence of diabetes:  <5.7%     Consistent with the absence of diabetes  5.7-6.4%  Consistent with increasing risk for diabetes   (prediabetes)  >or=6.5%  Consistent with diabetes  Currently, no consensus exists for use of hemoglobin A1c  for diagnosis of diabetes for children.  This Hemoglobin A1c assay has significant interference with fetal   hemoglobin   (HbF). The results are invalid for patients with abnormal amounts of   HbF,   including those with known Hereditary Persistence   of Fetal Hemoglobin. Heterozygous hemoglobin variants (HbAS, HbAC,   HbAD, HbAE, HbA2) do not significantly interfere with this assay;   however, presence of multiple variants in a sample may impact the %   interference.         TSH  Recent Labs   Lab 04/06/23  2359   TSH 1.398       The ASCVD Risk score (Adonay DK, et  al., 2019) failed to calculate for the following reasons:    The 2019 ASCVD risk score is only valid for ages 40 to 79       BNP    Lab Results   Component Value Date/Time     (H) 02/17/2025 05:36 PM    BNP 35 02/13/2025 10:08 PM    BNP 32 04/06/2023 09:34 PM            ECHO    Results for orders placed during the hospital encounter of 02/13/25    Echo    Interpretation Summary    Left Ventricle: The left ventricle is normal in size. There is normal systolic function with a visually estimated ejection fraction of 55 - 60%.    Right Ventricle: Normal right ventricular cavity size. Systolic function is normal.    Tricuspid Valve: There is mild to moderate regurgitation.    Aorta: Aortic root is mildly dilated measuring 4.03 cm.    Pulmonary Artery: The estimated pulmonary artery systolic pressure is 22 mmHg.    IVC/SVC: Normal venous pressure at 3 mmHg.      STRESS TEST    Results for orders placed during the hospital encounter of 04/06/23    Nuclear Stress - Cardiology Interpreted    Interpretation Summary    Normal myocardial perfusion scan. There is no evidence of myocardial ischemia or infarction.    There is a  moderate to severe intensity fixed perfusion abnormality in the inferior wall of the left ventricle secondary to diaphragm attenuation.    The gated perfusion images showed an ejection fraction of 69% post stress.    There is normal wall motion post stress.    LV cavity size is  and normal at stress.    The ECG portion of the study is negative for ischemia.    The patient reported no chest pain during the stress test.    There were no arrhythmias during stress.        CATH    No results found for this or any previous visit.

## 2025-02-21 NOTE — SUBJECTIVE & OBJECTIVE
Interval History: tolerated dialysis well yesterday, breathing more comfortably.    Review of patient's allergies indicates:  No Known Allergies  Current Facility-Administered Medications   Medication Frequency    acetaminophen tablet 650 mg Q6H PRN    albuterol-ipratropium 2.5 mg-0.5 mg/3 mL nebulizer solution 3 mL Q4H PRN    aluminum-magnesium hydroxide-simethicone 200-200-20 mg/5 mL suspension 30 mL QID PRN    apixaban tablet 2.5 mg BID    ARIPiprazole tablet 20 mg Daily    bisacodyL suppository 10 mg Daily PRN    cefTRIAXone injection 2 g Q24H    cetirizine tablet 5 mg Q48H    diltiaZEM 125 mg in D5W 125 mL infusion Continuous    fentaNYL 50 mcg/mL injection 50 mcg On Call Procedure    furosemide (Lasix) 200 mg in 0.9% NaCl 100 mL IVPB Once    glucagon (human recombinant) injection 1 mg PRN    glucose chewable tablet 16 g PRN    glucose chewable tablet 24 g PRN    melatonin tablet 6 mg Nightly PRN    metoprolol injection 5 mg Q4H PRN    metoprolol tartrate (LOPRESSOR) tablet 25 mg BID    midazolam (PF) (VERSED) 1 mg/mL injection 1 mg On Call Procedure    multivitamin tablet Daily    naloxone 0.4 mg/mL injection 0.02 mg PRN    ondansetron injection 4 mg Q8H PRN    senna-docusate 8.6-50 mg per tablet 1 tablet Daily PRN    simethicone chewable tablet 80 mg QID PRN    sodium chloride 0.9% flush 10 mL Q12H PRN    Tdap vaccine injection 0.5 mL vaccine x 1 dose     Facility-Administered Medications Ordered in Other Encounters   Medication Frequency    0.9%  NaCl infusion Continuous    mupirocin 2 % ointment On Call Procedure       Objective:     Vital Signs (Most Recent):  Temp: 97.7 °F (36.5 °C) (02/21/25 0800)  Pulse: (!) 134 (02/21/25 1100)  Resp: 13 (02/21/25 1100)  BP: 117/69 (02/21/25 1100)  SpO2: (!) 94 % (02/21/25 1100) Vital Signs (24h Range):  Temp:  [97.5 °F (36.4 °C)-98.3 °F (36.8 °C)] 97.7 °F (36.5 °C)  Pulse:  [] 134  Resp:  [11-33] 13  SpO2:  [89 %-99 %] 94 %  BP: (103-166)/(53-83) 117/69      Weight: 68.9 kg (151 lb 14.4 oz) (02/14/25 1515)  Body mass index is 21.79 kg/m².  Body surface area is 1.84 meters squared.    I/O last 3 completed shifts:  In: 1161.2 [I.V.:911.2; Other:250]  Out: 2335 [Urine:835; Other:1500]     Physical Exam  Constitutional:       General: He is not in acute distress.     Appearance: He is well-developed. He is not ill-appearing or toxic-appearing.   HENT:      Head: Normocephalic and atraumatic.   Eyes:      Pupils: Pupils are equal, round, and reactive to light.   Neck:      Comments: Right IJ CVC  Cardiovascular:      Rate and Rhythm: Normal rate and regular rhythm.      Heart sounds: Normal heart sounds.   Pulmonary:      Effort: No respiratory distress.      Breath sounds: Rhonchi present.   Abdominal:      General: Bowel sounds are normal.      Palpations: Abdomen is soft.      Tenderness: There is no abdominal tenderness.   Musculoskeletal:         General: Normal range of motion.      Cervical back: Normal range of motion and neck supple.   Skin:     Capillary Refill: Capillary refill takes less than 2 seconds.   Neurological:      Mental Status: He is alert and oriented to person, place, and time.          Significant Labs:  All labs within the past 24 hours have been reviewed.     Significant Imaging:  Labs: Reviewed

## 2025-02-21 NOTE — EICU
eICU Intervention    Notified of afib RVR while asleep  On amiodarone 0.5    Seen awake not in distress  Stable BP    Will increase amiodarone to 1 mg  Add CBC, Mg and phos to CMP ordered for AM  Discussed with [facility]

## 2025-02-21 NOTE — PLAN OF CARE
02/21/25 1349   Post-Acute Status   Post-Acute Authorization Placement   Post-Acute Placement Status Referrals Sent     SNF follow up:  [facility]  declined due to no bed available.  [facility] and [facility] still reviewing.    Started HD yesterday.  Unsure if patient will need OP HD.  Per Dr Unger we will review again on Monday.

## 2025-02-22 LAB
ALBUMIN SERPL BCP-MCNC: 2.2 G/DL (ref 3.5–5.2)
ALP SERPL-CCNC: 47 U/L (ref 40–150)
ALT SERPL W/O P-5'-P-CCNC: 149 U/L (ref 10–44)
ANION GAP SERPL CALC-SCNC: 21 MMOL/L (ref 8–16)
AST SERPL-CCNC: 62 U/L (ref 10–40)
BASOPHILS # BLD AUTO: 0.03 K/UL (ref 0–0.2)
BASOPHILS NFR BLD: 0.3 % (ref 0–1.9)
BILIRUB SERPL-MCNC: 0.2 MG/DL (ref 0.1–1)
BUN SERPL-MCNC: 84 MG/DL (ref 8–23)
CALCIUM SERPL-MCNC: 8.3 MG/DL (ref 8.7–10.5)
CHLORIDE SERPL-SCNC: 102 MMOL/L (ref 95–110)
CK SERPL-CCNC: 1352 U/L (ref 20–200)
CO2 SERPL-SCNC: 20 MMOL/L (ref 23–29)
CREAT SERPL-MCNC: 12.2 MG/DL (ref 0.5–1.4)
DIFFERENTIAL METHOD BLD: ABNORMAL
EOSINOPHIL # BLD AUTO: 0.4 K/UL (ref 0–0.5)
EOSINOPHIL NFR BLD: 4 % (ref 0–8)
ERYTHROCYTE [DISTWIDTH] IN BLOOD BY AUTOMATED COUNT: 14.6 % (ref 11.5–14.5)
EST. GFR  (NO RACE VARIABLE): 4 ML/MIN/1.73 M^2
GLUCOSE SERPL-MCNC: 88 MG/DL (ref 70–110)
HCT VFR BLD AUTO: 33.2 % (ref 40–54)
HGB BLD-MCNC: 11 G/DL (ref 14–18)
IMM GRANULOCYTES # BLD AUTO: 0.08 K/UL (ref 0–0.04)
IMM GRANULOCYTES NFR BLD AUTO: 0.9 % (ref 0–0.5)
LYMPHOCYTES # BLD AUTO: 0.7 K/UL (ref 1–4.8)
LYMPHOCYTES NFR BLD: 7.6 % (ref 18–48)
MCH RBC QN AUTO: 30.4 PG (ref 27–31)
MCHC RBC AUTO-ENTMCNC: 33.1 G/DL (ref 32–36)
MCV RBC AUTO: 92 FL (ref 82–98)
MONOCYTES # BLD AUTO: 1.2 K/UL (ref 0.3–1)
MONOCYTES NFR BLD: 12.9 % (ref 4–15)
NEUTROPHILS # BLD AUTO: 6.9 K/UL (ref 1.8–7.7)
NEUTROPHILS NFR BLD: 74.3 % (ref 38–73)
NRBC BLD-RTO: 0 /100 WBC
OHS QRS DURATION: 76 MS
OHS QRS DURATION: 82 MS
OHS QTC CALCULATION: 452 MS
OHS QTC CALCULATION: 478 MS
PLATELET # BLD AUTO: 229 K/UL (ref 150–450)
PMV BLD AUTO: 10.1 FL (ref 9.2–12.9)
POTASSIUM SERPL-SCNC: 4.2 MMOL/L (ref 3.5–5.1)
PROT SERPL-MCNC: 5.3 G/DL (ref 6–8.4)
RBC # BLD AUTO: 3.62 M/UL (ref 4.6–6.2)
SODIUM SERPL-SCNC: 143 MMOL/L (ref 136–145)
WBC # BLD AUTO: 9.29 K/UL (ref 3.9–12.7)

## 2025-02-22 PROCEDURE — 99900035 HC TECH TIME PER 15 MIN (STAT)

## 2025-02-22 PROCEDURE — 85025 COMPLETE CBC W/AUTO DIFF WBC: CPT | Performed by: INTERNAL MEDICINE

## 2025-02-22 PROCEDURE — 93010 ELECTROCARDIOGRAM REPORT: CPT | Mod: ,,, | Performed by: INTERNAL MEDICINE

## 2025-02-22 PROCEDURE — 25000003 PHARM REV CODE 250: Performed by: INTERNAL MEDICINE

## 2025-02-22 PROCEDURE — 27000207 HC ISOLATION

## 2025-02-22 PROCEDURE — 63600175 PHARM REV CODE 636 W HCPCS: Performed by: STUDENT IN AN ORGANIZED HEALTH CARE EDUCATION/TRAINING PROGRAM

## 2025-02-22 PROCEDURE — 99291 CRITICAL CARE FIRST HOUR: CPT | Mod: ,,, | Performed by: INTERNAL MEDICINE

## 2025-02-22 PROCEDURE — 93005 ELECTROCARDIOGRAM TRACING: CPT

## 2025-02-22 PROCEDURE — 11000001 HC ACUTE MED/SURG PRIVATE ROOM

## 2025-02-22 PROCEDURE — 99232 SBSQ HOSP IP/OBS MODERATE 35: CPT | Mod: ,,, | Performed by: STUDENT IN AN ORGANIZED HEALTH CARE EDUCATION/TRAINING PROGRAM

## 2025-02-22 PROCEDURE — 27000221 HC OXYGEN, UP TO 24 HOURS

## 2025-02-22 PROCEDURE — 25000003 PHARM REV CODE 250: Performed by: STUDENT IN AN ORGANIZED HEALTH CARE EDUCATION/TRAINING PROGRAM

## 2025-02-22 PROCEDURE — 82550 ASSAY OF CK (CPK): CPT | Performed by: STUDENT IN AN ORGANIZED HEALTH CARE EDUCATION/TRAINING PROGRAM

## 2025-02-22 PROCEDURE — 25000003 PHARM REV CODE 250: Performed by: HOSPITALIST

## 2025-02-22 PROCEDURE — 80053 COMPREHEN METABOLIC PANEL: CPT | Performed by: STUDENT IN AN ORGANIZED HEALTH CARE EDUCATION/TRAINING PROGRAM

## 2025-02-22 PROCEDURE — 21400001 HC TELEMETRY ROOM

## 2025-02-22 PROCEDURE — 94761 N-INVAS EAR/PLS OXIMETRY MLT: CPT

## 2025-02-22 RX ORDER — METOPROLOL TARTRATE 50 MG/1
50 TABLET ORAL 2 TIMES DAILY
Status: DISCONTINUED | OUTPATIENT
Start: 2025-02-22 | End: 2025-02-27 | Stop reason: HOSPADM

## 2025-02-22 RX ORDER — ACETAMINOPHEN 325 MG/1
650 TABLET ORAL EVERY 4 HOURS PRN
Status: DISCONTINUED | OUTPATIENT
Start: 2025-02-22 | End: 2025-02-27 | Stop reason: HOSPADM

## 2025-02-22 RX ADMIN — CEFTRIAXONE 2 G: 2 INJECTION, POWDER, FOR SOLUTION INTRAMUSCULAR; INTRAVENOUS at 03:02

## 2025-02-22 RX ADMIN — APIXABAN 2.5 MG: 2.5 TABLET, FILM COATED ORAL at 08:02

## 2025-02-22 RX ADMIN — SODIUM CHLORIDE 160 MG: 9 INJECTION, SOLUTION INTRAVENOUS at 08:02

## 2025-02-22 RX ADMIN — THERA TABS 1 TABLET: TAB at 08:02

## 2025-02-22 RX ADMIN — ARIPIPRAZOLE 20 MG: 10 TABLET ORAL at 08:02

## 2025-02-22 RX ADMIN — METOPROLOL TARTRATE 25 MG: 25 TABLET, FILM COATED ORAL at 08:02

## 2025-02-22 RX ADMIN — METOPROLOL TARTRATE 50 MG: 50 TABLET, FILM COATED ORAL at 08:02

## 2025-02-22 NOTE — SUBJECTIVE & OBJECTIVE
Interval History: no acute events overnight. UOP 1 liter overnight    Review of patient's allergies indicates:  No Known Allergies  Current Facility-Administered Medications   Medication Frequency    acetaminophen tablet 650 mg Q6H PRN    albuterol-ipratropium 2.5 mg-0.5 mg/3 mL nebulizer solution 3 mL Q4H PRN    aluminum-magnesium hydroxide-simethicone 200-200-20 mg/5 mL suspension 30 mL QID PRN    apixaban tablet 2.5 mg BID    ARIPiprazole tablet 20 mg Daily    bisacodyL suppository 10 mg Daily PRN    cefTRIAXone injection 2 g Q24H    cetirizine tablet 5 mg Q48H    fentaNYL 50 mcg/mL injection 50 mcg On Call Procedure    furosemide (Lasix) 160 mg in 0.9% NaCl 100 mL IVPB Q12H    glucagon (human recombinant) injection 1 mg PRN    glucose chewable tablet 16 g PRN    glucose chewable tablet 24 g PRN    melatonin tablet 6 mg Nightly PRN    metoprolol injection 5 mg Q4H PRN    metoprolol tartrate (LOPRESSOR) tablet 50 mg BID    midazolam (PF) (VERSED) 1 mg/mL injection 1 mg On Call Procedure    multivitamin tablet Daily    naloxone 0.4 mg/mL injection 0.02 mg PRN    ondansetron injection 4 mg Q8H PRN    senna-docusate 8.6-50 mg per tablet 1 tablet Daily PRN    simethicone chewable tablet 80 mg QID PRN    sodium chloride 0.9% flush 10 mL Q12H PRN    Tdap vaccine injection 0.5 mL vaccine x 1 dose     Facility-Administered Medications Ordered in Other Encounters   Medication Frequency    0.9%  NaCl infusion Continuous    mupirocin 2 % ointment On Call Procedure       Objective:     Vital Signs (Most Recent):  Temp: 98.1 °F (36.7 °C) (02/22/25 1100)  Pulse: 84 (02/22/25 1200)  Resp: 20 (02/22/25 1200)  BP: 130/77 (02/22/25 1200)  SpO2: 96 % (02/22/25 1200) Vital Signs (24h Range):  Temp:  [97.4 °F (36.3 °C)-98.2 °F (36.8 °C)] 98.1 °F (36.7 °C)  Pulse:  [] 84  Resp:  [10-31] 20  SpO2:  [87 %-100 %] 96 %  BP: ()/(51-87) 130/77     Weight: 68.9 kg (151 lb 14.4 oz) (02/14/25 1515)  Body mass index is 21.79  kg/m².  Body surface area is 1.84 meters squared.    I/O last 3 completed shifts:  In: 903.2 [P.O.:100; I.V.:553.2; Other:250]  Out: 2810 [Urine:1310; Other:1500]     Physical Exam  Constitutional:       General: He is not in acute distress.     Appearance: He is well-developed. He is not ill-appearing or toxic-appearing.   HENT:      Head: Normocephalic and atraumatic.   Eyes:      Pupils: Pupils are equal, round, and reactive to light.   Neck:      Comments: Right IJ CVC  Cardiovascular:      Rate and Rhythm: Normal rate and regular rhythm.      Heart sounds: Normal heart sounds.   Pulmonary:      Effort: No respiratory distress.      Breath sounds: Rhonchi present.   Abdominal:      General: Bowel sounds are normal.      Palpations: Abdomen is soft.      Tenderness: There is no abdominal tenderness.   Musculoskeletal:         General: Normal range of motion.      Cervical back: Normal range of motion and neck supple.   Skin:     Capillary Refill: Capillary refill takes less than 2 seconds.   Neurological:      Mental Status: He is alert and oriented to person, place, and time.          Significant Labs:  All labs within the past 24 hours have been reviewed.     Significant Imaging:  Labs: Reviewed

## 2025-02-22 NOTE — SUBJECTIVE & OBJECTIVE
Interval History: converted back to NSR    Review of Systems   HENT:  Negative for ear discharge and ear pain.    Eyes:  Negative for discharge and itching.   Endocrine: Negative for cold intolerance and heat intolerance.   Neurological:  Negative for seizures and syncope.     Objective:     Vital Signs (Most Recent):  Temp: 98.1 °F (36.7 °C) (02/22/25 1100)  Pulse: 79 (02/22/25 1100)  Resp: 16 (02/22/25 1100)  BP: 131/70 (02/22/25 0900)  SpO2: 98 % (02/22/25 1100) Vital Signs (24h Range):  Temp:  [97.4 °F (36.3 °C)-98.2 °F (36.8 °C)] 98.1 °F (36.7 °C)  Pulse:  [] 79  Resp:  [10-31] 16  SpO2:  [87 %-100 %] 98 %  BP: ()/(51-87) 131/70     Weight: 68.9 kg (151 lb 14.4 oz)  Body mass index is 21.79 kg/m².    Intake/Output Summary (Last 24 hours) at 2/22/2025 1125  Last data filed at 2/22/2025 1101  Gross per 24 hour   Intake 411.15 ml   Output 1310 ml   Net -898.85 ml         Physical Exam  Constitutional:       General: He is not in acute distress.     Appearance: He is well-developed. He is not ill-appearing or toxic-appearing.   HENT:      Head: Normocephalic and atraumatic.   Eyes:      Pupils: Pupils are equal, round, and reactive to light.   Cardiovascular:      Rate and Rhythm: Normal rate and regular rhythm.      Heart sounds: Normal heart sounds.   Pulmonary:      Effort: Pulmonary effort is normal.      Breath sounds: Rhonchi present.   Abdominal:      General: Bowel sounds are normal.      Palpations: Abdomen is soft.      Tenderness: There is no abdominal tenderness.   Musculoskeletal:         General: Normal range of motion.      Cervical back: Normal range of motion and neck supple.   Skin:     Capillary Refill: Capillary refill takes less than 2 seconds.   Neurological:      Mental Status: He is alert and oriented to person, place, and time.             Significant Labs: All pertinent labs within the past 24 hours have been reviewed.  BMP:   Recent Labs   Lab 02/21/25  0213 02/22/25  0431    GLU 87 88    143   K 4.3 4.2    102   CO2 17* 20*   BUN 76* 84*   CREATININE 11.1* 12.2*   CALCIUM 8.3* 8.3*   MG 1.9  --      CBC:   Recent Labs   Lab 02/21/25  0213 02/22/25  0437   WBC 9.87 9.29   HGB 11.9* 11.0*   HCT 35.6* 33.2*    229       Significant Imaging: I have reviewed all pertinent imaging results/findings within the past 24 hours.

## 2025-02-22 NOTE — NURSING TRANSFER
Nursing Transfer Note      2/22/2025   2:44 PM    Nurse giving handoff:Meagan GÓMEZ    Reason patient is being transferred: stepdown out of ICU    Transfer To: [id number]    Transfer via bed    Transfer with cardiac monitoring    Transported by Meagan GÓMEZ, transporter    Transfer Vital Signs:  Blood Pressure:115/72  Heart Rate:82  O2:96%  Temperature:98F  Respirations:16    Telemetry: Rhythm NSR  Order for Tele Monitor? Yes    Additional Lines: N/A    Medicines sent: n/a    Any special needs or follow-up needed: will notify sister    Patient belongings transferred with patient: Yes    Chart send with patient: Yes    Notified: sister    Patient reassessed at: 2/22 @ 5057 (date, time)  1  Upon arrival to floor: patient oriented to room, call bell in reach, and bed in lowest position

## 2025-02-22 NOTE — PROGRESS NOTES
[facility] Medicine  Progress Note    Patient Name: Antonio Torres Jr.  MRN: [id number]  Patient Class: IP- Inpatient   Admission Date: 2/13/2025  Length of Stay: 8 days  Attending Physician: Hernan Unger MD  Primary Care Provider: Fabio Lennon MD        Subjective     Principal Problem:Acute renal failure superimposed on stage 3 chronic kidney disease        HPI:  This is an 80-year-old male with a past medical history of schizophrenia, chronic blepharitis, hyperlipidemia, CKD 3, BPH, who presents with a fall.    Patient presents for evaluation after a fall and a sycopal episode that occurred prior to presentation. He reports losing consciousness and falling face forward on the floor sustaining an injury to his head, elbows, abdomen and knees. He was unable to stand up and was on the floor for about 1.5 hours. Patient reports having a coughing episode a day prior but denies sick contacts. He denied chest pain.     In the ED, the patient was tachycardic (120s > 60s), but otherwise hemodynamically stable.  Labs were remarkable for leukocytosis (25.3), elevated CPK (23,406), elevated creatinine (3.3-baseline around 1.9), elevated troponin (0.525-chronically elevated), elevated lactic acid (4.8 > 2.1), elevated procalcitonin (2.53), elevated LFTs (T bili: 1.1, AST: 267, ALT: 73), hypophosphatemia (1.7), positive COVID-19.  CT head, maxillofacial, cervical spine showed a nondisplaced right nasal bone fracture with no soft tissue swelling, and paranasal sinus disease.  CT chest, abdomen and pelvis showed possible soft tissue contusion at the level of the xiphoid process, a 2-3 mm left lower lobe pulmonary nodule and nonspecific urinary bladder wall thickening.  Right elbow x-ray demonstrated an elbow effusion with an olecranon spur.  Patient was given 2 L of LR, vancomycin, Zosyn, Tylenol 1 g p.o..  He was admitted for further management.    Overview/Hospital Course:  Mr. Torres is a 79 yo  M admitted after a fall and found to have acute on chronic kidney injury and rhabdomyolysis. CK >20,000 on admit and rising to >40,000. Renal function worsening. On IVF. Also found to be COVID positive. Started on remdesivir. Nephrology consulted. CK improving, though Cr still rising. Almanza is placed for accurate I&D monitoring. Patient went into rapid Afib.  Moved to ICU and started on Amio drip.  Quickly converted back to NSR.  Creat continued to increase and Nephrology recommending HD.  Trialysis catheter placed and patient started on HD.  Amio switched to PO and patient started on Eliquis.  Patient went back into rapid afib/aflutter. Started on Cardizem drip.  Amiodarone stopped because of elevated LFT's and patient started on B blocker.    Interval History: converted back to NSR    Review of Systems   HENT:  Negative for ear discharge and ear pain.    Eyes:  Negative for discharge and itching.   Endocrine: Negative for cold intolerance and heat intolerance.   Neurological:  Negative for seizures and syncope.     Objective:     Vital Signs (Most Recent):  Temp: 98.1 °F (36.7 °C) (02/22/25 1100)  Pulse: 79 (02/22/25 1100)  Resp: 16 (02/22/25 1100)  BP: 131/70 (02/22/25 0900)  SpO2: 98 % (02/22/25 1100) Vital Signs (24h Range):  Temp:  [97.4 °F (36.3 °C)-98.2 °F (36.8 °C)] 98.1 °F (36.7 °C)  Pulse:  [] 79  Resp:  [10-31] 16  SpO2:  [87 %-100 %] 98 %  BP: ()/(51-87) 131/70     Weight: 68.9 kg (151 lb 14.4 oz)  Body mass index is 21.79 kg/m².    Intake/Output Summary (Last 24 hours) at 2/22/2025 1125  Last data filed at 2/22/2025 1101  Gross per 24 hour   Intake 411.15 ml   Output 1310 ml   Net -898.85 ml         Physical Exam  Constitutional:       General: He is not in acute distress.     Appearance: He is well-developed. He is not ill-appearing or toxic-appearing.   HENT:      Head: Normocephalic and atraumatic.   Eyes:      Pupils: Pupils are equal, round, and reactive to light.   Cardiovascular:       Rate and Rhythm: Normal rate and regular rhythm.      Heart sounds: Normal heart sounds.   Pulmonary:      Effort: Pulmonary effort is normal.      Breath sounds: Rhonchi present.   Abdominal:      General: Bowel sounds are normal.      Palpations: Abdomen is soft.      Tenderness: There is no abdominal tenderness.   Musculoskeletal:         General: Normal range of motion.      Cervical back: Normal range of motion and neck supple.   Skin:     Capillary Refill: Capillary refill takes less than 2 seconds.   Neurological:      Mental Status: He is alert and oriented to person, place, and time.             Significant Labs: All pertinent labs within the past 24 hours have been reviewed.  BMP:   Recent Labs   Lab 02/21/25  0213 02/22/25  0437   GLU 87 88    143   K 4.3 4.2    102   CO2 17* 20*   BUN 76* 84*   CREATININE 11.1* 12.2*   CALCIUM 8.3* 8.3*   MG 1.9  --      CBC:   Recent Labs   Lab 02/21/25  0213 02/22/25  0437   WBC 9.87 9.29   HGB 11.9* 11.0*   HCT 35.6* 33.2*    229       Significant Imaging: I have reviewed all pertinent imaging results/findings within the past 24 hours.    Assessment and Plan     * Acute renal failure superimposed on stage 3 chronic kidney disease  BISI is likely due to acute tubular necrosis caused by rhabdomyolysis . Baseline creatinine is  1.9 . Most recent creatinine and eGFR are listed below.  Recent Labs     02/19/25  0410 02/20/25  0442 02/21/25  0213   CREATININE 11.4* 12.9* 11.1*   EGFRNORACEVR 4* 3* 4*        Plan  - BISI is worsening. Will continue current treatment  - Avoid nephrotoxins and renally dose meds for GFR listed above  - Monitor urine output, serial BMP, and adjust therapy as needed  Appreciate Nephrology.  Nephrology recommending HD.  Trialysis catheter placed and patient started on HD.    Non-traumatic rhabdomyolysis  Elevated CPK to 34584 in the setting of a prolonged fall   - CK trending to >40k  - with renal failure -- Nephrolgoy  consulted  - continue with aggressive hydration  - no evidence of compartment syndrome  - CK continues to improve, but Creat continued to increase.  Now on HD.    ACP (advance care planning)  Advance Care Planning    Date: 02/21/2025  Called patient's sister as there were many questions about patient's treatment plan and disposition.  Patient was recently seen by Palliative Care and discussed possible hospice, but patient has also been started on dialysis.  Discussed medical treatment plan and disposition.  At this time, trying to assess if HD will be temporary or long term.  Patient does not seem to want lifelong dialysis.  If this is the case, then we would be looking into hospice, most likely in a NH.  If HD just temporary or patient does agree to lifelong HD, we would then be looking into SNF.  Patient's sister agrees with this plan.  He remains DNR.    Length of ACP   conversation in minutes: A total of 20 min was spent on advance care planning, goals of care discussion, emotional support, formulating and communicating prognosis and exploring burden/benefit of various approaches of treatment. This discussion occurred on a fully voluntary basis with the verbal consent of the patient and/or family.           New onset a-fib  Patient has paroxysmal (<7 days) atrial fibrillation. Patient is currently in sinus rhythm. [id number] Score: 2. The patients heart rate in the last 24 hours is as follows:  Pulse  Min: 64  Max: 134     Antiarrhythmics  metoprolol injection 5 mg, Every 4 hours PRN, Intravenous  metoprolol tartrate (LOPRESSOR) tablet 50 mg, 2 times daily, Oral    Anticoagulants  apixaban tablet 2.5 mg, 2 times daily, Oral    Plan  - Replete lytes with a goal of K>4, Mg >2  - Patient is anticoagulated, see medications listed above.  - Patient's afib is currently  resolved and back in NSR  - patient moved to ICU overnight and placed on Amio and heparin drips.  Converted to NSR.  Cardiology  consulted.  Transitioned Amio to PO and heparin drip to Eliquis.  Converted back to rapid Afib on 2/20.  Placed on Cardizem drip.  Back to NSR. No Amio per Cards secondary to elevated LFT's.  Started on PO Lopressor.  Now off Cardizem drip.        Elevated troponin  Recent Labs   Lab 02/14/25  0435   TROPONINI 0.837*         Chronically elevated  No chest pain    Continue to monitor     COVID-19  No hypoxia, prophylactic remdesivir   Completed course of remdesivir.    Elevated LFTs  Likely in the setting of rhabdomyolysis   Continue to monitor   - slowly improving      Stage 3b chronic kidney disease  Creatine stable for now. BMP reviewed- noted Estimated Creatinine Clearance: 4.9 mL/min (A) (based on SCr of 11.4 mg/dL (H)). according to latest data. Based on current GFR, CKD stage is stage 3 - GFR 30-59.  Monitor UOP and serial BMP and adjust therapy as needed. Renally dose meds. Avoid nephrotoxic medications and procedures.  With BISI as above.    Schizophrenia  Continue Abilify         VTE Risk Mitigation (From admission, onward)           Ordered     apixaban tablet 2.5 mg  2 times daily         02/20/25 1017     IP VTE HIGH RISK PATIENT  Once         02/14/25 0222     Place sequential compression device  Until discontinued         02/14/25 0222                    Discharge Planning   JULIANA: 2/26/2025     Code Status: DNR   Medical Readiness for Discharge Date:   Discharge Plan A: Skilled Nursing Facility   Discharge Delays: None known at this time                Hernan Unger MD  Department of Hospital Medicine   [facility] - Intensive Care

## 2025-02-22 NOTE — ASSESSMENT & PLAN NOTE
Back in AFib.  Already ate today.  We will switch to Cardizem drip.  Hold amiodarone because of elevated LFTs.  If continues to be in AFib with RVR, consider JOSE cardioversion     2/22:  Back on sinus rhythm.  Continue AV sarthak blocking agents.  Avoid amiodarone because of elevated LFTs

## 2025-02-22 NOTE — ASSESSMENT & PLAN NOTE
Metabolic acidosis  Baseline creatinine 1.7-2.0  On admission creatinine 3.3    BISI due to Rhabdomyolysis, initial CK > 40k  Worsening though remains without RRT needs    Consent for dialysis obtained 2/18/2025  1 session of HD 2/20/2025    Plan/Recommendation  Lasix trial 200 IV with 1 liter UOP, hopeful for recovery. Trial lasix 160 BID today and monitor response  -Keep MAP > 65  -Keep hemoglobin > 7  -Strict ins and outs  -Avoid nephrotoxic agents if possible and renally dose medications  -Avoid drastic hemodynamic changes if possible

## 2025-02-22 NOTE — NURSING
[facility] [place]      2/21/2025       Skin assessed on: Q Shift      [x] No Pressure Injuries Present    [x]Prevention Measures Documented    [] Yes LDA  for Pressure Injury Previously documented     [] Yes New Pressure Injury Discovered   [] LDA for New Pressure Injury Added      Attending RN:  Mehdi Meng RN     Second RN:  Brian Rutherford RN

## 2025-02-22 NOTE — PROVIDER TRANSFER
Transfer Note    79 y/o male admitted after a fall and found to have acute on chronic kidney injury and rhabdomyolysis. CK >20,000 on admit and rising to >40,000. Renal function worsening. On IVF. Also found to be COVID positive. Started on remdesivir. Nephrology consulted. CK improving, though Cr still rising. Almanza is placed for accurate I&D monitoring. Patient went into rapid Afib. Moved to ICU and started on Amio drip. Quickly converted back to NSR. Creat continued to increase and Nephrology recommending HD. Trialysis catheter placed and plans to start HD today.  Transitioned to oral Amio.  Heparin drip changed to Eliquis.  Weak with initial PT/OT recommendations for SNF.  Will now have to determine if HD is going to be temporary or long term prior to disposition.  Patient may choose hospice care if HD needs to be long term.  Was going to be transferred to floor when he went back into Afib.  Started on Cardizem drip.  No Amio secondary to elevated LFT's.  Now back on NSR.  Started on B blocker and Cardizem drip stopped.  Move out of ICU.  Continue working with PT/OT.  Disposition will be determined if patient needs long term HD or not.

## 2025-02-22 NOTE — PLAN OF CARE
- AAOX 4, On room air. O2 sats dropped while sleeping 87%, supplemental O2 at 2 lpm applied.  - Off diltiazem drip since 02/21 at 1747. Normal sinus rhythm overnight.   - BUN/Crea 84/12.2, generalized edema. Urine output overnight 560 ml.  - Able to transfer from bed to bedside commode with minimal assistance. BM x1.      Problem: Adult Inpatient Plan of Care  Goal: Absence of Hospital-Acquired Illness or Injury  Outcome: Progressing     Problem: Infection  Goal: Absence of Infection Signs and Symptoms  Outcome: Progressing     Problem: Acute Kidney Injury/Impairment  Goal: Effective Renal Function  Outcome: Progressing     Problem: Fall Injury Risk  Goal: Absence of Fall and Fall-Related Injury  Outcome: Progressing     Problem: Skin Injury Risk Increased  Goal: Skin Health and Integrity  Outcome: Progressing

## 2025-02-22 NOTE — PROGRESS NOTES
[facility] - Intensive Care  Nephrology  Progress Note    Patient Name: Antonio Torres Jr.  MRN: [id number]  Admission Date: 2/13/2025  Hospital Length of Stay: 8 days  Attending Provider: Hernan Unger MD   Primary Care Physician: Fabio Lennon MD  Principal Problem:Acute renal failure superimposed on stage 3 chronic kidney disease    Subjective:     HPI: Mr. Torres is an 81 yo male with schizophrenia, BPH, CKD, and h/o melanoma who presented to the ED overnight s/p fall. HR 120s on arrival. He was also found to be COVID+. CPK 23k. He was started on IVF. Nephrology consulted for BISI on CKD. Prior records obtained and reviewed. He is followed by myself outpatient; last visit was 11/11/24. His baseline Cr is 1.7-2.0; his CKD is of unknown etiology. His Cr was 3.3 on arrival --> 3.5 this morning. CPK now > 40k. Abnormal LFTs. He reports to be doing okay this afternoon. Complaining of soreness. No SOB or leg swelling.    Interval History: no acute events overnight. UOP 1 liter overnight    Review of patient's allergies indicates:  No Known Allergies  Current Facility-Administered Medications   Medication Frequency    acetaminophen tablet 650 mg Q6H PRN    albuterol-ipratropium 2.5 mg-0.5 mg/3 mL nebulizer solution 3 mL Q4H PRN    aluminum-magnesium hydroxide-simethicone 200-200-20 mg/5 mL suspension 30 mL QID PRN    apixaban tablet 2.5 mg BID    ARIPiprazole tablet 20 mg Daily    bisacodyL suppository 10 mg Daily PRN    cefTRIAXone injection 2 g Q24H    cetirizine tablet 5 mg Q48H    fentaNYL 50 mcg/mL injection 50 mcg On Call Procedure    furosemide (Lasix) 160 mg in 0.9% NaCl 100 mL IVPB Q12H    glucagon (human recombinant) injection 1 mg PRN    glucose chewable tablet 16 g PRN    glucose chewable tablet 24 g PRN    melatonin tablet 6 mg Nightly PRN    metoprolol injection 5 mg Q4H PRN    metoprolol tartrate (LOPRESSOR) tablet 50 mg BID    midazolam (PF) (VERSED) 1 mg/mL injection 1 mg On Call Procedure     multivitamin tablet Daily    naloxone 0.4 mg/mL injection 0.02 mg PRN    ondansetron injection 4 mg Q8H PRN    senna-docusate 8.6-50 mg per tablet 1 tablet Daily PRN    simethicone chewable tablet 80 mg QID PRN    sodium chloride 0.9% flush 10 mL Q12H PRN    Tdap vaccine injection 0.5 mL vaccine x 1 dose     Facility-Administered Medications Ordered in Other Encounters   Medication Frequency    0.9%  NaCl infusion Continuous    mupirocin 2 % ointment On Call Procedure       Objective:     Vital Signs (Most Recent):  Temp: 98.1 °F (36.7 °C) (02/22/25 1100)  Pulse: 84 (02/22/25 1200)  Resp: 20 (02/22/25 1200)  BP: 130/77 (02/22/25 1200)  SpO2: 96 % (02/22/25 1200) Vital Signs (24h Range):  Temp:  [97.4 °F (36.3 °C)-98.2 °F (36.8 °C)] 98.1 °F (36.7 °C)  Pulse:  [] 84  Resp:  [10-31] 20  SpO2:  [87 %-100 %] 96 %  BP: ()/(51-87) 130/77     Weight: 68.9 kg (151 lb 14.4 oz) (02/14/25 1515)  Body mass index is 21.79 kg/m².  Body surface area is 1.84 meters squared.    I/O last 3 completed shifts:  In: 903.2 [P.O.:100; I.V.:553.2; Other:250]  Out: 2810 [Urine:1310; Other:1500]     Physical Exam  Constitutional:       General: He is not in acute distress.     Appearance: He is well-developed. He is not ill-appearing or toxic-appearing.   HENT:      Head: Normocephalic and atraumatic.   Eyes:      Pupils: Pupils are equal, round, and reactive to light.   Neck:      Comments: Right IJ CVC  Cardiovascular:      Rate and Rhythm: Normal rate and regular rhythm.      Heart sounds: Normal heart sounds.   Pulmonary:      Effort: No respiratory distress.      Breath sounds: Rhonchi present.   Abdominal:      General: Bowel sounds are normal.      Palpations: Abdomen is soft.      Tenderness: There is no abdominal tenderness.   Musculoskeletal:         General: Normal range of motion.      Cervical back: Normal range of motion and neck supple.   Skin:     Capillary Refill: Capillary refill takes less than 2 seconds.    Neurological:      Mental Status: He is alert and oriented to person, place, and time.          Significant Labs:  All labs within the past 24 hours have been reviewed.     Significant Imaging:  Labs: Reviewed  Assessment/Plan:     Renal/  * Acute renal failure superimposed on stage 3 chronic kidney disease  Metabolic acidosis  Baseline creatinine 1.7-2.0  On admission creatinine 3.3    BISI due to Rhabdomyolysis, initial CK > 40k  Worsening though remains without RRT needs    Consent for dialysis obtained 2/18/2025  1 session of HD 2/20/2025    Plan/Recommendation  Lasix trial 200 IV with 1 liter UOP, hopeful for recovery. Trial lasix 160 BID today and monitor response  -Keep MAP > 65  -Keep hemoglobin > 7  -Strict ins and outs  -Avoid nephrotoxic agents if possible and renally dose medications  -Avoid drastic hemodynamic changes if possible      Stage 3b chronic kidney disease  See BISI    Orthopedic  Non-traumatic rhabdomyolysis  See BISI    Palliative Care  ACP (advance care planning)  See BISI        Thank you for your consult. I will follow-up with patient. Please contact us if you have any additional questions.    Ezio Smith MD  Nephrology  [facility] - Intensive Care

## 2025-02-22 NOTE — ASSESSMENT & PLAN NOTE
Patient has paroxysmal (<7 days) atrial fibrillation. Patient is currently in sinus rhythm. [id number] Score: 2. The patients heart rate in the last 24 hours is as follows:  Pulse  Min: 64  Max: 134     Antiarrhythmics  metoprolol injection 5 mg, Every 4 hours PRN, Intravenous  metoprolol tartrate (LOPRESSOR) tablet 50 mg, 2 times daily, Oral    Anticoagulants  apixaban tablet 2.5 mg, 2 times daily, Oral    Plan  - Replete lytes with a goal of K>4, Mg >2  - Patient is anticoagulated, see medications listed above.  - Patient's afib is currently  resolved and back in NSR  - patient moved to ICU overnight and placed on Amio and heparin drips.  Converted to NSR.  Cardiology consulted.  Transitioned Amio to PO and heparin drip to Eliquis.  Converted back to rapid Afib on 2/20.  Placed on Cardizem drip.  Back to NSR. No Amio per Cards secondary to elevated LFT's.  Started on PO Lopressor.  Now off Cardizem drip.

## 2025-02-22 NOTE — PROGRESS NOTES
[facility]  Cardiology  Progress Note    Patient Name: Antonio Torres Jr.  MRN: [id number]  Admission Date: 2/13/2025  Hospital Length of Stay: 8 days  Code Status: DNR   Attending Physician: Hernan Unger MD   Primary Care Physician: Fabio Lennon MD  Expected Discharge Date: 2/26/2025  Principal Problem:Acute renal failure superimposed on stage 3 chronic kidney disease    Subjective:       Interval History:  Back in sinus rhythm.    Review of Systems   Constitutional: Negative.   HENT: Negative.     Eyes: Negative.    Endocrine: Negative.    Hematologic/Lymphatic: Negative.    Skin: Negative.    Musculoskeletal: Negative.    Gastrointestinal: Negative.    Genitourinary: Negative.    Neurological: Negative.    Psychiatric/Behavioral: Negative.     Allergic/Immunologic: Negative.      Objective:     Vital Signs (Most Recent):  Temp: 98.1 °F (36.7 °C) (02/22/25 1100)  Pulse: 79 (02/22/25 1100)  Resp: 16 (02/22/25 1100)  BP: 131/70 (02/22/25 0900)  SpO2: 98 % (02/22/25 1100) Vital Signs (24h Range):  Temp:  [97.4 °F (36.3 °C)-98.2 °F (36.8 °C)] 98.1 °F (36.7 °C)  Pulse:  [] 79  Resp:  [10-31] 16  SpO2:  [87 %-100 %] 98 %  BP: ()/(51-87) 131/70     Weight: 68.9 kg (151 lb 14.4 oz)  Body mass index is 21.79 kg/m².     SpO2: 98 %         Intake/Output Summary (Last 24 hours) at 2/22/2025 1112  Last data filed at 2/22/2025 1101  Gross per 24 hour   Intake 411.15 ml   Output 1310 ml   Net -898.85 ml       Lines/Drains/Airways       Central Venous Catheter Line  Duration             Trialysis (Dialysis) Catheter 02/19/25 1647 right internal jugular 2 days              Peripheral Intravenous Line  Duration                  Peripheral IV - Single Lumen 02/20/25 0230 20 G Anterior;Distal;Right Forearm 2 days         Peripheral IV - Single Lumen 02/20/25 0235 20 G Anterior;Distal;Right Upper Arm 2 days                       Physical Exam  Vitals reviewed.   Constitutional:       Appearance: He  is well-developed.   HENT:      Head: Normocephalic.   Eyes:      Conjunctiva/sclera: Conjunctivae normal.      Pupils: Pupils are equal, round, and reactive to light.   Cardiovascular:      Rate and Rhythm: Normal rate and regular rhythm.      Heart sounds: Normal heart sounds.   Pulmonary:      Effort: Pulmonary effort is normal.      Breath sounds: Normal breath sounds.   Abdominal:      General: Bowel sounds are normal.      Palpations: Abdomen is soft.   Musculoskeletal:      Cervical back: Normal range of motion and neck supple.   Skin:     General: Skin is warm.   Neurological:      Mental Status: He is alert and oriented to person, place, and time.            Significant Labs:     DATA:     Laboratory:  CBC:  Recent Labs   Lab 02/19/25 0045 02/21/25 0213 02/22/25 0437   WBC 8.01 9.87 9.29   Hemoglobin 12.2 L 11.9 L 11.0 L   Hematocrit 36.2 L 35.6 L 33.2 L   Platelets 182 224 229       CHEMISTRIES:  Recent Labs   Lab 02/14/25  0435 02/15/25  0451 02/19/25  0045 02/19/25  0410 02/20/25  0442 02/21/25  0213 02/22/25  0437   Glucose 114 H   < > 86   < > 86 87 88   Sodium 142   < > 141   < > 141 143 143   Potassium 3.8   < > 4.3   < > 4.8 4.3 4.2   BUN 48 H   < > 96 H   < > 101 H 76 H 84 H   Creatinine 3.5 H   < > 11.3 H   < > 12.9 H 11.1 H 12.2 H   Calcium 9.1   < > 7.9 L   < > 8.5 L 8.3 L 8.3 L   Magnesium 2.5  --  1.8  --   --  1.9  --     < > = values in this interval not displayed.       CARDIAC BIOMARKERS:  Recent Labs   Lab 02/14/25  0435 02/15/25  0451 02/19/25  0045 02/19/25  0410 02/20/25 0442 02/21/25 0213 02/22/25  0437   CPK >54245 H   < >  --  7872 H 4525 H 2535 H 1352 H   Troponin I 0.837 H  --  0.079 H 0.074 H  --   --   --     < > = values in this interval not displayed.       COAGS:  Recent Labs   Lab 02/19/25  0045   INR 1.1       LIPIDS/LFTS:  Recent Labs   Lab 09/21/22  0805 04/06/23  2134 04/07/23  0404 04/10/23  0717 02/20/25  0442 02/21/25  0213 02/22/25  0437   Cholesterol 222 H  --   178  --   --   --   --    Triglycerides 124  --  47  --   --   --   --    HDL 54  --  48  --   --   --   --    LDL Cholesterol 143.2  --  120.6  --   --   --   --    Non-HDL Cholesterol 168  --  130  --   --   --   --    AST 24   < >  --    < > 148 H 95 H 62 H   ALT 31   < >  --    < > 216 H 181 H 149 H    < > = values in this interval not displayed.       Hemoglobin A1C   Date Value Ref Range Status   09/21/2022 5.2 4.0 - 5.6 % Final     Comment:     ADA Screening Guidelines:  5.7-6.4%  Consistent with prediabetes  >or=6.5%  Consistent with diabetes    High levels of fetal hemoglobin interfere with the HbA1C  assay. Heterozygous hemoglobin variants (HbS, HgC, etc)do  not significantly interfere with this assay.   However, presence of multiple variants may affect accuracy.     10/24/2019 5.4 4.0 - 5.6 % Final     Comment:     ADA Screening Guidelines:  5.7-6.4%  Consistent with prediabetes  >or=6.5%  Consistent with diabetes  High levels of fetal hemoglobin interfere with the HbA1C  assay. Heterozygous hemoglobin variants (HbS, HgC, etc)do  not significantly interfere with this assay.   However, presence of multiple variants may affect accuracy.     10/24/2017 5.1 4.0 - 5.6 % Final     Comment:     According to ADA guidelines, hemoglobin A1c <7.0% represents  optimal control in non-pregnant diabetic patients. Different  metrics may apply to specific patient populations.   Standards of Medical Care in Diabetes-2016.  For the purpose of screening for the presence of diabetes:  <5.7%     Consistent with the absence of diabetes  5.7-6.4%  Consistent with increasing risk for diabetes   (prediabetes)  >or=6.5%  Consistent with diabetes  Currently, no consensus exists for use of hemoglobin A1c  for diagnosis of diabetes for children.  This Hemoglobin A1c assay has significant interference with fetal   hemoglobin   (HbF). The results are invalid for patients with abnormal amounts of   HbF,   including those with known  Hereditary Persistence   of Fetal Hemoglobin. Heterozygous hemoglobin variants (HbAS, HbAC,   HbAD, HbAE, HbA2) do not significantly interfere with this assay;   however, presence of multiple variants in a sample may impact the %   interference.         TSH  Recent Labs   Lab 04/06/23  2359   TSH 1.398       The ASCVD Risk score (Adonay HAMM, et al., 2019) failed to calculate for the following reasons:    The 2019 ASCVD risk score is only valid for ages 40 to 79       BNP    Lab Results   Component Value Date/Time     (H) 02/17/2025 05:36 PM    BNP 35 02/13/2025 10:08 PM    BNP 32 04/06/2023 09:34 PM            ECHO    Results for orders placed during the hospital encounter of 02/13/25    Echo    Interpretation Summary    Left Ventricle: The left ventricle is normal in size. There is normal systolic function with a visually estimated ejection fraction of 55 - 60%.    Right Ventricle: Normal right ventricular cavity size. Systolic function is normal.    Tricuspid Valve: There is mild to moderate regurgitation.    Aorta: Aortic root is mildly dilated measuring 4.03 cm.    Pulmonary Artery: The estimated pulmonary artery systolic pressure is 22 mmHg.    IVC/SVC: Normal venous pressure at 3 mmHg.      STRESS TEST    Results for orders placed during the hospital encounter of 04/06/23    Nuclear Stress - Cardiology Interpreted    Interpretation Summary    Normal myocardial perfusion scan. There is no evidence of myocardial ischemia or infarction.    There is a  moderate to severe intensity fixed perfusion abnormality in the inferior wall of the left ventricle secondary to diaphragm attenuation.    The gated perfusion images showed an ejection fraction of 69% post stress.    There is normal wall motion post stress.    LV cavity size is  and normal at stress.    The ECG portion of the study is negative for ischemia.    The patient reported no chest pain during the stress test.    There were no arrhythmias during  stress.          Assessment and Plan:     Brief HPI:     * Acute renal failure superimposed on stage 3 chronic kidney disease    Recent Labs     02/19/25  0410 02/20/25  0442 02/21/25  0213   CREATININE 11.4* 12.9* 11.1*   EGFRNORACEVR 4* 3* 4*      Plan    - Avoid nephrotoxins and renally dose meds for GFR listed above  - Monitor urine output, serial BMP, and adjust therapy as needed      New onset a-fib  Back in AFib.  Already ate today.  We will switch to Cardizem drip.  Hold amiodarone because of elevated LFTs.  If continues to be in AFib with RVR, consider JOSE cardioversion     2/22:  Back on sinus rhythm.  Continue AV sarthak blocking agents.  Avoid amiodarone because of elevated LFTs            Elevated troponin  Chest pain-free.  Likely type 2    COVID-19  Per primary    Non-traumatic rhabdomyolysis  CK improving. Continue hydration    Elevated LFTs  Hold amiodarone    Stage 3b chronic kidney disease        Schizophrenia  Per primary        VTE Risk Mitigation (From admission, onward)           Ordered     apixaban tablet 2.5 mg  2 times daily         02/20/25 1017     IP VTE HIGH RISK PATIENT  Once         02/14/25 0222     Place sequential compression device  Until discontinued         02/14/25 0222                    Jaya Ness MD  Cardiology  [facility] - Intensive Care    Critical Care Time:  35 minutes     Critical care was time spent personally by me on the following activities: development of treatment plan with patient or surrogate and bedside caregivers, discussions with consultants, evaluation of patient's response to treatment, examination of patient, ordering and performing treatments and interventions, ordering and review of laboratory studies, ordering and review of radiographic studies, pulse oximetry, re-evaluation of patient's condition. This critical care time did not overlap with that of any other provider or involve time for any procedures.

## 2025-02-22 NOTE — NURSING
Pt arrived to the unit via bed. AAOX4 orientated to unit. IV flushed. All questions answered. NAD noted.     [facility]  Nurses Note -- 4 Eyes      2/22/2025       Skin assessed on: Transfer      [x] No Pressure Injuries Present    [x]Prevention Measures Documented    [] Yes LDA  for Pressure Injury Previously documented     [] Yes New Pressure Injury Discovered   [] LDA for New Pressure Injury Added      Attending RN:  Asael Calderon LPN     Second RN:  Ciera GÓMEZ

## 2025-02-22 NOTE — SUBJECTIVE & OBJECTIVE
Interval History:  Back in sinus rhythm.    Review of Systems   Constitutional: Negative.   HENT: Negative.     Eyes: Negative.    Endocrine: Negative.    Hematologic/Lymphatic: Negative.    Skin: Negative.    Musculoskeletal: Negative.    Gastrointestinal: Negative.    Genitourinary: Negative.    Neurological: Negative.    Psychiatric/Behavioral: Negative.     Allergic/Immunologic: Negative.      Objective:     Vital Signs (Most Recent):  Temp: 98.1 °F (36.7 °C) (02/22/25 1100)  Pulse: 79 (02/22/25 1100)  Resp: 16 (02/22/25 1100)  BP: 131/70 (02/22/25 0900)  SpO2: 98 % (02/22/25 1100) Vital Signs (24h Range):  Temp:  [97.4 °F (36.3 °C)-98.2 °F (36.8 °C)] 98.1 °F (36.7 °C)  Pulse:  [] 79  Resp:  [10-31] 16  SpO2:  [87 %-100 %] 98 %  BP: ()/(51-87) 131/70     Weight: 68.9 kg (151 lb 14.4 oz)  Body mass index is 21.79 kg/m².     SpO2: 98 %         Intake/Output Summary (Last 24 hours) at 2/22/2025 1112  Last data filed at 2/22/2025 1101  Gross per 24 hour   Intake 411.15 ml   Output 1310 ml   Net -898.85 ml       Lines/Drains/Airways       Central Venous Catheter Line  Duration             Trialysis (Dialysis) Catheter 02/19/25 1647 right internal jugular 2 days              Peripheral Intravenous Line  Duration                  Peripheral IV - Single Lumen 02/20/25 0230 20 G Anterior;Distal;Right Forearm 2 days         Peripheral IV - Single Lumen 02/20/25 0235 20 G Anterior;Distal;Right Upper Arm 2 days                       Physical Exam  Vitals reviewed.   Constitutional:       Appearance: He is well-developed.   HENT:      Head: Normocephalic.   Eyes:      Conjunctiva/sclera: Conjunctivae normal.      Pupils: Pupils are equal, round, and reactive to light.   Cardiovascular:      Rate and Rhythm: Normal rate and regular rhythm.      Heart sounds: Normal heart sounds.   Pulmonary:      Effort: Pulmonary effort is normal.      Breath sounds: Normal breath sounds.   Abdominal:      General: Bowel sounds  are normal.      Palpations: Abdomen is soft.   Musculoskeletal:      Cervical back: Normal range of motion and neck supple.   Skin:     General: Skin is warm.   Neurological:      Mental Status: He is alert and oriented to person, place, and time.            Significant Labs:     DATA:     Laboratory:  CBC:  Recent Labs   Lab 02/19/25  0045 02/21/25 0213 02/22/25  0437   WBC 8.01 9.87 9.29   Hemoglobin 12.2 L 11.9 L 11.0 L   Hematocrit 36.2 L 35.6 L 33.2 L   Platelets 182 224 229       CHEMISTRIES:  Recent Labs   Lab 02/14/25  0435 02/15/25  0451 02/19/25  0045 02/19/25  0410 02/20/25  0442 02/21/25 0213 02/22/25  0437   Glucose 114 H   < > 86   < > 86 87 88   Sodium 142   < > 141   < > 141 143 143   Potassium 3.8   < > 4.3   < > 4.8 4.3 4.2   BUN 48 H   < > 96 H   < > 101 H 76 H 84 H   Creatinine 3.5 H   < > 11.3 H   < > 12.9 H 11.1 H 12.2 H   Calcium 9.1   < > 7.9 L   < > 8.5 L 8.3 L 8.3 L   Magnesium 2.5  --  1.8  --   --  1.9  --     < > = values in this interval not displayed.       CARDIAC BIOMARKERS:  Recent Labs   Lab 02/14/25  0435 02/15/25  0451 02/19/25  0045 02/19/25  0410 02/20/25  0442 02/21/25  0213 02/22/25  0437   CPK >62590 H   < >  --  7872 H 4525 H 2535 H 1352 H   Troponin I 0.837 H  --  0.079 H 0.074 H  --   --   --     < > = values in this interval not displayed.       COAGS:  Recent Labs   Lab 02/19/25  0045   INR 1.1       LIPIDS/LFTS:  Recent Labs   Lab 09/21/22  0805 04/06/23  2134 04/07/23  0404 04/10/23  0717 02/20/25  0442 02/21/25  0213 02/22/25  0437   Cholesterol 222 H  --  178  --   --   --   --    Triglycerides 124  --  47  --   --   --   --    HDL 54  --  48  --   --   --   --    LDL Cholesterol 143.2  --  120.6  --   --   --   --    Non-HDL Cholesterol 168  --  130  --   --   --   --    AST 24   < >  --    < > 148 H 95 H 62 H   ALT 31   < >  --    < > 216 H 181 H 149 H    < > = values in this interval not displayed.       Hemoglobin A1C   Date Value Ref Range Status    09/21/2022 5.2 4.0 - 5.6 % Final     Comment:     ADA Screening Guidelines:  5.7-6.4%  Consistent with prediabetes  >or=6.5%  Consistent with diabetes    High levels of fetal hemoglobin interfere with the HbA1C  assay. Heterozygous hemoglobin variants (HbS, HgC, etc)do  not significantly interfere with this assay.   However, presence of multiple variants may affect accuracy.     10/24/2019 5.4 4.0 - 5.6 % Final     Comment:     ADA Screening Guidelines:  5.7-6.4%  Consistent with prediabetes  >or=6.5%  Consistent with diabetes  High levels of fetal hemoglobin interfere with the HbA1C  assay. Heterozygous hemoglobin variants (HbS, HgC, etc)do  not significantly interfere with this assay.   However, presence of multiple variants may affect accuracy.     10/24/2017 5.1 4.0 - 5.6 % Final     Comment:     According to ADA guidelines, hemoglobin A1c <7.0% represents  optimal control in non-pregnant diabetic patients. Different  metrics may apply to specific patient populations.   Standards of Medical Care in Diabetes-2016.  For the purpose of screening for the presence of diabetes:  <5.7%     Consistent with the absence of diabetes  5.7-6.4%  Consistent with increasing risk for diabetes   (prediabetes)  >or=6.5%  Consistent with diabetes  Currently, no consensus exists for use of hemoglobin A1c  for diagnosis of diabetes for children.  This Hemoglobin A1c assay has significant interference with fetal   hemoglobin   (HbF). The results are invalid for patients with abnormal amounts of   HbF,   including those with known Hereditary Persistence   of Fetal Hemoglobin. Heterozygous hemoglobin variants (HbAS, HbAC,   HbAD, HbAE, HbA2) do not significantly interfere with this assay;   however, presence of multiple variants in a sample may impact the %   interference.         TSH  Recent Labs   Lab 04/06/23  2359   TSH 1.398       The ASCVD Risk score (Adonay DK, et al., 2019) failed to calculate for the following reasons:     The 2019 ASCVD risk score is only valid for ages 40 to 79       BNP    Lab Results   Component Value Date/Time     (H) 02/17/2025 05:36 PM    BNP 35 02/13/2025 10:08 PM    BNP 32 04/06/2023 09:34 PM            ECHO    Results for orders placed during the hospital encounter of 02/13/25    Echo    Interpretation Summary    Left Ventricle: The left ventricle is normal in size. There is normal systolic function with a visually estimated ejection fraction of 55 - 60%.    Right Ventricle: Normal right ventricular cavity size. Systolic function is normal.    Tricuspid Valve: There is mild to moderate regurgitation.    Aorta: Aortic root is mildly dilated measuring 4.03 cm.    Pulmonary Artery: The estimated pulmonary artery systolic pressure is 22 mmHg.    IVC/SVC: Normal venous pressure at 3 mmHg.      STRESS TEST    Results for orders placed during the hospital encounter of 04/06/23    Nuclear Stress - Cardiology Interpreted    Interpretation Summary    Normal myocardial perfusion scan. There is no evidence of myocardial ischemia or infarction.    There is a  moderate to severe intensity fixed perfusion abnormality in the inferior wall of the left ventricle secondary to diaphragm attenuation.    The gated perfusion images showed an ejection fraction of 69% post stress.    There is normal wall motion post stress.    LV cavity size is  and normal at stress.    The ECG portion of the study is negative for ischemia.    The patient reported no chest pain during the stress test.    There were no arrhythmias during stress.

## 2025-02-23 LAB
ALBUMIN SERPL BCP-MCNC: 2.3 G/DL (ref 3.5–5.2)
ALP SERPL-CCNC: 49 U/L (ref 40–150)
ALT SERPL W/O P-5'-P-CCNC: 126 U/L (ref 10–44)
ANION GAP SERPL CALC-SCNC: 15 MMOL/L (ref 8–16)
AST SERPL-CCNC: 44 U/L (ref 10–40)
BILIRUB SERPL-MCNC: 0.3 MG/DL (ref 0.1–1)
BUN SERPL-MCNC: 92 MG/DL (ref 8–23)
CALCIUM SERPL-MCNC: 8.4 MG/DL (ref 8.7–10.5)
CHLORIDE SERPL-SCNC: 103 MMOL/L (ref 95–110)
CK SERPL-CCNC: 893 U/L (ref 20–200)
CO2 SERPL-SCNC: 25 MMOL/L (ref 23–29)
CREAT SERPL-MCNC: 12.9 MG/DL (ref 0.5–1.4)
EST. GFR  (NO RACE VARIABLE): 3 ML/MIN/1.73 M^2
GLUCOSE SERPL-MCNC: 106 MG/DL (ref 70–110)
POTASSIUM SERPL-SCNC: 3.8 MMOL/L (ref 3.5–5.1)
PROT SERPL-MCNC: 5.3 G/DL (ref 6–8.4)
SODIUM SERPL-SCNC: 143 MMOL/L (ref 136–145)

## 2025-02-23 PROCEDURE — 25000003 PHARM REV CODE 250: Performed by: HOSPITALIST

## 2025-02-23 PROCEDURE — 11000001 HC ACUTE MED/SURG PRIVATE ROOM

## 2025-02-23 PROCEDURE — 99900035 HC TECH TIME PER 15 MIN (STAT)

## 2025-02-23 PROCEDURE — 27000221 HC OXYGEN, UP TO 24 HOURS

## 2025-02-23 PROCEDURE — 94761 N-INVAS EAR/PLS OXIMETRY MLT: CPT

## 2025-02-23 PROCEDURE — 63600175 PHARM REV CODE 636 W HCPCS: Performed by: HOSPITALIST

## 2025-02-23 PROCEDURE — 82550 ASSAY OF CK (CPK): CPT | Performed by: HOSPITALIST

## 2025-02-23 PROCEDURE — 80053 COMPREHEN METABOLIC PANEL: CPT | Performed by: HOSPITALIST

## 2025-02-23 PROCEDURE — 99232 SBSQ HOSP IP/OBS MODERATE 35: CPT | Mod: ,,, | Performed by: STUDENT IN AN ORGANIZED HEALTH CARE EDUCATION/TRAINING PROGRAM

## 2025-02-23 PROCEDURE — 99233 SBSQ HOSP IP/OBS HIGH 50: CPT | Mod: 25,,, | Performed by: INTERNAL MEDICINE

## 2025-02-23 PROCEDURE — 27000207 HC ISOLATION

## 2025-02-23 PROCEDURE — 36415 COLL VENOUS BLD VENIPUNCTURE: CPT | Performed by: HOSPITALIST

## 2025-02-23 PROCEDURE — 21400001 HC TELEMETRY ROOM

## 2025-02-23 RX ADMIN — METOPROLOL TARTRATE 50 MG: 50 TABLET, FILM COATED ORAL at 08:02

## 2025-02-23 RX ADMIN — SODIUM CHLORIDE 160 MG: 9 INJECTION, SOLUTION INTRAVENOUS at 08:02

## 2025-02-23 RX ADMIN — THERA TABS 1 TABLET: TAB at 09:02

## 2025-02-23 RX ADMIN — CETIRIZINE HYDROCHLORIDE 5 MG: 5 TABLET ORAL at 09:02

## 2025-02-23 RX ADMIN — APIXABAN 2.5 MG: 2.5 TABLET, FILM COATED ORAL at 09:02

## 2025-02-23 RX ADMIN — APIXABAN 2.5 MG: 2.5 TABLET, FILM COATED ORAL at 08:02

## 2025-02-23 RX ADMIN — METOPROLOL TARTRATE 50 MG: 50 TABLET, FILM COATED ORAL at 09:02

## 2025-02-23 RX ADMIN — ARIPIPRAZOLE 20 MG: 10 TABLET ORAL at 09:02

## 2025-02-23 RX ADMIN — SODIUM CHLORIDE 160 MG: 9 INJECTION, SOLUTION INTRAVENOUS at 09:02

## 2025-02-23 RX ADMIN — Medication 6 MG: at 08:02

## 2025-02-23 NOTE — SUBJECTIVE & OBJECTIVE
Interval History: adequate UOP overnight with lasix 160 however BUN continuing to rise    Review of patient's allergies indicates:  No Known Allergies  Current Facility-Administered Medications   Medication Frequency    acetaminophen tablet 650 mg Q4H PRN    albuterol-ipratropium 2.5 mg-0.5 mg/3 mL nebulizer solution 3 mL Q4H PRN    aluminum-magnesium hydroxide-simethicone 200-200-20 mg/5 mL suspension 30 mL QID PRN    apixaban tablet 2.5 mg BID    ARIPiprazole tablet 20 mg Daily    bisacodyL suppository 10 mg Daily PRN    cetirizine tablet 5 mg Q48H    furosemide (Lasix) 160 mg in 0.9% NaCl 100 mL IVPB Q12H    glucagon (human recombinant) injection 1 mg PRN    glucose chewable tablet 16 g PRN    glucose chewable tablet 24 g PRN    melatonin tablet 6 mg Nightly PRN    metoprolol injection 5 mg Q4H PRN    metoprolol tartrate (LOPRESSOR) tablet 50 mg BID    multivitamin tablet Daily    naloxone 0.4 mg/mL injection 0.02 mg PRN    ondansetron injection 4 mg Q8H PRN    senna-docusate 8.6-50 mg per tablet 1 tablet Daily PRN    simethicone chewable tablet 80 mg QID PRN    sodium chloride 0.9% flush 10 mL Q12H PRN    Tdap vaccine injection 0.5 mL vaccine x 1 dose     Facility-Administered Medications Ordered in Other Encounters   Medication Frequency    0.9%  NaCl infusion Continuous    mupirocin 2 % ointment On Call Procedure       Objective:     Vital Signs (Most Recent):  Temp: 98.4 °F (36.9 °C) (02/23/25 1132)  Pulse: 79 (02/23/25 1132)  Resp: 18 (02/23/25 1132)  BP: 134/72 (02/23/25 1132)  SpO2: 98 % (02/23/25 1132) Vital Signs (24h Range):  Temp:  [97.4 °F (36.3 °C)-98.6 °F (37 °C)] 98.4 °F (36.9 °C)  Pulse:  [70-93] 79  Resp:  [15-20] 18  SpO2:  [95 %-100 %] 98 %  BP: (124-158)/(68-75) 134/72     Weight: 68.9 kg (151 lb 14.4 oz) (02/14/25 3445)  Body mass index is 21.79 kg/m².  Body surface area is 1.84 meters squared.    I/O last 3 completed shifts:  In: 275.6 [P.O.:100; I.V.:84.8; IV Piggyback:90.9]  Out: 1760  [Urine:1760]     Physical Exam  Constitutional:       General: He is not in acute distress.     Appearance: He is well-developed. He is not ill-appearing or toxic-appearing.   HENT:      Head: Normocephalic and atraumatic.   Eyes:      Pupils: Pupils are equal, round, and reactive to light.   Neck:      Comments: Right IJ CVC  Cardiovascular:      Rate and Rhythm: Normal rate and regular rhythm.      Heart sounds: Normal heart sounds.   Pulmonary:      Effort: No respiratory distress.      Breath sounds: Rhonchi present.   Abdominal:      General: Bowel sounds are normal.      Palpations: Abdomen is soft.      Tenderness: There is no abdominal tenderness.   Musculoskeletal:         General: Normal range of motion.      Cervical back: Normal range of motion and neck supple.   Skin:     Capillary Refill: Capillary refill takes less than 2 seconds.   Neurological:      Mental Status: He is alert and oriented to person, place, and time.          Significant Labs:  All labs within the past 24 hours have been reviewed.     Significant Imaging:  Labs: Reviewed

## 2025-02-23 NOTE — ASSESSMENT & PLAN NOTE
Back in AFib.  Already ate today.  We will switch to Cardizem drip.  Hold amiodarone because of elevated LFTs.  If continues to be in AFib with RVR, consider JOSE cardioversion     2/22:  Back on sinus rhythm.  Continue AV sarthak blocking agents.  Avoid amiodarone because of elevated LFTs    2/23:  Continues to be in sinus rhythm.  Will sign off.  Continue oral anticoagulation and beta blockers

## 2025-02-23 NOTE — PROGRESS NOTES
[facility] [place] Telemetry  Cardiology  Progress Note    Patient Name: Antonio Torres Jr.  MRN: [id number]  Admission Date: 2/13/2025  Hospital Length of Stay: 9 days  Code Status: DNR   Attending Physician: Eloise Maier, *   Primary Care Physician: Fabio Lennon MD  Expected Discharge Date: 2/26/2025  Principal Problem:Acute renal failure superimposed on stage 3 chronic kidney disease    Subjective:       Interval History:  Patient is feeling better.  Continues to be in sinus rhythm.    Review of Systems   Constitutional: Negative.   HENT: Negative.     Eyes: Negative.    Endocrine: Negative.    Hematologic/Lymphatic: Negative.    Skin: Negative.    Musculoskeletal: Negative.    Gastrointestinal: Negative.    Genitourinary: Negative.    Neurological: Negative.    Psychiatric/Behavioral: Negative.     Allergic/Immunologic: Negative.      Objective:     Vital Signs (Most Recent):  Temp: 98.4 °F (36.9 °C) (02/23/25 1132)  Pulse: 80 (02/23/25 1449)  Resp: 18 (02/23/25 1132)  BP: 134/72 (02/23/25 1132)  SpO2: 98 % (02/23/25 1132) Vital Signs (24h Range):  Temp:  [97.4 °F (36.3 °C)-98.4 °F (36.9 °C)] 98.4 °F (36.9 °C)  Pulse:  [70-93] 80  Resp:  [17-18] 18  SpO2:  [98 %-100 %] 98 %  BP: (128-158)/(69-75) 134/72     Weight: 68.9 kg (151 lb 14.4 oz)  Body mass index is 21.79 kg/m².     SpO2: 98 %         Intake/Output Summary (Last 24 hours) at 2/23/2025 1636  Last data filed at 2/23/2025 1123  Gross per 24 hour   Intake 375.26 ml   Output 1250 ml   Net -874.74 ml       Lines/Drains/Airways       Central Venous Catheter Line  Duration             Trialysis (Dialysis) Catheter 02/19/25 1647 right internal jugular 3 days              Peripheral Intravenous Line  Duration                  Peripheral IV - Single Lumen 02/20/25 0235 20 G Anterior;Distal;Right Upper Arm 3 days         Peripheral IV - Single Lumen 02/23/25 1054 20 G Anterior;Right Forearm <1 day                       Physical Exam  Vitals reviewed.    Constitutional:       Appearance: He is well-developed.   HENT:      Head: Normocephalic.   Eyes:      Conjunctiva/sclera: Conjunctivae normal.      Pupils: Pupils are equal, round, and reactive to light.   Cardiovascular:      Rate and Rhythm: Normal rate and regular rhythm.      Heart sounds: Normal heart sounds.   Pulmonary:      Effort: Pulmonary effort is normal.      Breath sounds: Normal breath sounds.   Abdominal:      General: Bowel sounds are normal.      Palpations: Abdomen is soft.   Musculoskeletal:      Cervical back: Normal range of motion and neck supple.   Skin:     General: Skin is warm.   Neurological:      Mental Status: He is alert and oriented to person, place, and time.            Significant Labs:     DATA:     Laboratory:  CBC:  Recent Labs   Lab 02/19/25 0045 02/21/25 0213 02/22/25 0437   WBC 8.01 9.87 9.29   Hemoglobin 12.2 L 11.9 L 11.0 L   Hematocrit 36.2 L 35.6 L 33.2 L   Platelets 182 224 229       CHEMISTRIES:  Recent Labs   Lab 02/14/25  0435 02/15/25  0451 02/19/25  0045 02/19/25  0410 02/21/25 0213 02/22/25 0437 02/23/25  0530   Glucose 114 H   < > 86   < > 87 88 106   Sodium 142   < > 141   < > 143 143 143   Potassium 3.8   < > 4.3   < > 4.3 4.2 3.8   BUN 48 H   < > 96 H   < > 76 H 84 H 92 H   Creatinine 3.5 H   < > 11.3 H   < > 11.1 H 12.2 H 12.9 H   Calcium 9.1   < > 7.9 L   < > 8.3 L 8.3 L 8.4 L   Magnesium 2.5  --  1.8  --  1.9  --   --     < > = values in this interval not displayed.       CARDIAC BIOMARKERS:  Recent Labs   Lab 02/14/25  0435 02/15/25  0451 02/19/25  0045 02/19/25 0410 02/20/25  0442 02/21/25 0213 02/22/25 0437 02/23/25  0530   CPK >63784 H   < >  --  7872 H   < > 2535 H 1352 H 893 H   Troponin I 0.837 H  --  0.079 H 0.074 H  --   --   --   --     < > = values in this interval not displayed.       COAGS:  Recent Labs   Lab 02/19/25  0045   INR 1.1       LIPIDS/LFTS:  Recent Labs   Lab 09/21/22  0805 04/06/23  2134 04/07/23  0404 04/10/23  0717  02/21/25  0213 02/22/25  0437 02/23/25  0530   Cholesterol 222 H  --  178  --   --   --   --    Triglycerides 124  --  47  --   --   --   --    HDL 54  --  48  --   --   --   --    LDL Cholesterol 143.2  --  120.6  --   --   --   --    Non-HDL Cholesterol 168  --  130  --   --   --   --    AST 24   < >  --    < > 95 H 62 H 44 H   ALT 31   < >  --    < > 181 H 149 H 126 H    < > = values in this interval not displayed.       Hemoglobin A1C   Date Value Ref Range Status   09/21/2022 5.2 4.0 - 5.6 % Final     Comment:     ADA Screening Guidelines:  5.7-6.4%  Consistent with prediabetes  >or=6.5%  Consistent with diabetes    High levels of fetal hemoglobin interfere with the HbA1C  assay. Heterozygous hemoglobin variants (HbS, HgC, etc)do  not significantly interfere with this assay.   However, presence of multiple variants may affect accuracy.     10/24/2019 5.4 4.0 - 5.6 % Final     Comment:     ADA Screening Guidelines:  5.7-6.4%  Consistent with prediabetes  >or=6.5%  Consistent with diabetes  High levels of fetal hemoglobin interfere with the HbA1C  assay. Heterozygous hemoglobin variants (HbS, HgC, etc)do  not significantly interfere with this assay.   However, presence of multiple variants may affect accuracy.     10/24/2017 5.1 4.0 - 5.6 % Final     Comment:     According to ADA guidelines, hemoglobin A1c <7.0% represents  optimal control in non-pregnant diabetic patients. Different  metrics may apply to specific patient populations.   Standards of Medical Care in Diabetes-2016.  For the purpose of screening for the presence of diabetes:  <5.7%     Consistent with the absence of diabetes  5.7-6.4%  Consistent with increasing risk for diabetes   (prediabetes)  >or=6.5%  Consistent with diabetes  Currently, no consensus exists for use of hemoglobin A1c  for diagnosis of diabetes for children.  This Hemoglobin A1c assay has significant interference with fetal   hemoglobin   (HbF). The results are invalid for  patients with abnormal amounts of   HbF,   including those with known Hereditary Persistence   of Fetal Hemoglobin. Heterozygous hemoglobin variants (HbAS, HbAC,   HbAD, HbAE, HbA2) do not significantly interfere with this assay;   however, presence of multiple variants in a sample may impact the %   interference.         TSH  Recent Labs   Lab 04/06/23  2359   TSH 1.398       The ASCVD Risk score (Adonay HAMM, et al., 2019) failed to calculate for the following reasons:    The 2019 ASCVD risk score is only valid for ages 40 to 79       BNP    Lab Results   Component Value Date/Time     (H) 02/17/2025 05:36 PM    BNP 35 02/13/2025 10:08 PM    BNP 32 04/06/2023 09:34 PM            ECHO    Results for orders placed during the hospital encounter of 02/13/25    Echo    Interpretation Summary    Left Ventricle: The left ventricle is normal in size. There is normal systolic function with a visually estimated ejection fraction of 55 - 60%.    Right Ventricle: Normal right ventricular cavity size. Systolic function is normal.    Tricuspid Valve: There is mild to moderate regurgitation.    Aorta: Aortic root is mildly dilated measuring 4.03 cm.    Pulmonary Artery: The estimated pulmonary artery systolic pressure is 22 mmHg.    IVC/SVC: Normal venous pressure at 3 mmHg.      STRESS TEST    Results for orders placed during the hospital encounter of 04/06/23    Nuclear Stress - Cardiology Interpreted    Interpretation Summary    Normal myocardial perfusion scan. There is no evidence of myocardial ischemia or infarction.    There is a  moderate to severe intensity fixed perfusion abnormality in the inferior wall of the left ventricle secondary to diaphragm attenuation.    The gated perfusion images showed an ejection fraction of 69% post stress.    There is normal wall motion post stress.    LV cavity size is  and normal at stress.    The ECG portion of the study is negative for ischemia.    The patient reported no chest  pain during the stress test.    There were no arrhythmias during stress.          Assessment and Plan:     Brief HPI:     * Acute renal failure superimposed on stage 3 chronic kidney disease    Recent Labs     02/19/25  0410 02/20/25  0442 02/21/25  0213   CREATININE 11.4* 12.9* 11.1*   EGFRNORACEVR 4* 3* 4*      Plan    - Avoid nephrotoxins and renally dose meds for GFR listed above  - Monitor urine output, serial BMP, and adjust therapy as needed      New onset a-fib  Back in AFib.  Already ate today.  We will switch to Cardizem drip.  Hold amiodarone because of elevated LFTs.  If continues to be in AFib with RVR, consider JOSE cardioversion     2/22:  Back on sinus rhythm.  Continue AV sarthak blocking agents.  Avoid amiodarone because of elevated LFTs    2/23:  Continues to be in sinus rhythm.  Will sign off.  Continue oral anticoagulation and beta blockers            Elevated troponin  Chest pain-free.  Likely type 2    COVID-19  Per primary    Non-traumatic rhabdomyolysis  CK improving. Continue hydration    Elevated LFTs  Hold amiodarone    Stage 3b chronic kidney disease        Schizophrenia  Per primary        VTE Risk Mitigation (From admission, onward)           Ordered     apixaban tablet 2.5 mg  2 times daily         02/20/25 1017     IP VTE HIGH RISK PATIENT  Once         02/14/25 0222     Place sequential compression device  Until discontinued         02/14/25 0222                    Jaya Ness MD  Cardiology  [facility] [place] - Tele[facility]

## 2025-02-23 NOTE — SUBJECTIVE & OBJECTIVE
Interval History:  Patient is feeling better.  Continues to be in sinus rhythm.    Review of Systems   Constitutional: Negative.   HENT: Negative.     Eyes: Negative.    Endocrine: Negative.    Hematologic/Lymphatic: Negative.    Skin: Negative.    Musculoskeletal: Negative.    Gastrointestinal: Negative.    Genitourinary: Negative.    Neurological: Negative.    Psychiatric/Behavioral: Negative.     Allergic/Immunologic: Negative.      Objective:     Vital Signs (Most Recent):  Temp: 98.4 °F (36.9 °C) (02/23/25 1132)  Pulse: 80 (02/23/25 1449)  Resp: 18 (02/23/25 1132)  BP: 134/72 (02/23/25 1132)  SpO2: 98 % (02/23/25 1132) Vital Signs (24h Range):  Temp:  [97.4 °F (36.3 °C)-98.4 °F (36.9 °C)] 98.4 °F (36.9 °C)  Pulse:  [70-93] 80  Resp:  [17-18] 18  SpO2:  [98 %-100 %] 98 %  BP: (128-158)/(69-75) 134/72     Weight: 68.9 kg (151 lb 14.4 oz)  Body mass index is 21.79 kg/m².     SpO2: 98 %         Intake/Output Summary (Last 24 hours) at 2/23/2025 1636  Last data filed at 2/23/2025 1123  Gross per 24 hour   Intake 375.26 ml   Output 1250 ml   Net -874.74 ml       Lines/Drains/Airways       Central Venous Catheter Line  Duration             Trialysis (Dialysis) Catheter 02/19/25 1647 right internal jugular 3 days              Peripheral Intravenous Line  Duration                  Peripheral IV - Single Lumen 02/20/25 0235 20 G Anterior;Distal;Right Upper Arm 3 days         Peripheral IV - Single Lumen 02/23/25 1054 20 G Anterior;Right Forearm <1 day                       Physical Exam  Vitals reviewed.   Constitutional:       Appearance: He is well-developed.   HENT:      Head: Normocephalic.   Eyes:      Conjunctiva/sclera: Conjunctivae normal.      Pupils: Pupils are equal, round, and reactive to light.   Cardiovascular:      Rate and Rhythm: Normal rate and regular rhythm.      Heart sounds: Normal heart sounds.   Pulmonary:      Effort: Pulmonary effort is normal.      Breath sounds: Normal breath sounds.    Abdominal:      General: Bowel sounds are normal.      Palpations: Abdomen is soft.   Musculoskeletal:      Cervical back: Normal range of motion and neck supple.   Skin:     General: Skin is warm.   Neurological:      Mental Status: He is alert and oriented to person, place, and time.            Significant Labs:     DATA:     Laboratory:  CBC:  Recent Labs   Lab 02/19/25  0045 02/21/25 0213 02/22/25  0437   WBC 8.01 9.87 9.29   Hemoglobin 12.2 L 11.9 L 11.0 L   Hematocrit 36.2 L 35.6 L 33.2 L   Platelets 182 224 229       CHEMISTRIES:  Recent Labs   Lab 02/14/25  0435 02/15/25  0451 02/19/25  0045 02/19/25  0410 02/21/25  0213 02/22/25  0437 02/23/25  0530   Glucose 114 H   < > 86   < > 87 88 106   Sodium 142   < > 141   < > 143 143 143   Potassium 3.8   < > 4.3   < > 4.3 4.2 3.8   BUN 48 H   < > 96 H   < > 76 H 84 H 92 H   Creatinine 3.5 H   < > 11.3 H   < > 11.1 H 12.2 H 12.9 H   Calcium 9.1   < > 7.9 L   < > 8.3 L 8.3 L 8.4 L   Magnesium 2.5  --  1.8  --  1.9  --   --     < > = values in this interval not displayed.       CARDIAC BIOMARKERS:  Recent Labs   Lab 02/14/25  0435 02/15/25  0451 02/19/25  0045 02/19/25  0410 02/20/25  0442 02/21/25  0213 02/22/25  0437 02/23/25  0530   CPK >22347 H   < >  --  7872 H   < > 2535 H 1352 H 893 H   Troponin I 0.837 H  --  0.079 H 0.074 H  --   --   --   --     < > = values in this interval not displayed.       COAGS:  Recent Labs   Lab 02/19/25  0045   INR 1.1       LIPIDS/LFTS:  Recent Labs   Lab 09/21/22  0805 04/06/23  2134 04/07/23  0404 04/10/23  0717 02/21/25  0213 02/22/25  0437 02/23/25  0530   Cholesterol 222 H  --  178  --   --   --   --    Triglycerides 124  --  47  --   --   --   --    HDL 54  --  48  --   --   --   --    LDL Cholesterol 143.2  --  120.6  --   --   --   --    Non-HDL Cholesterol 168  --  130  --   --   --   --    AST 24   < >  --    < > 95 H 62 H 44 H   ALT 31   < >  --    < > 181 H 149 H 126 H    < > = values in this interval not  displayed.       Hemoglobin A1C   Date Value Ref Range Status   09/21/2022 5.2 4.0 - 5.6 % Final     Comment:     ADA Screening Guidelines:  5.7-6.4%  Consistent with prediabetes  >or=6.5%  Consistent with diabetes    High levels of fetal hemoglobin interfere with the HbA1C  assay. Heterozygous hemoglobin variants (HbS, HgC, etc)do  not significantly interfere with this assay.   However, presence of multiple variants may affect accuracy.     10/24/2019 5.4 4.0 - 5.6 % Final     Comment:     ADA Screening Guidelines:  5.7-6.4%  Consistent with prediabetes  >or=6.5%  Consistent with diabetes  High levels of fetal hemoglobin interfere with the HbA1C  assay. Heterozygous hemoglobin variants (HbS, HgC, etc)do  not significantly interfere with this assay.   However, presence of multiple variants may affect accuracy.     10/24/2017 5.1 4.0 - 5.6 % Final     Comment:     According to ADA guidelines, hemoglobin A1c <7.0% represents  optimal control in non-pregnant diabetic patients. Different  metrics may apply to specific patient populations.   Standards of Medical Care in Diabetes-2016.  For the purpose of screening for the presence of diabetes:  <5.7%     Consistent with the absence of diabetes  5.7-6.4%  Consistent with increasing risk for diabetes   (prediabetes)  >or=6.5%  Consistent with diabetes  Currently, no consensus exists for use of hemoglobin A1c  for diagnosis of diabetes for children.  This Hemoglobin A1c assay has significant interference with fetal   hemoglobin   (HbF). The results are invalid for patients with abnormal amounts of   HbF,   including those with known Hereditary Persistence   of Fetal Hemoglobin. Heterozygous hemoglobin variants (HbAS, HbAC,   HbAD, HbAE, HbA2) do not significantly interfere with this assay;   however, presence of multiple variants in a sample may impact the %   interference.         TSH  Recent Labs   Lab 04/06/23  2359   TSH 1.398       The ASCVD Risk score (Adonay DK, et  al., 2019) failed to calculate for the following reasons:    The 2019 ASCVD risk score is only valid for ages 40 to 79       BNP    Lab Results   Component Value Date/Time     (H) 02/17/2025 05:36 PM    BNP 35 02/13/2025 10:08 PM    BNP 32 04/06/2023 09:34 PM            ECHO    Results for orders placed during the hospital encounter of 02/13/25    Echo    Interpretation Summary    Left Ventricle: The left ventricle is normal in size. There is normal systolic function with a visually estimated ejection fraction of 55 - 60%.    Right Ventricle: Normal right ventricular cavity size. Systolic function is normal.    Tricuspid Valve: There is mild to moderate regurgitation.    Aorta: Aortic root is mildly dilated measuring 4.03 cm.    Pulmonary Artery: The estimated pulmonary artery systolic pressure is 22 mmHg.    IVC/SVC: Normal venous pressure at 3 mmHg.      STRESS TEST    Results for orders placed during the hospital encounter of 04/06/23    Nuclear Stress - Cardiology Interpreted    Interpretation Summary    Normal myocardial perfusion scan. There is no evidence of myocardial ischemia or infarction.    There is a  moderate to severe intensity fixed perfusion abnormality in the inferior wall of the left ventricle secondary to diaphragm attenuation.    The gated perfusion images showed an ejection fraction of 69% post stress.    There is normal wall motion post stress.    LV cavity size is  and normal at stress.    The ECG portion of the study is negative for ischemia.    The patient reported no chest pain during the stress test.    There were no arrhythmias during stress.

## 2025-02-23 NOTE — ASSESSMENT & PLAN NOTE
Advance Care Planning   Per prior attending.  Date: 02/21/2025  Called patient's sister as there were many questions about patient's treatment plan and disposition.  Patient was recently seen by Palliative Care and discussed possible hospice, but patient has also been started on dialysis.  Discussed medical treatment plan and disposition.  At this time, trying to assess if HD will be temporary or long term.  Patient does not seem to want lifelong dialysis.  If this is the case, then we would be looking into hospice, most likely in a NH.  If HD just temporary or patient does agree to lifelong HD, we would then be looking into SNF.  Patient's sister agrees with this plan.  He remains DNR.    Length of ACP   conversation in minutes: A total of 20 min was spent on advance care planning, goals of care discussion, emotional support, formulating and communicating prognosis and exploring burden/benefit of various approaches of treatment. This discussion occurred on a fully voluntary basis with the verbal consent of the patient and/or family.

## 2025-02-23 NOTE — PROGRESS NOTES
[facility] [place]  [place]phrology  Progress Note    Patient Name: Antonio Torres Jr.  MRN: [id number]  Admission Date: 2/13/2025  Hospital Length of Stay: 9 days  Attending Provider: Eloise Maier, *   Primary Care Physician: Fabio Lennon MD  Principal Problem:Acute renal failure superimposed on stage 3 chronic kidney disease    Subjective:     HPI: Mr. Torres is an 81 yo male with schizophrenia, BPH, CKD, and h/o melanoma who presented to the ED overnight s/p fall. HR 120s on arrival. He was also found to be COVID+. CPK 23k. He was started on IVF. Nephrology consulted for BISI on CKD. Prior records obtained and reviewed. He is followed by myself outpatient; last visit was 11/11/24. His baseline Cr is 1.7-2.0; his CKD is of unknown etiology. His Cr was 3.3 on arrival --> 3.5 this morning. CPK now > 40k. Abnormal LFTs. He reports to be doing okay this afternoon. Complaining of soreness. No SOB or leg swelling.    Interval History: adequate UOP overnight with lasix 160 however BUN continuing to rise    Review of patient's allergies indicates:  No Known Allergies  Current Facility-Administered Medications   Medication Frequency    acetaminophen tablet 650 mg Q4H PRN    albuterol-ipratropium 2.5 mg-0.5 mg/3 mL nebulizer solution 3 mL Q4H PRN    aluminum-magnesium hydroxide-simethicone 200-200-20 mg/5 mL suspension 30 mL QID PRN    apixaban tablet 2.5 mg BID    ARIPiprazole tablet 20 mg Daily    bisacodyL suppository 10 mg Daily PRN    cetirizine tablet 5 mg Q48H    furosemide (Lasix) 160 mg in 0.9% NaCl 100 mL IVPB Q12H    glucagon (human recombinant) injection 1 mg PRN    glucose chewable tablet 16 g PRN    glucose chewable tablet 24 g PRN    melatonin tablet 6 mg Nightly PRN    metoprolol injection 5 mg Q4H PRN    metoprolol tartrate (LOPRESSOR) tablet 50 mg BID    multivitamin tablet Daily    naloxone 0.4 mg/mL injection 0.02 mg PRN    ondansetron injection 4 mg Q8H PRN    senna-docusate 8.6-50 mg per tablet  1 tablet Daily PRN    simethicone chewable tablet 80 mg QID PRN    sodium chloride 0.9% flush 10 mL Q12H PRN    Tdap vaccine injection 0.5 mL vaccine x 1 dose     Facility-Administered Medications Ordered in Other Encounters   Medication Frequency    0.9%  NaCl infusion Continuous    mupirocin 2 % ointment On Call Procedure       Objective:     Vital Signs (Most Recent):  Temp: 98.4 °F (36.9 °C) (02/23/25 1132)  Pulse: 79 (02/23/25 1132)  Resp: 18 (02/23/25 1132)  BP: 134/72 (02/23/25 1132)  SpO2: 98 % (02/23/25 1132) Vital Signs (24h Range):  Temp:  [97.4 °F (36.3 °C)-98.6 °F (37 °C)] 98.4 °F (36.9 °C)  Pulse:  [70-93] 79  Resp:  [15-20] 18  SpO2:  [95 %-100 %] 98 %  BP: (124-158)/(68-75) 134/72     Weight: 68.9 kg (151 lb 14.4 oz) (02/14/25 1515)  Body mass index is 21.79 kg/m².  Body surface area is 1.84 meters squared.    I/O last 3 completed shifts:  In: 275.6 [P.O.:100; I.V.:84.8; IV Piggyback:90.9]  Out: 1760 [Urine:1760]     Physical Exam  Constitutional:       General: He is not in acute distress.     Appearance: He is well-developed. He is not ill-appearing or toxic-appearing.   HENT:      Head: Normocephalic and atraumatic.   Eyes:      Pupils: Pupils are equal, round, and reactive to light.   Neck:      Comments: Right IJ CVC  Cardiovascular:      Rate and Rhythm: Normal rate and regular rhythm.      Heart sounds: Normal heart sounds.   Pulmonary:      Effort: No respiratory distress.      Breath sounds: Rhonchi present.   Abdominal:      General: Bowel sounds are normal.      Palpations: Abdomen is soft.      Tenderness: There is no abdominal tenderness.   Musculoskeletal:         General: Normal range of motion.      Cervical back: Normal range of motion and neck supple.   Skin:     Capillary Refill: Capillary refill takes less than 2 seconds.   Neurological:      Mental Status: He is alert and oriented to person, place, and time.          Significant Labs:  All labs within the past 24 hours have been  reviewed.     Significant Imaging:  Labs: Reviewed  Assessment/Plan:     Renal/  * Acute renal failure superimposed on stage 3 chronic kidney disease  Metabolic acidosis  Baseline creatinine 1.7-2.0  On admission creatinine 3.3    BISI due to Rhabdomyolysis, initial CK > 40k  Consent for dialysis obtained 2/18/2025  1 session of HD 2/20/2025    Plan/Recommendation  He is responsive to lasix 160 BID, hopeful that tomorrow BUN and creatinine will improve though he may need TDC/short term dialysis.  -Keep MAP > 65  -Keep hemoglobin > 7  -Strict ins and outs  -Avoid nephrotoxic agents if possible and renally dose medications  -Avoid drastic hemodynamic changes if possible      Stage 3b chronic kidney disease  See BISI    Palliative Care  ACP (advance care planning)  See BISI        Thank you for your consult. I will follow-up with patient. Please contact us if you have any additional questions.    Ezio Smith MD  Nephrology  [facility] - Telemetry

## 2025-02-23 NOTE — ASSESSMENT & PLAN NOTE
BISI is likely due to acute tubular necrosis caused by rhabdomyolysis . Baseline creatinine is  1.9 . Most recent creatinine and eGFR are listed below.  Recent Labs     02/21/25  0213 02/22/25  0437 02/23/25  0530   CREATININE 11.1* 12.2* 12.9*   EGFRNORACEVR 4* 4* 3*        Plan  - BISI is worsening. Will continue current treatment  - Avoid nephrotoxins and renally dose meds for GFR listed above  - Monitor urine output, serial BMP, and adjust therapy as needed  Appreciate Nephrology.  Nephrology recommending HD.  Trialysis catheter placed and patient started on HD.  Seems will be ESRD,nephrology is on case.

## 2025-02-23 NOTE — SUBJECTIVE & OBJECTIVE
Interval History: converted back to NSR.    Seems will be ESRD,nephrology is on case.  Review of Systems   HENT:  Negative for ear discharge and ear pain.    Eyes:  Negative for discharge and itching.   Endocrine: Negative for cold intolerance and heat intolerance.   Neurological:  Negative for seizures and syncope.     Objective:     Vital Signs (Most Recent):  Temp: 97.4 °F (36.3 °C) (02/23/25 0736)  Pulse: 83 (02/23/25 0801)  Resp: 18 (02/23/25 0801)  BP: 138/71 (02/23/25 0736)  SpO2: 100 % (02/23/25 0801) Vital Signs (24h Range):  Temp:  [97.4 °F (36.3 °C)-98.6 °F (37 °C)] 97.4 °F (36.3 °C)  Pulse:  [70-93] 83  Resp:  [15-20] 18  SpO2:  [95 %-100 %] 100 %  BP: (124-158)/(68-77) 138/71     Weight: 68.9 kg (151 lb 14.4 oz)  Body mass index is 21.79 kg/m².    Intake/Output Summary (Last 24 hours) at 2/23/2025 1103  Last data filed at 2/23/2025 1053  Gross per 24 hour   Intake 180 ml   Output 1450 ml   Net -1270 ml         Physical Exam  Constitutional:       General: He is not in acute distress.     Appearance: He is well-developed. He is not ill-appearing or toxic-appearing.   HENT:      Head: Normocephalic and atraumatic.   Eyes:      Pupils: Pupils are equal, round, and reactive to light.   Cardiovascular:      Rate and Rhythm: Normal rate and regular rhythm.      Heart sounds: Normal heart sounds.   Pulmonary:      Effort: Pulmonary effort is normal.      Breath sounds: Rhonchi present.   Abdominal:      General: Bowel sounds are normal.      Palpations: Abdomen is soft.      Tenderness: There is no abdominal tenderness.   Musculoskeletal:         General: Normal range of motion.      Cervical back: Normal range of motion and neck supple.   Skin:     Capillary Refill: Capillary refill takes less than 2 seconds.   Neurological:      Mental Status: He is alert and oriented to person, place, and time.             Significant Labs: All pertinent labs within the past 24 hours have been reviewed.  BMP:   Recent Labs    Lab 02/23/25  0530         K 3.8      CO2 25   BUN 92*   CREATININE 12.9*   CALCIUM 8.4*     CBC:   Recent Labs   Lab 02/22/25  0437   WBC 9.29   HGB 11.0*   HCT 33.2*          Significant Imaging: I have reviewed all pertinent imaging results/findings within the past 24 hours.

## 2025-02-23 NOTE — PROGRESS NOTES
[facility] Medicine  Progress Note    Patient Name: Antonio Torres Jr.  MRN: [id number]  Patient Class: IP- Inpatient   Admission Date: 2/13/2025  Length of Stay: 9 days  Attending Physician: Eloise Maier, *  Primary Care Provider: Fabio Lennon MD        Subjective     Principal Problem:Acute renal failure superimposed on stage 3 chronic kidney disease        HPI:  This is an 80-year-old male with a past medical history of schizophrenia, chronic blepharitis, hyperlipidemia, CKD 3, BPH, who presents with a fall.    Patient presents for evaluation after a fall and a sycopal episode that occurred prior to presentation. He reports losing consciousness and falling face forward on the floor sustaining an injury to his head, elbows, abdomen and knees. He was unable to stand up and was on the floor for about 1.5 hours. Patient reports having a coughing episode a day prior but denies sick contacts. He denied chest pain.     In the ED, the patient was tachycardic (120s > 60s), but otherwise hemodynamically stable.  Labs were remarkable for leukocytosis (25.3), elevated CPK (23,406), elevated creatinine (3.3-baseline around 1.9), elevated troponin (0.525-chronically elevated), elevated lactic acid (4.8 > 2.1), elevated procalcitonin (2.53), elevated LFTs (T bili: 1.1, AST: 267, ALT: 73), hypophosphatemia (1.7), positive COVID-19.  CT head, maxillofacial, cervical spine showed a nondisplaced right nasal bone fracture with no soft tissue swelling, and paranasal sinus disease.  CT chest, abdomen and pelvis showed possible soft tissue contusion at the level of the xiphoid process, a 2-3 mm left lower lobe pulmonary nodule and nonspecific urinary bladder wall thickening.  Right elbow x-ray demonstrated an elbow effusion with an olecranon spur.  Patient was given 2 L of LR, vancomycin, Zosyn, Tylenol 1 g p.o..  He was admitted for further management.    Overview/Hospital Course:  Mr. Torres is a 79 yo  M admitted after a fall and found to have acute on chronic kidney injury and rhabdomyolysis. CK >20,000 on admit and rising to >40,000. Renal function worsening. On IVF. Also found to be COVID positive. Started on remdesivir. Nephrology consulted. CK improving, though Cr still rising. Almanza is placed for accurate I&D monitoring. Patient went into rapid Afib.  Moved to ICU and started on Amio drip.  Quickly converted back to NSR.  Creat continued to increase and Nephrology recommending HD.  Trialysis catheter placed and patient started on HD.  Amio switched to PO and patient started on Eliquis.  Patient went back into rapid afib/aflutter. Started on Cardizem drip.  Amiodarone stopped because of elevated LFT's and patient started on B blocker.  Seems will be ESRD,nephrology is on case.    Interval History: converted back to NSR.    Seems will be ESRD,nephrology is on case.  Review of Systems   HENT:  Negative for ear discharge and ear pain.    Eyes:  Negative for discharge and itching.   Endocrine: Negative for cold intolerance and heat intolerance.   Neurological:  Negative for seizures and syncope.     Objective:     Vital Signs (Most Recent):  Temp: 97.4 °F (36.3 °C) (02/23/25 0736)  Pulse: 83 (02/23/25 0801)  Resp: 18 (02/23/25 0801)  BP: 138/71 (02/23/25 0736)  SpO2: 100 % (02/23/25 0801) Vital Signs (24h Range):  Temp:  [97.4 °F (36.3 °C)-98.6 °F (37 °C)] 97.4 °F (36.3 °C)  Pulse:  [70-93] 83  Resp:  [15-20] 18  SpO2:  [95 %-100 %] 100 %  BP: (124-158)/(68-77) 138/71     Weight: 68.9 kg (151 lb 14.4 oz)  Body mass index is 21.79 kg/m².    Intake/Output Summary (Last 24 hours) at 2/23/2025 1103  Last data filed at 2/23/2025 1053  Gross per 24 hour   Intake 180 ml   Output 1450 ml   Net -1270 ml         Physical Exam  Constitutional:       General: He is not in acute distress.     Appearance: He is well-developed. He is not ill-appearing or toxic-appearing.   HENT:      Head: Normocephalic and atraumatic.   Eyes:       Pupils: Pupils are equal, round, and reactive to light.   Cardiovascular:      Rate and Rhythm: Normal rate and regular rhythm.      Heart sounds: Normal heart sounds.   Pulmonary:      Effort: Pulmonary effort is normal.      Breath sounds: Rhonchi present.   Abdominal:      General: Bowel sounds are normal.      Palpations: Abdomen is soft.      Tenderness: There is no abdominal tenderness.   Musculoskeletal:         General: Normal range of motion.      Cervical back: Normal range of motion and neck supple.   Skin:     Capillary Refill: Capillary refill takes less than 2 seconds.   Neurological:      Mental Status: He is alert and oriented to person, place, and time.             Significant Labs: All pertinent labs within the past 24 hours have been reviewed.  BMP:   Recent Labs   Lab 02/23/25  0530         K 3.8      CO2 25   BUN 92*   CREATININE 12.9*   CALCIUM 8.4*     CBC:   Recent Labs   Lab 02/22/25  0437   WBC 9.29   HGB 11.0*   HCT 33.2*          Significant Imaging: I have reviewed all pertinent imaging results/findings within the past 24 hours.    Assessment and Plan     * Acute renal failure superimposed on stage 3 chronic kidney disease  BISI is likely due to acute tubular necrosis caused by rhabdomyolysis . Baseline creatinine is  1.9 . Most recent creatinine and eGFR are listed below.  Recent Labs     02/21/25  0213 02/22/25  0437 02/23/25  0530   CREATININE 11.1* 12.2* 12.9*   EGFRNORACEVR 4* 4* 3*        Plan  - BISI is worsening. Will continue current treatment  - Avoid nephrotoxins and renally dose meds for GFR listed above  - Monitor urine output, serial BMP, and adjust therapy as needed  Appreciate Nephrology.  Nephrology recommending HD.  Trialysis catheter placed and patient started on HD.  Seems will be ESRD,nephrology is on case.    ACP (advance care planning)  Advance Care Planning  Per prior attending.  Date: 02/21/2025  Called patient's sister as there were  many questions about patient's treatment plan and disposition.  Patient was recently seen by Palliative Care and discussed possible hospice, but patient has also been started on dialysis.  Discussed medical treatment plan and disposition.  At this time, trying to assess if HD will be temporary or long term.  Patient does not seem to want lifelong dialysis.  If this is the case, then we would be looking into hospice, most likely in a NH.  If HD just temporary or patient does agree to lifelong HD, we would then be looking into SNF.  Patient's sister agrees with this plan.  He remains DNR.    Length of ACP   conversation in minutes: A total of 20 min was spent on advance care planning, goals of care discussion, emotional support, formulating and communicating prognosis and exploring burden/benefit of various approaches of treatment. This discussion occurred on a fully voluntary basis with the verbal consent of the patient and/or family.           New onset a-fib  Patient has paroxysmal (<7 days) atrial fibrillation. Patient is currently in sinus rhythm. [id number] Score: 2. The patients heart rate in the last 24 hours is as follows:  Pulse  Min: 64  Max: 134     Antiarrhythmics  metoprolol injection 5 mg, Every 4 hours PRN, Intravenous  metoprolol tartrate (LOPRESSOR) tablet 50 mg, 2 times daily, Oral    Anticoagulants  apixaban tablet 2.5 mg, 2 times daily, Oral    Plan  - Replete lytes with a goal of K>4, Mg >2  - Patient is anticoagulated, see medications listed above.  - Patient's afib is currently  resolved and back in NSR  - patient moved to ICU overnight and placed on Amio and heparin drips.  Converted to NSR.  Cardiology consulted.  Transitioned Amio to PO and heparin drip to Eliquis.  Converted back to rapid Afib on 2/20.  Placed on Cardizem drip.  Back to NSR. No Amio per Cards secondary to elevated LFT's.  Started on PO Lopressor.  Now off Cardizem drip.        Elevated troponin  Recent Labs   Lab  02/14/25  0435   TROPONINI 0.837*         Chronically elevated  No chest pain    Continue to monitor     COVID-19  No hypoxia, prophylactic remdesivir   Completed course of remdesivir.    Non-traumatic rhabdomyolysis  Elevated CPK to 09767 in the setting of a prolonged fall   - CK trending to >40k  - with renal failure -- Nephrolgoy consulted  - continue with aggressive hydration  - no evidence of compartment syndrome  - CK continues to improve, but Creat continued to increase.  Now on HD.    Elevated LFTs  Likely in the setting of rhabdomyolysis   Continue to monitor   - slowly improving      Stage 3b chronic kidney disease  Creatine stable for now. BMP reviewed- noted Estimated Creatinine Clearance: 4.9 mL/min (A) (based on SCr of 11.4 mg/dL (H)). according to latest data. Based on current GFR, CKD stage is stage 3 - GFR 30-59.  Monitor UOP and serial BMP and adjust therapy as needed. Renally dose meds. Avoid nephrotoxic medications and procedures.  With BISI as above.    Schizophrenia  Continue Abilify         VTE Risk Mitigation (From admission, onward)           Ordered     apixaban tablet 2.5 mg  2 times daily         02/20/25 1017     IP VTE HIGH RISK PATIENT  Once         02/14/25 0222     Place sequential compression device  Until discontinued         02/14/25 0222                    Discharge Planning   JULIANA: 2/26/2025     Code Status: DNR   Medical Readiness for Discharge Date:   Discharge Plan A: Skilled Nursing Facility   Discharge Delays: None known at this time                    Eloise Maier MD  Department of Hospital Medicine   [facility]

## 2025-02-23 NOTE — ASSESSMENT & PLAN NOTE
Metabolic acidosis  Baseline creatinine 1.7-2.0  On admission creatinine 3.3    BISI due to Rhabdomyolysis, initial CK > 40k  Consent for dialysis obtained 2/18/2025  1 session of HD 2/20/2025    Plan/Recommendation  He is responsive to lasix 160 BID, hopeful that tomorrow BUN and creatinine will improve though he may need TDC/short term dialysis.  -Keep MAP > 65  -Keep hemoglobin > 7  -Strict ins and outs  -Avoid nephrotoxic agents if possible and renally dose medications  -Avoid drastic hemodynamic changes if possible

## 2025-02-24 ENCOUNTER — DOCUMENTATION ONLY (OUTPATIENT)
Dept: HEMATOLOGY/ONCOLOGY | Facility: CLINIC | Age: 80
End: 2025-02-24
Payer: MEDICARE

## 2025-02-24 LAB
ALBUMIN SERPL BCP-MCNC: 2.3 G/DL (ref 3.5–5.2)
ALP SERPL-CCNC: 53 U/L (ref 40–150)
ALT SERPL W/O P-5'-P-CCNC: 105 U/L (ref 10–44)
ANION GAP SERPL CALC-SCNC: 16 MMOL/L (ref 8–16)
AST SERPL-CCNC: 35 U/L (ref 10–40)
BILIRUB SERPL-MCNC: 0.3 MG/DL (ref 0.1–1)
BUN SERPL-MCNC: 112 MG/DL (ref 8–23)
CALCIUM SERPL-MCNC: 8.3 MG/DL (ref 8.7–10.5)
CHLORIDE SERPL-SCNC: 102 MMOL/L (ref 95–110)
CK SERPL-CCNC: 558 U/L (ref 20–200)
CO2 SERPL-SCNC: 25 MMOL/L (ref 23–29)
CREAT SERPL-MCNC: 12.7 MG/DL (ref 0.5–1.4)
EST. GFR  (NO RACE VARIABLE): 4 ML/MIN/1.73 M^2
GLUCOSE SERPL-MCNC: 124 MG/DL (ref 70–110)
HBV SURFACE AB SER QL IA: NEGATIVE
HBV SURFACE AB SERPL IA-ACNC: <3 MIU/ML
PHOSPHATE SERPL-MCNC: 6.1 MG/DL (ref 2.7–4.5)
POTASSIUM SERPL-SCNC: 3.6 MMOL/L (ref 3.5–5.1)
PROT SERPL-MCNC: 5.4 G/DL (ref 6–8.4)
SODIUM SERPL-SCNC: 143 MMOL/L (ref 136–145)

## 2025-02-24 PROCEDURE — 21400001 HC TELEMETRY ROOM

## 2025-02-24 PROCEDURE — 25000003 PHARM REV CODE 250: Performed by: HOSPITALIST

## 2025-02-24 PROCEDURE — 11000001 HC ACUTE MED/SURG PRIVATE ROOM

## 2025-02-24 PROCEDURE — 80053 COMPREHEN METABOLIC PANEL: CPT | Performed by: HOSPITALIST

## 2025-02-24 PROCEDURE — 99232 SBSQ HOSP IP/OBS MODERATE 35: CPT | Mod: ,,, | Performed by: INTERNAL MEDICINE

## 2025-02-24 PROCEDURE — 63600175 PHARM REV CODE 636 W HCPCS: Performed by: HOSPITALIST

## 2025-02-24 PROCEDURE — 82550 ASSAY OF CK (CPK): CPT | Performed by: HOSPITALIST

## 2025-02-24 PROCEDURE — 94799 UNLISTED PULMONARY SVC/PX: CPT

## 2025-02-24 PROCEDURE — 84100 ASSAY OF PHOSPHORUS: CPT | Performed by: HOSPITALIST

## 2025-02-24 PROCEDURE — 99233 SBSQ HOSP IP/OBS HIGH 50: CPT | Mod: ,,, | Performed by: REGISTERED NURSE

## 2025-02-24 PROCEDURE — 99497 ADVNCD CARE PLAN 30 MIN: CPT | Mod: ,,, | Performed by: REGISTERED NURSE

## 2025-02-24 PROCEDURE — 94761 N-INVAS EAR/PLS OXIMETRY MLT: CPT

## 2025-02-24 PROCEDURE — 99900031 HC PATIENT EDUCATION (STAT)

## 2025-02-24 PROCEDURE — 27000221 HC OXYGEN, UP TO 24 HOURS

## 2025-02-24 PROCEDURE — 99900035 HC TECH TIME PER 15 MIN (STAT)

## 2025-02-24 PROCEDURE — 36415 COLL VENOUS BLD VENIPUNCTURE: CPT | Performed by: HOSPITALIST

## 2025-02-24 PROCEDURE — 94760 N-INVAS EAR/PLS OXIMETRY 1: CPT

## 2025-02-24 RX ORDER — CETIRIZINE HYDROCHLORIDE 5 MG/1
5 TABLET ORAL
Status: DISCONTINUED | OUTPATIENT
Start: 2025-02-26 | End: 2025-02-27 | Stop reason: HOSPADM

## 2025-02-24 RX ORDER — LIDOCAINE 50 MG/G
1 PATCH TOPICAL
Status: DISCONTINUED | OUTPATIENT
Start: 2025-02-24 | End: 2025-02-27 | Stop reason: HOSPADM

## 2025-02-24 RX ADMIN — Medication 6 MG: at 08:02

## 2025-02-24 RX ADMIN — LIDOCAINE 5% 1 PATCH: 700 PATCH TOPICAL at 02:02

## 2025-02-24 RX ADMIN — ARIPIPRAZOLE 20 MG: 10 TABLET ORAL at 08:02

## 2025-02-24 RX ADMIN — APIXABAN 2.5 MG: 2.5 TABLET, FILM COATED ORAL at 08:02

## 2025-02-24 RX ADMIN — THERA TABS 1 TABLET: TAB at 08:02

## 2025-02-24 RX ADMIN — METOPROLOL TARTRATE 50 MG: 50 TABLET, FILM COATED ORAL at 08:02

## 2025-02-24 RX ADMIN — SODIUM CHLORIDE 160 MG: 9 INJECTION, SOLUTION INTRAVENOUS at 08:02

## 2025-02-24 NOTE — NURSING
Trialysis line removed per orders, sutures removed, site covered with vaseline gauze, reg gauze, and tegaderm. Pressure held at site for 10 minutes. HOB flat, patient instructed to continue laying flat for another 20 minutes. Patient tolerated well.    No.

## 2025-02-24 NOTE — CONSULTS
[facility] Medicine  Telemedicine Consult Note       Mr. Antonio Torres Jr. has been accepted for transfer to [facility] and will be followed through telemedicine services beginning at 7 AM.      Aura Valdez MD  [facility] Medicine Staff

## 2025-02-24 NOTE — SUBJECTIVE & OBJECTIVE
Interval History: UOP 3.3L yesterday on lasix 160mg IV BID. No events overnight. Patient and sister decided against outpatient dialysis; patient requested quality of life > duration of life. +L ankle is swollen.     Review of patient's allergies indicates:  No Known Allergies  Current Facility-Administered Medications   Medication Frequency    acetaminophen tablet 650 mg Q4H PRN    albuterol-ipratropium 2.5 mg-0.5 mg/3 mL nebulizer solution 3 mL Q4H PRN    aluminum-magnesium hydroxide-simethicone 200-200-20 mg/5 mL suspension 30 mL QID PRN    apixaban tablet 2.5 mg BID    ARIPiprazole tablet 20 mg Daily    bisacodyL suppository 10 mg Daily PRN    [START ON 2/26/2025] cetirizine tablet 5 mg Every Mon, Wed, Fri    furosemide (Lasix) 160 mg in 0.9% NaCl 100 mL IVPB Q12H    glucagon (human recombinant) injection 1 mg PRN    glucose chewable tablet 16 g PRN    glucose chewable tablet 24 g PRN    melatonin tablet 6 mg Nightly PRN    metoprolol injection 5 mg Q4H PRN    metoprolol tartrate (LOPRESSOR) tablet 50 mg BID    multivitamin tablet Daily    naloxone 0.4 mg/mL injection 0.02 mg PRN    ondansetron injection 4 mg Q8H PRN    senna-docusate 8.6-50 mg per tablet 1 tablet Daily PRN    simethicone chewable tablet 80 mg QID PRN    sodium chloride 0.9% flush 10 mL Q12H PRN    Tdap vaccine injection 0.5 mL vaccine x 1 dose     Facility-Administered Medications Ordered in Other Encounters   Medication Frequency    0.9%  NaCl infusion Continuous    mupirocin 2 % ointment On Call Procedure       Objective:     Vital Signs (Most Recent):  Temp: 98 °F (36.7 °C) (02/24/25 1122)  Pulse: 78 (02/24/25 1122)  Resp: 18 (02/24/25 1122)  BP: 112/66 (02/24/25 1122)  SpO2: 99 % (02/24/25 1122) Vital Signs (24h Range):  Temp:  [97.4 °F (36.3 °C)-98.7 °F (37.1 °C)] 98 °F (36.7 °C)  Pulse:  [71-92] 78  Resp:  [18] 18  SpO2:  [97 %-100 %] 99 %  BP: (112-182)/(58-74) 112/66     Weight: 68.9 kg (151 lb 14.4 oz) (02/24/25 0700)  Body mass index  is 21.79 kg/m².  Body surface area is 1.84 meters squared.    I/O last 3 completed shifts:  In: 375.3 [P.O.:180; IV Piggyback:195.3]  Out: 3850 [Urine:3850]     Physical Exam  Vitals and nursing note reviewed.   Constitutional:       General: He is awake. He is not in acute distress.     Appearance: Normal appearance. He is well-developed.   HENT:      Head: Normocephalic and atraumatic.      Nose: Nose normal.      Mouth/Throat:      Mouth: Mucous membranes are moist.   Eyes:      Extraocular Movements: Extraocular movements intact.      Conjunctiva/sclera: Conjunctivae normal.   Cardiovascular:      Rate and Rhythm: Normal rate and regular rhythm.   Pulmonary:      Effort: Pulmonary effort is normal.      Breath sounds: Normal breath sounds.   Abdominal:      General: There is no distension.      Palpations: Abdomen is soft.   Musculoskeletal:         General: No tenderness or signs of injury.      Right lower leg: No edema.      Left lower leg: No edema.      Comments: BLE with mild edema; L ankle larger than R   Skin:     General: Skin is warm and dry.      Findings: No erythema or rash.   Neurological:      General: No focal deficit present.      Mental Status: He is alert. Mental status is at baseline.   Psychiatric:         Mood and Affect: Mood normal.         Behavior: Behavior normal.          Significant Labs:  CBC:   Recent Labs   Lab 02/22/25  0437   WBC 9.29   RBC 3.62*   HGB 11.0*   HCT 33.2*      MCV 92   MCH 30.4   MCHC 33.1     CMP:   Recent Labs   Lab 02/24/25  0530   *   CALCIUM 8.3*   ALBUMIN 2.3*   PROT 5.4*      K 3.6   CO2 25      *   CREATININE 12.7*   ALKPHOS 53   *   AST 35   BILITOT 0.3     All labs within the past 24 hours have been reviewed.     Significant Imaging:  Labs: Reviewed

## 2025-02-24 NOTE — PROGRESS NOTES
[facility] - Telemetry  Adult Nutrition  Progress Note    SUMMARY       Recommendations    Recommendation:   1. Continue pt on renal on dialysis diet and novasource renal tid.   2. Encourage intake at meals.   3. Monitor weight/labs.   4. RD to follow to monitor PO intake    Goals:   Pt to meet 50-75% EEN/EPN by RD follow up  Nutrition Goal Status: new  Communication of RD Recs:  (POC)    Nutrition Discharge Planning  Nutrition Discharge Planning: Too early to determine, pending clinical course (dependent on dialysis status)    Assessment and Plan  Nutrition Problem  Inadequate energy intake    Related to (etiology):   Acute renal failure superimposed on stage 3 chronic kidney disease    Signs and Symptoms (as evidenced by):   25% intake reported at meals  BMI 21.79 - underweight     Interventions:  Collaboration with other providers   [employer]     Nutrition Diagnosis Status:   New    Malnutrition Assessment  NFPE not conducted at this time    Reason for Assessment  Reason For Assessment: length of stay  Diagnosis: renal disease (Acute renal failure superimposed on stage 3 CKD)  General Information Comments: Pt presented to ED s/p fall, reports that he passes out while making his bed and hit his face on the dresser, he laid on the floor for 1.5 hours becasue he couldn't get up by himself. Pt was admitted with acute renal failure superimosed on staeg 3 CKD. Pt lives by himself in apartment above garage behind his sisters house. Started HD while admitted, pt does not want HD long term. Pt covid-19 positive. PMH: schizophrenia, CKD, HTN. Currently on renal on dialysis diet with novasource renal TID, noted 25% intake at meals. Barron 18- skin intact. No significant wt change x 6 months.  NFPE not conducted at this time, pt on airborned/contact/droplet isolation.    Nutrition/Diet History  Nutrition Intake History: 25%  Food Preferences: No Zoroastrian or cultural food preferences  Spiritual, Cultural Beliefs,  "Mosque Practices, Values that Affect Care: no  Food Allergies: NKFA  Factors Affecting Nutritional Intake: None identified at this time    Past Medical History:   Diagnosis Date    ACP (advance care planning) 2025    Chronic kidney disease     Depression     Hematuria     had neg work up with Dr. Stafford    Hypercholesterolemia 2013    Nephrolithiasis 7/15/2024    Schizophrenia     Skin cancer of scalp     Syncope 2023    UTI (urinary tract infection), uncomplicated 2024     Anthropometrics  Height: 5' 10" (177.8 cm)  Height (inches): 70 in  Height Method: Stated  Weight: 68.9 kg (151 lb 14.4 oz)  Weight (lb): 151.9 lb  Weight Method: Bed Scale  Ideal Body Weight (IBW), Male: 166 lb  % Ideal Body Weight, Male (lb): 91.51 %  BMI (Calculated): 21.8  BMI Grade: less than 23 (older than 65 years) - underweight  Usual Body Weight (UBW), k.7 kg ()  Weight Change Amount: 2 lb  % Usual Body Weight: 99.06  % Weight Change From Usual Weight: -1.15 %    Lab/Procedures/Meds  Pertinent Labs Reviewed: reviewed  Pertinent Labs Comments: BUN 112H, Crea 12.7H, Glu 124H, Phos 6.1H  Pertinent Medications Reviewed: reviewed  Pertinent Medications Comments: aripiprazole, furosemide, mutivitamin    Estimated/Assessed Needs  Weight Used For Calorie Calculations: 68.9 kg (151 lb 14.4 oz)  Energy Calorie Requirements (kcal):  (30 kcal.kg)  Energy Need Method: Kcal/kg  Protein Requirements: 55 g (0.8 g/kg)  Weight Used For Protein Calculations: 68.9 kg (151 lb 14.4 oz)  Estimated Fluid Requirement Method: RDA Method  RDA Method (mL):     Nutrition Prescription Ordered  Current Diet Order: renal on dialysis  Oral Nutrition Supplement: Novasource Renal    Evaluation of Received Nutrient/Fluid Intake  I/O: 375.3/3300  Energy Calories Required: not meeting needs  Protein Required: not meeting needs  Fluid Required: not meeting needs  Comments: LBM 2/24  % Intake of Estimated Energy Needs: 0 - 25 %  % " Meal Intake: 0 - 25 %    Nutrition Risk  Level of Risk/Frequency of Follow-up:  (1 x weekly)     Monitor and Evaluation  Monitor and Evaluation: Food and beverage intake, Diet order, Food and nutrition knowledge, Weight, Beliefs and attitudes, Electrolyte and renal panel, Gastrointestinal profile, Glucose/endocrine profile, Inflammatory profile, Nutrition focused physical findings     Nutrition Follow-Up  RD Follow-up?: Yes

## 2025-02-24 NOTE — PROGRESS NOTES
The  spoke with Ms. Brady's sister after it was reported that Ms. Brady was feeling distressed following a conversation with the medical staff earlier this morning. Ms. Brady shared that she felt much better after speaking with Dr. Castellano to address her concerns. Additionally, Ms. Brady expressed that, if given a choice, she would prefer [facility], or [facility], in that order, for her nursing home care. Ms. Brady stated that she fully understands what her brother is facing and she has peace.  informed Ms. Brady that someone from the Palliative team will be reaching out to moving forward with the plan of care.

## 2025-02-24 NOTE — PLAN OF CARE
Recommendation:   1. Continue pt on renal on dialysis diet and novasource renal tid.   2. Encourage intake at meals.   3. Monitor weight/labs.   4. RD to follow to monitor PO intake    Goals:   Pt to meet 50-75% EEN/EPN by RD follow up  Nutrition Goal Status: new   27-Feb-2020 16:47

## 2025-02-24 NOTE — PLAN OF CARE
Changes in medical condition or discharge plan: No    Does patient need new DME? No    Follow up appts needed: Nephrology and Cardiology    Medically stable: No    Estimated Discharge Date: TBD    Per attending, patient is not medically stable for discharge.   Per Nephrology, patient Cr and LFTs improving.  Patient and sister, Caitlyn Manzo, [phone], have decided against OP HD and in agreement with residential placement.  Referrals sent via Epic;  to call in LOCET and continue to assess for an until discharge.    02/24/25 0581   Discharge Reassessment   Assessment Type Discharge Planning Reassessment   Did the patient's condition or plan change since previous assessment? No   Discharge Plan discussed with: Sibling   Name(s) and Number(s) Caitlyn Manzo (Sister)  [phone]   Communicated JULIANA with patient/caregiver Date not available/Unable to determine   Discharge Plan A New Nursing Home placement - residential care facility   Discharge Plan B New Nursing Home placement - residential care facility   DME Needed Upon Discharge  none   Why the patient remains in the hospital Requires continued medical care   Post-Acute Status   Post-Acute Authorization Placement   Post-Acute Placement Status Referrals Sent   Hospital Resources/Appts/Education Provided Provided patient/caregiver with written discharge plan information   Discharge Delays None known at this time

## 2025-02-24 NOTE — PROGRESS NOTES
[facility]  Nephrology  Progress Note    Patient Name: Antonio Torres Jr.  MRN: [id number]  Admission Date: 2/13/2025  Hospital Length of Stay: 10 days  Attending Provider: Aura Valdez MD   Primary Care Physician: Fabio Lennon MD  Principal Problem:Acute renal failure superimposed on stage 3 chronic kidney disease    Subjective:     HPI: Mr. Torres is an 79 yo male with schizophrenia, BPH, CKD, and h/o melanoma who presented to the ED overnight s/p fall. HR 120s on arrival. He was also found to be COVID+. CPK 23k. He was started on IVF. Nephrology consulted for BISI on CKD. Prior records obtained and reviewed. He is followed by myself outpatient; last visit was 11/11/24. His baseline Cr is 1.7-2.0; his CKD is of unknown etiology. His Cr was 3.3 on arrival --> 3.5 this morning. CPK now > 40k. Abnormal LFTs. He reports to be doing okay this afternoon. Complaining of soreness. No SOB or leg swelling.    Interval History: UOP 3.3L yesterday on lasix 160mg IV BID. No events overnight. Patient and sister decided against outpatient dialysis; patient requested quality of life > duration of life. +L ankle is swollen.     Review of patient's allergies indicates:  No Known Allergies  Current Facility-Administered Medications   Medication Frequency    acetaminophen tablet 650 mg Q4H PRN    albuterol-ipratropium 2.5 mg-0.5 mg/3 mL nebulizer solution 3 mL Q4H PRN    aluminum-magnesium hydroxide-simethicone 200-200-20 mg/5 mL suspension 30 mL QID PRN    apixaban tablet 2.5 mg BID    ARIPiprazole tablet 20 mg Daily    bisacodyL suppository 10 mg Daily PRN    [START ON 2/26/2025] cetirizine tablet 5 mg Every Mon, Wed, Fri    furosemide (Lasix) 160 mg in 0.9% NaCl 100 mL IVPB Q12H    glucagon (human recombinant) injection 1 mg PRN    glucose chewable tablet 16 g PRN    glucose chewable tablet 24 g PRN    melatonin tablet 6 mg Nightly PRN    metoprolol injection 5 mg Q4H PRN    metoprolol tartrate (LOPRESSOR) tablet 50  mg BID    multivitamin tablet Daily    naloxone 0.4 mg/mL injection 0.02 mg PRN    ondansetron injection 4 mg Q8H PRN    senna-docusate 8.6-50 mg per tablet 1 tablet Daily PRN    simethicone chewable tablet 80 mg QID PRN    sodium chloride 0.9% flush 10 mL Q12H PRN    Tdap vaccine injection 0.5 mL vaccine x 1 dose     Facility-Administered Medications Ordered in Other Encounters   Medication Frequency    0.9%  NaCl infusion Continuous    mupirocin 2 % ointment On Call Procedure       Objective:     Vital Signs (Most Recent):  Temp: 98 °F (36.7 °C) (02/24/25 1122)  Pulse: 78 (02/24/25 1122)  Resp: 18 (02/24/25 1122)  BP: 112/66 (02/24/25 1122)  SpO2: 99 % (02/24/25 1122) Vital Signs (24h Range):  Temp:  [97.4 °F (36.3 °C)-98.7 °F (37.1 °C)] 98 °F (36.7 °C)  Pulse:  [71-92] 78  Resp:  [18] 18  SpO2:  [97 %-100 %] 99 %  BP: (112-182)/(58-74) 112/66     Weight: 68.9 kg (151 lb 14.4 oz) (02/24/25 0700)  Body mass index is 21.79 kg/m².  Body surface area is 1.84 meters squared.    I/O last 3 completed shifts:  In: 375.3 [P.O.:180; IV Piggyback:195.3]  Out: 3850 [Urine:3850]     Physical Exam  Vitals and nursing note reviewed.   Constitutional:       General: He is awake. He is not in acute distress.     Appearance: Normal appearance. He is well-developed.   HENT:      Head: Normocephalic and atraumatic.      Nose: Nose normal.      Mouth/Throat:      Mouth: Mucous membranes are moist.   Eyes:      Extraocular Movements: Extraocular movements intact.      Conjunctiva/sclera: Conjunctivae normal.   Cardiovascular:      Rate and Rhythm: Normal rate and regular rhythm.   Pulmonary:      Effort: Pulmonary effort is normal.      Breath sounds: Normal breath sounds.   Abdominal:      General: There is no distension.      Palpations: Abdomen is soft.   Musculoskeletal:         General: No tenderness or signs of injury.      Right lower leg: No edema.      Left lower leg: No edema.      Comments: BLE with mild edema; L ankle  larger than R   Skin:     General: Skin is warm and dry.      Findings: No erythema or rash.   Neurological:      General: No focal deficit present.      Mental Status: He is alert. Mental status is at baseline.   Psychiatric:         Mood and Affect: Mood normal.         Behavior: Behavior normal.          Significant Labs:  CBC:   Recent Labs   Lab 02/22/25  0437   WBC 9.29   RBC 3.62*   HGB 11.0*   HCT 33.2*      MCV 92   MCH 30.4   MCHC 33.1     CMP:   Recent Labs   Lab 02/24/25  0530   *   CALCIUM 8.3*   ALBUMIN 2.3*   PROT 5.4*      K 3.6   CO2 25      *   CREATININE 12.7*   ALKPHOS 53   *   AST 35   BILITOT 0.3     All labs within the past 24 hours have been reviewed.     Significant Imaging:  Labs: Reviewed    Assessment/Plan:     BISI on CKD stage 3b  - baseline Cr 1.7-2.0  - Cr 3.3 on arrival and peaked at 12.9 on 2/20/25; due to rhabdomyolysis  - s/p HD x 2 hours on 2/20/25; HD has been held since  - Cr 12.9 --> 12.7 today; no longer worsening. Non-oliguric. LFTs improving; hopefully renal function will follow.   - patient not interested, but no longer with need for RRT  - discontinue dialysis catheter  - appears mostly euvolemic on exam. Will hold lasix.   - repeat labs in AM. If Cr continues to improve; okay to discharge to NH. If Cr worsens, recommend discharging to NH with hospice. These options were discussed with patient and sister.     Rhabdomyolysis  - CPK > 40,000   - improved    Metabolic acidosis  - improved    CKD-MBD  - CCa normal; phos elevated at 6.1  - continue renal diet  - will trend      Thank you for your consult. I will follow-up with patient. Please contact us if you have any additional questions.    Dariela Terrazas MD  Nephrology  [facility] - [place] - Telemetry

## 2025-02-24 NOTE — NURSING
[facility], [facility] - [place]  Nurses Note -- 4 Eyes      2/23/2025       Skin assessed on: Q Shift      [x] No Pressure Injuries Present    []Prevention Measures Documented    [] Yes LDA  for Pressure Injury Previously documented     [] Yes New Pressure Injury Discovered   [] LDA for New Pressure Injury Added      Attending RN:  Nithya Sosa LPN     Second RN:  Asael Calderon LPN

## 2025-02-25 PROBLEM — R53.81 DEBILITY: Status: ACTIVE | Noted: 2022-11-20

## 2025-02-25 LAB
ALBUMIN SERPL BCP-MCNC: 2.4 G/DL (ref 3.5–5.2)
ALP SERPL-CCNC: 46 U/L (ref 40–150)
ALT SERPL W/O P-5'-P-CCNC: 93 U/L (ref 10–44)
ANION GAP SERPL CALC-SCNC: 19 MMOL/L (ref 8–16)
AST SERPL-CCNC: 27 U/L (ref 10–40)
BASOPHILS # BLD AUTO: 0.04 K/UL (ref 0–0.2)
BASOPHILS NFR BLD: 0.3 % (ref 0–1.9)
BILIRUB SERPL-MCNC: 0.4 MG/DL (ref 0.1–1)
BUN SERPL-MCNC: 123 MG/DL (ref 8–23)
CALCIUM SERPL-MCNC: 8.7 MG/DL (ref 8.7–10.5)
CHLORIDE SERPL-SCNC: 97 MMOL/L (ref 95–110)
CO2 SERPL-SCNC: 27 MMOL/L (ref 23–29)
CREAT SERPL-MCNC: 12.8 MG/DL (ref 0.5–1.4)
DIFFERENTIAL METHOD BLD: ABNORMAL
EOSINOPHIL # BLD AUTO: 0.2 K/UL (ref 0–0.5)
EOSINOPHIL NFR BLD: 1.4 % (ref 0–8)
ERYTHROCYTE [DISTWIDTH] IN BLOOD BY AUTOMATED COUNT: 14.4 % (ref 11.5–14.5)
EST. GFR  (NO RACE VARIABLE): 4 ML/MIN/1.73 M^2
GLUCOSE SERPL-MCNC: 102 MG/DL (ref 70–110)
HCT VFR BLD AUTO: 29.5 % (ref 40–54)
HGB BLD-MCNC: 9.5 G/DL (ref 14–18)
IMM GRANULOCYTES # BLD AUTO: 0.1 K/UL (ref 0–0.04)
IMM GRANULOCYTES NFR BLD AUTO: 0.8 % (ref 0–0.5)
LYMPHOCYTES # BLD AUTO: 1.1 K/UL (ref 1–4.8)
LYMPHOCYTES NFR BLD: 9.1 % (ref 18–48)
MCH RBC QN AUTO: 30.5 PG (ref 27–31)
MCHC RBC AUTO-ENTMCNC: 32.2 G/DL (ref 32–36)
MCV RBC AUTO: 95 FL (ref 82–98)
MONOCYTES # BLD AUTO: 1.2 K/UL (ref 0.3–1)
MONOCYTES NFR BLD: 9.7 % (ref 4–15)
NEUTROPHILS # BLD AUTO: 9.8 K/UL (ref 1.8–7.7)
NEUTROPHILS NFR BLD: 78.7 % (ref 38–73)
NRBC BLD-RTO: 0 /100 WBC
OHS QRS DURATION: 78 MS
OHS QRS DURATION: 86 MS
OHS QTC CALCULATION: 472 MS
OHS QTC CALCULATION: 473 MS
PHOSPHATE SERPL-MCNC: 6 MG/DL (ref 2.7–4.5)
PLATELET # BLD AUTO: 229 K/UL (ref 150–450)
PMV BLD AUTO: 10.6 FL (ref 9.2–12.9)
POTASSIUM SERPL-SCNC: 3.6 MMOL/L (ref 3.5–5.1)
PROT SERPL-MCNC: 6.1 G/DL (ref 6–8.4)
RBC # BLD AUTO: 3.11 M/UL (ref 4.6–6.2)
SODIUM SERPL-SCNC: 143 MMOL/L (ref 136–145)
WBC # BLD AUTO: 12.42 K/UL (ref 3.9–12.7)

## 2025-02-25 PROCEDURE — 25000003 PHARM REV CODE 250: Performed by: HOSPITALIST

## 2025-02-25 PROCEDURE — 94761 N-INVAS EAR/PLS OXIMETRY MLT: CPT

## 2025-02-25 PROCEDURE — 11000001 HC ACUTE MED/SURG PRIVATE ROOM

## 2025-02-25 PROCEDURE — 36415 COLL VENOUS BLD VENIPUNCTURE: CPT | Performed by: INTERNAL MEDICINE

## 2025-02-25 PROCEDURE — 94799 UNLISTED PULMONARY SVC/PX: CPT

## 2025-02-25 PROCEDURE — 21400001 HC TELEMETRY ROOM

## 2025-02-25 PROCEDURE — 85025 COMPLETE CBC W/AUTO DIFF WBC: CPT | Performed by: INTERNAL MEDICINE

## 2025-02-25 PROCEDURE — 99232 SBSQ HOSP IP/OBS MODERATE 35: CPT | Mod: ,,, | Performed by: INTERNAL MEDICINE

## 2025-02-25 PROCEDURE — 80053 COMPREHEN METABOLIC PANEL: CPT | Performed by: HOSPITALIST

## 2025-02-25 PROCEDURE — 84100 ASSAY OF PHOSPHORUS: CPT | Performed by: INTERNAL MEDICINE

## 2025-02-25 RX ADMIN — THERA TABS 1 TABLET: TAB at 08:02

## 2025-02-25 RX ADMIN — ACETAMINOPHEN 650 MG: 325 TABLET ORAL at 07:02

## 2025-02-25 RX ADMIN — APIXABAN 2.5 MG: 2.5 TABLET, FILM COATED ORAL at 08:02

## 2025-02-25 RX ADMIN — ARIPIPRAZOLE 20 MG: 10 TABLET ORAL at 08:02

## 2025-02-25 RX ADMIN — LIDOCAINE 5% 1 PATCH: 700 PATCH TOPICAL at 02:02

## 2025-02-25 RX ADMIN — METOPROLOL TARTRATE 50 MG: 50 TABLET, FILM COATED ORAL at 08:02

## 2025-02-25 RX ADMIN — ACETAMINOPHEN 650 MG: 325 TABLET ORAL at 04:02

## 2025-02-25 NOTE — ASSESSMENT & PLAN NOTE
Elevated CPK to 25784 in the setting of a prolonged fall   - CK trending to >40k  - with renal failure -- Nephrolgoy consulted  - continue with aggressive hydration  - no evidence of compartment syndrome  - CK continues to improve, but Creat continued to increase.  Pt was started on HD.  -on 2/24, kidney function improving. HD stopped on 2/24   -per neprho, kidney function should continue to improve  -pt and family does not want to continue long term HD if needed in the future

## 2025-02-25 NOTE — PLAN OF CARE
Problem: Adult Inpatient Plan of Care  Goal: Plan of Care Review  Outcome: Progressing  Flowsheets (Taken 2/25/2025 0815)  Plan of Care Reviewed With: patient  Goal: Patient-Specific Goal (Individualized)  Outcome: Progressing     Problem: Fall Injury Risk  Goal: Absence of Fall and Fall-Related Injury  Outcome: Progressing  Intervention: Identify and Manage Contributors  Flowsheets (Taken 2/25/2025 0815)  Self-Care Promotion:   BADL personal objects within reach   meal set-up provided  Medication Review/Management: medications reviewed  Intervention: Promote Injury-Free Environment  Flowsheets (Taken 2/25/2025 0815)  Safety Promotion/Fall Prevention:   assistive device/personal item within reach   lighting adjusted   room near unit station     Problem: Adult Inpatient Plan of Care  Goal: Plan of Care Review  Outcome: Progressing  Flowsheets (Taken 2/25/2025 0815)  Plan of Care Reviewed With: patient     Problem: Adult Inpatient Plan of Care  Goal: Plan of Care Review  Outcome: Progressing  Flowsheets (Taken 2/25/2025 0815)  Plan of Care Reviewed With: patient     Problem: Adult Inpatient Plan of Care  Goal: Patient-Specific Goal (Individualized)  Outcome: Progressing     Problem: Adult Inpatient Plan of Care  Goal: Patient-Specific Goal (Individualized)  Outcome: Progressing     Problem: Fall Injury Risk  Goal: Absence of Fall and Fall-Related Injury  Outcome: Progressing  Intervention: Identify and Manage Contributors  Flowsheets (Taken 2/25/2025 0815)  Self-Care Promotion:   BADL personal objects within reach   meal set-up provided  Medication Review/Management: medications reviewed  Intervention: Promote Injury-Free Environment  Flowsheets (Taken 2/25/2025 0815)  Safety Promotion/Fall Prevention:   assistive device/personal item within reach   lighting adjusted   room near unit station     Problem: Fall Injury Risk  Goal: Absence of Fall and Fall-Related Injury  Outcome: Progressing     Problem: Fall Injury  Risk  Goal: Absence of Fall and Fall-Related Injury  Intervention: Identify and Manage Contributors  Flowsheets (Taken 2/25/2025 0815)  Self-Care Promotion:   BADL personal objects within reach   meal set-up provided  Medication Review/Management: medications reviewed     Problem: Fall Injury Risk  Goal: Absence of Fall and Fall-Related Injury  Intervention: Identify and Manage Contributors  Flowsheets (Taken 2/25/2025 0815)  Self-Care Promotion:   BADL personal objects within reach   meal set-up provided  Medication Review/Management: medications reviewed     Problem: Fall Injury Risk  Goal: Absence of Fall and Fall-Related Injury  Intervention: Promote Injury-Free Environment  Flowsheets (Taken 2/25/2025 0815)  Safety Promotion/Fall Prevention:   assistive device/personal item within reach   lighting adjusted   room near unit station     Problem: Fall Injury Risk  Goal: Absence of Fall and Fall-Related Injury  Intervention: Promote Injury-Free Environment  Flowsheets (Taken 2/25/2025 0815)  Safety Promotion/Fall Prevention:   assistive device/personal item within reach   lighting adjusted   room near unit station

## 2025-02-25 NOTE — ASSESSMENT & PLAN NOTE
- chronic condition noted; now with acute BISI requiring HD   - was present during Dr. Smith's discussions with pt and sister (by phone) today; all in agreement for HD   - sister/MPOA previously expressed wishes for time limited trial of HD, she is agreeable to assess if HD can be temporary to improve pts current condition, but would not wish for pt to continue HD indefinitely   - after several days of HD pt has expressed wishes to discontinue HD; good insight that this could lead to worsening renal function in the future and eventually likely contribute to pt's death in the future

## 2025-02-25 NOTE — ASSESSMENT & PLAN NOTE
Patient has paroxysmal (<7 days) atrial fibrillation. Patient is currently in sinus rhythm. [id number] Score: 2. The patients heart rate in the last 24 hours is as follows:  Pulse  Min: 71  Max: 90     Antiarrhythmics  metoprolol injection 5 mg, Every 4 hours PRN, Intravenous  metoprolol tartrate (LOPRESSOR) tablet 50 mg, 2 times daily, Oral    Anticoagulants  apixaban tablet 2.5 mg, 2 times daily, Oral    Plan  - Replete lytes with a goal of K>4, Mg >2  - Patient is anticoagulated, see medications listed above.  - Patient's afib is currently  resolved and back in NSR  - patient moved to ICU overnight and placed on Amio and heparin drips.  Converted to NSR.  Cardiology consulted.  Transitioned Amio to PO and heparin drip to Eliquis.  Converted back to rapid Afib on 2/20.  Placed on Cardizem drip.  Back to NSR. No Amio per Cards secondary to elevated LFT's.  Started on PO Lopressor.  Now off Cardizem drip.

## 2025-02-25 NOTE — ASSESSMENT & PLAN NOTE
BISI is likely due to acute tubular necrosis caused by rhabdomyolysis . Baseline creatinine is  1.9 . Most recent creatinine and eGFR are listed below.  Recent Labs     02/22/25  0437 02/23/25  0530 02/24/25  0530   CREATININE 12.2* 12.9* 12.7*   EGFRNORACEVR 4* 3* 4*        Plan  - Avoid nephrotoxins and renally dose meds for GFR listed above  - Monitor urine output, serial BMP, and adjust therapy as needed  Appreciate Nephrology.  Nephrology recommending HD.  Trialysis catheter placed and patient started on HD.  on 2/24, kidney function improving. HD stopped on 2/24   -per neprho, kidney function should continue to improve  -pt and family does not want to continue long term HD if needed in the future  -trialysis cath removed  -continue goals of care discussion

## 2025-02-25 NOTE — NURSING
[facility] - [place]  Nurses Note -- 4 Eyes      2/24/2025       Skin assessed on: Q Shift      [x] No Pressure Injuries Present    []Prevention Measures Documented    [] Yes LDA  for Pressure Injury Previously documented     [] Yes New Pressure Injury Discovered   [] LDA for New Pressure Injury Added      Attending RN:  Nithya Sosa LPN     Second RN:  Felecia Boston RN

## 2025-02-25 NOTE — ASSESSMENT & PLAN NOTE
Advance Care Planning   Per prior attending.  Date: 02/21/2025  Called patient's sister as there were many questions about patient's treatment plan and disposition.  Patient was recently seen by Palliative Care and discussed possible hospice, but patient has also been started on dialysis.  Discussed medical treatment plan and disposition.  At this time, trying to assess if HD will be temporary or long term.  Patient does not seem to want lifelong dialysis.  If this is the case, then we would be looking into hospice, most likely in a NH.  If HD just temporary or patient does agree to lifelong HD, we would then be looking into SNF.  Patient's sister agrees with this plan.  He remains DNR.    Length of ACP   conversation in minutes: A total of 20 min was spent on advance care planning, goals of care discussion, emotional support, formulating and communicating prognosis and exploring burden/benefit of various approaches of treatment. This discussion occurred on a fully voluntary basis with the verbal consent of the patient and/or family.       Another long goals of care discussion on 2/24 with pt and his sister (M[place]). They do not want to do long term HD. Leaning towards conservative care. Palliative care consulted again.

## 2025-02-25 NOTE — PROGRESS NOTES
[facility] Medicine  Telemedicine Progress Note    Patient Name: Antonio Torres Jr.  MRN: [id number]  Patient Class: IP- Inpatient   Admission Date: 2/13/2025  Length of Stay: 11 days  Attending Physician: Aura Valdez MD  Primary Care Provider: Fabio Lennon MD          Subjective:     Principal Problem:Acute renal failure superimposed on stage 3 chronic kidney disease        HPI:  This is an 80-year-old male with a past medical history of schizophrenia, chronic blepharitis, hyperlipidemia, CKD 3, BPH, who presents with a fall.    Patient presents for evaluation after a fall and a sycopal episode that occurred prior to presentation. He reports losing consciousness and falling face forward on the floor sustaining an injury to his head, elbows, abdomen and knees. He was unable to stand up and was on the floor for about 1.5 hours. Patient reports having a coughing episode a day prior but denies sick contacts. He denied chest pain.     In the ED, the patient was tachycardic (120s > 60s), but otherwise hemodynamically stable.  Labs were remarkable for leukocytosis (25.3), elevated CPK (23,406), elevated creatinine (3.3-baseline around 1.9), elevated troponin (0.525-chronically elevated), elevated lactic acid (4.8 > 2.1), elevated procalcitonin (2.53), elevated LFTs (T bili: 1.1, AST: 267, ALT: 73), hypophosphatemia (1.7), positive COVID-19.  CT head, maxillofacial, cervical spine showed a nondisplaced right nasal bone fracture with no soft tissue swelling, and paranasal sinus disease.  CT chest, abdomen and pelvis showed possible soft tissue contusion at the level of the xiphoid process, a 2-3 mm left lower lobe pulmonary nodule and nonspecific urinary bladder wall thickening.  Right elbow x-ray demonstrated an elbow effusion with an olecranon spur.  Patient was given 2 L of LR, vancomycin, Zosyn, Tylenol 1 g p.o..  He was admitted for further management.    Overview/Hospital  Course:  Mr. Torres is a 79 yo M admitted after a fall and found to have acute on chronic kidney injury and rhabdomyolysis. CK >20,000 on admit and rising to >40,000. Renal function worsening. On IVF. Also found to be COVID positive. Started on remdesivir. Nephrology consulted. CK improving, though Cr still rising. Almanza is placed for accurate I&D monitoring. Patient went into rapid Afib.  Moved to ICU and started on Amio drip.  Quickly converted back to NSR.  Creat continued to increase and Nephrology recommending HD.  Trialysis catheter placed and patient started on HD.  Amio switched to PO and patient started on Eliquis.  Patient went back into rapid afib/aflutter. Started on Cardizem drip.  Amiodarone stopped because of elevated LFT's and patient started on B blocker.  Seems will be ESRD,nephrology is on case.    Interval History: Pt noted to be afebrile and hemodynamically stable. Stable respiratory status.  Overall feeling much better today.  Had about 5 L urine output since the last 24 hours.  Kidney function appears to be stabilizing/improving.  Nephrology following, appreciate recommendations.  No further HD indicated at this time.  Patient now getting more medically stable, will need long term nursing home placement with or without hospice.  Palliative Care following.      Review of Systems   Constitutional:  Positive for activity change, appetite change and fatigue. Negative for fever.   Respiratory:  Negative for cough and shortness of breath.    Cardiovascular:  Negative for chest pain.   Gastrointestinal:  Positive for nausea. Negative for abdominal pain.   Musculoskeletal:  Positive for arthralgias.   Neurological:  Negative for dizziness and headaches.   Psychiatric/Behavioral:  Negative for confusion.    All other systems reviewed and are negative.    Objective:     Vital Signs (Most Recent):  Temp: 98.4 °F (36.9 °C) (02/25/25 1118)  Pulse: 81 (02/25/25 1118)  Resp: 18 (02/25/25 1118)  BP: (!) 97/59  (02/25/25 1118)  SpO2: 96 % (02/25/25 1118) Vital Signs (24h Range):  Temp:  [97.5 °F (36.4 °C)-98.4 °F (36.9 °C)] 98.4 °F (36.9 °C)  Pulse:  [] 81  Resp:  [17-20] 18  SpO2:  [96 %-98 %] 96 %  BP: ()/(58-61) 97/59     Weight: 68.9 kg (151 lb 14.4 oz)  Body mass index is 21.79 kg/m².    Intake/Output Summary (Last 24 hours) at 2/25/2025 1432  Last data filed at 2/25/2025 0700  Gross per 24 hour   Intake --   Output 2475 ml   Net -2475 ml         Physical Exam  Vitals and nursing note reviewed.   Constitutional:       Appearance: Normal appearance.      Comments: Globally weak   HENT:      Head: Normocephalic and atraumatic.   Cardiovascular:      Rate and Rhythm: Normal rate and regular rhythm.   Pulmonary:      Effort: Pulmonary effort is normal. No respiratory distress.   Abdominal:      General: There is no distension.   Musculoskeletal:         General: Normal range of motion.   Neurological:      Mental Status: He is alert and oriented to person, place, and time.   Psychiatric:         Mood and Affect: Mood normal.         Behavior: Behavior normal.             Significant Labs: All pertinent labs within the past 24 hours have been reviewed.  CBC:   Recent Labs   Lab 02/25/25  0428   WBC 12.42   HGB 9.5*   HCT 29.5*        CMP:   Recent Labs   Lab 02/24/25  0530 02/25/25  0427    143   K 3.6 3.6    97   CO2 25 27   * 102   * 123*   CREATININE 12.7* 12.8*   CALCIUM 8.3* 8.7   PROT 5.4* 6.1   ALBUMIN 2.3* 2.4*   BILITOT 0.3 0.4   ALKPHOS 53 46   AST 35 27   * 93*   ANIONGAP 16 19*       Significant Imaging: I have reviewed all pertinent imaging results/findings within the past 24 hours.    Assessment/Plan:      * Acute renal failure superimposed on stage 3 chronic kidney disease  BISI is likely due to acute tubular necrosis caused by rhabdomyolysis . Baseline creatinine is  1.9 . Most recent creatinine and eGFR are listed below.  Recent Labs     02/23/25  0555  02/24/25  0530 02/25/25  0427   CREATININE 12.9* 12.7* 12.8*   EGFRNORACEVR 3* 4* 4*        Plan  - Avoid nephrotoxins and renally dose meds for GFR listed above  - Monitor urine output, serial BMP, and adjust therapy as needed  Appreciate Nephrology.  Nephrology recommending HD.  Trialysis catheter placed and patient started on HD.  on 2/24, kidney function improving. HD stopped on 2/24   -per neprho, kidney function should continue to improve  -pt and family does not want to continue long term HD if needed in the future  -trialysis cath removed  -continue goals of care discussion, palliative Care following, appreciate recommendations    ACP (advance care planning)  Advance Care Planning  Per prior attending.  Date: 02/21/2025  Called patient's sister as there were many questions about patient's treatment plan and disposition.  Patient was recently seen by Palliative Care and discussed possible hospice, but patient has also been started on dialysis.  Discussed medical treatment plan and disposition.  At this time, trying to assess if HD will be temporary or long term.  Patient does not seem to want lifelong dialysis.  If this is the case, then we would be looking into hospice, most likely in a NH.  If HD just temporary or patient does agree to lifelong HD, we would then be looking into SNF.  Patient's sister agrees with this plan.  He remains DNR.    Length of ACP   conversation in minutes: A total of 20 min was spent on advance care planning, goals of care discussion, emotional support, formulating and communicating prognosis and exploring burden/benefit of various approaches of treatment. This discussion occurred on a fully voluntary basis with the verbal consent of the patient and/or family.       Another long goals of care discussion on 2/24 with pt and his sister (M[place]). They do not want to do long term HD. Leaning towards conservative care. Palliative care consulted again.       New onset a-fib  Patient has  paroxysmal (<7 days) atrial fibrillation. Patient is currently in sinus rhythm. [id number] Score: 2. The patients heart rate in the last 24 hours is as follows:  Pulse  Min: 71  Max: 90     Antiarrhythmics  metoprolol injection 5 mg, Every 4 hours PRN, Intravenous  metoprolol tartrate (LOPRESSOR) tablet 50 mg, 2 times daily, Oral    Anticoagulants  apixaban tablet 2.5 mg, 2 times daily, Oral    Plan  - Replete lytes with a goal of K>4, Mg >2  - Patient is anticoagulated, see medications listed above.  - Patient's afib is currently  resolved and back in NSR  - patient moved to ICU overnight and placed on Amio and heparin drips.  Converted to NSR.  Cardiology consulted.  Transitioned Amio to PO and heparin drip to Eliquis.  Converted back to rapid Afib on 2/20.  Placed on Cardizem drip.  Back to NSR. No Amio per Cards secondary to elevated LFT's.  Started on PO Lopressor.  Now off Cardizem drip.        Elevated troponin  Recent Labs   Lab 02/19/25  0410   TROPONINI 0.074*         Chronically elevated  No chest pain    Continue to monitor     COVID-19  No hypoxia, prophylactic remdesivir   Completed course of remdesivir.    Non-traumatic rhabdomyolysis  Elevated CPK to 03396 in the setting of a prolonged fall   - CK trending to >40k  - with renal failure -- Nephrolgoy consulted  - continue with aggressive hydration  - no evidence of compartment syndrome  - CK continues to improve, but Creat continued to increase.  Pt was started on HD.  -on 2/24, kidney function improving. HD stopped on 2/24   -per neprho, kidney function should continue to improve  -pt and family does not want to continue long term HD if needed in the future    Elevated LFTs  Likely in the setting of rhabdomyolysis   Continue to monitor   - slowly improving      Debility  -Patient pending placement to [facility] nursing home, continue in-hospital care as stated above in the meantime  -More than 20 minutes of my time has been spent discussing and  planning just her safe disposition with patient/family/CM/SW/Nursing staff (not including other time spent in clinical discussion and management)  -CM/SW following, appreciate input   -Will place DC orders once placement has been secured  -depending on ongoing goals of care conversation, patient may need hospice set up at the nursing home        Stage 3b chronic kidney disease  Creatine stable for now. BMP reviewed- noted Estimated Creatinine Clearance: 4.4 mL/min (A) (based on SCr of 12.7 mg/dL (H)). according to latest data. Based on current GFR, CKD stage is stage 3 - GFR 30-59.  Monitor UOP and serial BMP and adjust therapy as needed. Renally dose meds. Avoid nephrotoxic medications and procedures.  With BISI as above.    Schizophrenia  Continue Abilify         VTE Risk Mitigation (From admission, onward)           Ordered     apixaban tablet 2.5 mg  2 times daily         02/20/25 1017     IP VTE HIGH RISK PATIENT  Once         02/14/25 0222     Place sequential compression device  Until discontinued         02/14/25 0222                          I have completed this tele-visit without the assistance of a telepresenter.    The attending portion of this evaluation, treatment, and documentation was performed per Aura Valdez MD via Telemedicine AudioVisual using the secure [email or web address] software platform with 2 way audio/video. The provider was located off-site and the patient is located in the hospital. The aforementioned video software was utilized to document the relevant history and physical exam    Aura Valdez MD  Department of Hospital Medicine   [facility] - [place] - Telemetry

## 2025-02-25 NOTE — ASSESSMENT & PLAN NOTE
Recent Labs   Lab 02/19/25  0410   TROPONINI 0.074*         Chronically elevated  No chest pain    Continue to monitor

## 2025-02-25 NOTE — ASSESSMENT & PLAN NOTE
- interval chart reviewed; discussed pt with HM   - MDT requested palliative discussion with pt to confirm wishes regarding HD   - pt with clear GOC that HD does not align with the quality of life he wishes for himself, given his age, overall poor prognosis, and plan for [facility] NH placement; doesn't wish for his life to mainly consist of full care at a NH and HD   - validated pts wishes and GOC for himself  - pt wishes to proceed with [facility] NH placement and open to further discussion of hospice care at NH   - emotional support provided; answered all questions   - attempted to call pt's sister for update of pt's wishes and to answer any questions, unable to reach on initial attempt   - requested palliative SW follow up with sister for emotional support and to assist in answering questions with planning further discussion with sister when able to reach directly     2025 - Consult   - consult received; interval chart reviewed in detail; discussed pt during ICU MDT rounds   - met with patient at bedside, along with Dr. Smith; Dr. Smith discussed plan for HD with pt; I remained in the room with pt following for introduction to palliative medicine team and role in current care and admission and GOC/ACP discussion   - pt confirms sister is preferred MPOA; she is also legal surrogate decision maker as pt is not , does not have children, and both parents are ; pt wishes to be included in care discussions but defaults decision making to sister (see schizophrenia); this seems very appropriate as pt seems to have capacity for simple aspect of care, but not for complex aspects of care such as HD, code status, etc or overall care/coordination of care on his own behalf outside of the hospital   - also joined Dr. Smith with call to pt's sisterCaitlyn   - learned more about pt outside of current admission; he has been having decreased quality of life over the last few years; she outfitted her garage  into a living space for pt but has good insight that pt's care exceeds her abilities at this time   - Caitlyn was very tearful in today's discussions as this is all very hard for her to process, especially from a distance   - GOC/ACP discussion; focus of GOC are for pt's quality of life and symptom management   - she wishes for optimization with time limited trial of HD; would not wish for prolonged or indefinite HD   - reviewed code status and differences between full code and DNR; shared that I did discuss recommendation for DNR with pt and that he seemed a little saddened that his health was at the state to need to discuss this but expressed understand that it would be best and a quality of life less than his current if CPR was needed, would not be good; sister Caitlyn agreeable to DNR   - DNR order placed in chart  - reviewed long term wishes for half-way NH with hospice; reviewed philosophy of care of hospice   - Sister agreeable to NH with hospice following optimization, with preference for [facility]; updated DM/SW of these wishes   - if pt's condition were to decline Caitlyn would wish to discuss transition to comfort focused care   - emotional support provided   - Allowed time for questions/concerns; all addressed; expressed availability of myself/palliative team for additional questions/concerns

## 2025-02-25 NOTE — SUBJECTIVE & OBJECTIVE
Interval History: Pt noted to be afebrile and hemodynamically stable. Stable respiratory status.  Overall feeling much better today.  Had about 5 L urine output since the last 24 hours.  Kidney function appears to be stabilizing/improving.  Nephrology following, appreciate recommendations.  No further HD indicated at this time.  Patient now getting more medically stable, will need long-term nursing home placement with or without hospice.  Palliative Care following.      Review of Systems   Constitutional:  Positive for activity change, appetite change and fatigue. Negative for fever.   Respiratory:  Negative for cough and shortness of breath.    Cardiovascular:  Negative for chest pain.   Gastrointestinal:  Positive for nausea. Negative for abdominal pain.   Musculoskeletal:  Positive for arthralgias.   Neurological:  Negative for dizziness and headaches.   Psychiatric/Behavioral:  Negative for confusion.    All other systems reviewed and are negative.    Objective:     Vital Signs (Most Recent):  Temp: 98.4 °F (36.9 °C) (02/25/25 1118)  Pulse: 81 (02/25/25 1118)  Resp: 18 (02/25/25 1118)  BP: (!) 97/59 (02/25/25 1118)  SpO2: 96 % (02/25/25 1118) Vital Signs (24h Range):  Temp:  [97.5 °F (36.4 °C)-98.4 °F (36.9 °C)] 98.4 °F (36.9 °C)  Pulse:  [] 81  Resp:  [17-20] 18  SpO2:  [96 %-98 %] 96 %  BP: ()/(58-61) 97/59     Weight: 68.9 kg (151 lb 14.4 oz)  Body mass index is 21.79 kg/m².    Intake/Output Summary (Last 24 hours) at 2/25/2025 1432  Last data filed at 2/25/2025 0700  Gross per 24 hour   Intake --   Output 2475 ml   Net -2475 ml         Physical Exam  Vitals and nursing note reviewed.   Constitutional:       Appearance: Normal appearance.      Comments: Globally weak   HENT:      Head: Normocephalic and atraumatic.   Cardiovascular:      Rate and Rhythm: Normal rate and regular rhythm.   Pulmonary:      Effort: Pulmonary effort is normal. No respiratory distress.   Abdominal:      General: There  is no distension.   Musculoskeletal:         General: Normal range of motion.   Neurological:      Mental Status: He is alert and oriented to person, place, and time.   Psychiatric:         Mood and Affect: Mood normal.         Behavior: Behavior normal.             Significant Labs: All pertinent labs within the past 24 hours have been reviewed.  CBC:   Recent Labs   Lab 02/25/25  0428   WBC 12.42   HGB 9.5*   HCT 29.5*        CMP:   Recent Labs   Lab 02/24/25  0530 02/25/25  0427    143   K 3.6 3.6    97   CO2 25 27   * 102   * 123*   CREATININE 12.7* 12.8*   CALCIUM 8.3* 8.7   PROT 5.4* 6.1   ALBUMIN 2.3* 2.4*   BILITOT 0.3 0.4   ALKPHOS 53 46   AST 35 27   * 93*   ANIONGAP 16 19*       Significant Imaging: I have reviewed all pertinent imaging results/findings within the past 24 hours.

## 2025-02-25 NOTE — ASSESSMENT & PLAN NOTE
Creatine stable for now. BMP reviewed- noted Estimated Creatinine Clearance: 4.4 mL/min (A) (based on SCr of 12.7 mg/dL (H)). according to latest data. Based on current GFR, CKD stage is stage 3 - GFR 30-59.  Monitor UOP and serial BMP and adjust therapy as needed. Renally dose meds. Avoid nephrotoxic medications and procedures.  With BISI as above.

## 2025-02-25 NOTE — ASSESSMENT & PLAN NOTE
-Patient pending placement to care home nursing home, continue in-hospital care as stated above in the meantime  -More than 20 minutes of my time has been spent discussing and planning just her safe disposition with patient/family/CM/SW/Nursing staff (not including other time spent in clinical discussion and management)  -CM/SW following, appreciate input   -Will place DC orders once placement has been secured  -depending on ongoing goals of care conversation, patient may need hospice set up at the nursing home

## 2025-02-25 NOTE — PLAN OF CARE
Problem: Adult Inpatient Plan of Care  Goal: Optimal Comfort and Wellbeing  Outcome: Progressing     Problem: Acute Kidney Injury/Impairment  Goal: Fluid and Electrolyte Balance  Outcome: Progressing     Problem: Coping Ineffective  Goal: Effective Coping  Outcome: Progressing

## 2025-02-25 NOTE — PROGRESS NOTES
[facility] Medicine  Telemedicine Progress Note    Patient Name: Antonio Torres Jr.  MRN: [id number]  Patient Class: IP- Inpatient   Admission Date: 2/13/2025  Length of Stay: 11 days  Attending Physician: Aura Valdez MD  Primary Care Provider: Fabio Lennon MD          Subjective:     Principal Problem:Acute renal failure superimposed on stage 3 chronic kidney disease        HPI:  This is an 80-year-old male with a past medical history of schizophrenia, chronic blepharitis, hyperlipidemia, CKD 3, BPH, who presents with a fall.    Patient presents for evaluation after a fall and a sycopal episode that occurred prior to presentation. He reports losing consciousness and falling face forward on the floor sustaining an injury to his head, elbows, abdomen and knees. He was unable to stand up and was on the floor for about 1.5 hours. Patient reports having a coughing episode a day prior but denies sick contacts. He denied chest pain.     In the ED, the patient was tachycardic (120s > 60s), but otherwise hemodynamically stable.  Labs were remarkable for leukocytosis (25.3), elevated CPK (23,406), elevated creatinine (3.3-baseline around 1.9), elevated troponin (0.525-chronically elevated), elevated lactic acid (4.8 > 2.1), elevated procalcitonin (2.53), elevated LFTs (T bili: 1.1, AST: 267, ALT: 73), hypophosphatemia (1.7), positive COVID-19.  CT head, maxillofacial, cervical spine showed a nondisplaced right nasal bone fracture with no soft tissue swelling, and paranasal sinus disease.  CT chest, abdomen and pelvis showed possible soft tissue contusion at the level of the xiphoid process, a 2-3 mm left lower lobe pulmonary nodule and nonspecific urinary bladder wall thickening.  Right elbow x-ray demonstrated an elbow effusion with an olecranon spur.  Patient was given 2 L of LR, vancomycin, Zosyn, Tylenol 1 g p.o..  He was admitted for further management.    Overview/Hospital  Course:  Mr. Torres is a 79 yo M admitted after a fall and found to have acute on chronic kidney injury and rhabdomyolysis. CK >20,000 on admit and rising to >40,000. Renal function worsening. On IVF. Also found to be COVID positive. Started on remdesivir. Nephrology consulted. CK improving, though Cr still rising. Almanza is placed for accurate I&D monitoring. Patient went into rapid Afib.  Moved to ICU and started on Amio drip.  Quickly converted back to NSR.  Creat continued to increase and Nephrology recommending HD.  Trialysis catheter placed and patient started on HD.  Amio switched to PO and patient started on Eliquis.  Patient went back into rapid afib/aflutter. Started on Cardizem drip.  Amiodarone stopped because of elevated LFT's and patient started on B blocker.  Seems will be ESRD,nephrology is on case.    Interval History: Pt noted to be afebrile and hemodynamically stable. Stable respiratory status. Long discussion with patient and his sister (Marshall) about goals of care. Sister mentions that pt does not want to continue HD. He prefers conservative care. Discussed with neprhology, with kidney function improving today, will stop HD and remove dialysis cath. discontinue THDC placement planned for today.  Palliative care consulted for ongoing goals of care discussion.     Review of Systems   Constitutional:  Positive for activity change, appetite change and fatigue. Negative for fever.   Respiratory:  Negative for cough and shortness of breath.    Cardiovascular:  Negative for chest pain.   Gastrointestinal:  Positive for nausea. Negative for abdominal pain.   Musculoskeletal:  Positive for arthralgias.   Neurological:  Negative for dizziness and headaches.   Psychiatric/Behavioral:  Negative for confusion.    All other systems reviewed and are negative.    Objective:     Vital Signs (Most Recent):  Temp: 97.6 °F (36.4 °C) (02/24/25 2314)  Pulse: 83 (02/24/25 2314)  Resp: 17 (02/24/25 2314)  BP: (!) 115/58  "(02/24/25 2314)  SpO2: 97 % (02/24/25 2314) Vital Signs (24h Range):  Temp:  [97.4 °F (36.3 °C)-98.2 °F (36.8 °C)] 97.6 °F (36.4 °C)  Pulse:  [71-90] 83  Resp:  [17-20] 17  SpO2:  [97 %-100 %] 97 %  BP: (112-132)/(58-69) 115/58     Weight: 68.9 kg (151 lb 14.4 oz)  Body mass index is 21.79 kg/m².    Intake/Output Summary (Last 24 hours) at 2/24/2025 2356  Last data filed at 2/24/2025 2314  Gross per 24 hour   Intake --   Output 4700 ml   Net -4700 ml         Physical Exam  Vitals and nursing note reviewed.   Constitutional:       Appearance: Normal appearance.      Comments: Globally weak   HENT:      Head: Normocephalic and atraumatic.   Cardiovascular:      Rate and Rhythm: Normal rate and regular rhythm.   Pulmonary:      Effort: Pulmonary effort is normal. No respiratory distress.   Abdominal:      General: There is no distension.   Musculoskeletal:         General: Normal range of motion.   Neurological:      Mental Status: He is alert and oriented to person, place, and time.   Psychiatric:         Mood and Affect: Mood normal.         Behavior: Behavior normal.             Significant Labs: All pertinent labs within the past 24 hours have been reviewed.  CBC: No results for input(s): "WBC", "HGB", "HCT", "PLT" in the last 48 hours.  CMP:   Recent Labs   Lab 02/23/25  0530 02/24/25  0530    143   K 3.8 3.6    102   CO2 25 25    124*   BUN 92* 112*   CREATININE 12.9* 12.7*   CALCIUM 8.4* 8.3*   PROT 5.3* 5.4*   ALBUMIN 2.3* 2.3*   BILITOT 0.3 0.3   ALKPHOS 49 53   AST 44* 35   * 105*   ANIONGAP 15 16       Significant Imaging: I have reviewed all pertinent imaging results/findings within the past 24 hours.    Assessment/Plan:      * Acute renal failure superimposed on stage 3 chronic kidney disease  BISI is likely due to acute tubular necrosis caused by rhabdomyolysis . Baseline creatinine is  1.9 . Most recent creatinine and eGFR are listed below.  Recent Labs     02/22/25  0437 " 02/23/25  0530 02/24/25  0530   CREATININE 12.2* 12.9* 12.7*   EGFRNORACEVR 4* 3* 4*        Plan  - Avoid nephrotoxins and renally dose meds for GFR listed above  - Monitor urine output, serial BMP, and adjust therapy as needed  Appreciate Nephrology.  Nephrology recommending HD.  Trialysis catheter placed and patient started on HD.  on 2/24, kidney function improving. HD stopped on 2/24   -per neprho, kidney function should continue to improve  -pt and family does not want to continue long term HD if needed in the future  -trialysis cath removed  -continue goals of care discussion     ACP (advance care planning)  Advance Care Planning  Per prior attending.  Date: 02/21/2025  Called patient's sister as there were many questions about patient's treatment plan and disposition.  Patient was recently seen by Palliative Care and discussed possible hospice, but patient has also been started on dialysis.  Discussed medical treatment plan and disposition.  At this time, trying to assess if HD will be temporary or long term.  Patient does not seem to want lifelong dialysis.  If this is the case, then we would be looking into hospice, most likely in a NH.  If HD just temporary or patient does agree to lifelong HD, we would then be looking into SNF.  Patient's sister agrees with this plan.  He remains DNR.    Length of ACP   conversation in minutes: A total of 20 min was spent on advance care planning, goals of care discussion, emotional support, formulating and communicating prognosis and exploring burden/benefit of various approaches of treatment. This discussion occurred on a fully voluntary basis with the verbal consent of the patient and/or family.       Another long goals of care discussion on 2/24 with pt and his sister (Marshall). They do not want to do long term HD. Leaning towards conservative care. Palliative care consulted again.       New onset a-fib  Patient has paroxysmal (<7 days) atrial fibrillation. Patient is  currently in sinus rhythm. [id number] Score: 2. The patients heart rate in the last 24 hours is as follows:  Pulse  Min: 71  Max: 90     Antiarrhythmics  metoprolol injection 5 mg, Every 4 hours PRN, Intravenous  metoprolol tartrate (LOPRESSOR) tablet 50 mg, 2 times daily, Oral    Anticoagulants  apixaban tablet 2.5 mg, 2 times daily, Oral    Plan  - Replete lytes with a goal of K>4, Mg >2  - Patient is anticoagulated, see medications listed above.  - Patient's afib is currently  resolved and back in NSR  - patient moved to ICU overnight and placed on Amio and heparin drips.  Converted to NSR.  Cardiology consulted.  Transitioned Amio to PO and heparin drip to Eliquis.  Converted back to rapid Afib on 2/20.  Placed on Cardizem drip.  Back to NSR. No Amio per Cards secondary to elevated LFT's.  Started on PO Lopressor.  Now off Cardizem drip.        Elevated troponin  Recent Labs   Lab 02/19/25  0410   TROPONINI 0.074*         Chronically elevated  No chest pain    Continue to monitor     COVID-19  No hypoxia, prophylactic remdesivir   Completed course of remdesivir.    Non-traumatic rhabdomyolysis  Elevated CPK to 88641 in the setting of a prolonged fall   - CK trending to >40k  - with renal failure -- Nephrolgoy consulted  - continue with aggressive hydration  - no evidence of compartment syndrome  - CK continues to improve, but Creat continued to increase.  Pt was started on HD.  -on 2/24, kidney function improving. HD stopped on 2/24   -per neprho, kidney function should continue to improve  -pt and family does not want to continue long term HD if needed in the future    Elevated LFTs  Likely in the setting of rhabdomyolysis   Continue to monitor   - slowly improving      Stage 3b chronic kidney disease  Creatine stable for now. BMP reviewed- noted Estimated Creatinine Clearance: 4.4 mL/min (A) (based on SCr of 12.7 mg/dL (H)). according to latest data. Based on current GFR, CKD stage is stage 3 - GFR 30-59.   Monitor UOP and serial BMP and adjust therapy as needed. Renally dose meds. Avoid nephrotoxic medications and procedures.  With BISI as above.    Schizophrenia  Continue Abilify         VTE Risk Mitigation (From admission, onward)           Ordered     apixaban tablet 2.5 mg  2 times daily         02/20/25 1017     IP VTE HIGH RISK PATIENT  Once         02/14/25 0222     Place sequential compression device  Until discontinued         02/14/25 0222                          I have completed this tele-visit without the assistance of a telepresenter.    The attending portion of this evaluation, treatment, and documentation was performed per Aura Valdez MD via Telemedicine AudioVisual using the secure [employer] software platform with 2 way audio/video. The provider was located off-site and the patient is located in the hospital. The aforementioned video software was utilized to document the relevant history and physical exam    Aura Valdez MD  Department of Hospital Medicine   [facility]

## 2025-02-25 NOTE — SUBJECTIVE & OBJECTIVE
Medications:  Continuous Infusions:  Scheduled Meds:   apixaban  2.5 mg Oral BID    ARIPiprazole  20 mg Oral Daily    [START ON 2/26/2025] cetirizine  5 mg Oral Every Mon, Wed, Fri    LIDOcaine  1 patch Transdermal Q24H    metoprolol tartrate  50 mg Oral BID    multivitamin  1 tablet Oral Daily     PRN Meds:  Current Facility-Administered Medications:     acetaminophen, 650 mg, Oral, Q4H PRN    albuterol-ipratropium, 3 mL, Nebulization, Q4H PRN    aluminum-magnesium hydroxide-simethicone, 30 mL, Oral, QID PRN    bisacodyL, 10 mg, Rectal, Daily PRN    glucagon (human recombinant), 1 mg, Intramuscular, PRN    glucose, 16 g, Oral, PRN    glucose, 24 g, Oral, PRN    melatonin, 6 mg, Oral, Nightly PRN    metoprolol, 5 mg, Intravenous, Q4H PRN    naloxone, 0.02 mg, Intravenous, PRN    ondansetron, 4 mg, Intravenous, Q8H PRN    senna-docusate 8.6-50 mg, 1 tablet, Oral, Daily PRN    simethicone, 1 tablet, Oral, QID PRN    sodium chloride 0.9%, 10 mL, Intravenous, Q12H PRN    DIPH,PERTUSS(ACELL),TET VACCINE (ADULT)(BOOSTRIX,ADACEL), 0.5 mL, Intramuscular, vaccine x 1 dose    Objective:     Vital Signs (Most Recent):  Temp: 98.1 °F (36.7 °C) (02/24/25 1924)  Pulse: 87 (02/24/25 1947)  Resp: 17 (02/24/25 1924)  BP: (!) 119/58 (02/24/25 2034)  SpO2: 97 % (02/24/25 1924) Vital Signs (24h Range):  Temp:  [97.4 °F (36.3 °C)-98.2 °F (36.8 °C)] 98.1 °F (36.7 °C)  Pulse:  [71-90] 87  Resp:  [17-20] 17  SpO2:  [97 %-100 %] 97 %  BP: (112-132)/(58-69) 119/58     Weight: 68.9 kg (151 lb 14.4 oz)  Body mass index is 21.79 kg/m².       Physical Exam  Vitals and nursing note reviewed.   Constitutional:       General: He is awake. He is not in acute distress.     Appearance: He is ill-appearing.   HENT:      Head: Normocephalic.   Pulmonary:      Effort: No respiratory distress.      Comments: Tachypnea   Skin:     Comments: Multiple areas of bruising and skin abrasions/tares to face and upper ext.     Neurological:      Mental Status:  He is alert and oriented to person, place, and time.   Psychiatric:         Behavior: Behavior is cooperative.      Comments: Improved ability to participate in more complex aspects of medical discussions             Palliative Exam    Advance Care Planning   Advance Directives:   Living Will: No    LaPOST: No (needs lapost prior to discharge; potential plan for hospice at discharge, can be completed with intake if remains discharge plan)    Do Not Resuscitate Status: Yes    Medical Power of : No (pt's sister Caitlyn is legal surrogate decision maker and pt's preferred MPOA)      Decision Making:  Family answered questions and Patient answered questions  Goals of Care: What is most important right now is to focus on avoiding the hospital, remaining as independent as possible, symptom/pain control, quality of life, even if it means sacrificing a little time. Accordingly, we have decided that the best plan to meet the patient's goals includes enrolling in hospice care.      Significant Labs: All pertinent labs within the past 24 hours have been reviewed.  CBC:   Recent Labs   Lab 02/22/25  0437   WBC 9.29   HGB 11.0*   HCT 33.2*   MCV 92        BMP:  Recent Labs   Lab 02/24/25  0530   *      K 3.6      CO2 25   *   CREATININE 12.7*   CALCIUM 8.3*     LFT:  Lab Results   Component Value Date    AST 35 02/24/2025    ALKPHOS 53 02/24/2025    BILITOT 0.3 02/24/2025     Albumin:   Albumin   Date Value Ref Range Status   02/24/2025 2.3 (L) 3.5 - 5.2 g/dL Final     Protein:   Total Protein   Date Value Ref Range Status   02/24/2025 5.4 (L) 6.0 - 8.4 g/dL Final     Lactic acid:   Lab Results   Component Value Date    LACTATE 2.1 02/14/2025    LACTATE 4.8 (HH) 02/13/2025       Significant Imaging: I have reviewed all pertinent imaging results/findings within the past 24 hours.

## 2025-02-25 NOTE — SUBJECTIVE & OBJECTIVE
Interval History: UOP 4.57L yesterday! No events overnight. Doing well this afternoon. Reports minimal appetite but trying to eat as much lunch as he can. Good UOP. No nausea or vomiting.     Review of patient's allergies indicates:  No Known Allergies  Current Facility-Administered Medications   Medication Frequency    acetaminophen tablet 650 mg Q4H PRN    albuterol-ipratropium 2.5 mg-0.5 mg/3 mL nebulizer solution 3 mL Q4H PRN    aluminum-magnesium hydroxide-simethicone 200-200-20 mg/5 mL suspension 30 mL QID PRN    apixaban tablet 2.5 mg BID    ARIPiprazole tablet 20 mg Daily    bisacodyL suppository 10 mg Daily PRN    [START ON 2/26/2025] cetirizine tablet 5 mg Every Mon, Wed, Fri    glucagon (human recombinant) injection 1 mg PRN    glucose chewable tablet 16 g PRN    glucose chewable tablet 24 g PRN    LIDOcaine 5 % patch 1 patch Q24H    melatonin tablet 6 mg Nightly PRN    metoprolol injection 5 mg Q4H PRN    metoprolol tartrate (LOPRESSOR) tablet 50 mg BID    multivitamin tablet Daily    naloxone 0.4 mg/mL injection 0.02 mg PRN    ondansetron injection 4 mg Q8H PRN    senna-docusate 8.6-50 mg per tablet 1 tablet Daily PRN    simethicone chewable tablet 80 mg QID PRN    sodium chloride 0.9% flush 10 mL Q12H PRN    Tdap vaccine injection 0.5 mL vaccine x 1 dose     Facility-Administered Medications Ordered in Other Encounters   Medication Frequency    0.9%  NaCl infusion Continuous    mupirocin 2 % ointment On Call Procedure       Objective:     Vital Signs (Most Recent):  Temp: 98.4 °F (36.9 °C) (02/25/25 1118)  Pulse: 81 (02/25/25 1118)  Resp: 18 (02/25/25 1118)  BP: (!) 97/59 (02/25/25 1118)  SpO2: 96 % (02/25/25 1118) Vital Signs (24h Range):  Temp:  [97.5 °F (36.4 °C)-98.4 °F (36.9 °C)] 98.4 °F (36.9 °C)  Pulse:  [] 81  Resp:  [17-20] 18  SpO2:  [96 %-98 %] 96 %  BP: ()/(58-61) 97/59     Weight: 68.9 kg (151 lb 14.4 oz) (02/24/25 0700)  Body mass index is 21.79 kg/m².  Body surface area is  1.84 meters squared.    I/O last 3 completed shifts:  In: -   Out: 6375 [Urine:6375]     Physical Exam  Vitals and nursing note reviewed.   Constitutional:       General: He is awake. He is not in acute distress.     Appearance: Normal appearance. He is well-developed.   HENT:      Head: Normocephalic and atraumatic.      Nose: Nose normal.      Mouth/Throat:      Mouth: Mucous membranes are moist.   Eyes:      Extraocular Movements: Extraocular movements intact.      Conjunctiva/sclera: Conjunctivae normal.   Cardiovascular:      Rate and Rhythm: Normal rate and regular rhythm.   Pulmonary:      Effort: Pulmonary effort is normal.      Breath sounds: Normal breath sounds.   Abdominal:      General: There is no distension.      Palpations: Abdomen is soft.   Musculoskeletal:         General: No tenderness or signs of injury.      Right lower leg: No edema.      Left lower leg: No edema.   Skin:     General: Skin is warm and dry.      Findings: No erythema or rash.   Neurological:      General: No focal deficit present.      Mental Status: He is alert. Mental status is at baseline.   Psychiatric:         Mood and Affect: Mood normal.         Behavior: Behavior normal.          Significant Labs:  CBC:   Recent Labs   Lab 02/25/25  0428   WBC 12.42   RBC 3.11*   HGB 9.5*   HCT 29.5*      MCV 95   MCH 30.5   MCHC 32.2     CMP:   Recent Labs   Lab 02/25/25 0427      CALCIUM 8.7   ALBUMIN 2.4*   PROT 6.1      K 3.6   CO2 27   CL 97   *   CREATININE 12.8*   ALKPHOS 46   ALT 93*   AST 27   BILITOT 0.4     All labs within the past 24 hours have been reviewed.     Significant Imaging:  Labs: Reviewed

## 2025-02-25 NOTE — NURSING
[facility]  Nurses Note -- 4 Eyes      2/25/2025       Skin assessed on: Q Shift      [x] No Pressure Injuries Present    []Prevention Measures Documented    [] Yes LDA  for Pressure Injury Previously documented     [] Yes New Pressure Injury Discovered   [] LDA for New Pressure Injury Added      Attending RN:  Felecia Boston RN     Second RN:  Carley

## 2025-02-25 NOTE — SUBJECTIVE & OBJECTIVE
Interval History: Pt noted to be afebrile and hemodynamically stable. Stable respiratory status. Long discussion with patient and his sister (Marshall) about goals of care. Sister mentions that pt does not want to continue HD. He prefers conservative care. Discussed with neprhology, with kidney function improving today, will stop HD and remove dialysis cath. discontinue THDC placement planned for today.  Palliative care consulted for ongoing goals of care discussion.     Review of Systems   Constitutional:  Positive for activity change, appetite change and fatigue. Negative for fever.   Respiratory:  Negative for cough and shortness of breath.    Cardiovascular:  Negative for chest pain.   Gastrointestinal:  Positive for nausea. Negative for abdominal pain.   Musculoskeletal:  Positive for arthralgias.   Neurological:  Negative for dizziness and headaches.   Psychiatric/Behavioral:  Negative for confusion.    All other systems reviewed and are negative.    Objective:     Vital Signs (Most Recent):  Temp: 97.6 °F (36.4 °C) (02/24/25 2314)  Pulse: 83 (02/24/25 2314)  Resp: 17 (02/24/25 2314)  BP: (!) 115/58 (02/24/25 2314)  SpO2: 97 % (02/24/25 2314) Vital Signs (24h Range):  Temp:  [97.4 °F (36.3 °C)-98.2 °F (36.8 °C)] 97.6 °F (36.4 °C)  Pulse:  [71-90] 83  Resp:  [17-20] 17  SpO2:  [97 %-100 %] 97 %  BP: (112-132)/(58-69) 115/58     Weight: 68.9 kg (151 lb 14.4 oz)  Body mass index is 21.79 kg/m².    Intake/Output Summary (Last 24 hours) at 2/24/2025 2480  Last data filed at 2/24/2025 2314  Gross per 24 hour   Intake --   Output 4700 ml   Net -4700 ml         Physical Exam  Vitals and nursing note reviewed.   Constitutional:       Appearance: Normal appearance.      Comments: Globally weak   HENT:      Head: Normocephalic and atraumatic.   Cardiovascular:      Rate and Rhythm: Normal rate and regular rhythm.   Pulmonary:      Effort: Pulmonary effort is normal. No respiratory distress.   Abdominal:      General: There  "is no distension.   Musculoskeletal:         General: Normal range of motion.   Neurological:      Mental Status: He is alert and oriented to person, place, and time.   Psychiatric:         Mood and Affect: Mood normal.         Behavior: Behavior normal.             Significant Labs: All pertinent labs within the past 24 hours have been reviewed.  CBC: No results for input(s): "WBC", "HGB", "HCT", "PLT" in the last 48 hours.  CMP:   Recent Labs   Lab 02/23/25  0530 02/24/25  0530    143   K 3.8 3.6    102   CO2 25 25    124*   BUN 92* 112*   CREATININE 12.9* 12.7*   CALCIUM 8.4* 8.3*   PROT 5.3* 5.4*   ALBUMIN 2.3* 2.3*   BILITOT 0.3 0.3   ALKPHOS 49 53   AST 44* 35   * 105*   ANIONGAP 15 16       Significant Imaging: I have reviewed all pertinent imaging results/findings within the past 24 hours.  "

## 2025-02-25 NOTE — PROGRESS NOTES
[facility] [place] [facility]  [facility]phrology  Progress Note    Patient Name: Antonio Torres Jr.  MRN: [id number]  Admission Date: 2/13/2025  Hospital Length of Stay: 11 days  Attending Provider: Aura Valdez MD   Primary Care Physician: Fabio Lennon MD  Principal Problem:Acute renal failure superimposed on stage 3 chronic kidney disease    Subjective:     HPI: Mr. Torres is an 79 yo male with schizophrenia, BPH, CKD, and h/o melanoma who presented to the ED overnight s/p fall. HR 120s on arrival. He was also found to be COVID+. CPK 23k. He was started on IVF. Nephrology consulted for BISI on CKD. Prior records obtained and reviewed. He is followed by myself outpatient; last visit was 11/11/24. His baseline Cr is 1.7-2.0; his CKD is of unknown etiology. His Cr was 3.3 on arrival --> 3.5 this morning. CPK now > 40k. Abnormal LFTs. He reports to be doing okay this afternoon. Complaining of soreness. No SOB or leg swelling.    Interval History: UOP 4.57L yesterday! No events overnight. Doing well this afternoon. Reports minimal appetite but trying to eat as much lunch as he can. Good UOP. No nausea or vomiting.     Review of patient's allergies indicates:  No Known Allergies  Current Facility-Administered Medications   Medication Frequency    acetaminophen tablet 650 mg Q4H PRN    albuterol-ipratropium 2.5 mg-0.5 mg/3 mL nebulizer solution 3 mL Q4H PRN    aluminum-magnesium hydroxide-simethicone 200-200-20 mg/5 mL suspension 30 mL QID PRN    apixaban tablet 2.5 mg BID    ARIPiprazole tablet 20 mg Daily    bisacodyL suppository 10 mg Daily PRN    [START ON 2/26/2025] cetirizine tablet 5 mg Every Mon, Wed, Fri    glucagon (human recombinant) injection 1 mg PRN    glucose chewable tablet 16 g PRN    glucose chewable tablet 24 g PRN    LIDOcaine 5 % patch 1 patch Q24H    melatonin tablet 6 mg Nightly PRN    metoprolol injection 5 mg Q4H PRN    metoprolol tartrate (LOPRESSOR) tablet 50 mg BID    multivitamin tablet Daily     PFT order placed    I am okay with the letter     MG   naloxone 0.4 mg/mL injection 0.02 mg PRN    ondansetron injection 4 mg Q8H PRN    senna-docusate 8.6-50 mg per tablet 1 tablet Daily PRN    simethicone chewable tablet 80 mg QID PRN    sodium chloride 0.9% flush 10 mL Q12H PRN    Tdap vaccine injection 0.5 mL vaccine x 1 dose     Facility-Administered Medications Ordered in Other Encounters   Medication Frequency    0.9%  NaCl infusion Continuous    mupirocin 2 % ointment On Call Procedure       Objective:     Vital Signs (Most Recent):  Temp: 98.4 °F (36.9 °C) (02/25/25 1118)  Pulse: 81 (02/25/25 1118)  Resp: 18 (02/25/25 1118)  BP: (!) 97/59 (02/25/25 1118)  SpO2: 96 % (02/25/25 1118) Vital Signs (24h Range):  Temp:  [97.5 °F (36.4 °C)-98.4 °F (36.9 °C)] 98.4 °F (36.9 °C)  Pulse:  [] 81  Resp:  [17-20] 18  SpO2:  [96 %-98 %] 96 %  BP: ()/(58-61) 97/59     Weight: 68.9 kg (151 lb 14.4 oz) (02/24/25 0700)  Body mass index is 21.79 kg/m².  Body surface area is 1.84 meters squared.    I/O last 3 completed shifts:  In: -   Out: 6375 [Urine:6375]     Physical Exam  Vitals and nursing note reviewed.   Constitutional:       General: He is awake. He is not in acute distress.     Appearance: Normal appearance. He is well-developed.   HENT:      Head: Normocephalic and atraumatic.      Nose: Nose normal.      Mouth/Throat:      Mouth: Mucous membranes are moist.   Eyes:      Extraocular Movements: Extraocular movements intact.      Conjunctiva/sclera: Conjunctivae normal.   Cardiovascular:      Rate and Rhythm: Normal rate and regular rhythm.   Pulmonary:      Effort: Pulmonary effort is normal.      Breath sounds: Normal breath sounds.   Abdominal:      General: There is no distension.      Palpations: Abdomen is soft.   Musculoskeletal:         General: No tenderness or signs of injury.      Right lower leg: No edema.      Left lower leg: No edema.   Skin:     General: Skin is warm and dry.      Findings: No erythema or rash.   Neurological:      General: No  focal deficit present.      Mental Status: He is alert. Mental status is at baseline.   Psychiatric:         Mood and Affect: Mood normal.         Behavior: Behavior normal.          Significant Labs:  CBC:   Recent Labs   Lab 02/25/25  0428   WBC 12.42   RBC 3.11*   HGB 9.5*   HCT 29.5*      MCV 95   MCH 30.5   MCHC 32.2     CMP:   Recent Labs   Lab 02/25/25  0427      CALCIUM 8.7   ALBUMIN 2.4*   PROT 6.1      K 3.6   CO2 27   CL 97   *   CREATININE 12.8*   ALKPHOS 46   ALT 93*   AST 27   BILITOT 0.4     All labs within the past 24 hours have been reviewed.     Significant Imaging:  Labs: Reviewed    Assessment/Plan:     BISI on CKD stage 3b  - baseline Cr 1.7-2.0; followed outpatient by myself  - Cr 3.3 on arrival and peaked at 12.9 on 2/20/25; due to rhabdomyolysis  - s/p HD x 2 hours on 2/20/25; HD has been held since  - Cr 12.9 --> 12.7 --> 12.8 today; stable. Currently polyuric; possibly with post-ATN diuresis. LFTs improving  - patient refused further HD, but no need for dialysis today  - continue holding lasix  - low threshold to start 1/2NS if hypotension or hypernatremia develop (due to polyuria)  - daily labs. Strict I/Os  - otherwise, okay to discharge from renal standpoint    Rhabdomyolysis  - CPK > 40,000   - improved    Metabolic acidosis  - improved  - monitor for alkalosis in setting of polyuria    CKD-MBD  - CCa normal  - phos 6.1 --> 6.0; stable  - continue renal diet  - will trend      Thank you for your consult. I will follow-up with patient. Please contact us if you have any additional questions.    Dariela Terrazas MD  Nephrology  [facility] - Telemetry

## 2025-02-25 NOTE — ASSESSMENT & PLAN NOTE
BISI is likely due to acute tubular necrosis caused by rhabdomyolysis . Baseline creatinine is  1.9 . Most recent creatinine and eGFR are listed below.  Recent Labs     02/23/25  0530 02/24/25  0530 02/25/25  0427   CREATININE 12.9* 12.7* 12.8*   EGFRNORACEVR 3* 4* 4*        Plan  - Avoid nephrotoxins and renally dose meds for GFR listed above  - Monitor urine output, serial BMP, and adjust therapy as needed  Appreciate Nephrology.  Nephrology recommending HD.  Trialysis catheter placed and patient started on HD.  on 2/24, kidney function improving. HD stopped on 2/24   -per neprho, kidney function should continue to improve  -pt and family does not want to continue long term HD if needed in the future  -trialysis cath removed  -continue goals of care discussion, palliative Care following, appreciate recommendations

## 2025-02-25 NOTE — PROGRESS NOTES
"[facility]  Palliative Medicine  Progress Note    Patient Name: Antonio Torres Jr.  MRN: [id number]  Admission Date: 2/13/2025  Hospital Length of Stay: 10 days  Code Status: DNR   Attending Provider: Aura Valdez MD  Consulting Provider: Janny Romero NP  Primary Care Physician: Fabio Lennon MD  Principal Problem:Acute renal failure superimposed on stage 3 chronic kidney disease    Patient information was obtained from patient and primary team.      Assessment/Plan:     Psychiatric  Schizophrenia  - pt diagnosed in early 20's, per sister Caitlyn; sadly 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 at [employer] in the 60's and was "very brilliant"; during that time pt suffered "nervous breakdown" per sister; pt went through extensive psychiatric care including ECT   - pt has since struggled with his mental disorders which have greatly affected his overall quality of life, especially more recently (see ACP)   - he also has good insight that his care needs exceed his sisters abilities at this time and is agreeable to exploring other options for shelter living and medical care   - pt is currently alert, cooperative, and is better able to participate in discussions and express insight into more complex areas of his care such as renal function, HD, and potential trajectory of stopping HD     Renal/  * Acute renal failure superimposed on stage 3 chronic kidney disease  - chronic condition noted; now with acute BISI requiring HD   - was present during Dr. Smith's discussions with pt and sister (by phone) today; all in agreement for HD   - sister/MPOA previously expressed wishes for time limited trial of HD, she is agreeable to assess if HD can be temporary to improve pts current condition, but would not wish for pt to continue HD indefinitely   - after several days of HD pt has expressed wishes to discontinue HD; good insight that this could lead to worsening renal function in the future and eventually " likely contribute to pt's death in the future       Palliative Care  ACP (advance care planning)    - interval chart reviewed; discussed pt with HM   - MDT requested palliative discussion with pt to confirm wishes regarding HD   - pt with clear GOC that HD does not align with the quality of life he wishes for himself, given his age, overall poor prognosis, and [facility]; doesn't wish for his life to mainly consist of full care at a NH and HD   - validated pts wishes and GOC for himself  - pt wishes to proceed with [facility] placement and open to further discussion of hospice care at NH   - emotional support provided; answered all questions   - attempted to call pt's sister for update of pt's wishes and to answer any questions, unable to reach on initial attempt   - requested palliative SW follow up with sister for emotional support and to assist in answering questions with planning further discussion with sister when able to reach directly     2025 - Consult   - consult received; interval chart reviewed in detail; discussed pt during ICU MDT rounds   - met with patient at bedside, along with Dr. Smith; Dr. Smith discussed plan for HD with pt; I remained in the room with pt following for introduction to palliative medicine team and role in current care and admission and GOC/ACP discussion   - pt confirms sister is preferred MPOA; she is also legal surrogate decision maker as pt is not , does not have children, and both parents are ; pt wishes to be included in care discussions but defaults decision making to sister (see schizophrenia); this seems very appropriate as pt seems to have capacity for simple aspect of care, but not for complex aspects of care such as HD, code status, etc or overall care/coordination of care on his own behalf outside of the hospital   - also joined Dr. Smith with call to pt's sisterCaitlyn   - learned more about pt outside of current  "admission; he has been having decreased quality of life over the last few years; she outfitted her garage into a living space for pt but has good insight that pt's care exceeds her abilities at this time   - Caitlyn was very tearful in today's discussions as this is all very hard for her to process, especially from a distance   - GOC/ACP discussion; focus of GOC are for pt's quality of life and symptom management   - she wishes for optimization with time limited trial of HD; would not wish for prolonged or indefinite HD   - reviewed code status and differences between full code and DNR; shared that I did discuss recommendation for DNR with pt and that he seemed a little saddened that his health was at the state to need to discuss this but expressed understand that it would be best and a quality of life less than his current if CPR was needed, would not be good; sister Caitlyn agreeable to DNR   - DNR order placed in chart  - reviewed long term wishes for [facility] with hospice; reviewed philosophy of care of hospice   - Sister agreeable to NH with hospice following optimization, with preference for [facility]; updated DM/SW of these wishes   - if pt's condition were to decline Caitlyn would wish to discuss transition to comfort focused care   - emotional support provided   - Allowed time for questions/concerns; all addressed; expressed availability of myself/palliative team for additional questions/concerns           I will follow-up with patient. Please contact us if you have any additional questions.    Subjective:     Chief Complaint:   Chief Complaint   Patient presents with    Fall     Reports falling from standing position and hitting face on dresser. Denies blood thinners. +LOC       HPI:   From H&P: "This is an 80-year-old male with a past medical history of schizophrenia, chronic blepharitis, hyperlipidemia, CKD 3, BPH, who presents with a fall.     Patient presents for evaluation after a fall and a " "sycopal episode that occurred prior to presentation. He reports losing consciousness and falling face forward on the floor sustaining an injury to his head, elbows, abdomen and knees. He was unable to stand up and was on the floor for about 1.5 hours. Patient reports having a coughing episode a day prior but denies sick contacts. He denied chest pain.      In the ED, the patient was tachycardic (120s > 60s), but otherwise hemodynamically stable.  Labs were remarkable for leukocytosis (25.3), elevated CPK (23,406), elevated creatinine (3.3-baseline around 1.9), elevated troponin (0.525-chronically elevated), elevated lactic acid (4.8 > 2.1), elevated procalcitonin (2.53), elevated LFTs (T bili: 1.1, AST: 267, ALT: 73), hypophosphatemia (1.7), positive COVID-19.  CT head, maxillofacial, cervical spine showed a nondisplaced right nasal bone fracture with no soft tissue swelling, and paranasal sinus disease.  CT chest, abdomen and pelvis showed possible soft tissue contusion at the level of the xiphoid process, a 2-3 mm left lower lobe pulmonary nodule and nonspecific urinary bladder wall thickening.  Right elbow x-ray demonstrated an elbow effusion with an olecranon spur.  Patient was given 2 L of LR, vancomycin, Zosyn, Tylenol 1 g p.o..  He was admitted for further management."     Palliative medicine consulted for goals of care discussion and advance care planning; for details of visit, see advance care planning section of plan.       Hospital Course:  No notes on file        Medications:  Continuous Infusions:  Scheduled Meds:   apixaban  2.5 mg Oral BID    ARIPiprazole  20 mg Oral Daily    [START ON 2/26/2025] cetirizine  5 mg Oral Every Mon, Wed, Fri    LIDOcaine  1 patch Transdermal Q24H    metoprolol tartrate  50 mg Oral BID    multivitamin  1 tablet Oral Daily     PRN Meds:  Current Facility-Administered Medications:     acetaminophen, 650 mg, Oral, Q4H PRN    albuterol-ipratropium, 3 mL, Nebulization, Q4H " PRN    aluminum-magnesium hydroxide-simethicone, 30 mL, Oral, QID PRN    bisacodyL, 10 mg, Rectal, Daily PRN    glucagon (human recombinant), 1 mg, Intramuscular, PRN    glucose, 16 g, Oral, PRN    glucose, 24 g, Oral, PRN    melatonin, 6 mg, Oral, Nightly PRN    metoprolol, 5 mg, Intravenous, Q4H PRN    naloxone, 0.02 mg, Intravenous, PRN    ondansetron, 4 mg, Intravenous, Q8H PRN    senna-docusate 8.6-50 mg, 1 tablet, Oral, Daily PRN    simethicone, 1 tablet, Oral, QID PRN    sodium chloride 0.9%, 10 mL, Intravenous, Q12H PRN    DIPH,PERTUSS(ACELL),TET VACCINE (ADULT)(BOOSTRIX,ADACEL), 0.5 mL, Intramuscular, vaccine x 1 dose    Objective:     Vital Signs (Most Recent):  Temp: 98.1 °F (36.7 °C) (02/24/25 1924)  Pulse: 87 (02/24/25 1947)  Resp: 17 (02/24/25 1924)  BP: (!) 119/58 (02/24/25 2034)  SpO2: 97 % (02/24/25 1924) Vital Signs (24h Range):  Temp:  [97.4 °F (36.3 °C)-98.2 °F (36.8 °C)] 98.1 °F (36.7 °C)  Pulse:  [71-90] 87  Resp:  [17-20] 17  SpO2:  [97 %-100 %] 97 %  BP: (112-132)/(58-69) 119/58     Weight: 68.9 kg (151 lb 14.4 oz)  Body mass index is 21.79 kg/m².       Physical Exam  Vitals and nursing note reviewed.   Constitutional:       General: He is awake. He is not in acute distress.     Appearance: He is ill-appearing.   HENT:      Head: Normocephalic.   Pulmonary:      Effort: No respiratory distress.      Comments: Tachypnea   Skin:     Comments: Multiple areas of bruising and skin abrasions/tares to face and upper ext.     Neurological:      Mental Status: He is alert and oriented to person, place, and time.   Psychiatric:         Behavior: Behavior is cooperative.      Comments: Improved ability to participate in more complex aspects of medical discussions             Palliative Exam    Advance Care Planning  Advance Directives:   Living Will: No    LaPOST: No (needs lapost prior to discharge; potential plan for hospice at discharge, can be completed with intake if remains discharge plan)    Do  Not Resuscitate Status: Yes    Medical Power of : No (pt's sister Caitlyn is legal surrogate decision maker and pt's preferred MPOA)      Decision Making:  Family answered questions and Patient answered questions  Goals of Care: What is most important right now is to focus on avoiding the hospital, remaining as independent as possible, symptom/pain control, quality of life, even if it means sacrificing a little time. Accordingly, we have decided that the best plan to meet the patient's goals includes enrolling in hospice care.      Significant Labs: All pertinent labs within the past 24 hours have been reviewed.  CBC:   Recent Labs   Lab 02/22/25  0437   WBC 9.29   HGB 11.0*   HCT 33.2*   MCV 92        BMP:  Recent Labs   Lab 02/24/25  0530   *      K 3.6      CO2 25   *   CREATININE 12.7*   CALCIUM 8.3*     LFT:  Lab Results   Component Value Date    AST 35 02/24/2025    ALKPHOS 53 02/24/2025    BILITOT 0.3 02/24/2025     Albumin:   Albumin   Date Value Ref Range Status   02/24/2025 2.3 (L) 3.5 - 5.2 g/dL Final     Protein:   Total Protein   Date Value Ref Range Status   02/24/2025 5.4 (L) 6.0 - 8.4 g/dL Final     Lactic acid:   Lab Results   Component Value Date    LACTATE 2.1 02/14/2025    LACTATE 4.8 (HH) 02/13/2025       Significant Imaging: I have reviewed all pertinent imaging results/findings within the past 24 hours.    Total visit time: 55 minutes    35 min visit time including: face to face time in discussion of symptom assessment, and exploring options and burdens of offered treatments.  This also includes non-face to face time preparing to see the patient including chart review, obtaining and/or reviewing separately obtained history, documenting clinical information in the electronic or other health record, independently interpreting results and communicating results to the patient/family/caregiver, family discussions by phone if not able to be present, coordination  of care with other specialists, and discharge planning.     20 min ACP time spent: goals of care, advanced care planning, emotional support, formulating and communicating prognosis, exploring burden/ benefit of various approaches of treatment.     Janny Romero NP  Palliative Medicine  [facility]

## 2025-02-25 NOTE — ASSESSMENT & PLAN NOTE
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 [employer] in the 60's and was "very brilliant"; during that time pt suffered "nervous breakdown" per sister; pt went through extensive psychiatric care including ECT   - pt has since struggled with his mental disorders which have greatly affected his overall quality of life, especially more recently (see ACP)   - he also has good insight that his care needs exceed his sisters abilities at this time and is agreeable to exploring other options for half-way living and medical care   - pt is currently alert, cooperative, and is better able to participate in discussions and express insight into more complex areas of his care such as renal function, HD, and potential trajectory of stopping HD   "

## 2025-02-26 LAB
ALBUMIN SERPL BCP-MCNC: 2.3 G/DL (ref 3.5–5.2)
ALP SERPL-CCNC: 43 U/L (ref 40–150)
ALT SERPL W/O P-5'-P-CCNC: 75 U/L (ref 10–44)
ANION GAP SERPL CALC-SCNC: 17 MMOL/L (ref 8–16)
AST SERPL-CCNC: 26 U/L (ref 10–40)
BASOPHILS # BLD AUTO: 0.04 K/UL (ref 0–0.2)
BASOPHILS NFR BLD: 0.3 % (ref 0–1.9)
BILIRUB SERPL-MCNC: 0.3 MG/DL (ref 0.1–1)
BUN SERPL-MCNC: 138 MG/DL (ref 8–23)
CALCIUM SERPL-MCNC: 8.5 MG/DL (ref 8.7–10.5)
CHLORIDE SERPL-SCNC: 99 MMOL/L (ref 95–110)
CO2 SERPL-SCNC: 27 MMOL/L (ref 23–29)
CREAT SERPL-MCNC: 12.2 MG/DL (ref 0.5–1.4)
DIFFERENTIAL METHOD BLD: ABNORMAL
EOSINOPHIL # BLD AUTO: 0.3 K/UL (ref 0–0.5)
EOSINOPHIL NFR BLD: 2.1 % (ref 0–8)
ERYTHROCYTE [DISTWIDTH] IN BLOOD BY AUTOMATED COUNT: 14.2 % (ref 11.5–14.5)
EST. GFR  (NO RACE VARIABLE): 4 ML/MIN/1.73 M^2
GLUCOSE SERPL-MCNC: 106 MG/DL (ref 70–110)
HCT VFR BLD AUTO: 26.6 % (ref 40–54)
HGB BLD-MCNC: 8.9 G/DL (ref 14–18)
IMM GRANULOCYTES # BLD AUTO: 0.08 K/UL (ref 0–0.04)
IMM GRANULOCYTES NFR BLD AUTO: 0.7 % (ref 0–0.5)
LYMPHOCYTES # BLD AUTO: 1 K/UL (ref 1–4.8)
LYMPHOCYTES NFR BLD: 8.5 % (ref 18–48)
MAGNESIUM SERPL-MCNC: 1.9 MG/DL (ref 1.6–2.6)
MCH RBC QN AUTO: 31.1 PG (ref 27–31)
MCHC RBC AUTO-ENTMCNC: 33.5 G/DL (ref 32–36)
MCV RBC AUTO: 93 FL (ref 82–98)
MONOCYTES # BLD AUTO: 1.2 K/UL (ref 0.3–1)
MONOCYTES NFR BLD: 9.7 % (ref 4–15)
NEUTROPHILS # BLD AUTO: 9.6 K/UL (ref 1.8–7.7)
NEUTROPHILS NFR BLD: 78.7 % (ref 38–73)
NRBC BLD-RTO: 0 /100 WBC
PHOSPHATE SERPL-MCNC: 5.9 MG/DL (ref 2.7–4.5)
PLATELET # BLD AUTO: 273 K/UL (ref 150–450)
PMV BLD AUTO: 10.1 FL (ref 9.2–12.9)
POTASSIUM SERPL-SCNC: 3.4 MMOL/L (ref 3.5–5.1)
PROT SERPL-MCNC: 5.9 G/DL (ref 6–8.4)
RBC # BLD AUTO: 2.86 M/UL (ref 4.6–6.2)
SODIUM SERPL-SCNC: 143 MMOL/L (ref 136–145)
WBC # BLD AUTO: 12.2 K/UL (ref 3.9–12.7)

## 2025-02-26 PROCEDURE — 36415 COLL VENOUS BLD VENIPUNCTURE: CPT | Performed by: HOSPITALIST

## 2025-02-26 PROCEDURE — 99233 SBSQ HOSP IP/OBS HIGH 50: CPT | Mod: ,,, | Performed by: REGISTERED NURSE

## 2025-02-26 PROCEDURE — 25000003 PHARM REV CODE 250: Performed by: HOSPITALIST

## 2025-02-26 PROCEDURE — 85025 COMPLETE CBC W/AUTO DIFF WBC: CPT | Performed by: HOSPITALIST

## 2025-02-26 PROCEDURE — 25000003 PHARM REV CODE 250: Performed by: INTERNAL MEDICINE

## 2025-02-26 PROCEDURE — 83735 ASSAY OF MAGNESIUM: CPT | Performed by: HOSPITALIST

## 2025-02-26 PROCEDURE — 99232 SBSQ HOSP IP/OBS MODERATE 35: CPT | Mod: ,,, | Performed by: INTERNAL MEDICINE

## 2025-02-26 PROCEDURE — 80053 COMPREHEN METABOLIC PANEL: CPT | Performed by: HOSPITALIST

## 2025-02-26 PROCEDURE — 21400001 HC TELEMETRY ROOM

## 2025-02-26 PROCEDURE — 99497 ADVNCD CARE PLAN 30 MIN: CPT | Mod: ,,, | Performed by: REGISTERED NURSE

## 2025-02-26 PROCEDURE — 84100 ASSAY OF PHOSPHORUS: CPT | Performed by: HOSPITALIST

## 2025-02-26 RX ORDER — POTASSIUM CHLORIDE 20 MEQ/1
20 TABLET, EXTENDED RELEASE ORAL ONCE
Status: COMPLETED | OUTPATIENT
Start: 2025-02-26 | End: 2025-02-26

## 2025-02-26 RX ORDER — POTASSIUM CHLORIDE 20 MEQ/1
40 TABLET, EXTENDED RELEASE ORAL ONCE
Status: COMPLETED | OUTPATIENT
Start: 2025-02-26 | End: 2025-02-26

## 2025-02-26 RX ADMIN — ARIPIPRAZOLE 20 MG: 10 TABLET ORAL at 09:02

## 2025-02-26 RX ADMIN — POTASSIUM CHLORIDE 20 MEQ: 1500 TABLET, EXTENDED RELEASE ORAL at 09:02

## 2025-02-26 RX ADMIN — LIDOCAINE 5% 1 PATCH: 700 PATCH TOPICAL at 02:02

## 2025-02-26 RX ADMIN — APIXABAN 2.5 MG: 2.5 TABLET, FILM COATED ORAL at 08:02

## 2025-02-26 RX ADMIN — APIXABAN 2.5 MG: 2.5 TABLET, FILM COATED ORAL at 09:02

## 2025-02-26 RX ADMIN — THERA TABS 1 TABLET: TAB at 09:02

## 2025-02-26 RX ADMIN — CETIRIZINE HYDROCHLORIDE 5 MG: 5 TABLET ORAL at 09:02

## 2025-02-26 RX ADMIN — POTASSIUM CHLORIDE 40 MEQ: 1500 TABLET, EXTENDED RELEASE ORAL at 11:02

## 2025-02-26 RX ADMIN — SODIUM CHLORIDE 250 ML: 9 INJECTION, SOLUTION INTRAVENOUS at 11:02

## 2025-02-26 NOTE — SUBJECTIVE & OBJECTIVE
Interval History: Pt noted to be afebrile with stable respiratory status.  BP slightly soft.  Given 250 cc IV fluid bolus today with improvement in blood pressure.  Overall feeling much better today.  Continues to have high urine output.  Per Nephrology, kidney function appears to be stabilizing/improving.   No further HD indicated at this time.  Patient now getting more medically stable, will need shelter nursing home placement with or without hospice.  Palliative Care following.  Reports he is eating and drinking well, having regular bowel movements, getting good rest.      Review of Systems   Constitutional:  Positive for activity change, appetite change and fatigue. Negative for fever.   Respiratory:  Negative for cough and shortness of breath.    Cardiovascular:  Negative for chest pain.   Gastrointestinal:  Positive for nausea. Negative for abdominal pain.   Musculoskeletal:  Positive for arthralgias.   Neurological:  Negative for dizziness and headaches.   Psychiatric/Behavioral:  Negative for confusion.    All other systems reviewed and are negative.    Objective:     Vital Signs (Most Recent):  Temp: 98 °F (36.7 °C) (02/26/25 1053)  Pulse: 78 (02/26/25 1107)  Resp: 18 (02/26/25 1053)  BP: (!) 98/52 (02/26/25 1315)  SpO2: 97 % (02/26/25 1053) Vital Signs (24h Range):  Temp:  [97.3 °F (36.3 °C)-98.4 °F (36.9 °C)] 98 °F (36.7 °C)  Pulse:  [70-84] 78  Resp:  [17-18] 18  SpO2:  [97 %-100 %] 97 %  BP: ()/(52-64) 98/52     Weight: 68.9 kg (151 lb 14.4 oz)  Body mass index is 21.79 kg/m².    Intake/Output Summary (Last 24 hours) at 2/26/2025 1329  Last data filed at 2/26/2025 1042  Gross per 24 hour   Intake 120 ml   Output 1650 ml   Net -1530 ml         Physical Exam  Vitals and nursing note reviewed.   Constitutional:       Appearance: Normal appearance.      Comments: Globally weak   HENT:      Head: Normocephalic and atraumatic.   Cardiovascular:      Rate and Rhythm: Normal rate and regular rhythm.    Pulmonary:      Effort: Pulmonary effort is normal. No respiratory distress.   Abdominal:      General: There is no distension.   Musculoskeletal:         General: Normal range of motion.   Neurological:      Mental Status: He is alert and oriented to person, place, and time.   Psychiatric:         Mood and Affect: Mood normal.         Behavior: Behavior normal.             Significant Labs: All pertinent labs within the past 24 hours have been reviewed.  CBC:   Recent Labs   Lab 02/25/25  0428 02/26/25  0522   WBC 12.42 12.20   HGB 9.5* 8.9*   HCT 29.5* 26.6*    273     CMP:   Recent Labs   Lab 02/25/25  0427 02/26/25  0522    143   K 3.6 3.4*   CL 97 99   CO2 27 27    106   * 138*   CREATININE 12.8* 12.2*   CALCIUM 8.7 8.5*   PROT 6.1 5.9*   ALBUMIN 2.4* 2.3*   BILITOT 0.4 0.3   ALKPHOS 46 43   AST 27 26   ALT 93* 75*   ANIONGAP 19* 17*       Significant Imaging: I have reviewed all pertinent imaging results/findings within the past 24 hours.

## 2025-02-26 NOTE — PROGRESS NOTES
[facility]  Palliative Medicine  Progress Note    Patient Name: Antonio Torres Jr.  MRN: [id number]  Admission Date: 2/13/2025  Hospital Length of Stay: 12 days  Code Status: DNR   Attending Provider: Aura Valdez MD  Consulting Provider: Janny Romero NP  Primary Care Physician: Fabio Lennon MD  Principal Problem:Acute renal failure superimposed on stage 3 chronic kidney disease    Patient information was obtained from patient, relative(s), and primary team.      Assessment/Plan:   Palliative Care  Advance Care Planning   2/26/2025  - interval chart reviewed; discussed pt with HM  - met with pt at bedside; continued GOC/ACP discussion   - pt confirms wishes for no longer wishing for HD and for hospice at NH   - discussed pt with Dr. Castellano (nephrology), as pt's sister called her office to discuss hospice  - sister was not at bedside, called for update and to confirm wishes regarding SNF vs intermediate at Nh and further discuss hospice   - sister, Caitlyn, confirms wishes for NH with hospice   - further hospice and renal failure end of life education provided   - updated HM and CM of plan for NH with hospice     2/24/2025  - interval chart reviewed; discussed pt with HM   - MDT requested palliative discussion with pt to confirm wishes regarding HD   - pt with clear GOC that HD does not align with the quality of life he wishes for himself, given his age, overall poor prognosis, and plan for intermediate NH placement; doesn't wish for his life to mainly consist of full care at a NH and HD   - validated pts wishes and GOC for himself  - pt wishes to proceed with intermediate NH placement and open to further discussion of hospice care at NH   - emotional support provided; answered all questions   - attempted to call pt's sister for update of pt's wishes and to answer any questions, unable to reach on initial attempt   - requested palliative SW follow up with sister for emotional support and to assist in  answering questions with planning further discussion with sister when able to reach directly     2025 - Consult   - consult received; interval chart reviewed in detail; discussed pt during ICU MDT rounds   - met with patient at bedside, along with Dr. Smith; Dr. Smith discussed plan for HD with pt; I remained in the room with pt following for introduction to palliative medicine team and role in current care and admission and GOC/ACP discussion   - pt confirms sister is preferred MPOA; she is also legal surrogate decision maker as pt is not , does not have children, and both parents are ; pt wishes to be included in care discussions but defaults decision making to sister (see schizophrenia); this seems very appropriate as pt seems to have capacity for simple aspect of care, but not for complex aspects of care such as HD, code status, etc or overall care/coordination of care on his own behalf outside of the hospital   - also joined Dr. Smith with call to pt's sister, Caitlyn   - learned more about pt outside of current admission; he has been having decreased quality of life over the last few years; she outfitted her garage into a living space for pt but has good insight that pt's care exceeds her abilities at this time   - Caitlyn was very tearful in today's discussions as this is all very hard for her to process, especially from a distance   - GOC/ACP discussion; focus of GOC are for pt's quality of life and symptom management   - she wishes for optimization with time limited trial of HD; would not wish for prolonged or indefinite HD   - reviewed code status and differences between full code and DNR; shared that I did discuss recommendation for DNR with pt and that he seemed a little saddened that his health was at the state to need to discuss this but expressed understand that it would be best and a quality of life less than his current if CPR was needed, would not be good; sister Caitlyn agreeable to DNR   -  "DNR order placed in chart  - reviewed long term wishes for long term NH with hospice; reviewed philosophy of care of hospice   - Sister agreeable to NH with hospice following optimization, with preference for [facility]; updated DM/SW of these wishes   - if pt's condition were to decline Caitlyn would wish to discuss transition to comfort focused care   - emotional support provided   - Allowed time for questions/concerns; all addressed; expressed availability of myself/palliative team for additional questions/concerns     Psychiatric  Schizophrenia  - pt diagnosed in early 20's, per sister Caitlyn; sadly 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 at [employer] in the 60's and was "very brilliant"; during that time pt suffered "nervous breakdown" per sister; pt went through extensive psychiatric care including ECT   - pt has since struggled with his mental disorders which have greatly affected his overall quality of life, especially more recently (see ACP)   - he also has good insight that his care needs exceed his sisters abilities at this time and is agreeable to exploring other options for long term living and medical care   - pt is currently alert, cooperative, and is better able to participate in discussions and express insight into more complex areas of his care such as renal function, HD, and potential trajectory of stopping HD     Renal/  * Acute renal failure superimposed on stage 3 chronic kidney disease  - chronic condition noted; now with acute BISI requiring HD   - was present during Dr. Smith's discussions with pt and sister (by phone) today; all in agreement for HD   - sister/MPOA previously expressed wishes for time limited trial of HD, she is agreeable to assess if HD can be temporary to improve pts current condition, but would not wish for pt to continue HD indefinitely   - after HD was initiated pt has expressed wishes to discontinue HD; good insight that this could lead to worsening renal function " "in the future and eventually likely contribute to pt's death in the future   - hospice appropriate     I will follow-up with patient. Please contact us if you have any additional questions.    Total visit time: 55 minutes    35 min visit time including: face to face time in discussion of symptom assessment, and exploring options and burdens of offered treatments.  This also includes non-face to face time preparing to see the patient including chart review, obtaining and/or reviewing separately obtained history, documenting clinical information in the electronic or other health record, independently interpreting results and communicating results to the patient/family/caregiver, family discussions by phone if not able to be present, coordination of care with other specialists, and discharge planning.     20 min ACP time spent: goals of care, advanced care planning, emotional support, formulating and communicating prognosis, exploring burden/ benefit of various approaches of treatment.     Janny Romero NP  Palliative Medicine  [facility]     Subjective:     Chief Complaint:   Chief Complaint   Patient presents with    Fall     Reports falling from standing position and hitting face on dresser. Denies blood thinners. +LOC       HPI:   From H&P: "This is an 80-year-old male with a past medical history of schizophrenia, chronic blepharitis, hyperlipidemia, CKD 3, BPH, who presents with a fall.     Patient presents for evaluation after a fall and a sycopal episode that occurred prior to presentation. He reports losing consciousness and falling face forward on the floor sustaining an injury to his head, elbows, abdomen and knees. He was unable to stand up and was on the floor for about 1.5 hours. Patient reports having a coughing episode a day prior but denies sick contacts. He denied chest pain.      In the ED, the patient was tachycardic (120s > 60s), but otherwise hemodynamically stable.  " "Labs were remarkable for leukocytosis (25.3), elevated CPK (23,406), elevated creatinine (3.3-baseline around 1.9), elevated troponin (0.525-chronically elevated), elevated lactic acid (4.8 > 2.1), elevated procalcitonin (2.53), elevated LFTs (T bili: 1.1, AST: 267, ALT: 73), hypophosphatemia (1.7), positive COVID-19.  CT head, maxillofacial, cervical spine showed a nondisplaced right nasal bone fracture with no soft tissue swelling, and paranasal sinus disease.  CT chest, abdomen and pelvis showed possible soft tissue contusion at the level of the xiphoid process, a 2-3 mm left lower lobe pulmonary nodule and nonspecific urinary bladder wall thickening.  Right elbow x-ray demonstrated an elbow effusion with an olecranon spur.  Patient was given 2 L of LR, vancomycin, Zosyn, Tylenol 1 g p.o..  He was admitted for further management."     Palliative medicine consulted for goals of care discussion and advance care planning; for details of visit, see advance care planning section of plan.       Hospital Course:  No notes on file        Medications:  Continuous Infusions:  Scheduled Meds:   apixaban  2.5 mg Oral BID    ARIPiprazole  20 mg Oral Daily    cetirizine  5 mg Oral Every Mon, Wed, Fri    LIDOcaine  1 patch Transdermal Q24H    metoprolol tartrate  50 mg Oral BID    multivitamin  1 tablet Oral Daily     PRN Meds:  Current Facility-Administered Medications:     acetaminophen, 650 mg, Oral, Q4H PRN    albuterol-ipratropium, 3 mL, Nebulization, Q4H PRN    aluminum-magnesium hydroxide-simethicone, 30 mL, Oral, QID PRN    bisacodyL, 10 mg, Rectal, Daily PRN    glucagon (human recombinant), 1 mg, Intramuscular, PRN    glucose, 16 g, Oral, PRN    glucose, 24 g, Oral, PRN    melatonin, 6 mg, Oral, Nightly PRN    metoprolol, 5 mg, Intravenous, Q4H PRN    naloxone, 0.02 mg, Intravenous, PRN    ondansetron, 4 mg, Intravenous, Q8H PRN    senna-docusate 8.6-50 mg, 1 tablet, Oral, Daily PRN    simethicone, 1 tablet, Oral, QID " PRN    sodium chloride 0.9%, 10 mL, Intravenous, Q12H PRN    DIPH,PERTUSS(ACELL),TET VACCINE (ADULT)(BOOSTRIX,ADACEL), 0.5 mL, Intramuscular, vaccine x 1 dose    Objective:     Vital Signs (Most Recent):  Temp: 98 °F (36.7 °C) (02/26/25 1053)  Pulse: 80 (02/26/25 1434)  Resp: 18 (02/26/25 1053)  BP: (!) 98/52 (02/26/25 1315)  SpO2: 97 % (02/26/25 1053) Vital Signs (24h Range):  Temp:  [97.3 °F (36.3 °C)-98.4 °F (36.9 °C)] 98 °F (36.7 °C)  Pulse:  [76-84] 80  Resp:  [17-18] 18  SpO2:  [97 %-100 %] 97 %  BP: ()/(52-64) 98/52     Weight: 68.9 kg (151 lb 14.4 oz)  Body mass index is 21.79 kg/m².       Physical Exam  Vitals and nursing note reviewed.   Constitutional:       General: He is awake. He is not in acute distress.     Appearance: He is ill-appearing.   HENT:      Head: Normocephalic.   Pulmonary:      Effort: Pulmonary effort is normal. No respiratory distress.   Skin:     Comments: Multiple areas of bruising and skin abrasions/tares to face and upper ext.     Neurological:      Mental Status: He is alert and oriented to person, place, and time.   Psychiatric:         Behavior: Behavior is cooperative.      Comments: Improved ability to participate in more complex aspects of medical discussions            Advance Care Planning   Advance Directives:   Living Will: No    LaPOST: No (needs lapost prior to discharge; potential plan for hospice at discharge, can be completed with intake if remains discharge plan)    Do Not Resuscitate Status: Yes    Medical Power of : No (pt's sister Caitlyn is legal surrogate decision maker and pt's preferred MPOA)      Decision Making:  Family answered questions and Patient answered questions  Goals of Care: What is most important right now is to focus on avoiding the hospital, remaining as independent as possible, symptom/pain control, quality of life, even if it means sacrificing a little time. Accordingly, we have decided that the best plan to meet the patient's  goals includes enrolling in hospice care.      Significant Labs: All pertinent labs within the past 24 hours have been reviewed.  CBC:   Recent Labs   Lab 02/26/25 0522   WBC 12.20   HGB 8.9*   HCT 26.6*   MCV 93        BMP:  Recent Labs   Lab 02/26/25 0522         K 3.4*   CL 99   CO2 27   *   CREATININE 12.2*   CALCIUM 8.5*   MG 1.9     LFT:  Lab Results   Component Value Date    AST 26 02/26/2025    ALKPHOS 43 02/26/2025    BILITOT 0.3 02/26/2025     Albumin:   Albumin   Date Value Ref Range Status   02/26/2025 2.3 (L) 3.5 - 5.2 g/dL Final     Protein:   Total Protein   Date Value Ref Range Status   02/26/2025 5.9 (L) 6.0 - 8.4 g/dL Final     Lactic acid:   Lab Results   Component Value Date    LACTATE 2.1 02/14/2025    LACTATE 4.8 (HH) 02/13/2025       Significant Imaging: I have reviewed all pertinent imaging results/findings within the past 24 hours.    Janny Romero NP  Palliative Medicine  [facility]

## 2025-02-26 NOTE — PROGRESS NOTES
[facility]  [facility]phrology  Progress Note    Patient Name: Antonio Torres Jr.  MRN: [id number]  Admission Date: 2/13/2025  Hospital Length of Stay: 12 days  Attending Provider: Aura Valdez MD   Primary Care Physician: Fabio Lennon MD  Principal Problem:Acute renal failure superimposed on stage 3 chronic kidney disease    Subjective:     HPI: Mr. Torres is an 81 yo male with schizophrenia, BPH, CKD, and h/o melanoma who presented to the ED overnight s/p fall. HR 120s on arrival. He was also found to be COVID+. CPK 23k. He was started on IVF. Nephrology consulted for BISI on CKD. Prior records obtained and reviewed. He is followed by myself outpatient; last visit was 11/11/24. His baseline Cr is 1.7-2.0; his CKD is of unknown etiology. His Cr was 3.3 on arrival --> 3.5 this morning. CPK now > 40k. Abnormal LFTs. He reports to be doing okay this afternoon. Complaining of soreness. No SOB or leg swelling.    Interval History: UOP 1.85L yesterday. No events overnight. SBP in the 90s this morning; receiving 500cc bolus of IVF. He reports to be doing okay. Appetite is fair. No nausea or vomiting.       Review of patient's allergies indicates:  No Known Allergies  Current Facility-Administered Medications   Medication Frequency    acetaminophen tablet 650 mg Q4H PRN    albuterol-ipratropium 2.5 mg-0.5 mg/3 mL nebulizer solution 3 mL Q4H PRN    aluminum-magnesium hydroxide-simethicone 200-200-20 mg/5 mL suspension 30 mL QID PRN    apixaban tablet 2.5 mg BID    ARIPiprazole tablet 20 mg Daily    bisacodyL suppository 10 mg Daily PRN    cetirizine tablet 5 mg Every Mon, Wed, Fri    glucagon (human recombinant) injection 1 mg PRN    glucose chewable tablet 16 g PRN    glucose chewable tablet 24 g PRN    LIDOcaine 5 % patch 1 patch Q24H    melatonin tablet 6 mg Nightly PRN    metoprolol injection 5 mg Q4H PRN    metoprolol tartrate (LOPRESSOR) tablet 50 mg BID    multivitamin tablet Daily    naloxone 0.4 mg/mL  injection 0.02 mg PRN    ondansetron injection 4 mg Q8H PRN    senna-docusate 8.6-50 mg per tablet 1 tablet Daily PRN    simethicone chewable tablet 80 mg QID PRN    sodium chloride 0.9% flush 10 mL Q12H PRN    Tdap vaccine injection 0.5 mL vaccine x 1 dose     Facility-Administered Medications Ordered in Other Encounters   Medication Frequency    0.9%  NaCl infusion Continuous    mupirocin 2 % ointment On Call Procedure       Objective:     Vital Signs (Most Recent):  Temp: 98 °F (36.7 °C) (02/26/25 1053)  Pulse: 80 (02/26/25 1434)  Resp: 18 (02/26/25 1053)  BP: (!) 98/52 (02/26/25 1315)  SpO2: 97 % (02/26/25 1053) Vital Signs (24h Range):  Temp:  [97.3 °F (36.3 °C)-98.4 °F (36.9 °C)] 98 °F (36.7 °C)  Pulse:  [76-84] 80  Resp:  [17-18] 18  SpO2:  [97 %-100 %] 97 %  BP: ()/(52-64) 98/52     Weight: 68.9 kg (151 lb 14.4 oz) (02/24/25 0700)  Body mass index is 21.79 kg/m².  Body surface area is 1.84 meters squared.    I/O last 3 completed shifts:  In: 120 [P.O.:120]  Out: 3825 [Urine:3825]     Physical Exam  Vitals and nursing note reviewed.   Constitutional:       General: He is awake. He is not in acute distress.     Appearance: Normal appearance. He is well-developed.   HENT:      Head: Normocephalic and atraumatic.      Nose: Nose normal.      Mouth/Throat:      Mouth: Mucous membranes are moist.   Eyes:      Extraocular Movements: Extraocular movements intact.      Conjunctiva/sclera: Conjunctivae normal.   Cardiovascular:      Rate and Rhythm: Normal rate and regular rhythm.   Pulmonary:      Effort: Pulmonary effort is normal.      Breath sounds: Normal breath sounds.   Abdominal:      General: There is no distension.      Palpations: Abdomen is soft.   Musculoskeletal:         General: No tenderness or signs of injury.      Right lower leg: No edema.      Left lower leg: No edema.   Skin:     General: Skin is warm and dry.      Findings: No erythema or rash.   Neurological:      General: No focal deficit  present.      Mental Status: He is alert. Mental status is at baseline.   Psychiatric:         Mood and Affect: Mood normal.         Behavior: Behavior normal.          Significant Labs:  CBC:   Recent Labs   Lab 02/26/25  0522   WBC 12.20   RBC 2.86*   HGB 8.9*   HCT 26.6*      MCV 93   MCH 31.1*   MCHC 33.5     CMP:   Recent Labs   Lab 02/26/25  0522      CALCIUM 8.5*   ALBUMIN 2.3*   PROT 5.9*      K 3.4*   CO2 27   CL 99   *   CREATININE 12.2*   ALKPHOS 43   ALT 75*   AST 26   BILITOT 0.3     All labs within the past 24 hours have been reviewed.     Significant Imaging:  Labs: Reviewed    Assessment/Plan:     BISI on CKD stage 3b  - baseline Cr 1.7-2.0; followed outpatient by myself  - Cr 3.3 on arrival and peaked at 12.9 on 2/20/25; due to rhabdomyolysis  - s/p HD x 2 hours on 2/20/25; HD has been held since  - Cr 12.8 --> 12.2 today; starting to improve. BUN up to 138 and associated with soft BPs. Likely related to volume depletion s/p polyuria yesterday. Agree with 500cc bolus of IVF  - repeat labs in AM. Continue strict I/Os  - okay to discharge from renal standpoint once BP stabilizes    Rhabdomyolysis  - CPK > 40,000   - improved    Metabolic acidosis  - improved  - bicarb 27 today; agree with NS    CKD-MBD  - CCa normal  - phos 6.1 --> 6.0 --> 5.9; slowly improving  - continue renal diet  - will trend    Hypokalemia  - K 3.4; ordered KCl 20meq po x1      Thank you for your consult. I will follow-up with patient. Please contact us if you have any additional questions.    Dariela Terrazas MD  Nephrology  [facility] [place] [facility] Telemetry

## 2025-02-26 NOTE — SUBJECTIVE & OBJECTIVE
Interval History: UOP 1.85L yesterday. No events overnight. SBP in the 90s this morning; receiving 500cc bolus of IVF. He reports to be doing okay. Appetite is fair. No nausea or vomiting.       Review of patient's allergies indicates:  No Known Allergies  Current Facility-Administered Medications   Medication Frequency    acetaminophen tablet 650 mg Q4H PRN    albuterol-ipratropium 2.5 mg-0.5 mg/3 mL nebulizer solution 3 mL Q4H PRN    aluminum-magnesium hydroxide-simethicone 200-200-20 mg/5 mL suspension 30 mL QID PRN    apixaban tablet 2.5 mg BID    ARIPiprazole tablet 20 mg Daily    bisacodyL suppository 10 mg Daily PRN    cetirizine tablet 5 mg Every Mon, Wed, Fri    glucagon (human recombinant) injection 1 mg PRN    glucose chewable tablet 16 g PRN    glucose chewable tablet 24 g PRN    LIDOcaine 5 % patch 1 patch Q24H    melatonin tablet 6 mg Nightly PRN    metoprolol injection 5 mg Q4H PRN    metoprolol tartrate (LOPRESSOR) tablet 50 mg BID    multivitamin tablet Daily    naloxone 0.4 mg/mL injection 0.02 mg PRN    ondansetron injection 4 mg Q8H PRN    senna-docusate 8.6-50 mg per tablet 1 tablet Daily PRN    simethicone chewable tablet 80 mg QID PRN    sodium chloride 0.9% flush 10 mL Q12H PRN    Tdap vaccine injection 0.5 mL vaccine x 1 dose     Facility-Administered Medications Ordered in Other Encounters   Medication Frequency    0.9%  NaCl infusion Continuous    mupirocin 2 % ointment On Call Procedure       Objective:     Vital Signs (Most Recent):  Temp: 98 °F (36.7 °C) (02/26/25 1053)  Pulse: 80 (02/26/25 1434)  Resp: 18 (02/26/25 1053)  BP: (!) 98/52 (02/26/25 1315)  SpO2: 97 % (02/26/25 1053) Vital Signs (24h Range):  Temp:  [97.3 °F (36.3 °C)-98.4 °F (36.9 °C)] 98 °F (36.7 °C)  Pulse:  [76-84] 80  Resp:  [17-18] 18  SpO2:  [97 %-100 %] 97 %  BP: ()/(52-64) 98/52     Weight: 68.9 kg (151 lb 14.4 oz) (02/24/25 0700)  Body mass index is 21.79 kg/m².  Body surface area is 1.84 meters  squared.    I/O last 3 completed shifts:  In: 120 [P.O.:120]  Out: 3825 [Urine:3825]     Physical Exam  Vitals and nursing note reviewed.   Constitutional:       General: He is awake. He is not in acute distress.     Appearance: Normal appearance. He is well-developed.   HENT:      Head: Normocephalic and atraumatic.      Nose: Nose normal.      Mouth/Throat:      Mouth: Mucous membranes are moist.   Eyes:      Extraocular Movements: Extraocular movements intact.      Conjunctiva/sclera: Conjunctivae normal.   Cardiovascular:      Rate and Rhythm: Normal rate and regular rhythm.   Pulmonary:      Effort: Pulmonary effort is normal.      Breath sounds: Normal breath sounds.   Abdominal:      General: There is no distension.      Palpations: Abdomen is soft.   Musculoskeletal:         General: No tenderness or signs of injury.      Right lower leg: No edema.      Left lower leg: No edema.   Skin:     General: Skin is warm and dry.      Findings: No erythema or rash.   Neurological:      General: No focal deficit present.      Mental Status: He is alert. Mental status is at baseline.   Psychiatric:         Mood and Affect: Mood normal.         Behavior: Behavior normal.          Significant Labs:  CBC:   Recent Labs   Lab 02/26/25 0522   WBC 12.20   RBC 2.86*   HGB 8.9*   HCT 26.6*      MCV 93   MCH 31.1*   MCHC 33.5     CMP:   Recent Labs   Lab 02/26/25 0522      CALCIUM 8.5*   ALBUMIN 2.3*   PROT 5.9*      K 3.4*   CO2 27   CL 99   *   CREATININE 12.2*   ALKPHOS 43   ALT 75*   AST 26   BILITOT 0.3     All labs within the past 24 hours have been reviewed.     Significant Imaging:  Labs: Reviewed

## 2025-02-26 NOTE — PLAN OF CARE
9:48am: CM spoke to Rosa with [facility], [phone]; she stated patient's referral is still under review.  CM informed her that patient is medically stable for discharge.  CM to continue to assess for and until discharge.    9:52am: CM spoke with patient's sister, Caitlyn Manzo, [phone]; she clarified that patient will not be SNF to home, but going straight retirement.  CM to update NH and PHN    10:05am: CM informed Rosa that patient will be going straight retirement.  She will follow up with CM when she receives a final decision regarding acceptance    9:57am: CM spoke with patient's sister; she stated she has all of patient's financials and paperwork ready.  CM to continue to assess for and until discharge.     1:52pm: GALE spoke to Rosa; she stated she met with patient's sister, who provided all financials and requested documents for placement. She stated pt's sister has decided on NH placement with hospice and would like hospice services with [facility],  [phone].  Rosa stated, facility will verify patient's financial information and follow up with CM regarding decision    2:23pm: GALE spoke to Rosa; she stated facility is able to accept patient and will be able to take him tomorrow.  CM to send [facility] referral to [facility].    CM sent referral to [facility] via [employer]; awaiting acceptance    4:07pm: GALE spoke admissions coordinator with [facility]; she confirmed agency received patient referrals and team is reviewing it.  She will follow up with CM regarding decision.

## 2025-02-26 NOTE — ASSESSMENT & PLAN NOTE
2/26/2025  - interval chart reviewed; discussed pt with HM  - met with pt at bedside; continued GOC/ACP discussion   - pt confirms wishes for no longer wishing for HD and for hospice at NH   - discussed pt with Dr. Castellano (nephrology), as pt's sister called her office to discuss hospice  - sister was not at bedside, called for update and to confirm wishes regarding SNF vs long-term at [facility] and further discuss hospice   - sister, Caitlyn, confirms wishes for NH with hospice   - further hospice and renal failure end of life education provided   - updated HM and CM of plan for NH with hospice     2/24/2025  - interval chart reviewed; discussed pt with HM   - MDT requested palliative discussion with pt to confirm wishes regarding HD   - pt with clear GOC that HD does not align with the quality of life he wishes for himself, given his age, overall poor prognosis, and plan for long-term NH placement; doesn't wish for his life to mainly consist of full care at a NH and HD   - validated pts wishes and GOC for himself  - pt wishes to proceed with long-term NH placement and open to further discussion of hospice care at NH   - emotional support provided; answered all questions   - attempted to call pt's sister for update of pt's wishes and to answer any questions, unable to reach on initial attempt   - requested palliative SW follow up with sister for emotional support and to assist in answering questions with planning further discussion with sister when able to reach directly     2/19/2025 - Consult   - consult received; interval chart reviewed in detail; discussed pt during ICU MDT rounds   - met with patient at bedside, along with Dr. Smith; Dr. Smith discussed plan for HD with pt; I remained in the room with pt following for introduction to palliative medicine team and role in current care and admission and GOC/ACP discussion   - pt confirms sister is preferred MPOA; she is also legal surrogate decision maker as pt is not ,  does not have children, and both parents are ; pt wishes to be included in care discussions but defaults decision making to sister (see schizophrenia); this seems very appropriate as pt seems to have capacity for simple aspect of care, but not for complex aspects of care such as HD, code status, etc or overall care/coordination of care on his own behalf outside of the hospital   - also joined Dr. Smith with call to pt's sister, Caitlyn   - learned more about pt outside of current admission; he has been having decreased quality of life over the last few years; she outfitted her garage into a living space for pt but has good insight that pt's care exceeds her abilities at this time   - Caitlyn was very tearful in today's discussions as this is all very hard for her to process, especially from a distance   - GOC/ACP discussion; focus of GOC are for pt's quality of life and symptom management   - she wishes for optimization with time limited trial of HD; would not wish for prolonged or indefinite HD   - reviewed code status and differences between full code and DNR; shared that I did discuss recommendation for DNR with pt and that he seemed a little saddened that his health was at the state to need to discuss this but expressed understand that it would be best and a quality of life less than his current if CPR was needed, would not be good; sister Caitlyn agreeable to DNR   - DNR order placed in chart  - reviewed long term wishes for snf NH with hospice; reviewed philosophy of care of hospice   - Sister agreeable to NH with hospice following optimization, with preference for [facility]; updated DM/SW of these wishes   - if pt's condition were to decline Caitlyn would wish to discuss transition to comfort focused care   - emotional support provided   - Allowed time for questions/concerns; all addressed; expressed availability of myself/palliative team for additional questions/concerns

## 2025-02-26 NOTE — CLINICAL REVIEW
IP Sepsis Screen (most recent)       Sepsis Screen (IP) - 02/26/25 2228       Is the patient's history or complaint suggestive of a possible infection? Yes  -TR    Are there at least two of the following signs and symptoms present? Yes  -TR    Sepsis signs/symptoms - Tachycardia Tachycardia     >90  -TR    Sepsis signs/symptoms - WBC WBC < 4,000 or WBC > 12,000  -TR    Are any of the following organ dysfunction criteria present and not considered to be due to a chronic condition? Yes  -TR    Organ Dysfunction Criteria Creatinine > 2.0  -TR    Initiate Sepsis Protocol No  -TR    Reason sepsis not considered Other - Required comments   pt requesting to stop dialysis and enter hospice per palliative care note -TR              User Key  (r) = Recorded By, (t) = Taken By, (c) = Cosigned By      Initials Name    Viktoria Cardenas RN

## 2025-02-26 NOTE — ASSESSMENT & PLAN NOTE
- chronic condition noted; now with acute BISI requiring HD   - was present during Dr. Smith's discussions with pt and sister (by phone) today; all in agreement for HD   - sister/MPOA previously expressed wishes for time limited trial of HD, she is agreeable to assess if HD can be temporary to improve pts current condition, but would not wish for pt to continue HD indefinitely   - after HD was initiated pt has expressed wishes to discontinue HD; good insight that this could lead to worsening renal function in the future and eventually likely contribute to pt's death in the future   - hospice appropriate

## 2025-02-26 NOTE — SUBJECTIVE & OBJECTIVE
Medications:  Continuous Infusions:  Scheduled Meds:   apixaban  2.5 mg Oral BID    ARIPiprazole  20 mg Oral Daily    cetirizine  5 mg Oral Every Mon, Wed, Fri    LIDOcaine  1 patch Transdermal Q24H    metoprolol tartrate  50 mg Oral BID    multivitamin  1 tablet Oral Daily     PRN Meds:  Current Facility-Administered Medications:     acetaminophen, 650 mg, Oral, Q4H PRN    albuterol-ipratropium, 3 mL, Nebulization, Q4H PRN    aluminum-magnesium hydroxide-simethicone, 30 mL, Oral, QID PRN    bisacodyL, 10 mg, Rectal, Daily PRN    glucagon (human recombinant), 1 mg, Intramuscular, PRN    glucose, 16 g, Oral, PRN    glucose, 24 g, Oral, PRN    melatonin, 6 mg, Oral, Nightly PRN    metoprolol, 5 mg, Intravenous, Q4H PRN    naloxone, 0.02 mg, Intravenous, PRN    ondansetron, 4 mg, Intravenous, Q8H PRN    senna-docusate 8.6-50 mg, 1 tablet, Oral, Daily PRN    simethicone, 1 tablet, Oral, QID PRN    sodium chloride 0.9%, 10 mL, Intravenous, Q12H PRN    DIPH,PERTUSS(ACELL),TET VACCINE (ADULT)(BOOSTRIX,ADACEL), 0.5 mL, Intramuscular, vaccine x 1 dose    Objective:     Vital Signs (Most Recent):  Temp: 98 °F (36.7 °C) (02/26/25 1053)  Pulse: 80 (02/26/25 1434)  Resp: 18 (02/26/25 1053)  BP: (!) 98/52 (02/26/25 1315)  SpO2: 97 % (02/26/25 1053) Vital Signs (24h Range):  Temp:  [97.3 °F (36.3 °C)-98.4 °F (36.9 °C)] 98 °F (36.7 °C)  Pulse:  [76-84] 80  Resp:  [17-18] 18  SpO2:  [97 %-100 %] 97 %  BP: ()/(52-64) 98/52     Weight: 68.9 kg (151 lb 14.4 oz)  Body mass index is 21.79 kg/m².       Physical Exam  Vitals and nursing note reviewed.   Constitutional:       General: He is awake. He is not in acute distress.     Appearance: He is ill-appearing.   HENT:      Head: Normocephalic.   Pulmonary:      Effort: Pulmonary effort is normal. No respiratory distress.   Skin:     Comments: Multiple areas of bruising and skin abrasions/tares to face and upper ext.     Neurological:      Mental Status: He is alert and oriented  to person, place, and time.   Psychiatric:         Behavior: Behavior is cooperative.      Comments: Improved ability to participate in more complex aspects of medical discussions            Advance Care Planning   Advance Directives:   Living Will: No    LaPOST: No (needs lapost prior to discharge; potential plan for hospice at discharge, can be completed with intake if remains discharge plan)    Do Not Resuscitate Status: Yes    Medical Power of : No (pt's sister Caitlyn is legal surrogate decision maker and pt's preferred MPOA)      Decision Making:  Family answered questions and Patient answered questions  Goals of Care: What is most important right now is to focus on avoiding the hospital, remaining as independent as possible, symptom/pain control, quality of life, even if it means sacrificing a little time. Accordingly, we have decided that the best plan to meet the patient's goals includes enrolling in hospice care.      Significant Labs: All pertinent labs within the past 24 hours have been reviewed.  CBC:   Recent Labs   Lab 02/26/25  0522   WBC 12.20   HGB 8.9*   HCT 26.6*   MCV 93        BMP:  Recent Labs   Lab 02/26/25 0522         K 3.4*   CL 99   CO2 27   *   CREATININE 12.2*   CALCIUM 8.5*   MG 1.9     LFT:  Lab Results   Component Value Date    AST 26 02/26/2025    ALKPHOS 43 02/26/2025    BILITOT 0.3 02/26/2025     Albumin:   Albumin   Date Value Ref Range Status   02/26/2025 2.3 (L) 3.5 - 5.2 g/dL Final     Protein:   Total Protein   Date Value Ref Range Status   02/26/2025 5.9 (L) 6.0 - 8.4 g/dL Final     Lactic acid:   Lab Results   Component Value Date    LACTATE 2.1 02/14/2025    LACTATE 4.8 (HH) 02/13/2025       Significant Imaging: I have reviewed all pertinent imaging results/findings within the past 24 hours.

## 2025-02-26 NOTE — ASSESSMENT & PLAN NOTE
BISI is likely due to acute tubular necrosis caused by rhabdomyolysis . Baseline creatinine is  1.9 . Most recent creatinine and eGFR are listed below.  Recent Labs     02/24/25  0530 02/25/25  0427 02/26/25  0522   CREATININE 12.7* 12.8* 12.2*   EGFRNORACEVR 4* 4* 4*        Plan  - Avoid nephrotoxins and renally dose meds for GFR listed above  - Monitor urine output, serial BMP, and adjust therapy as needed  Appreciate Nephrology.  Nephrology recommending HD.  Trialysis catheter placed and patient started on HD.  on 2/24, kidney function improving. HD stopped on 2/24   -per neprho, kidney function should continue to improve  -pt and family does not want to continue long term HD if needed in the future  -trialysis cath removed  -continue goals of care discussion, palliative Care following, appreciate recommendations

## 2025-02-26 NOTE — ASSESSMENT & PLAN NOTE
Advance Care Planning   Per prior attending.  Date: 02/21/2025  Called patient's sister as there were many questions about patient's treatment plan and disposition.  Patient was recently seen by Palliative Care and discussed possible hospice, but patient has also been started on dialysis.  Discussed medical treatment plan and disposition.  At this time, trying to assess if HD will be temporary or long term.  Patient does not seem to want lifelong dialysis.  If this is the case, then we would be looking into hospice, most likely in a NH.  If HD just temporary or patient does agree to lifelong HD, we would then be looking into SNF.  Patient's sister agrees with this plan.  He remains DNR.    Length of ACP   conversation in minutes: A total of 20 min was spent on advance care planning, goals of care discussion, emotional support, formulating and communicating prognosis and exploring burden/benefit of various approaches of treatment. This discussion occurred on a fully voluntary basis with the verbal consent of the patient and/or family.       Another long goals of care discussion on 2/24 with pt and his sister (Marshall). They do not want to do long term HD. Leaning towards conservative care. Palliative care consulted again.     At this time, no dialysis indicated as kidney function is stabilizing/improving per Nephrology.  We will continue goals of care discussion.  [facility].  Appreciate palliative care input.

## 2025-02-26 NOTE — ASSESSMENT & PLAN NOTE
Patient has paroxysmal (<7 days) atrial fibrillation. Patient is currently in sinus rhythm. [id number] Score: 2. The patients heart rate in the last 24 hours is as follows:  Pulse  Min: 70  Max: 84     Antiarrhythmics  metoprolol injection 5 mg, Every 4 hours PRN, Intravenous  metoprolol tartrate (LOPRESSOR) tablet 50 mg, 2 times daily, Oral    Anticoagulants  apixaban tablet 2.5 mg, 2 times daily, Oral    Plan  - Replete lytes with a goal of K>4, Mg >2  - Patient is anticoagulated, see medications listed above.  - Patient's afib is currently  resolved and back in NSR  - patient moved to ICU overnight and placed on Amio and heparin drips.  Converted to NSR.  Cardiology consulted.  Transitioned Amio to PO and heparin drip to Eliquis.  Converted back to rapid Afib on 2/20.  Placed on Cardizem drip.  Back to NSR. No Amio per Cards secondary to elevated LFT's.  Started on PO Lopressor.  Now off Cardizem drip.

## 2025-02-26 NOTE — ASSESSMENT & PLAN NOTE
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"; during that time pt suffered "nervous breakdown" per sister; pt went through extensive psychiatric care including ECT   - pt has since struggled with his mental disorders which have greatly affected his overall quality of life, especially more recently (see ACP)   - he also has good insight that his care needs exceed his sisters abilities at this time and is agreeable to exploring other options for skilled nursing living and medical care   - pt is currently alert, cooperative, and is better able to participate in discussions and express insight into more complex areas of his care such as renal function, HD, and potential trajectory of stopping HD   "

## 2025-02-26 NOTE — PLAN OF CARE
Problem: Adult Inpatient Plan of Care  Goal: Plan of Care Review  Outcome: Progressing  Goal: Patient-Specific Goal (Individualized)  Outcome: Progressing  Goal: Absence of Hospital-Acquired Illness or Injury  Outcome: Progressing  Goal: Optimal Comfort and Wellbeing  Outcome: Progressing  Goal: Readiness for Transition of Care  Outcome: Progressing     Problem: Infection  Goal: Absence of Infection Signs and Symptoms  Outcome: Progressing     Problem: Acute Kidney Injury/Impairment  Goal: Fluid and Electrolyte Balance  Outcome: Progressing  Goal: Improved Oral Intake  Outcome: Progressing  Goal: Effective Renal Function  Outcome: Progressing     Problem: Fall Injury Risk  Goal: Absence of Fall and Fall-Related Injury  Outcome: Progressing     Problem: Coping Ineffective  Goal: Effective Coping  Outcome: Progressing     Problem: Hemodialysis  Goal: Safe, Effective Therapy Delivery  Outcome: Progressing  Goal: Effective Tissue Perfusion  Outcome: Progressing  Goal: Absence of Infection Signs and Symptoms  Outcome: Progressing     Problem: Skin Injury Risk Increased  Goal: Skin Health and Integrity  Outcome: Progressing

## 2025-02-26 NOTE — PROGRESS NOTES
[facility] Medicine  Telemedicine Progress Note    Patient Name: Antonio Torres Jr.  MRN: [id number]  Patient Class: IP- Inpatient   Admission Date: 2/13/2025  Length of Stay: 12 days  Attending Physician: Aura Valdez MD  Primary Care Provider: Fabio Lennon MD          Subjective:     Principal Problem:Acute renal failure superimposed on stage 3 chronic kidney disease        HPI:  This is an 80-year-old male with a past medical history of schizophrenia, chronic blepharitis, hyperlipidemia, CKD 3, BPH, who presents with a fall.    Patient presents for evaluation after a fall and a sycopal episode that occurred prior to presentation. He reports losing consciousness and falling face forward on the floor sustaining an injury to his head, elbows, abdomen and knees. He was unable to stand up and was on the floor for about 1.5 hours. Patient reports having a coughing episode a day prior but denies sick contacts. He denied chest pain.     In the ED, the patient was tachycardic (120s > 60s), but otherwise hemodynamically stable.  Labs were remarkable for leukocytosis (25.3), elevated CPK (23,406), elevated creatinine (3.3-baseline around 1.9), elevated troponin (0.525-chronically elevated), elevated lactic acid (4.8 > 2.1), elevated procalcitonin (2.53), elevated LFTs (T bili: 1.1, AST: 267, ALT: 73), hypophosphatemia (1.7), positive COVID-19.  CT head, maxillofacial, cervical spine showed a nondisplaced right nasal bone fracture with no soft tissue swelling, and paranasal sinus disease.  CT chest, abdomen and pelvis showed possible soft tissue contusion at the level of the xiphoid process, a 2-3 mm left lower lobe pulmonary nodule and nonspecific urinary bladder wall thickening.  Right elbow x-ray demonstrated an elbow effusion with an olecranon spur.  Patient was given 2 L of LR, vancomycin, Zosyn, Tylenol 1 g p.o..  He was admitted for further management.    Overview/Hospital  Course:  Mr. Torres is a 79 yo M admitted after a fall and found to have acute on chronic kidney injury and rhabdomyolysis. CK >20,000 on admit and rising to >40,000. Renal function worsening. On IVF. Also found to be COVID positive. Started on remdesivir. Nephrology consulted. CK improving, though Cr still rising. Almanza is placed for accurate I&D monitoring. Patient went into rapid Afib.  Moved to ICU and started on Amio drip.  Quickly converted back to NSR.  Creat continued to increase and Nephrology recommending HD.  Trialysis catheter placed and patient started on HD.  Amio switched to PO and patient started on Eliquis.  Patient went back into rapid afib/aflutter. Started on Cardizem drip.  Amiodarone stopped because of elevated LFT's and patient started on B blocker.  Seems will be ESRD,nephrology is on case.    Interval History: Pt noted to be afebrile with stable respiratory status.  BP slightly soft.  Given 250 cc IV fluid bolus today with improvement in blood pressure.  Overall feeling much better today.  Continues to have high urine output.  Per Nephrology, kidney function appears to be stabilizing/improving.   No further HD indicated at this time.  Patient now getting more medically stable, will need [facility] nursing home placement with or without hospice.  Palliative Care following.  Reports he is eating and drinking well, having regular bowel movements, getting good rest.      Review of Systems   Constitutional:  Positive for activity change, appetite change and fatigue. Negative for fever.   Respiratory:  Negative for cough and shortness of breath.    Cardiovascular:  Negative for chest pain.   Gastrointestinal:  Positive for nausea. Negative for abdominal pain.   Musculoskeletal:  Positive for arthralgias.   Neurological:  Negative for dizziness and headaches.   Psychiatric/Behavioral:  Negative for confusion.    All other systems reviewed and are negative.    Objective:     Vital Signs (Most  Recent):  Temp: 98 °F (36.7 °C) (02/26/25 1053)  Pulse: 78 (02/26/25 1107)  Resp: 18 (02/26/25 1053)  BP: (!) 98/52 (02/26/25 1315)  SpO2: 97 % (02/26/25 1053) Vital Signs (24h Range):  Temp:  [97.3 °F (36.3 °C)-98.4 °F (36.9 °C)] 98 °F (36.7 °C)  Pulse:  [70-84] 78  Resp:  [17-18] 18  SpO2:  [97 %-100 %] 97 %  BP: ()/(52-64) 98/52     Weight: 68.9 kg (151 lb 14.4 oz)  Body mass index is 21.79 kg/m².    Intake/Output Summary (Last 24 hours) at 2/26/2025 1329  Last data filed at 2/26/2025 1042  Gross per 24 hour   Intake 120 ml   Output 1650 ml   Net -1530 ml         Physical Exam  Vitals and nursing note reviewed.   Constitutional:       Appearance: Normal appearance.      Comments: Globally weak   HENT:      Head: Normocephalic and atraumatic.   Cardiovascular:      Rate and Rhythm: Normal rate and regular rhythm.   Pulmonary:      Effort: Pulmonary effort is normal. No respiratory distress.   Abdominal:      General: There is no distension.   Musculoskeletal:         General: Normal range of motion.   Neurological:      Mental Status: He is alert and oriented to person, place, and time.   Psychiatric:         Mood and Affect: Mood normal.         Behavior: Behavior normal.             Significant Labs: All pertinent labs within the past 24 hours have been reviewed.  CBC:   Recent Labs   Lab 02/25/25 0428 02/26/25  0522   WBC 12.42 12.20   HGB 9.5* 8.9*   HCT 29.5* 26.6*    273     CMP:   Recent Labs   Lab 02/25/25  0427 02/26/25  0522    143   K 3.6 3.4*   CL 97 99   CO2 27 27    106   * 138*   CREATININE 12.8* 12.2*   CALCIUM 8.7 8.5*   PROT 6.1 5.9*   ALBUMIN 2.4* 2.3*   BILITOT 0.4 0.3   ALKPHOS 46 43   AST 27 26   ALT 93* 75*   ANIONGAP 19* 17*       Significant Imaging: I have reviewed all pertinent imaging results/findings within the past 24 hours.    Assessment/Plan:      * Acute renal failure superimposed on stage 3 chronic kidney disease  BISI is likely due to acute  tubular necrosis caused by rhabdomyolysis . Baseline creatinine is  1.9 . Most recent creatinine and eGFR are listed below.  Recent Labs     02/24/25  0530 02/25/25  0427 02/26/25  0522   CREATININE 12.7* 12.8* 12.2*   EGFRNORACEVR 4* 4* 4*        Plan  - Avoid nephrotoxins and renally dose meds for GFR listed above  - Monitor urine output, serial BMP, and adjust therapy as needed  Appreciate Nephrology.  Nephrology recommending HD.  Trialysis catheter placed and patient started on HD.  on 2/24, kidney function improving. HD stopped on 2/24   -per neprho, kidney function should continue to improve  -pt and family does not want to continue long term HD if needed in the future  -trialysis cath removed  -continue goals of care discussion, palliative Care following, appreciate recommendations    ACP (advance care planning)  Advance Care Planning  Per prior attending.  Date: 02/21/2025  Called patient's sister as there were many questions about patient's treatment plan and disposition.  Patient was recently seen by Palliative Care and discussed possible hospice, but patient has also been started on dialysis.  Discussed medical treatment plan and disposition.  At this time, trying to assess if HD will be temporary or long term.  Patient does not seem to want lifelong dialysis.  If this is the case, then we would be looking into hospice, most likely in a NH.  If HD just temporary or patient does agree to lifelong HD, we would then be looking into SNF.  Patient's sister agrees with this plan.  He remains DNR.    Length of ACP   conversation in minutes: A total of 20 min was spent on advance care planning, goals of care discussion, emotional support, formulating and communicating prognosis and exploring burden/benefit of various approaches of treatment. This discussion occurred on a fully voluntary basis with the verbal consent of the patient and/or family.       Another long goals of care discussion on 2/24 with pt and his  sister (Marshall). They do not want to do long term HD. Leaning towards conservative care. Palliative care consulted again.     At this time, no dialysis indicated as kidney function is stabilizing/improving per Nephrology.  We will continue goals of care discussion.  Plan is to DC to longterm nursing home with or without hospice.  Appreciate palliative care input.      New onset a-fib  Patient has paroxysmal (<7 days) atrial fibrillation. Patient is currently in sinus rhythm. [id number] Score: 2. The patients heart rate in the last 24 hours is as follows:  Pulse  Min: 70  Max: 84     Antiarrhythmics  metoprolol injection 5 mg, Every 4 hours PRN, Intravenous  metoprolol tartrate (LOPRESSOR) tablet 50 mg, 2 times daily, Oral    Anticoagulants  apixaban tablet 2.5 mg, 2 times daily, Oral    Plan  - Replete lytes with a goal of K>4, Mg >2  - Patient is anticoagulated, see medications listed above.  - Patient's afib is currently  resolved and back in NSR  - patient moved to ICU overnight and placed on Amio and heparin drips.  Converted to NSR.  Cardiology consulted.  Transitioned Amio to PO and heparin drip to Eliquis.  Converted back to rapid Afib on 2/20.  Placed on Cardizem drip.  Back to NSR. No Amio per Cards secondary to elevated LFT's.  Started on PO Lopressor.  Now off Cardizem drip.        Elevated troponin  Recent Labs   Lab 02/19/25  0410   TROPONINI 0.074*         Chronically elevated  No chest pain    Continue to monitor     COVID-19  No hypoxia, prophylactic remdesivir   Completed course of remdesivir.    Non-traumatic rhabdomyolysis  [place] CPK to 15097 in the setting of a prolonged fall   - CK trending to >40k  - with renal failure -- Nephrolgoy consulted  - continue with aggressive hydration  - no evidence of compartment syndrome  - CK continues to improve, but Creat continued to increase.  Pt was started on HD.  -on 2/24, kidney function improving. HD stopped on 2/24   -per neprho, kidney function  should continue to improve  -pt and family does not want to continue long term HD if needed in the future    Elevated LFTs  Likely in the setting of rhabdomyolysis   Continue to monitor   - slowly improving      Debility  -[facility], continue in-hospital care as stated above in the meantime  -More than 20 minutes of my time has been spent discussing and planning just her safe disposition with patient/family/CM/SW/Nursing staff (not including other time spent in clinical discussion and management)  -CM/SW following, appreciate input   -Will place DC orders once placement has been secured  -depending on ongoing goals of care conversation, patient may need hospice set up at the nursing home        Stage 3b chronic kidney disease  Creatine stable for now. BMP reviewed- noted Estimated Creatinine Clearance: 4.4 mL/min (A) (based on SCr of 12.7 mg/dL (H)). according to latest data. Based on current GFR, CKD stage is stage 3 - GFR 30-59.  Monitor UOP and serial BMP and adjust therapy as needed. Renally dose meds. Avoid nephrotoxic medications and procedures.  With BISI as above.    Schizophrenia  Continue Abilify         VTE Risk Mitigation (From admission, onward)           Ordered     apixaban tablet 2.5 mg  2 times daily         02/20/25 1017     IP VTE HIGH RISK PATIENT  Once         02/14/25 0222     Place sequential compression device  Until discontinued         02/14/25 0222                          I have completed this tele-visit without the assistance of a telepresenter.    The attending portion of this evaluation, treatment, and documentation was performed per Aura Valdez MD via Telemedicine AudioVisual using the secure [employer] software platform with 2 way audio/video. The provider was located off-site and the patient is located in the hospital. The aforementioned video software was utilized to document the relevant history and physical exam    Aura Valdez,  MD  Department of Hospital Medicine   [facility] [place] - Telemetry

## 2025-02-27 VITALS
OXYGEN SATURATION: 97 % | BODY MASS INDEX: 21.74 KG/M2 | HEIGHT: 70 IN | RESPIRATION RATE: 18 BRPM | WEIGHT: 151.88 LBS | TEMPERATURE: 98 F | DIASTOLIC BLOOD PRESSURE: 61 MMHG | SYSTOLIC BLOOD PRESSURE: 104 MMHG | HEART RATE: 79 BPM

## 2025-02-27 LAB
ALBUMIN SERPL BCP-MCNC: 2.3 G/DL (ref 3.5–5.2)
ALP SERPL-CCNC: 39 U/L (ref 40–150)
ALT SERPL W/O P-5'-P-CCNC: 64 U/L (ref 10–44)
ANION GAP SERPL CALC-SCNC: 16 MMOL/L (ref 8–16)
AST SERPL-CCNC: 25 U/L (ref 10–40)
BASOPHILS # BLD AUTO: 0.04 K/UL (ref 0–0.2)
BASOPHILS NFR BLD: 0.4 % (ref 0–1.9)
BILIRUB SERPL-MCNC: 0.4 MG/DL (ref 0.1–1)
BUN SERPL-MCNC: 140 MG/DL (ref 8–23)
CALCIUM SERPL-MCNC: 8.4 MG/DL (ref 8.7–10.5)
CHLORIDE SERPL-SCNC: 103 MMOL/L (ref 95–110)
CO2 SERPL-SCNC: 26 MMOL/L (ref 23–29)
CREAT SERPL-MCNC: 11.2 MG/DL (ref 0.5–1.4)
DIFFERENTIAL METHOD BLD: ABNORMAL
EOSINOPHIL # BLD AUTO: 0.2 K/UL (ref 0–0.5)
EOSINOPHIL NFR BLD: 2.4 % (ref 0–8)
ERYTHROCYTE [DISTWIDTH] IN BLOOD BY AUTOMATED COUNT: 14.5 % (ref 11.5–14.5)
EST. GFR  (NO RACE VARIABLE): 4 ML/MIN/1.73 M^2
GLUCOSE SERPL-MCNC: 109 MG/DL (ref 70–110)
HCT VFR BLD AUTO: 23.9 % (ref 40–54)
HGB BLD-MCNC: 8 G/DL (ref 14–18)
IMM GRANULOCYTES # BLD AUTO: 0.06 K/UL (ref 0–0.04)
IMM GRANULOCYTES NFR BLD AUTO: 0.6 % (ref 0–0.5)
LYMPHOCYTES # BLD AUTO: 1.3 K/UL (ref 1–4.8)
LYMPHOCYTES NFR BLD: 12.4 % (ref 18–48)
MAGNESIUM SERPL-MCNC: 1.8 MG/DL (ref 1.6–2.6)
MCH RBC QN AUTO: 31.5 PG (ref 27–31)
MCHC RBC AUTO-ENTMCNC: 33.5 G/DL (ref 32–36)
MCV RBC AUTO: 94 FL (ref 82–98)
MONOCYTES # BLD AUTO: 1 K/UL (ref 0.3–1)
MONOCYTES NFR BLD: 10.2 % (ref 4–15)
NEUTROPHILS # BLD AUTO: 7.6 K/UL (ref 1.8–7.7)
NEUTROPHILS NFR BLD: 74 % (ref 38–73)
NRBC BLD-RTO: 0 /100 WBC
PHOSPHATE SERPL-MCNC: 4.4 MG/DL (ref 2.7–4.5)
PLATELET # BLD AUTO: 290 K/UL (ref 150–450)
PMV BLD AUTO: 10.2 FL (ref 9.2–12.9)
POTASSIUM SERPL-SCNC: 3.7 MMOL/L (ref 3.5–5.1)
PROT SERPL-MCNC: 5.7 G/DL (ref 6–8.4)
RBC # BLD AUTO: 2.54 M/UL (ref 4.6–6.2)
SODIUM SERPL-SCNC: 145 MMOL/L (ref 136–145)
WBC # BLD AUTO: 10.2 K/UL (ref 3.9–12.7)

## 2025-02-27 PROCEDURE — 80053 COMPREHEN METABOLIC PANEL: CPT | Performed by: HOSPITALIST

## 2025-02-27 PROCEDURE — 25000003 PHARM REV CODE 250: Performed by: HOSPITALIST

## 2025-02-27 PROCEDURE — 86580 TB INTRADERMAL TEST: CPT | Performed by: HOSPITALIST

## 2025-02-27 PROCEDURE — 63600175 PHARM REV CODE 636 W HCPCS: Mod: JZ,EC,TB | Performed by: INTERNAL MEDICINE

## 2025-02-27 PROCEDURE — 84100 ASSAY OF PHOSPHORUS: CPT | Performed by: HOSPITALIST

## 2025-02-27 PROCEDURE — 94761 N-INVAS EAR/PLS OXIMETRY MLT: CPT

## 2025-02-27 PROCEDURE — 85025 COMPLETE CBC W/AUTO DIFF WBC: CPT | Performed by: HOSPITALIST

## 2025-02-27 PROCEDURE — 99232 SBSQ HOSP IP/OBS MODERATE 35: CPT | Mod: ,,, | Performed by: INTERNAL MEDICINE

## 2025-02-27 PROCEDURE — 36415 COLL VENOUS BLD VENIPUNCTURE: CPT | Performed by: HOSPITALIST

## 2025-02-27 PROCEDURE — 83735 ASSAY OF MAGNESIUM: CPT | Performed by: HOSPITALIST

## 2025-02-27 PROCEDURE — 99900035 HC TECH TIME PER 15 MIN (STAT)

## 2025-02-27 PROCEDURE — 30200315 PPD INTRADERMAL TEST REV CODE 302: Performed by: HOSPITALIST

## 2025-02-27 RX ORDER — METOPROLOL TARTRATE 50 MG/1
50 TABLET ORAL 2 TIMES DAILY
Start: 2025-02-27 | End: 2026-02-27

## 2025-02-27 RX ORDER — ARIPIPRAZOLE 20 MG/1
20 TABLET ORAL DAILY
Start: 2025-02-27

## 2025-02-27 RX ADMIN — ARIPIPRAZOLE 20 MG: 10 TABLET ORAL at 09:02

## 2025-02-27 RX ADMIN — APIXABAN 2.5 MG: 2.5 TABLET, FILM COATED ORAL at 09:02

## 2025-02-27 RX ADMIN — THERA TABS 1 TABLET: TAB at 09:02

## 2025-02-27 RX ADMIN — TUBERCULIN PURIFIED PROTEIN DERIVATIVE 5 UNITS: 5 INJECTION, SOLUTION INTRADERMAL at 12:02

## 2025-02-27 RX ADMIN — LIDOCAINE 5% 1 PATCH: 700 PATCH TOPICAL at 02:02

## 2025-02-27 RX ADMIN — EPOETIN ALFA-EPBX 20000 UNITS: 10000 INJECTION, SOLUTION INTRAVENOUS; SUBCUTANEOUS at 02:02

## 2025-02-27 RX ADMIN — METOPROLOL TARTRATE 50 MG: 50 TABLET, FILM COATED ORAL at 09:02

## 2025-02-27 NOTE — PLAN OF CARE
[facility] FOLLOW UP APPOINTMENT          The following appointments have been scheduled for patient by Case Management.   Post Acute Facility to arrange patient transportation to and from specialty appointments during patient stay at facility.           Contact information for follow-up providers       Fabio Lennon MD. Go on 3/6/2025.   Specialty: Family Medicine  Why: Appointment scheduled for 8:20am  Contact information:  [address]  [place] [id number]  [phone]

## 2025-02-27 NOTE — PLAN OF CARE
NURSING HOME ORDERS / HOSPICE ORDERS     02/27/2025  [facility] - TELEMETRY  [address] [facility]  [place] [id number]  Dept: [phone]  Loc: [phone]     Admit to Nursing Home:  sen[facility] care Nursing Facili*    Diagnoses:  Active Hospital Problems    Diagnosis  POA    *Acute renal failure superimposed on stage 3 chronic kidney disease [N17.9, N18.30]  Yes     Priority: 1 - High    New onset a-fib [I48.91]  No    ACP (advance care planning) [Z71.89]  Not Applicable    Non-traumatic rhabdomyolysis [M62.82]  Yes    COVID-19 [U07.1]  Yes    Elevated troponin [R79.89]  Yes    Elevated LFTs [R79.89]  Yes    Debility [R53.81]  Yes    Stage 3b chronic kidney disease [N18.32]  Yes    Schizophrenia [F20.9]  Yes      Resolved Hospital Problems    Diagnosis Date Resolved POA    Leukocytosis [D72.829] 02/19/2025 Yes       Patient is homebound due to:  Acute renal failure superimposed on stage 3 chronic kidney disease    Hospice Qualifying Diagnoses: BISI       Patient has a life expectancy < 6 months due to:  Primary Hospice Diagnosis: BISI  Comorbid Conditions Contributing to Decline: CKD, Schizophrenia     Vital Signs: Routine per Hospice Protocol.    Code Status: DNR    Allergies:Review of patient's allergies indicates:  No Known Allergies    Vitals:  Routine    Diet: renal diet    Activities:   Activity as tolerated    Goals of Care Treatment Preferences:  Code Status: DNR          What is most important right now is to focus on avoiding the hospital, remaining as independent as possible, symptom/pain control, quality of life, even if it means sacrificing a little time.  Accordingly, we have decided that the best plan to meet the patient's goals includes continuing with treatment.      Nursing Precautions: Per Hospice Routine  Aspiration , Fall, and Pressure ulcer prevention    Consults:   Nutrition to evaluate and recommend diet. Hospice or palliative care  consult.     Miscellaneous Care: Routine Skin for Bedridden Patients:  Apply moisture barrier cream to all                   Medications: Discontinue all previous medication orders, if any. See new list below.     Medication List        START taking these medications      apixaban 2.5 mg Tab  Commonly known as: ELIQUIS  Take 1 tablet (2.5 mg total) by mouth 2 (two) times daily.     metoprolol tartrate 50 MG tablet  Commonly known as: LOPRESSOR  Take 1 tablet (50 mg total) by mouth 2 (two) times daily.            CONTINUE taking these medications      ARIPiprazole 20 MG Tab  Commonly known as: ABILIFY  Take 1 tablet (20 mg total) by mouth once daily.     loratadine 10 mg tablet  Commonly known as: CLARITIN  Take 10 mg by mouth once daily.     multivitamin capsule  Take 1 capsule by mouth once daily.                Immunizations Administered as of 2/27/2025       Name Date Dose VIS Date Route Exp Date    COVID-19, MRNA, LN-S, PF (Moderna) 3/29/2021 0.5 mL -- -- --    : Modern[employer]     Lot: [id number]     Comment: Adminis     COVID-19, MRNA, LN-S, PF (Moderna) 3/1/2021 0.5 mL -- -- --    : Moderna [employer]     Lot: [id number]     Comment: Adminis               Some patients may experience side effects after vaccination.  These may include fever, headache, muscle or joint aches.  Most symptoms resolve with 24-48 hours and do not require urgent medical evaluation unless they persist for more than 72 hours or symptoms are concerning for an unrelated medical condition.        Future Orders:  Hospice Medical Director may dictate new orders for comfortable care measures & sign death certificate.       _________________________________  Mary Melendez MD  02/27/2025

## 2025-02-27 NOTE — ASSESSMENT & PLAN NOTE
Creatine stable for now. BMP reviewed- noted Estimated Creatinine Clearance: 5 mL/min (A) (based on SCr of 11.2 mg/dL (H)). according to latest data. Based on current GFR, CKD stage is stage 3 - GFR 30-59.  Monitor UOP and serial BMP and adjust therapy as needed. Renally dose meds. Avoid nephrotoxic medications and procedures.  With BISI as above.

## 2025-02-27 NOTE — PLAN OF CARE
Problem: Adult Inpatient Plan of Care  Goal: Plan of Care Review  Outcome: Progressing     Problem: Acute Kidney Injury/Impairment  Goal: Fluid and Electrolyte Balance  Outcome: Progressing     Problem: Fall Injury Risk  Goal: Absence of Fall and Fall-Related Injury  Outcome: Progressing     Problem: Skin Injury Risk Increased  Goal: Skin Health and Integrity  Outcome: Progressing

## 2025-02-27 NOTE — ASSESSMENT & PLAN NOTE
Patient has paroxysmal (<7 days) atrial fibrillation. Patient is currently in sinus rhythm. [id number] Score: 2. The patients heart rate in the last 24 hours is as follows:  Pulse  Min: 76  Max: 94     Antiarrhythmics  metoprolol injection 5 mg, Every 4 hours PRN, Intravenous  metoprolol tartrate (LOPRESSOR) tablet 50 mg, 2 times daily, Oral    Anticoagulants  apixaban tablet 2.5 mg, 2 times daily, Oral    Plan  - Replete lytes with a goal of K>4, Mg >2  - Patient is anticoagulated, see medications listed above.  - Patient's afib is currently  resolved and back in NSR  - patient moved to ICU overnight and placed on Amio and heparin drips.  Converted to NSR.  Cardiology consulted.  Transitioned Amio to PO and heparin drip to Eliquis.  Converted back to rapid Afib on 2/20.  Placed on Cardizem drip.  Back to NSR. No Amio per Cards secondary to elevated LFT's.  Started on PO Lopressor.  Now off Cardizem drip.

## 2025-02-27 NOTE — SUBJECTIVE & OBJECTIVE
Interval History: UOP 1.525L yesterday. No events overnight. Doing well this morning. Denies complaints. Anticipating discharge to nursing home later today.       Review of patient's allergies indicates:  No Known Allergies  Current Facility-Administered Medications   Medication Frequency    acetaminophen tablet 650 mg Q4H PRN    albuterol-ipratropium 2.5 mg-0.5 mg/3 mL nebulizer solution 3 mL Q4H PRN    aluminum-magnesium hydroxide-simethicone 200-200-20 mg/5 mL suspension 30 mL QID PRN    apixaban tablet 2.5 mg BID    ARIPiprazole tablet 20 mg Daily    bisacodyL suppository 10 mg Daily PRN    cetirizine tablet 5 mg Every Mon, Wed, Fri    epoetin loco-epbx injection 20,000 Units Once    glucagon (human recombinant) injection 1 mg PRN    glucose chewable tablet 16 g PRN    glucose chewable tablet 24 g PRN    LIDOcaine 5 % patch 1 patch Q24H    melatonin tablet 6 mg Nightly PRN    metoprolol injection 5 mg Q4H PRN    metoprolol tartrate (LOPRESSOR) tablet 50 mg BID    multivitamin tablet Daily    naloxone 0.4 mg/mL injection 0.02 mg PRN    ondansetron injection 4 mg Q8H PRN    senna-docusate 8.6-50 mg per tablet 1 tablet Daily PRN    simethicone chewable tablet 80 mg QID PRN    sodium chloride 0.9% flush 10 mL Q12H PRN    Tdap vaccine injection 0.5 mL vaccine x 1 dose     Facility-Administered Medications Ordered in Other Encounters   Medication Frequency    0.9%  NaCl infusion Continuous    mupirocin 2 % ointment On Call Procedure       Objective:     Vital Signs (Most Recent):  Temp: 98.4 °F (36.9 °C) (02/27/25 1143)  Pulse: 76 (02/27/25 1143)  Resp: 18 (02/27/25 1143)  BP: 113/62 (02/27/25 1143)  SpO2: 97 % (02/27/25 1143) Vital Signs (24h Range):  Temp:  [97.4 °F (36.3 °C)-98.4 °F (36.9 °C)] 98.4 °F (36.9 °C)  Pulse:  [67-94] 76  Resp:  [16-18] 18  SpO2:  [97 %-100 %] 97 %  BP: ()/(51-64) 113/62     Weight: 68.9 kg (151 lb 14.4 oz) (02/24/25 0700)  Body mass index is 21.79 kg/m².  Body surface area is 1.84  meters squared.    I/O last 3 completed shifts:  In: 600 [P.O.:600]  Out: 2225 [Urine:2225]     Physical Exam  Vitals and nursing note reviewed.   Constitutional:       General: He is awake. He is not in acute distress.     Appearance: Normal appearance. He is well-developed.   HENT:      Head: Normocephalic and atraumatic.      Nose: Nose normal.      Mouth/Throat:      Mouth: Mucous membranes are moist.   Eyes:      Extraocular Movements: Extraocular movements intact.      Conjunctiva/sclera: Conjunctivae normal.   Pulmonary:      Effort: Pulmonary effort is normal.   Abdominal:      General: There is no distension.      Palpations: Abdomen is soft.   Musculoskeletal:         General: No tenderness or signs of injury.      Right lower leg: No edema.      Left lower leg: No edema.   Skin:     General: Skin is warm and dry.      Findings: No erythema or rash.   Neurological:      General: No focal deficit present.      Mental Status: He is alert. Mental status is at baseline.   Psychiatric:         Mood and Affect: Mood normal.         Behavior: Behavior normal.          Significant Labs:  CBC:   Recent Labs   Lab 02/27/25  0433   WBC 10.20   RBC 2.54*   HGB 8.0*   HCT 23.9*      MCV 94   MCH 31.5*   MCHC 33.5     CMP:   Recent Labs   Lab 02/27/25  0433      CALCIUM 8.4*   ALBUMIN 2.3*   PROT 5.7*      K 3.7   CO2 26      *   CREATININE 11.2*   ALKPHOS 39*   ALT 64*   AST 25   BILITOT 0.4     All labs within the past 24 hours have been reviewed.     Significant Imaging:  Labs: Reviewed

## 2025-02-27 NOTE — ASSESSMENT & PLAN NOTE
Elevated CPK to 25516 in the setting of a prolonged fall   - CK trending to >40k  - with renal failure -- Nephrolgoy consulted  - continue with aggressive hydration  - no evidence of compartment syndrome  - CK continues to improve, but Creat continued to increase.  Pt was started on HD.  -on 2/24, kidney function improving. HD stopped on 2/24   -per neprho, kidney function should continue to improve  -pt and family does not want to continue long term HD if needed in the future

## 2025-02-27 NOTE — PLAN OF CARE
Case Management Final Discharge Note    Discharge Disposition: long-term placed at [facility], [phone]    New DME ordered / company name: None    Relevant SDOH / Transition of Care Barriers:  None    Person available to provide assistance at home when needed and their contact information: Caitlyn Manzo (Sister) [phone]      Scheduled followup appointment: Follow up appointment with PCP scheduled and added to patient AVS    Referrals placed: None    Transportation: Wheelchair van requested to transport patient to facility         Patient and family educated on discharge services and updated on DC plan.  Patient to be a new correction placement at [facility]; he will be admitting with hospice care from [facility], [phone].  Patient follow up appointment sent to facility via [employer].  Bedside RN notified, patient clear to discharge from Case Management Perspective.    02/27/25 1234   Final Note   Assessment Type Final Discharge Note   Anticipated Discharge Disposition [facility] Resources/Appts/Education Provided Provided patient/caregiver with written discharge plan information;Appointments scheduled and added to AVS;Post-Acute resouces added to AVS   Post-Acute Status   Post-Acute Authorization Placement;Hospice   Post-Acute Placement Status Set-up Complete/Auth obtained   Hospice Status Set-up Complete/Auth obtained   Discharge Delays None known at this time

## 2025-02-27 NOTE — ASSESSMENT & PLAN NOTE
BISI is likely due to acute tubular necrosis caused by rhabdomyolysis . Baseline creatinine is  1.9 . Most recent creatinine and eGFR are listed below.  Recent Labs     02/25/25  0427 02/26/25  0522 02/27/25  0433   CREATININE 12.8* 12.2* 11.2*   EGFRNORACEVR 4* 4* 4*        Plan  - Avoid nephrotoxins and renally dose meds for GFR listed above  - Monitor urine output, serial BMP, and adjust therapy as needed  Appreciate Nephrology.  Nephrology recommending HD.  Trialysis catheter placed and patient started on HD.  on 2/24, kidney function improving. HD stopped on 2/24   -per neprho, kidney function should continue to improve  -pt and family does not want to continue long term HD if needed in the future  -trialysis cath removed  -continue goals of care discussion, palliative Care following, appreciate recommendations

## 2025-02-27 NOTE — PROGRESS NOTES
[facility] Medicine  Telemedicine Progress Note    Patient Name: Antonio Torres Jr.  MRN: [id number]  Patient Class: IP- Inpatient   Admission Date: 2/13/2025  Length of Stay: 13 days  Attending Physician: Mary Mcdonald MD  Primary Care Provider: Fabio Lennon MD          Subjective:     Principal Problem:Acute renal failure superimposed on stage 3 chronic kidney disease        HPI:  This is an 80-year-old male with a past medical history of schizophrenia, chronic blepharitis, hyperlipidemia, CKD 3, BPH, who presents with a fall.    Patient presents for evaluation after a fall and a sycopal episode that occurred prior to presentation. He reports losing consciousness and falling face forward on the floor sustaining an injury to his head, elbows, abdomen and knees. He was unable to stand up and was on the floor for about 1.5 hours. Patient reports having a coughing episode a day prior but denies sick contacts. He denied chest pain.     In the ED, the patient was tachycardic (120s > 60s), but otherwise hemodynamically stable.  Labs were remarkable for leukocytosis (25.3), elevated CPK (23,406), elevated creatinine (3.3-baseline around 1.9), elevated troponin (0.525-chronically elevated), elevated lactic acid (4.8 > 2.1), elevated procalcitonin (2.53), elevated LFTs (T bili: 1.1, AST: 267, ALT: 73), hypophosphatemia (1.7), positive COVID-19.  CT head, maxillofacial, cervical spine showed a nondisplaced right nasal bone fracture with no soft tissue swelling, and paranasal sinus disease.  CT chest, abdomen and pelvis showed possible soft tissue contusion at the level of the xiphoid process, a 2-3 mm left lower lobe pulmonary nodule and nonspecific urinary bladder wall thickening.  Right elbow x-ray demonstrated an elbow effusion with an olecranon spur.  Patient was given 2 L of LR, vancomycin, Zosyn, Tylenol 1 g p.o..  He was admitted for further management.    Overview/Hospital Course:    Brian is a 81 yo M admitted after a fall and found to have acute on chronic kidney injury and rhabdomyolysis. CK >20,000 on admit and rising to >40,000. Renal function worsening. On IVF. Also found to be COVID positive. Started on remdesivir. Nephrology consulted. CK improving, though Cr still rising. Almanza is placed for accurate I&D monitoring. Patient went into rapid Afib.  Moved to ICU and started on Amio drip.  Quickly converted back to NSR.  Creat continued to increase and Nephrology recommending HD.  Trialysis catheter placed and patient started on HD.  Amio switched to PO and patient started on Eliquis.  Patient went back into rapid afib/aflutter. Started on Cardizem drip.  Amiodarone stopped because of elevated LFT's and patient started on B blocker.  Seems will be ESRD,nephrology is on case.    Follow-up For:  Patient Active Problem List    Diagnosis Date Noted    New onset a-fib 02/19/2025    Non-traumatic rhabdomyolysis 02/14/2025    Acute renal failure superimposed on stage 3 chronic kidney disease 02/14/2025    Debility 11/20/2022     Discharge Planning   JULIANA: 2/26/2025   Discharge Plan A: New Nursing Home placement - correction care facility   Discharge Delays: None known at this time    Interval History:   Subjective: Antonio Torres Jr. is being followed for Acute renal failure superimposed on stage 3 chronic kidney disease. No acute events overnight. He is awake and alert. No complaints. Pending NH placement.     Symptoms: The patient denies shortness of breath, malaise, cough, chest pain, weakness, headache, chest pressure, anorexia, diarrhea and anxiety.     Diet: Diet Renal On Dialysis. Poor intake. Eating 25% of meals. The patient denies nausea and vomiting.    Last Bowel Movement: 02/27/25    Activity Level: Impaired due to weakness    Ambulation: Continuous assistance    Pain: The patient Denies pain       Data  Details     [x]   Lab results reviewed 2/27/2025 Sr Cr improved. BUN remains high.  CBC stable.     []   Micro reports reviewed 2/27/2025     []   Pathology reports reviewed 2/27/2025     []   Imaging reports reviewed 2/27/2025     []   Cardiology Procedure reports reviewed 2/27/2025     []   Non- records/CareEverywhere notes reviewed 2/27/2025      []  Tests/studies orders placed or verified 2/27/2025       []  Independently viewed/assessed 2/27/2025  EKG:  none   Imaging None    []  2/27/2025 Discussion of:      High Risk of M&M from management or Complexity of problems:   Patient has a condition that poses threat to life and bodily function: Acute Renal Failure     Review of Systems   Constitutional:  Negative for chills and fever.   Respiratory:  Negative for cough and shortness of breath.    Cardiovascular:  Negative for chest pain and palpitations.   Gastrointestinal:  Negative for abdominal pain.        Scheduled Meds:   apixaban  2.5 mg Oral BID    ARIPiprazole  20 mg Oral Daily    cetirizine  5 mg Oral Every Mon, Wed, Fri    LIDOcaine  1 patch Transdermal Q24H    metoprolol tartrate  50 mg Oral BID    multivitamin  1 tablet Oral Daily     Continuous Infusions:  PRN Meds:.  Current Facility-Administered Medications:     acetaminophen, 650 mg, Oral, Q4H PRN    albuterol-ipratropium, 3 mL, Nebulization, Q4H PRN    aluminum-magnesium hydroxide-simethicone, 30 mL, Oral, QID PRN    bisacodyL, 10 mg, Rectal, Daily PRN    glucagon (human recombinant), 1 mg, Intramuscular, PRN    glucose, 16 g, Oral, PRN    glucose, 24 g, Oral, PRN    melatonin, 6 mg, Oral, Nightly PRN    metoprolol, 5 mg, Intravenous, Q4H PRN    naloxone, 0.02 mg, Intravenous, PRN    ondansetron, 4 mg, Intravenous, Q8H PRN    senna-docusate 8.6-50 mg, 1 tablet, Oral, Daily PRN    simethicone, 1 tablet, Oral, QID PRN    sodium chloride 0.9%, 10 mL, Intravenous, Q12H PRN    DIPH,PERTUSS(ACELL),TET VACCINE (ADULT)(BOOSTRIX,ADACEL), 0.5 mL, Intramuscular, vaccine x 1 dose      Objective:     Vitals:    02/27/25 0500 02/27/25 0652  02/27/25 0700 02/27/25 0806   BP: 110/64   110/62   BP Location: Left arm   Left arm   Patient Position: Lying   Lying   Pulse: 80 85 88 91   Resp: 16  18 18   Temp: 97.7 °F (36.5 °C)   97.4 °F (36.3 °C)   TempSrc: Oral   Oral   SpO2: 97%  100% 98%   Weight:       Height:           No data found.    Intake/Output Summary (Last 24 hours) at 2/27/2025 0922  Last data filed at 2/27/2025 0546  Gross per 24 hour   Intake 480 ml   Output 1525 ml   Net -1045 ml     Net IO Since Admission: -9,265.65 mL [02/27/25 0922]    Physical Exam  Vitals and nursing note reviewed.   Constitutional:       General: He is awake. He is not in acute distress.     Appearance: Normal appearance. He is well-developed and well-groomed. He is not toxic-appearing.   HENT:      Head: Normocephalic and atraumatic.   Cardiovascular:      Rate and Rhythm: Normal rate.   Pulmonary:      Effort: No tachypnea, accessory muscle usage or respiratory distress.   Abdominal:      General: There is no distension.   Neurological:      Mental Status: He is alert and oriented to person, place, and time. Mental status is at baseline.   Psychiatric:         Mood and Affect: Mood normal.         Behavior: Behavior normal. Behavior is cooperative.         Thought Content: Thought content normal.       Significant Labs: All pertinent labs within the past 24 hours have been reviewed.    BMP (Last 3 Results):   Recent Labs   Lab 02/21/25  0213 02/22/25  0437 02/25/25 0427 02/26/25 0522 02/27/25  0433   GLU 87   < > 102 106 109      < > 143 143 145   K 4.3   < > 3.6 3.4* 3.7      < > 97 99 103   CO2 17*   < > 27 27 26   BUN 76*   < > 123* 138* 140*   CREATININE 11.1*   < > 12.8* 12.2* 11.2*   CALCIUM 8.3*   < > 8.7 8.5* 8.4*   MG 1.9  --   --  1.9 1.8    < > = values in this interval not displayed.     CBC (Last 3 Results):   Recent Labs   Lab 02/25/25  0428 02/26/25 0522 02/27/25  0433   WBC 12.42 12.20 10.20   RBC 3.11* 2.86* 2.54*   HGB 9.5* 8.9* 8.0*    HCT 29.5* 26.6* 23.9*    273 290   MCV 95 93 94   MCH 30.5 31.1* 31.5*   MCHC 32.2 33.5 33.5       Significant Imaging: I have reviewed all pertinent imaging results/findings within the past 24 hours.  X-Ray Chest 1 View  Narrative: EXAMINATION:  XR CHEST 1 VIEW    CLINICAL HISTORY:  trailysis line placement verification;    TECHNIQUE:  Single frontal view of the chest was performed.    COMPARISON:  02/17/2025.    FINDINGS:  Right IJ central venous catheter distal tip is seen over the SVC.  Heart is stable in size.  New mild right basilar atelectasis or opacification/consolidation is seen.  No evidence of pneumothorax or large pleural effusion.  Impression: As above.    Electronically signed by: Олег Vo MD  Date:    02/19/2025  Time:    17:17        Assessment/Plan:      * Acute renal failure superimposed on stage 3 chronic kidney disease  BISI is likely due to acute tubular necrosis caused by rhabdomyolysis . Baseline creatinine is  1.9 . Most recent creatinine and eGFR are listed below.  Recent Labs     02/25/25  0427 02/26/25  0522 02/27/25  0433   CREATININE 12.8* 12.2* 11.2*   EGFRNORACEVR 4* 4* 4*        Plan  - Avoid nephrotoxins and renally dose meds for GFR listed above  - Monitor urine output, serial BMP, and adjust therapy as needed  Appreciate Nephrology.  Nephrology recommending HD.  Trialysis catheter placed and patient started on HD.  on 2/24, kidney function improving. HD stopped on 2/24   -per neprho, kidney function should continue to improve  -pt and family does not want to continue long term HD if needed in the future  -trialysis cath removed  -continue goals of care discussion, palliative Care following, appreciate recommendations    ACP (advance care planning)  Advance Care Planning  Per prior attending.  Date: 02/21/2025  Called patient's sister as there were many questions about patient's treatment plan and disposition.  Patient was recently seen by Palliative Care and  discussed possible hospice, but patient has also been started on dialysis.  Discussed medical treatment plan and disposition.  At this time, trying to assess if HD will be temporary or long term.  Patient does not seem to want lifelong dialysis.  If this is the case, then we would be looking into hospice, most likely in a NH.  If HD just temporary or patient does agree to lifelong HD, we would then be looking into SNF.  Patient's sister agrees with this plan.  He remains DNR.    Length of ACP   conversation in minutes: A total of 20 min was spent on advance care planning, goals of care discussion, emotional support, formulating and communicating prognosis and exploring burden/benefit of various approaches of treatment. This discussion occurred on a fully voluntary basis with the verbal consent of the patient and/or family.      Another long goals of care discussion on 2/24 with pt and his sister (Marshall). They do not want to do long term HD. Leaning towards conservative care. Palliative care consulted again.     At this time, no dialysis indicated as kidney function is stabilizing/improving per Nephrology.  We will continue goals of care discussion.  Plan is to DC to snf nursing home with or without hospice.  Appreciate palliative care input.      New onset a-fib  Patient has paroxysmal (<7 days) atrial fibrillation. Patient is currently in sinus rhythm. [id number] Score: 2. The patients heart rate in the last 24 hours is as follows:  Pulse  Min: 76  Max: 94     Antiarrhythmics  metoprolol injection 5 mg, Every 4 hours PRN, Intravenous  metoprolol tartrate (LOPRESSOR) tablet 50 mg, 2 times daily, Oral    Anticoagulants  apixaban tablet 2.5 mg, 2 times daily, Oral    Plan  - Replete lytes with a goal of K>4, Mg >2  - Patient is anticoagulated, see medications listed above.  - Patient's afib is currently  resolved and back in NSR  - patient moved to ICU overnight and placed on Amio and heparin drips.  Converted to  "NSR.  Cardiology consulted.  Transitioned Amio to PO and heparin drip to Eliquis.  Converted back to rapid Afib on 2/20.  Placed on Cardizem drip.  Back to NSR. No Amio per Cards secondary to elevated LFT's.  Started on PO Lopressor.  Now off Cardizem drip.        Elevated troponin  No results for input(s): "TROPONINI", "TROPONINIHS" in the last 168 hours.      Chronically elevated  No chest pain    Continue to monitor     COVID-19  No hypoxia, prophylactic remdesivir   Completed course of remdesivir.    Non-traumatic rhabdomyolysis  Elevated CPK to 79362 in the setting of a prolonged fall   - CK trending to >40k  - with renal failure -- Nephrolgoy consulted  - continue with aggressive hydration  - no evidence of compartment syndrome  - CK continues to improve, but Creat continued to increase.  Pt was started on HD.  -on 2/24, kidney function improving. HD stopped on 2/24   -per neprho, kidney function should continue to improve  -pt and family does not want to continue long term HD if needed in the future    Elevated LFTs  Likely in the setting of rhabdomyolysis   Continue to monitor   - slowly improving      Debility  -Patient pending placement to alf nursing home, continue in-hospital care as stated above in the meantime  -More than 20 minutes of my time has been spent discussing and planning just her safe disposition with patient/family/CM/SW/Nursing staff (not including other time spent in clinical discussion and management)  -CM/SW following, appreciate input   -Will place DC orders once placement has been secured  -depending on ongoing goals of care conversation, patient may need hospice set up at the nursing home        Stage 3b chronic kidney disease  Creatine stable for now. BMP reviewed- noted Estimated Creatinine Clearance: 5 mL/min (A) (based on SCr of 11.2 mg/dL (H)). according to latest data. Based on current GFR, CKD stage is stage 3 - GFR 30-59.  Monitor UOP and serial BMP and adjust therapy " as needed. Renally dose meds. Avoid nephrotoxic medications and procedures.  With BISI as above.    Schizophrenia  Continue Abilify         VTE Risk Mitigation (From admission, onward)           Ordered     apixaban tablet 2.5 mg  2 times daily         02/20/25 1017     IP VTE HIGH RISK PATIENT  Once         02/14/25 0222     Place sequential compression device  Until discontinued         02/14/25 0222                          I have completed this tele-visit without the assistance of a telepresenter.    The attending portion of this evaluation, treatment, and documentation was performed per Mary Melendez MD via Telemedicine AudioVisual using the secure [employer] software platform with 2 way audio/video. The provider was located off-site and the patient is located in the hospital. The aforementioned video software was utilized to document the relevant history and physical exam    Mary Melendez MD  Department of Hospital Medicine   [facility]

## 2025-02-27 NOTE — ASSESSMENT & PLAN NOTE
Advance Care Planning   Per prior attending.  Date: 02/21/2025  Called patient's sister as there were many questions about patient's treatment plan and disposition.  Patient was recently seen by Palliative Care and discussed possible hospice, but patient has also been started on dialysis.  Discussed medical treatment plan and disposition.  At this time, trying to assess if HD will be temporary or long term.  Patient does not seem to want lifelong dialysis.  If this is the case, then we would be looking into hospice, most likely in a NH.  If HD just temporary or patient does agree to lifelong HD, we would then be looking into SNF.  Patient's sister agrees with this plan.  He remains DNR.    Length of ACP   conversation in minutes: A total of 20 min was spent on advance care planning, goals of care discussion, emotional support, formulating and communicating prognosis and exploring burden/benefit of various approaches of treatment. This discussion occurred on a fully voluntary basis with the verbal consent of the patient and/or family.       Another long goals of care discussion on 2/24 with pt and his sister (Marshall). They do not want to do long term HD. Leaning towards conservative care. Palliative care consulted again.     At this time, no dialysis indicated as kidney function is stabilizing/improving per Nephrology.  We will continue goals of care discussion.  Plan is to DC to skilled nursing nursing home with or without hospice.  Appreciate palliative care input.

## 2025-02-27 NOTE — PROGRESS NOTES
[facility] - Telemetry  Nephrology  Progress Note    Patient Name: Antonio Torres Jr.  MRN: [id number]  Admission Date: 2/13/2025  Hospital Length of Stay: 13 days  Attending Provider: Mary Mcdonald MD   Primary Care Physician: Fabio Lennon MD  Principal Problem:Acute renal failure superimposed on stage 3 chronic kidney disease    Subjective:     HPI: Mr. Torres is an 79 yo male with schizophrenia, BPH, CKD, and h/o melanoma who presented to the ED overnight s/p fall. HR 120s on arrival. He was also found to be COVID+. CPK 23k. He was started on IVF. Nephrology consulted for BISI on CKD. Prior records obtained and reviewed. He is followed by myself outpatient; last visit was 11/11/24. His baseline Cr is 1.7-2.0; his CKD is of unknown etiology. His Cr was 3.3 on arrival --> 3.5 this morning. CPK now > 40k. Abnormal LFTs. He reports to be doing okay this afternoon. Complaining of soreness. No SOB or leg swelling.    Interval History: UOP 1.525L yesterday. No events overnight. Doing well this morning. Denies complaints. Anticipating discharge to nursing home later today.       Review of patient's allergies indicates:  No Known Allergies  Current Facility-Administered Medications   Medication Frequency    acetaminophen tablet 650 mg Q4H PRN    albuterol-ipratropium 2.5 mg-0.5 mg/3 mL nebulizer solution 3 mL Q4H PRN    aluminum-magnesium hydroxide-simethicone 200-200-20 mg/5 mL suspension 30 mL QID PRN    apixaban tablet 2.5 mg BID    ARIPiprazole tablet 20 mg Daily    bisacodyL suppository 10 mg Daily PRN    cetirizine tablet 5 mg Every Mon, Wed, Fri    epoetin loco-epbx injection 20,000 Units Once    glucagon (human recombinant) injection 1 mg PRN    glucose chewable tablet 16 g PRN    glucose chewable tablet 24 g PRN    LIDOcaine 5 % patch 1 patch Q24H    melatonin tablet 6 mg Nightly PRN    metoprolol injection 5 mg Q4H PRN    metoprolol tartrate (LOPRESSOR) tablet 50 mg BID    multivitamin tablet Daily    naloxone  0.4 mg/mL injection 0.02 mg PRN    ondansetron injection 4 mg Q8H PRN    senna-docusate 8.6-50 mg per tablet 1 tablet Daily PRN    simethicone chewable tablet 80 mg QID PRN    sodium chloride 0.9% flush 10 mL Q12H PRN    Tdap vaccine injection 0.5 mL vaccine x 1 dose     Facility-Administered Medications Ordered in Other Encounters   Medication Frequency    0.9%  NaCl infusion Continuous    mupirocin 2 % ointment On Call Procedure       Objective:     Vital Signs (Most Recent):  Temp: 98.4 °F (36.9 °C) (02/27/25 1143)  Pulse: 76 (02/27/25 1143)  Resp: 18 (02/27/25 1143)  BP: 113/62 (02/27/25 1143)  SpO2: 97 % (02/27/25 1143) Vital Signs (24h Range):  Temp:  [97.4 °F (36.3 °C)-98.4 °F (36.9 °C)] 98.4 °F (36.9 °C)  Pulse:  [67-94] 76  Resp:  [16-18] 18  SpO2:  [97 %-100 %] 97 %  BP: ()/(51-64) 113/62     Weight: 68.9 kg (151 lb 14.4 oz) (02/24/25 0700)  Body mass index is 21.79 kg/m².  Body surface area is 1.84 meters squared.    I/O last 3 completed shifts:  In: 600 [P.O.:600]  Out: 2225 [Urine:2225]     Physical Exam  Vitals and nursing note reviewed.   Constitutional:       General: He is awake. He is not in acute distress.     Appearance: Normal appearance. He is well-developed.   HENT:      Head: Normocephalic and atraumatic.      Nose: Nose normal.      Mouth/Throat:      Mouth: Mucous membranes are moist.   Eyes:      Extraocular Movements: Extraocular movements intact.      Conjunctiva/sclera: Conjunctivae normal.   Pulmonary:      Effort: Pulmonary effort is normal.   Abdominal:      General: There is no distension.      Palpations: Abdomen is soft.   Musculoskeletal:         General: No tenderness or signs of injury.      Right lower leg: No edema.      Left lower leg: No edema.   Skin:     General: Skin is warm and dry.      Findings: No erythema or rash.   Neurological:      General: No focal deficit present.      Mental Status: He is alert. Mental status is at baseline.   Psychiatric:         Mood  and Affect: Mood normal.         Behavior: Behavior normal.          Significant Labs:  CBC:   Recent Labs   Lab 02/27/25  0433   WBC 10.20   RBC 2.54*   HGB 8.0*   HCT 23.9*      MCV 94   MCH 31.5*   MCHC 33.5     CMP:   Recent Labs   Lab 02/27/25  0433      CALCIUM 8.4*   ALBUMIN 2.3*   PROT 5.7*      K 3.7   CO2 26      *   CREATININE 11.2*   ALKPHOS 39*   ALT 64*   AST 25   BILITOT 0.4     All labs within the past 24 hours have been reviewed.     Significant Imaging:  Labs: Reviewed    Assessment/Plan:     BISI on CKD stage 3b  - baseline Cr 1.7-2.0; followed outpatient by myself  - Cr 3.3 on arrival and peaked at 12.9 on 2/20/25; due to rhabdomyolysis  - s/p HD x 2 hours on 2/20/25; HD has been held since  - Cr 12.8 --> 12.2 --> 11.2 today; improving  - BUN remains elevated but starting to stabilize as well  - agree with hospital discharge today. No need for RRT at this time. Anticipating renal function will continue to improve over the next week or so    Rhabdomyolysis - resolved    Metabolic acidosis - resolved    CKD-MBD  - CCa normal  - phos 5.9 --> 4.4 today; improving  - okay to loosen dietary restrictions    Hypokalemia  - improved s/p replacement    Anemia of CKD  - hgb 8.0; ordered ERIC 20k units SC x1        Thank you for your consult. I will follow-up with patient. Please contact us if you have any additional questions.    Dariela Terrazas MD  Nephrology  [facility] [place] - Telemetry

## 2025-02-27 NOTE — PLAN OF CARE
Problem: Adult Inpatient Plan of Care  Goal: Plan of Care Review  Outcome: Met  Goal: Patient-Specific Goal (Individualized)  Outcome: Met  Goal: Absence of Hospital-Acquired Illness or Injury  Outcome: Met  Goal: Optimal Comfort and Wellbeing  Outcome: Met  Goal: Readiness for Transition of Care  Outcome: Met     Problem: Infection  Goal: Absence of Infection Signs and Symptoms  Outcome: Met     Problem: Acute Kidney Injury/Impairment  Goal: Fluid and Electrolyte Balance  Outcome: Met  Goal: Improved Oral Intake  Outcome: Met  Goal: Effective Renal Function  Outcome: Met     Problem: Fall Injury Risk  Goal: Absence of Fall and Fall-Related Injury  Outcome: Met     Problem: Coping Ineffective  Goal: Effective Coping  Outcome: Met     Problem: Hemodialysis  Goal: Safe, Effective Therapy Delivery  Outcome: Met  Goal: Effective Tissue Perfusion  Outcome: Met  Goal: Absence of Infection Signs and Symptoms  Outcome: Met     Problem: Skin Injury Risk Increased  Goal: Skin Health and Integrity  Outcome: Met

## 2025-02-27 NOTE — SUBJECTIVE & OBJECTIVE
Follow-up For:  Patient Active Problem List    Diagnosis Date Noted    New onset a-fib 02/19/2025    Non-traumatic rhabdomyolysis 02/14/2025    Acute renal failure superimposed on stage 3 chronic kidney disease 02/14/2025    Debility 11/20/2022     Discharge Planning   JULIANA: 2/26/2025   Discharge Plan A: New Nursing Home placement - [facility] care facility   Discharge Delays: None known at this time    Interval History:   Subjective: Antonio Torres Jr. is being followed for Acute renal failure superimposed on stage 3 chronic kidney disease. No acute events overnight. He is awake and alert. No complaints. Pending NH placement.     Symptoms: The patient denies shortness of breath, malaise, cough, chest pain, weakness, headache, chest pressure, anorexia, diarrhea and anxiety.     Diet: Diet Renal On Dialysis. Poor intake. Eating 25% of meals. The patient denies nausea and vomiting.    Last Bowel Movement: 02/27/25    Activity Level: Impaired due to weakness    Ambulation: Continuous assistance    Pain: The patient Denies pain       Data  Details     [x]   Lab results reviewed 2/27/2025 Sr Cr improved. BUN remains high. CBC stable.     []   Micro reports reviewed 2/27/2025     []   Pathology reports reviewed 2/27/2025     []   Imaging reports reviewed 2/27/2025     []   Cardiology Procedure reports reviewed 2/27/2025     []   Non- records/CareEverywhere notes reviewed 2/27/2025      []  Tests/studies orders placed or verified 2/27/2025       []  Independently viewed/assessed 2/27/2025  EKG:  none   Imaging None    []  2/27/2025 Discussion of:      High Risk of M&M from management or Complexity of problems:   Patient has a condition that poses threat to life and bodily function: Acute Renal Failure     Review of Systems   Constitutional:  Negative for chills and fever.   Respiratory:  Negative for cough and shortness of breath.    Cardiovascular:  Negative for chest pain and palpitations.   Gastrointestinal:   Negative for abdominal pain.        Scheduled Meds:   apixaban  2.5 mg Oral BID    ARIPiprazole  20 mg Oral Daily    cetirizine  5 mg Oral Every Mon, Wed, Fri    LIDOcaine  1 patch Transdermal Q24H    metoprolol tartrate  50 mg Oral BID    multivitamin  1 tablet Oral Daily     Continuous Infusions:  PRN Meds:.  Current Facility-Administered Medications:     acetaminophen, 650 mg, Oral, Q4H PRN    albuterol-ipratropium, 3 mL, Nebulization, Q4H PRN    aluminum-magnesium hydroxide-simethicone, 30 mL, Oral, QID PRN    bisacodyL, 10 mg, Rectal, Daily PRN    glucagon (human recombinant), 1 mg, Intramuscular, PRN    glucose, 16 g, Oral, PRN    glucose, 24 g, Oral, PRN    melatonin, 6 mg, Oral, Nightly PRN    metoprolol, 5 mg, Intravenous, Q4H PRN    naloxone, 0.02 mg, Intravenous, PRN    ondansetron, 4 mg, Intravenous, Q8H PRN    senna-docusate 8.6-50 mg, 1 tablet, Oral, Daily PRN    simethicone, 1 tablet, Oral, QID PRN    sodium chloride 0.9%, 10 mL, Intravenous, Q12H PRN    DIPH,PERTUSS(ACELL),TET VACCINE (ADULT)(BOOSTRIX,ADACEL), 0.5 mL, Intramuscular, vaccine x 1 dose      Objective:     Vitals:    02/27/25 0500 02/27/25 0652 02/27/25 0700 02/27/25 0806   BP: 110/64   110/62   BP Location: Left arm   Left arm   Patient Position: Lying   Lying   Pulse: 80 85 88 91   Resp: 16  18 18   Temp: 97.7 °F (36.5 °C)   97.4 °F (36.3 °C)   TempSrc: Oral   Oral   SpO2: 97%  100% 98%   Weight:       Height:           No data found.    Intake/Output Summary (Last 24 hours) at 2/27/2025 0922  Last data filed at 2/27/2025 0546  Gross per 24 hour   Intake 480 ml   Output 1525 ml   Net -1045 ml     Net IO Since Admission: -9,265.65 mL [02/27/25 0922]    Physical Exam  Vitals and nursing note reviewed.   Constitutional:       General: He is awake. He is not in acute distress.     Appearance: Normal appearance. He is well-developed and well-groomed. He is not toxic-appearing.   HENT:      Head: Normocephalic and atraumatic.    Cardiovascular:      Rate and Rhythm: Normal rate.   Pulmonary:      Effort: No tachypnea, accessory muscle usage or respiratory distress.   Abdominal:      General: There is no distension.   Neurological:      Mental Status: He is alert and oriented to person, place, and time. Mental status is at baseline.   Psychiatric:         Mood and Affect: Mood normal.         Behavior: Behavior normal. Behavior is cooperative.         Thought Content: Thought content normal.       Significant Labs: All pertinent labs within the past 24 hours have been reviewed.    BMP (Last 3 Results):   Recent Labs   Lab 02/21/25 0213 02/22/25 0437 02/25/25 0427 02/26/25 0522 02/27/25  0433   GLU 87   < > 102 106 109      < > 143 143 145   K 4.3   < > 3.6 3.4* 3.7      < > 97 99 103   CO2 17*   < > 27 27 26   BUN 76*   < > 123* 138* 140*   CREATININE 11.1*   < > 12.8* 12.2* 11.2*   CALCIUM 8.3*   < > 8.7 8.5* 8.4*   MG 1.9  --   --  1.9 1.8    < > = values in this interval not displayed.     CBC (Last 3 Results):   Recent Labs   Lab 02/25/25 0428 02/26/25 0522 02/27/25  0433   WBC 12.42 12.20 10.20   RBC 3.11* 2.86* 2.54*   HGB 9.5* 8.9* 8.0*   HCT 29.5* 26.6* 23.9*    273 290   MCV 95 93 94   MCH 30.5 31.1* 31.5*   MCHC 32.2 33.5 33.5       Significant Imaging: I have reviewed all pertinent imaging results/findings within the past 24 hours.  X-Ray Chest 1 View  Narrative: EXAMINATION:  XR CHEST 1 VIEW    CLINICAL HISTORY:  trailysis line placement verification;    TECHNIQUE:  Single frontal view of the chest was performed.    COMPARISON:  02/17/2025.    FINDINGS:  Right IJ central venous catheter distal tip is seen over the SVC.  Heart is stable in size.  New mild right basilar atelectasis or opacification/consolidation is seen.  No evidence of pneumothorax or large pleural effusion.  Impression: As above.    Electronically signed by: Олег Vo MD  Date:    02/19/2025  Time:    17:17

## 2025-02-27 NOTE — ASSESSMENT & PLAN NOTE
"No results for input(s): "TROPONINI", "TROPONINIHS" in the last 168 hours.      Chronically elevated  No chest pain    Continue to monitor   "

## 2025-02-27 NOTE — NURSING
[facility] [place]  Nurses Note -- 4 Eyes      2/26/2025      Skin assessed on: Q Shift      [x] No Pressure Injuries Present    [x]Prevention Measures Documented    [] Yes LDA  for Pressure Injury Previously documented     [] Yes New Pressure Injury Discovered   [] LDA for New Pressure Injury Added      Attending RN:  Mega Nolasco RN     Second RN:  RICO Short

## 2025-02-27 NOTE — ASSESSMENT & PLAN NOTE
-Patient pending placement to [facility] nursing home, continue in-hospital care as stated above in the meantime  -More than 20 minutes of my time has been spent discussing and planning just her safe disposition with patient/family/CM/SW/Nursing staff (not including other time spent in clinical discussion and management)  -CM/SW following, appreciate input   -Will place DC orders once placement has been secured  -depending on ongoing goals of care conversation, patient may need hospice set up at the nursing home

## 2025-03-01 NOTE — ASSESSMENT & PLAN NOTE
BISI is likely due to acute tubular necrosis caused by rhabdomyolysis . Baseline creatinine is  1.9 . Most recent creatinine and eGFR are listed below.  Recent Labs     02/27/25  0433   CREATININE 11.2*   EGFRNORACEVR 4*        Plan  - Avoid nephrotoxins and renally dose meds for GFR listed above  - Monitor urine output, serial BMP, and adjust therapy as needed  Appreciate Nephrology.  Nephrology recommending HD.  Trialysis catheter placed and patient started on HD.  on 2/24, kidney function improving. HD stopped on 2/24   -per neprho, kidney function should continue to improve  -pt and family does not want to continue long term HD if needed in the future  -trialysis cath removed  -continue goals of care discussion, palliative Care following, appreciate recommendations

## 2025-03-01 NOTE — ASSESSMENT & PLAN NOTE
-Patient pending placement to [facility] nursing home, continue in-hospital care as stated above in the meantime  -More than 20 minutes of my time has been spent discussing and planning just her safe disposition with patient/family/CM/SW/Nursing staff (not including other time spent in clinical discussion and management)  -CM/SW following, appreciate input   -Will place DC orders once placement has been secured  -depending on ongoing goals of care conversation, patient may need hospice set up at the nursing home

## 2025-03-01 NOTE — DISCHARGE SUMMARY
[facility] Medicine  Discharge Summary      Patient Name: Antonio Torres Jr.  MRN: [id number]  Patient Class: IP- Inpatient  Admission Date: 2/13/2025  Hospital Length of Stay: 13 days  Discharge Date and Time: 2/27/2025  5:52 PM  Attending Physician: Maisha att. providers found   Discharging Provider: Mary Melendez MD  Primary Care Provider: Fabio Lennon MD      HPI:   This is an 80-year-old male with a past medical history of schizophrenia, chronic blepharitis, hyperlipidemia, CKD 3, BPH, who presents with a fall.    Patient presents for evaluation after a fall and a sycopal episode that occurred prior to presentation. He reports losing consciousness and falling face forward on the floor sustaining an injury to his head, elbows, abdomen and knees. He was unable to stand up and was on the floor for about 1.5 hours. Patient reports having a coughing episode a day prior but denies sick contacts. He denied chest pain.     In the ED, the patient was tachycardic (120s > 60s), but otherwise hemodynamically stable.  Labs were remarkable for leukocytosis (25.3), elevated CPK (23,406), elevated creatinine (3.3-baseline around 1.9), elevated troponin (0.525-chronically elevated), elevated lactic acid (4.8 > 2.1), elevated procalcitonin (2.53), elevated LFTs (T bili: 1.1, AST: 267, ALT: 73), hypophosphatemia (1.7), positive COVID-19.  CT head, maxillofacial, cervical spine showed a nondisplaced right nasal bone fracture with no soft tissue swelling, and paranasal sinus disease.  CT chest, abdomen and pelvis showed possible soft tissue contusion at the level of the xiphoid process, a 2-3 mm left lower lobe pulmonary nodule and nonspecific urinary bladder wall thickening.  Right elbow x-ray demonstrated an elbow effusion with an olecranon spur.  Patient was given 2 L of LR, vancomycin, Zosyn, Tylenol 1 g p.o..  He was admitted for further management.    * No surgery found *      Hospital Course:   Mr. Torres  is a 79 yo M admitted after a fall and found to have acute on chronic kidney injury and rhabdomyolysis. CK >20,000 on admit and rising to >40,000. Renal function worsening. On IVF. Also found to be COVID positive. Started on remdesivir. Nephrology consulted. CK improving, though Cr still rising. Almanza is placed for accurate I&D monitoring. Patient went into rapid Afib.  Moved to ICU and started on Amio drip.  Quickly converted back to NSR.  Creat continued to increase and Nephrology recommending HD.  Trialysis catheter placed and patient started on HD.  Amio switched to PO and patient started on Eliquis.  Patient went back into rapid afib/aflutter. Started on Cardizem drip.  Amiodarone stopped because of elevated LFT's and patient started on B blocker.  Seems will be ESRD,nephrology is on case.     Goals of Care Treatment Preferences:  Code Status: DNR          What is most important right now is to focus on avoiding the hospital, remaining as independent as possible, symptom/pain control, quality of life, even if it means sacrificing a little time.  Accordingly, we have decided that the best plan to meet the patient's goals includes continuing with treatment.      Consults:   Consults (From admission, onward)          Status Ordering Provider     Inpatient virtual consult to Hospital Medicine  Once        Provider:  Aura Valdez MD    Completed EFREM HAQUE     Inpatient consult to Pulmonology  Once        Provider:  Bianca Guthrie NP    Completed ROSANA MACDONALD     Inpatient consult to Critical Care Medicine  Once        Provider:  Marlen Mata MD    Completed BIANKA CRUZ     Inpatient consult to Palliative Care  Once        Provider:  Janny Romero NP    Completed BIANKA CRUZ     Inpatient consult to Cardiology  Once        Provider:  Tomas Calix MD    Completed SOFIYA PRECIADO     Inpatient consult to Nephrology  Once        Provider:  Dariela Terrazas MD    Completed  "JIMMY AMADO            Psychiatric  Schizophrenia  Continue Abilify       Cardiac/Vascular  Elevated troponin  No results for input(s): "TROPONINI", "TROPONINIHS" in the last 168 hours.      Chronically elevated  No chest pain    Continue to monitor     Renal/  * Acute renal failure superimposed on stage 3 chronic kidney disease  BISI is likely due to acute tubular necrosis caused by rhabdomyolysis . Baseline creatinine is  1.9 . Most recent creatinine and eGFR are listed below.  Recent Labs     02/27/25  0433   CREATININE 11.2*   EGFRNORACEVR 4*        Plan  - Avoid nephrotoxins and renally dose meds for GFR listed above  - Monitor urine output, serial BMP, and adjust therapy as needed  Appreciate Nephrology.  Nephrology recommending HD.  Trialysis catheter placed and patient started on HD.  on 2/24, kidney function improving. HD stopped on 2/24   -per neprho, kidney function should continue to improve  -pt and family does not want to continue long term HD if needed in the future  -trialysis cath removed  -continue goals of care discussion, palliative Care following, appreciate recommendations    Stage 3b chronic kidney disease  Creatine stable for now. BMP reviewed- noted Estimated Creatinine Clearance: 5 mL/min (A) (based on SCr of 11.2 mg/dL (H)). according to latest data. Based on current GFR, CKD stage is stage 3 - GFR 30-59.  Monitor UOP and serial BMP and adjust therapy as needed. Renally dose meds. Avoid nephrotoxic medications and procedures.  With BISI as above.    ID  COVID-19  No hypoxia, prophylactic remdesivir   Completed course of remdesivir.    GI  Elevated LFTs  Likely in the setting of rhabdomyolysis   Continue to monitor   - slowly improving      Orthopedic  Non-traumatic rhabdomyolysis  Elevated CPK to 48019 in the setting of a prolonged fall   - CK trending to >40k  - with renal failure -- Nephrolgoy consulted  - continue with aggressive hydration  - no evidence of compartment syndrome  - " CK continues to improve, but Creat continued to increase.  Pt was started on HD.  -on 2/24, kidney function improving. HD stopped on 2/24   -per neprho, kidney function should continue to improve  -pt and family does not want to continue long term HD if needed in the future    Other  Debility  -[facility], continue in-hospital care as stated above in the meantime  -More than 20 minutes of my time has been spent discussing and planning just her safe disposition with patient/family/CM/SW/Nursing staff (not including other time spent in clinical discussion and management)  -CM/SW following, appreciate input   -Will place DC orders once placement has been secured  -depending on ongoing goals of care conversation, patient may need hospice set up at the nursing home          Final Active Diagnoses:    Diagnosis Date Noted POA    PRINCIPAL PROBLEM:  Acute renal failure superimposed on stage 3 chronic kidney disease [N17.9, N18.30] 02/14/2025 Yes    New onset a-fib [I48.91] 02/19/2025 No    ACP (advance care planning) [Z71.89] 02/19/2025 Not Applicable    Non-traumatic rhabdomyolysis [M62.82] 02/14/2025 Yes    COVID-19 [U07.1] 02/14/2025 Yes    Elevated troponin [R79.89] 02/14/2025 Yes    Elevated LFTs [R79.89] 04/07/2023 Yes    Debility [R53.81] 11/20/2022 Yes    Stage 3b chronic kidney disease [N18.32] 10/04/2013 Yes    Schizophrenia [F20.9] 07/27/2012 Yes      Problems Resolved During this Admission:    Diagnosis Date Noted Date Resolved POA    Leukocytosis [D72.829] 02/14/2025 02/19/2025 Yes       Discharged Condition: fair    Disposition: correction Nursing Facili* with hospice     Follow Up:   Contact information for follow-up providers       [facility] Follow up.    Contact information:  [address] [facility] [address]  [phone]             Fabio Lennon MD. Go on 3/6/2025.    Specialty: Family Medicine  Why: Appointment scheduled for  8:20am  Contact information:  [address] [address]  [phone]                       Contact information for after-discharge care       Destination       [facility] .    Service: Nursing Home  Contact information:  [address] [facility] [address]  [phone]                                 Patient Instructions:      Diet renal     Notify your health care provider if you experience any of the following:  severe uncontrolled pain     Activity as tolerated       Significant Diagnostic Studies: N/A    Pending Diagnostic Studies:       None           Medications:  Reconciled Home Medications:      Medication List        START taking these medications      apixaban 2.5 mg Tab  Commonly known as: ELIQUIS  Take 1 tablet (2.5 mg total) by mouth 2 (two) times daily.     metoprolol tartrate 50 MG tablet  Commonly known as: LOPRESSOR  Take 1 tablet (50 mg total) by mouth 2 (two) times daily.            CONTINUE taking these medications      ARIPiprazole 20 MG Tab  Commonly known as: ABILIFY  Take 1 tablet (20 mg total) by mouth once daily.     loratadine 10 mg tablet  Commonly known as: CLARITIN  Take 10 mg by mouth once daily.     multivitamin capsule  Take 1 capsule by mouth once daily.              Indwelling Lines/Drains at time of discharge:   Lines/Drains/Airways       None                   Time spent on the discharge of patient: 45 minutes         The attending portion of this evaluation, treatment, and documentation was performed per Mary Melendez MD via Telemedicine AudioVisual using the secure [employer] software platform with 2 way audio/video. The provider was located off-site and the patient is located in the hospital. The aforementioned video software was utilized to document the relevant history and physical exam    Mary Melendez MD  Department of Hospital Medicine  [facility]

## 2025-03-01 NOTE — ASSESSMENT & PLAN NOTE
Elevated CPK to 11006 in the setting of a prolonged fall   - CK trending to >40k  - with renal failure -- Nephrolgoy consulted  - continue with aggressive hydration  - no evidence of compartment syndrome  - CK continues to improve, but Creat continued to increase.  Pt was started on HD.  -on 2/24, kidney function improving. HD stopped on 2/24   -per neprho, kidney function should continue to improve  -pt and family does not want to continue long term HD if needed in the future

## 2025-03-13 ENCOUNTER — TELEPHONE (OUTPATIENT)
Dept: NEPHROLOGY | Facility: CLINIC | Age: 80
End: 2025-03-13
Payer: MEDICARE

## 2025-03-13 NOTE — TELEPHONE ENCOUNTER
Patient's sister contacted in reference of arranging a follow up with Dr. Terrazas. Patient is awaiting approval for hospice, and may not be attending a future appointment. She states she will call to verify this is correct; however, I voiced that is usually true with these appointments. It have been a pleasure assisting with caring for her brother, and encouraged to contact the office if needed.

## 2025-03-15 ENCOUNTER — PATIENT MESSAGE (OUTPATIENT)
Dept: HEMATOLOGY/ONCOLOGY | Facility: CLINIC | Age: 80
End: 2025-03-15
Payer: MEDICARE

## 2025-03-20 ENCOUNTER — HOSPITAL ENCOUNTER (EMERGENCY)
Facility: HOSPITAL | Age: 80
Discharge: HOSPICE | End: 2025-03-20
Attending: EMERGENCY MEDICINE
Payer: MEDICARE

## 2025-03-20 VITALS
WEIGHT: 147 LBS | HEART RATE: 92 BPM | HEIGHT: 71 IN | DIASTOLIC BLOOD PRESSURE: 53 MMHG | TEMPERATURE: 97 F | BODY MASS INDEX: 20.58 KG/M2 | SYSTOLIC BLOOD PRESSURE: 92 MMHG | OXYGEN SATURATION: 100 % | RESPIRATION RATE: 22 BRPM

## 2025-03-20 DIAGNOSIS — J96.90 RESPIRATORY FAILURE, UNSPECIFIED CHRONICITY, UNSPECIFIED WHETHER WITH HYPOXIA OR HYPERCAPNIA: Primary | ICD-10-CM

## 2025-03-20 LAB
ALBUMIN SERPL BCP-MCNC: 3.2 G/DL (ref 3.5–5.2)
ALP SERPL-CCNC: 71 U/L (ref 40–150)
ALT SERPL W/O P-5'-P-CCNC: 21 U/L (ref 10–44)
ANION GAP SERPL CALC-SCNC: 12 MMOL/L (ref 8–16)
AST SERPL-CCNC: 16 U/L (ref 10–40)
BASOPHILS # BLD AUTO: 0.04 K/UL (ref 0–0.2)
BASOPHILS NFR BLD: 0.3 % (ref 0–1.9)
BILIRUB SERPL-MCNC: 0.3 MG/DL (ref 0.1–1)
BUN SERPL-MCNC: 39 MG/DL (ref 8–23)
CALCIUM SERPL-MCNC: 8.9 MG/DL (ref 8.7–10.5)
CHLORIDE SERPL-SCNC: 107 MMOL/L (ref 95–110)
CO2 SERPL-SCNC: 22 MMOL/L (ref 23–29)
CREAT SERPL-MCNC: 3.5 MG/DL (ref 0.5–1.4)
DIFFERENTIAL METHOD BLD: ABNORMAL
EOSINOPHIL # BLD AUTO: 0 K/UL (ref 0–0.5)
EOSINOPHIL NFR BLD: 0.3 % (ref 0–8)
ERYTHROCYTE [DISTWIDTH] IN BLOOD BY AUTOMATED COUNT: 14.5 % (ref 11.5–14.5)
EST. GFR  (NO RACE VARIABLE): 17 ML/MIN/1.73 M^2
GLUCOSE SERPL-MCNC: 200 MG/DL (ref 70–110)
HCT VFR BLD AUTO: 34.5 % (ref 40–54)
HGB BLD-MCNC: 10.9 G/DL (ref 14–18)
IMM GRANULOCYTES # BLD AUTO: 0.06 K/UL (ref 0–0.04)
IMM GRANULOCYTES NFR BLD AUTO: 0.4 % (ref 0–0.5)
LYMPHOCYTES # BLD AUTO: 1.5 K/UL (ref 1–4.8)
LYMPHOCYTES NFR BLD: 10.1 % (ref 18–48)
MCH RBC QN AUTO: 30.7 PG (ref 27–31)
MCHC RBC AUTO-ENTMCNC: 31.6 G/DL (ref 32–36)
MCV RBC AUTO: 97 FL (ref 82–98)
MONOCYTES # BLD AUTO: 0.5 K/UL (ref 0.3–1)
MONOCYTES NFR BLD: 3.1 % (ref 4–15)
NEUTROPHILS # BLD AUTO: 12.4 K/UL (ref 1.8–7.7)
NEUTROPHILS NFR BLD: 85.8 % (ref 38–73)
NRBC BLD-RTO: 0 /100 WBC
PLATELET # BLD AUTO: 347 K/UL (ref 150–450)
PMV BLD AUTO: 9.7 FL (ref 9.2–12.9)
POTASSIUM SERPL-SCNC: 4.1 MMOL/L (ref 3.5–5.1)
PROT SERPL-MCNC: 7.2 G/DL (ref 6–8.4)
RBC # BLD AUTO: 3.55 M/UL (ref 4.6–6.2)
SODIUM SERPL-SCNC: 141 MMOL/L (ref 136–145)
WBC # BLD AUTO: 14.46 K/UL (ref 3.9–12.7)

## 2025-03-20 PROCEDURE — 96374 THER/PROPH/DIAG INJ IV PUSH: CPT

## 2025-03-20 PROCEDURE — 99900035 HC TECH TIME PER 15 MIN (STAT)

## 2025-03-20 PROCEDURE — 27000190 HC CPAP FULL FACE MASK W/VALVE

## 2025-03-20 PROCEDURE — 94660 CPAP INITIATION&MGMT: CPT

## 2025-03-20 PROCEDURE — 99285 EMERGENCY DEPT VISIT HI MDM: CPT | Mod: 25

## 2025-03-20 PROCEDURE — 85025 COMPLETE CBC W/AUTO DIFF WBC: CPT | Performed by: EMERGENCY MEDICINE

## 2025-03-20 PROCEDURE — 80053 COMPREHEN METABOLIC PANEL: CPT | Performed by: EMERGENCY MEDICINE

## 2025-03-20 PROCEDURE — 94799 UNLISTED PULMONARY SVC/PX: CPT

## 2025-03-20 PROCEDURE — 27000221 HC OXYGEN, UP TO 24 HOURS

## 2025-03-20 PROCEDURE — 96375 TX/PRO/DX INJ NEW DRUG ADDON: CPT

## 2025-03-20 PROCEDURE — 63600175 PHARM REV CODE 636 W HCPCS: Performed by: EMERGENCY MEDICINE

## 2025-03-20 RX ORDER — FUROSEMIDE 10 MG/ML
40 INJECTION INTRAMUSCULAR; INTRAVENOUS
Status: COMPLETED | OUTPATIENT
Start: 2025-03-20 | End: 2025-03-20

## 2025-03-20 RX ORDER — METHYLPREDNISOLONE SOD SUCC 125 MG
125 VIAL (EA) INJECTION
Status: COMPLETED | OUTPATIENT
Start: 2025-03-20 | End: 2025-03-20

## 2025-03-20 RX ORDER — DIPHENHYDRAMINE HYDROCHLORIDE 50 MG/ML
25 INJECTION, SOLUTION INTRAMUSCULAR; INTRAVENOUS
Status: COMPLETED | OUTPATIENT
Start: 2025-03-20 | End: 2025-03-20

## 2025-03-20 RX ORDER — MORPHINE SULFATE 4 MG/ML
2 INJECTION, SOLUTION INTRAMUSCULAR; INTRAVENOUS
Status: COMPLETED | OUTPATIENT
Start: 2025-03-20 | End: 2025-03-20

## 2025-03-20 RX ORDER — ONDANSETRON HYDROCHLORIDE 2 MG/ML
4 INJECTION, SOLUTION INTRAVENOUS
Status: COMPLETED | OUTPATIENT
Start: 2025-03-20 | End: 2025-03-20

## 2025-03-20 RX ADMIN — MORPHINE SULFATE 2 MG: 4 INJECTION, SOLUTION INTRAMUSCULAR; INTRAVENOUS at 05:03

## 2025-03-20 RX ADMIN — DIPHENHYDRAMINE HYDROCHLORIDE 25 MG: 50 INJECTION INTRAMUSCULAR; INTRAVENOUS at 05:03

## 2025-03-20 RX ADMIN — ONDANSETRON 4 MG: 2 INJECTION INTRAMUSCULAR; INTRAVENOUS at 05:03

## 2025-03-20 RX ADMIN — METHYLPREDNISOLONE SODIUM SUCCINATE 125 MG: 125 INJECTION, POWDER, FOR SOLUTION INTRAMUSCULAR; INTRAVENOUS at 05:03

## 2025-03-20 RX ADMIN — FUROSEMIDE 40 MG: 10 INJECTION, SOLUTION INTRAVENOUS at 06:03

## 2025-03-20 NOTE — RESPIRATORY THERAPY
Order received for BIPAP.  Patient with nausea and vomiting.  Md aware.  Patient given medication for nausea and placed on BIPAP per md order

## 2025-03-20 NOTE — ED PROVIDER NOTES
Encounter Date: 3/20/2025    SCRIBE #1 NOTE: I, Aixa Shields am scribing for, and in the presence of,  Cara Bran MD. I have scribed the following portions of the note - Other sections scribed: HPI, ROS, PE.       History     Chief Complaint   Patient presents with    Shortness of Breath     BIB [facility] unit 7 from [facility] with c/o SOB and bright red hematemesis that started today. Initial sats at facility per EMS were in the 50s, they applied O2@ 2L NC with little improvement. Arrived to ED on 5L via NC sating in the 70s in obvious respiratory distress. Hx blood thinner use. Pt AAOx4.      Antoino Torres Jr. is a 80 y.o. male, with a PMHx of CKD, atrial fibrillation, anticoagulation use (Eliquis), pulmonary hypertension, rhabdomyolysis, BIB EMS who presents to the ED with shortness of breath onset this morning. EMS state they were called to the patient's nursing facility, [facility], after his shortness of breath began to worsen and he began to have episodes of bright red hematemesis. Upon their arrival, his oxygen saturation was in the 50s and the patient was placed on 2 liters of oxygen via nasal cannula. He was subsequently placed on 5 liters after his oxygen only improved to the 60s. Patient arrived to the ED with an oxygen saturation of 76%. The patient does not normally use supplemental oxygen at home. No other exacerbating or alleviating factors. Denies any chest pain, abdominal pain, other associated symptoms. NKDA. Patient typically ambulates with a walker.     Patient presents with a LaPOST DNR and comfort care documentation. He is currently receiving hospice care from [facility].       The history is provided by the patient, the EMS personnel and medical records. No  was used.     Review of patient's allergies indicates:   Allergen Reactions    Opioids - morphine analogues Rash    Zofran [ondansetron hcl] Rash     Past Medical History:   Diagnosis Date     ACP (advance care planning) 2/19/2025    Chronic kidney disease     Depression     Hematuria     had neg work up with Dr. Stafford    Hypercholesterolemia 03/01/2013    Nephrolithiasis 7/15/2024    Schizophrenia     Skin cancer of scalp     Syncope 04/06/2023    UTI (urinary tract infection), uncomplicated 11/11/2024     Past Surgical History:   Procedure Laterality Date    APPLICATION, GRAFT N/A 07/07/2023    Procedure: APPLICSTAPLER RELOAD TITANIUM 30MMATION, GRAFT, ANTERIOR SCALP;  Surgeon: Mario Montano MD;  Location: [facility] OR;  Service: Plastics;  Laterality: N/A;  application acellular human dermal allograft anterior scalp 2x4cm    EXCISION OF CARCINOMA N/A 07/07/2023    Procedure: EXCISION, CARCINOMA, ANTERIOR SCALP;  Surgeon: Mario Montano MD;  Location: [facility] OR;  Service: Plastics;  Laterality: N/A;  anterior scalp- melanoma----NEED ORDERS OFFICE NOTIFIED    EXCISION-WIDE LOCAL N/A 05/08/2023    Procedure: EXCISION-WIDE LOCAL;  Surgeon: Rhys Lehman MD;  Location: [facility] OR;  Service: General;  Laterality: N/A;  RN PREOP 5/1/23---    HERNIA REPAIR      INCISION AND DRAINAGE, LOWER EXTREMITY Right 06/09/2023    Procedure: INCISION AND DRAINAGE, LOWER EXTREMITY- ANKLE;  Surgeon: Abby Lemus MD;  Location: [facility] OR;  Service: Orthopedics;  Laterality: Right;  Wound vac placement (medium)  RN PREOP 6/8/2023    PLACEMENT OF ACELLULAR HUMAN DERMAL ALLOGRAFT Right 07/07/2023    Procedure: APPLICATION, ACELLULAR HUMAN DERMAL ALLOGRAFT, FOOT;  Surgeon: Mario Montano MD;  Location: [facility] OR;  Service: Plastics;  Laterality: Right;  dermal graft to right dorsal foot wound 6x6 cm---PHONE PREOP DONE-7/6/23    RECONSTRUCTION USING FLAP N/A 05/08/2023    Procedure: RECONSTRUCTION USING FLAP WITH APPLICATION OF DERMAL GRAFT;  Surgeon: Mario Montano MD;  Location: [facility] OR;  Service: Plastics;  Laterality: N/A;  10 X 10 DERMAL GRAFT  NOTIFIED [facility] [facility] ON 5/4/2023 @ 10:48AM. PATIENT WILL BE  INJECTED ON AM OF SURGERY PER DR. NEHA ZAVALA  PT NEEDS TO BE IN [facility] FOR 7AM ON AM OF SURGERY-LO    SKIN CANCER EXCISION       Family History   Problem Relation Name Age of Onset    Alzheimer's disease Mother      Heart disease Father Antonio Torres     Pulmonary embolism Father Antonio Torres     Hypertension Sister Caitlyn Manzo      Social History[1]  Review of Systems   Constitutional:  Negative for chills and fever.   HENT:  Negative for congestion and sore throat.    Eyes:  Negative for visual disturbance.   Respiratory:  Positive for shortness of breath. Negative for cough.    Cardiovascular:  Negative for chest pain.   Gastrointestinal:  Positive for vomiting (bright red hematemesis). Negative for abdominal pain and nausea.   Genitourinary:  Negative for dysuria.   Skin:  Negative for rash.   Neurological:  Negative for headaches.   Psychiatric/Behavioral:  Negative for confusion.        Physical Exam     Initial Vitals [03/20/25 1659]   BP Pulse Resp Temp SpO2   (!) 156/73 (!) 123 (!) 36 -- (!) 79 %      MAP       --         Physical Exam    Nursing note and vitals reviewed.  Constitutional: He appears well-developed and well-nourished. He is not diaphoretic. No distress.   HENT:   Head: Normocephalic and atraumatic. Mouth/Throat: Oropharynx is clear and moist.   Small amount of blood and mucous around nose and mouth.    Eyes: EOM are normal. Pupils are equal, round, and reactive to light.   Neck: Neck supple.   Cardiovascular:  Normal rate and regular rhythm.           Pulmonary/Chest: Accessory muscle usage present. He is in respiratory distress. He has decreased breath sounds (diminished breath sounds to right lobe).   Abdominal: Abdomen is soft. Bowel sounds are normal.   Musculoskeletal:         General: No edema.      Cervical back: Neck supple.     Neurological: He is alert and oriented to person, place, and time.   Skin: Skin is warm and dry.   Psychiatric: He has a normal mood and  affect.         ED Course   Procedures  Labs Reviewed   CBC W/ AUTO DIFFERENTIAL - Abnormal       Result Value    WBC 14.46 (*)     RBC 3.55 (*)     Hemoglobin 10.9 (*)     Hematocrit 34.5 (*)     MCV 97      MCH 30.7      MCHC 31.6 (*)     RDW 14.5      Platelets 347      MPV 9.7      Immature Granulocytes 0.4      Gran # (ANC) 12.4 (*)     Immature Grans (Abs) 0.06 (*)     Lymph # 1.5      Mono # 0.5      Eos # 0.0      Baso # 0.04      nRBC 0      Gran % 85.8 (*)     Lymph % 10.1 (*)     Mono % 3.1 (*)     Eosinophil % 0.3      Basophil % 0.3      Differential Method Automated     COMPREHENSIVE METABOLIC PANEL - Abnormal    Sodium 141      Potassium 4.1      Chloride 107      CO2 22 (*)     Glucose 200 (*)     BUN 39 (*)     Creatinine 3.5 (*)     Calcium 8.9      Total Protein 7.2      Albumin 3.2 (*)     Total Bilirubin 0.3      Alkaline Phosphatase 71      AST 16      ALT 21      eGFR 17 (*)     Anion Gap 12     SARS-COV-2 RDRP GENE          Imaging Results              X-Ray Chest AP Portable (Final result)  Result time 03/20/25 17:35:51      Final result by Олег Vo MD (03/20/25 17:35:51)                   Impression:      As above.      Electronically signed by: Олег Vo MD  Date:    03/20/2025  Time:    17:35               Narrative:    EXAMINATION:  XR CHEST AP PORTABLE    CLINICAL HISTORY:  shortness of breath;    TECHNIQUE:  Single frontal view of the chest was performed.    COMPARISON:  02/19/2025.    FINDINGS:  Cardiac silhouette is normal in size.  Lungs are symmetrically expanded.  There is bilateral perihilar opacity/consolidation.  Perihilar edema or multifocal infectious process/developing pneumonia to be considered in the right clinical setting.  Potential underlying neoplastic process not excluded.  No pneumothorax or significant pleural effusion seen.  No acute osseous abnormality identified.                                       Medications   ondansetron injection 4 mg (4 mg  Intravenous Given 3/20/25 1707)   morphine injection 2 mg (2 mg Intravenous Given 3/20/25 1707)   diphenhydrAMINE injection 25 mg (25 mg Intravenous Given 3/20/25 1720)   methylPREDNISolone sodium succinate injection 125 mg (125 mg Intravenous Given 3/20/25 1720)   furosemide injection 40 mg (40 mg Intravenous Given 3/20/25 1800)     Medical Decision Making  80-year-old male with history of kidney disease, AFib on Eliquis presents to the ED from nursing home for acute respiratory distress and hematemesis.  O2 sats were low, did not improve on nasal cannula or non-rebreather.  I discussed with the patient, he confirms DNR status, does not want to be intubated.  Comfort measures only as indicated on his LaPOST form.  Will give Zofran for nausea, morphine for shortness of breath.  Discussed starting BiPAP to help with shortness of breath.  Patient amenable to this.  Likely needs inpatient hospice.    Amount and/or Complexity of Data Reviewed  Independent Historian: EMS     Details: See HPI.  External Data Reviewed: notes.     Details: See HPI.  Labs: ordered.  Radiology: ordered. Decision-making details documented in ED Course.    Risk  Prescription drug management.            Scribe Attestation:   Scribe #1: I performed the above scribed service and the documentation accurately describes the services I performed. I attest to the accuracy of the note.        ED Course as of 03/20/25 1919   Thu Mar 20, 2025   1711 Patient with redness of right upper arm after receiving morphine and Zofran, he has not received either medication in the past.  No itching. [LH]   1730 Called [facility] for IP admission, advised to notify [facility] first. Called [facility],  [LH]   2488 Called RN at [facility]- states she will call [facility] to see if bed available, otherwise states patient can go back to NH and they have medications to manage patient's symptoms there.  [LH]   1167 [facility] does have a bed for patient. They need my  progress note and order for bipap faxed. Discussed with Case management.  [LH]   1848 [facility] called- confirmed bipap orders, requested covid test, this has been ordered.  [LH]      ED Course User Index  [LH] Cara Bran MD                           Clinical Impression:  Final diagnoses:  [J96.90] Respiratory failure, unspecified chronicity, unspecified whether with hypoxia or hypercapnia (Primary)          ED Disposition Condition    Transfer to Another Facility Stable               I, Cara Bran, personally performed the services described in this documentation. All medical record entries made by the scribe were at my direction and in my presence. I have reviewed the chart and agree that the record reflects my personal performance and is accurate and complete.      DISCLAIMER: This note was prepared with [employer] voice recognition transcription software. Garbled syntax, mangled pronouns, and other bizarre constructions may be attributed to that software system.       Cara Bran MD  03/20/25 1820         Cara Bran MD  03/20/25 1827       [1]   Social History  Tobacco Use    Smoking status: Never     Passive exposure: Never    Smokeless tobacco: Never   Substance Use Topics    Alcohol use: No    Drug use: No        Cara Bran MD  03/20/25 1919

## 2025-03-20 NOTE — ED NOTES
Pt began to have localized skin redness near IV after morphine and zofran. Pt states he has never had either medication. Provider notified, awaiting further orders

## 2025-03-20 NOTE — PLAN OF CARE
MD progress notes and Bipap orders faxed to [facility] [phone]

## 2025-03-21 NOTE — PLAN OF CARE
ADT 30 order placed for [facility] [employer].  Requested  time: 8:00pm  If transportation does not arrive at ETA time nurse will be instructed to follow protocol for transportation below:  How can I get in touch directly with dispatch, if needed?                 Non-emergent (stretcher): [phone]  option 6     ++NURSING:  If St[facility]cher does not arrive at requested time please call the above Non Emergent Dispatcher.  If issue not resolved please escalate to your charge nurse for further instructions.

## 2025-03-21 NOTE — ED NOTES
Report received from [facility] Lopez; pt resting calmly awake in bed connected to continuous cardiac monitoring, BP cuff, & pulse ox with BIPAP in place; pt awaiting transfer to [facility]; pt denies any complaints and states he feels much better than when first arrived; no distress noted

## 2025-08-06 ENCOUNTER — PATIENT MESSAGE (OUTPATIENT)
Dept: FAMILY MEDICINE | Facility: CLINIC | Age: 80
End: 2025-08-06
Payer: MEDICARE

## (undated) DEVICE — APPLICATOR STERILE 3IN

## (undated) DEVICE — GLOVE SURGICAL LATEX SZ 6.5

## (undated) DEVICE — SLEEVE SCD EXPRESS CALF MEDIUM

## (undated) DEVICE — APPLICATOR CHLORAPREP ORN 26ML

## (undated) DEVICE — PAD PREP 50/CA

## (undated) DEVICE — PAD ABDOMINAL STERILE 8X10IN

## (undated) DEVICE — Device

## (undated) DEVICE — BLADE PEAK PLASMA

## (undated) DEVICE — PACK HEAD & NECK

## (undated) DEVICE — SPONGE LAP 18X18 PREWASHED

## (undated) DEVICE — SEE MEDLINE ITEM 154981

## (undated) DEVICE — BLADE SURG STAINLESS STEEL #15

## (undated) DEVICE — ELECTRODE REM PLYHSV RETURN 9

## (undated) DEVICE — SEE MEDLINE ITEM 157110

## (undated) DEVICE — BANDAGE ELAS SOFTWRAP ST 4X5YD

## (undated) DEVICE — CANISTER SUCTION 2 LTR

## (undated) DEVICE — GLOVE SURGICAL LATEX SZ 8

## (undated) DEVICE — SUT VICRYL PLUS 4-0 P3 18IN

## (undated) DEVICE — NDL HYPO REG 25G X 1 1/2

## (undated) DEVICE — TOWEL OR DISP STRL BLUE 4/PK

## (undated) DEVICE — TIP YANKAUERS BULB NO VENT

## (undated) DEVICE — DRAPE ULTRASOUND PROBE HEAD

## (undated) DEVICE — CONTAINER SPECIMEN STRL 4OZ

## (undated) DEVICE — ELECTRODE NEEDLE 1IN

## (undated) DEVICE — DRESSING GAUZE XEROFORM 5X9

## (undated) DEVICE — GLOVE SURG BIOGEL LATEX SZ 7.5

## (undated) DEVICE — CORD FOR BIPOLAR FORCEPS 12

## (undated) DEVICE — SYR LUER LOCK 1CC

## (undated) DEVICE — UNDERGLOVE BIOGEL PI SZ 6.5 LF

## (undated) DEVICE — JELLY LUBRICANT STERILE 4 OZ

## (undated) DEVICE — SYR 10CC LUER LOCK

## (undated) DEVICE — SOL WATER STERILE IRR 500ML

## (undated) DEVICE — STAPLER SKIN ROTATING HEAD

## (undated) DEVICE — COVER OVERHEAD SURG LT BLUE

## (undated) DEVICE — SUT SILK 2-0 PS 18IN BLACK

## (undated) DEVICE — ADHESIVE DERMABOND MINI HV

## (undated) DEVICE — BANDAGE ROLL COTTN 4.5INX4.1YD

## (undated) DEVICE — BLANKET LOWER BODY 55.9X40.2IN

## (undated) DEVICE — SUT VICRYL 3-0 27 SH

## (undated) DEVICE — PACK ARTHROSCOPY W/ISO BAC

## (undated) DEVICE — SOL NACL IRR 1000ML BTL

## (undated) DEVICE — SUT 3/0 27IN COATED VICRYL

## (undated) DEVICE — SKINMARKER & RULER REGULAR X-F

## (undated) DEVICE — SYS LABLNG CORECT MED 4 FLG

## (undated) DEVICE — GLOVE BIOGEL 7.5

## (undated) DEVICE — SUT ETHILON 4-0 PS2 18 BLK

## (undated) DEVICE — PACK ENDOSCOPY GENERAL

## (undated) DEVICE — GLOVE BIOGEL ORTHOPEDIC 8

## (undated) DEVICE — SUT MONOCRYL PLUS UD 3-0 27

## (undated) DEVICE — BLANKET UPPER BODY 78.7X29.9IN